# Patient Record
Sex: FEMALE | Race: WHITE | Employment: FULL TIME | URBAN - METROPOLITAN AREA
[De-identification: names, ages, dates, MRNs, and addresses within clinical notes are randomized per-mention and may not be internally consistent; named-entity substitution may affect disease eponyms.]

---

## 2019-11-25 ENCOUNTER — HOSPITAL ENCOUNTER (INPATIENT)
Age: 54
LOS: 1 days | Discharge: HOME | DRG: 247 | End: 2019-11-27
Attending: EMERGENCY MEDICINE | Admitting: INTERNAL MEDICINE
Payer: COMMERCIAL

## 2019-11-25 ENCOUNTER — APPOINTMENT (OUTPATIENT)
Dept: GENERAL RADIOLOGY | Age: 54
DRG: 247 | End: 2019-11-25
Payer: COMMERCIAL

## 2019-11-25 ENCOUNTER — APPOINTMENT (OUTPATIENT)
Dept: NUCLEAR MEDICINE | Age: 54
DRG: 247 | End: 2019-11-25
Payer: COMMERCIAL

## 2019-11-25 DIAGNOSIS — R07.9 ACUTE CHEST PAIN: Primary | ICD-10-CM

## 2019-11-25 DIAGNOSIS — J81.0 ACUTE PULMONARY EDEMA (HCC): ICD-10-CM

## 2019-11-25 DIAGNOSIS — R73.9 HYPERGLYCEMIA: ICD-10-CM

## 2019-11-25 LAB
ALBUMIN SERPL-MCNC: 4 GM/DL (ref 3.4–5)
ALP BLD-CCNC: 100 IU/L (ref 40–128)
ALT SERPL-CCNC: 23 U/L (ref 10–40)
ANION GAP SERPL CALCULATED.3IONS-SCNC: 13 MMOL/L (ref 4–16)
AST SERPL-CCNC: 19 IU/L (ref 15–37)
BASOPHILS ABSOLUTE: 0.1 K/CU MM
BASOPHILS RELATIVE PERCENT: 0.4 % (ref 0–1)
BILIRUB SERPL-MCNC: 0.2 MG/DL (ref 0–1)
BUN BLDV-MCNC: 14 MG/DL (ref 6–23)
CALCIUM SERPL-MCNC: 9.5 MG/DL (ref 8.3–10.6)
CHLORIDE BLD-SCNC: 90 MMOL/L (ref 99–110)
CHOLESTEROL: 304 MG/DL
CO2: 26 MMOL/L (ref 21–32)
CREAT SERPL-MCNC: 0.6 MG/DL (ref 0.6–1.1)
D DIMER: 202 NG/ML(DDU)
DIFFERENTIAL TYPE: ABNORMAL
EOSINOPHILS ABSOLUTE: 0.2 K/CU MM
EOSINOPHILS RELATIVE PERCENT: 2.1 % (ref 0–3)
ESTIMATED AVERAGE GLUCOSE: 335 MG/DL
GFR AFRICAN AMERICAN: >60 ML/MIN/1.73M2
GFR NON-AFRICAN AMERICAN: >60 ML/MIN/1.73M2
GLUCOSE BLD-MCNC: 319 MG/DL (ref 70–99)
GLUCOSE BLD-MCNC: 329 MG/DL (ref 70–99)
GLUCOSE BLD-MCNC: 343 MG/DL (ref 70–99)
GLUCOSE BLD-MCNC: 358 MG/DL (ref 70–99)
GLUCOSE BLD-MCNC: 509 MG/DL (ref 70–99)
HBA1C MFR BLD: 13.3 % (ref 4.2–6.3)
HCT VFR BLD CALC: 48.2 % (ref 37–47)
HDLC SERPL-MCNC: 30 MG/DL
HEMOGLOBIN: 15.7 GM/DL (ref 12.5–16)
IMMATURE NEUTROPHIL %: 0.4 % (ref 0–0.43)
LDL CHOLESTEROL DIRECT: 191 MG/DL
LV EF: 58 %
LV EF: 60 %
LVEF MODALITY: NORMAL
LVEF MODALITY: NORMAL
LYMPHOCYTES ABSOLUTE: 3.5 K/CU MM
LYMPHOCYTES RELATIVE PERCENT: 30.7 % (ref 24–44)
MCH RBC QN AUTO: 31.1 PG (ref 27–31)
MCHC RBC AUTO-ENTMCNC: 32.6 % (ref 32–36)
MCV RBC AUTO: 95.4 FL (ref 78–100)
MONOCYTES ABSOLUTE: 0.9 K/CU MM
MONOCYTES RELATIVE PERCENT: 7.7 % (ref 0–4)
NUCLEATED RBC %: 0 %
PDW BLD-RTO: 12 % (ref 11.7–14.9)
PLATELET # BLD: 197 K/CU MM (ref 140–440)
PMV BLD AUTO: 10.5 FL (ref 7.5–11.1)
POTASSIUM SERPL-SCNC: 4.3 MMOL/L (ref 3.5–5.1)
PRO-BNP: 52.8 PG/ML
PROCALCITONIN: 0.05
RBC # BLD: 5.05 M/CU MM (ref 4.2–5.4)
REASON FOR REJECTION: NORMAL
REASON FOR REJECTION: NORMAL
REJECTED TEST: NORMAL
SEGMENTED NEUTROPHILS ABSOLUTE COUNT: 6.7 K/CU MM
SEGMENTED NEUTROPHILS RELATIVE PERCENT: 58.7 % (ref 36–66)
SODIUM BLD-SCNC: 129 MMOL/L (ref 135–145)
TOTAL IMMATURE NEUTOROPHIL: 0.04 K/CU MM
TOTAL NUCLEATED RBC: 0 K/CU MM
TOTAL PROTEIN: 7.4 GM/DL (ref 6.4–8.2)
TRIGL SERPL-MCNC: 775 MG/DL
TROPONIN T: 0.03 NG/ML
TROPONIN T: 0.05 NG/ML
TROPONIN T: <0.01 NG/ML
WBC # BLD: 11.4 K/CU MM (ref 4–10.5)

## 2019-11-25 PROCEDURE — 99204 OFFICE O/P NEW MOD 45 MIN: CPT | Performed by: INTERNAL MEDICINE

## 2019-11-25 PROCEDURE — 3430000000 HC RX DIAGNOSTIC RADIOPHARMACEUTICAL: Performed by: INTERNAL MEDICINE

## 2019-11-25 PROCEDURE — 93306 TTE W/DOPPLER COMPLETE: CPT

## 2019-11-25 PROCEDURE — 6370000000 HC RX 637 (ALT 250 FOR IP): Performed by: INTERNAL MEDICINE

## 2019-11-25 PROCEDURE — 96372 THER/PROPH/DIAG INJ SC/IM: CPT

## 2019-11-25 PROCEDURE — 84145 PROCALCITONIN (PCT): CPT

## 2019-11-25 PROCEDURE — 80053 COMPREHEN METABOLIC PANEL: CPT

## 2019-11-25 PROCEDURE — 93010 ELECTROCARDIOGRAM REPORT: CPT | Performed by: INTERNAL MEDICINE

## 2019-11-25 PROCEDURE — 83036 HEMOGLOBIN GLYCOSYLATED A1C: CPT

## 2019-11-25 PROCEDURE — 6360000002 HC RX W HCPCS: Performed by: INTERNAL MEDICINE

## 2019-11-25 PROCEDURE — 78452 HT MUSCLE IMAGE SPECT MULT: CPT

## 2019-11-25 PROCEDURE — 99285 EMERGENCY DEPT VISIT HI MDM: CPT

## 2019-11-25 PROCEDURE — 71045 X-RAY EXAM CHEST 1 VIEW: CPT

## 2019-11-25 PROCEDURE — 85379 FIBRIN DEGRADATION QUANT: CPT

## 2019-11-25 PROCEDURE — 82962 GLUCOSE BLOOD TEST: CPT

## 2019-11-25 PROCEDURE — 83721 ASSAY OF BLOOD LIPOPROTEIN: CPT

## 2019-11-25 PROCEDURE — 84484 ASSAY OF TROPONIN QUANT: CPT

## 2019-11-25 PROCEDURE — 83880 ASSAY OF NATRIURETIC PEPTIDE: CPT

## 2019-11-25 PROCEDURE — G0378 HOSPITAL OBSERVATION PER HR: HCPCS

## 2019-11-25 PROCEDURE — 93017 CV STRESS TEST TRACING ONLY: CPT

## 2019-11-25 PROCEDURE — 94761 N-INVAS EAR/PLS OXIMETRY MLT: CPT

## 2019-11-25 PROCEDURE — 93005 ELECTROCARDIOGRAM TRACING: CPT | Performed by: EMERGENCY MEDICINE

## 2019-11-25 PROCEDURE — 80061 LIPID PANEL: CPT

## 2019-11-25 PROCEDURE — 85025 COMPLETE CBC W/AUTO DIFF WBC: CPT

## 2019-11-25 PROCEDURE — A9500 TC99M SESTAMIBI: HCPCS | Performed by: INTERNAL MEDICINE

## 2019-11-25 PROCEDURE — 36415 COLL VENOUS BLD VENIPUNCTURE: CPT

## 2019-11-25 RX ORDER — NICOTINE POLACRILEX 4 MG
15 LOZENGE BUCCAL PRN
Status: DISCONTINUED | OUTPATIENT
Start: 2019-11-25 | End: 2019-11-27 | Stop reason: HOSPADM

## 2019-11-25 RX ORDER — SODIUM CHLORIDE 0.9 % (FLUSH) 0.9 %
10 SYRINGE (ML) INJECTION EVERY 12 HOURS SCHEDULED
Status: CANCELLED | OUTPATIENT
Start: 2019-11-25

## 2019-11-25 RX ORDER — SODIUM CHLORIDE 9 MG/ML
INJECTION, SOLUTION INTRAVENOUS CONTINUOUS
Status: CANCELLED | OUTPATIENT
Start: 2019-11-25

## 2019-11-25 RX ORDER — DEXTROSE MONOHYDRATE 25 G/50ML
12.5 INJECTION, SOLUTION INTRAVENOUS PRN
Status: DISCONTINUED | OUTPATIENT
Start: 2019-11-25 | End: 2019-11-27 | Stop reason: HOSPADM

## 2019-11-25 RX ORDER — LISINOPRIL 20 MG/1
20 TABLET ORAL DAILY
Status: DISCONTINUED | OUTPATIENT
Start: 2019-11-25 | End: 2019-11-27 | Stop reason: HOSPADM

## 2019-11-25 RX ORDER — INSULIN GLARGINE 100 [IU]/ML
30 INJECTION, SOLUTION SUBCUTANEOUS NIGHTLY
Status: DISCONTINUED | OUTPATIENT
Start: 2019-11-25 | End: 2019-11-25

## 2019-11-25 RX ORDER — SODIUM CHLORIDE 0.9 % (FLUSH) 0.9 %
10 SYRINGE (ML) INJECTION PRN
Status: CANCELLED | OUTPATIENT
Start: 2019-11-25

## 2019-11-25 RX ORDER — DEXTROSE MONOHYDRATE 50 MG/ML
100 INJECTION, SOLUTION INTRAVENOUS PRN
Status: DISCONTINUED | OUTPATIENT
Start: 2019-11-25 | End: 2019-11-27 | Stop reason: HOSPADM

## 2019-11-25 RX ORDER — INSULIN GLARGINE 100 [IU]/ML
40 INJECTION, SOLUTION SUBCUTANEOUS NIGHTLY
Status: DISCONTINUED | OUTPATIENT
Start: 2019-11-25 | End: 2019-11-27

## 2019-11-25 RX ADMIN — INSULIN HUMAN 10 UNITS: 100 INJECTION, SOLUTION PARENTERAL at 03:25

## 2019-11-25 RX ADMIN — Medication 30 MILLICURIE: at 12:50

## 2019-11-25 RX ADMIN — Medication 10 MILLICURIE: at 09:55

## 2019-11-25 RX ADMIN — REGADENOSON 0.4 MG: 0.08 INJECTION, SOLUTION INTRAVENOUS at 12:48

## 2019-11-25 RX ADMIN — ENOXAPARIN SODIUM 40 MG: 40 INJECTION SUBCUTANEOUS at 15:59

## 2019-11-25 RX ADMIN — LISINOPRIL 20 MG: 20 TABLET ORAL at 15:59

## 2019-11-25 RX ADMIN — INSULIN GLARGINE 40 UNITS: 100 INJECTION, SOLUTION SUBCUTANEOUS at 20:32

## 2019-11-25 RX ADMIN — INSULIN LISPRO 4 UNITS: 100 INJECTION, SOLUTION INTRAVENOUS; SUBCUTANEOUS at 08:56

## 2019-11-25 SDOH — HEALTH STABILITY: MENTAL HEALTH: HOW OFTEN DO YOU HAVE A DRINK CONTAINING ALCOHOL?: NEVER

## 2019-11-25 ASSESSMENT — PAIN DESCRIPTION - ORIENTATION: ORIENTATION: MID

## 2019-11-25 ASSESSMENT — PAIN DESCRIPTION - PAIN TYPE: TYPE: ACUTE PAIN

## 2019-11-25 ASSESSMENT — PAIN SCALES - GENERAL
PAINLEVEL_OUTOF10: 7
PAINLEVEL_OUTOF10: 0

## 2019-11-25 ASSESSMENT — PAIN - FUNCTIONAL ASSESSMENT: PAIN_FUNCTIONAL_ASSESSMENT: 0-10

## 2019-11-25 ASSESSMENT — PAIN DESCRIPTION - LOCATION: LOCATION: CHEST

## 2019-11-25 ASSESSMENT — PAIN DESCRIPTION - ONSET: ONSET: ON-GOING

## 2019-11-25 ASSESSMENT — PAIN DESCRIPTION - FREQUENCY: FREQUENCY: CONTINUOUS

## 2019-11-26 ENCOUNTER — APPOINTMENT (OUTPATIENT)
Dept: GENERAL RADIOLOGY | Age: 54
DRG: 247 | End: 2019-11-26
Payer: COMMERCIAL

## 2019-11-26 PROBLEM — Z79.4 TYPE 2 DIABETES MELLITUS WITH CIRCULATORY DISORDER, WITH LONG-TERM CURRENT USE OF INSULIN (HCC): Status: ACTIVE | Noted: 2019-11-26

## 2019-11-26 PROBLEM — E66.01 MORBID OBESITY (HCC): Status: ACTIVE | Noted: 2019-11-26

## 2019-11-26 PROBLEM — I10 ESSENTIAL HYPERTENSION: Status: ACTIVE | Noted: 2019-11-26

## 2019-11-26 PROBLEM — E11.59 TYPE 2 DIABETES MELLITUS WITH CIRCULATORY DISORDER, WITH LONG-TERM CURRENT USE OF INSULIN (HCC): Status: ACTIVE | Noted: 2019-11-26

## 2019-11-26 PROBLEM — E78.5 DYSLIPIDEMIA: Status: ACTIVE | Noted: 2019-11-26

## 2019-11-26 PROBLEM — I20.0 UNSTABLE ANGINA (HCC): Status: ACTIVE | Noted: 2019-11-26

## 2019-11-26 LAB
ACTIVATED CLOTTING TIME, LOW RANGE: 159 SEC
ACTIVATED CLOTTING TIME, LOW RANGE: 198 SEC
ACTIVATED CLOTTING TIME, LOW RANGE: 229 SEC
GLUCOSE BLD-MCNC: 177 MG/DL (ref 70–99)
GLUCOSE BLD-MCNC: 218 MG/DL (ref 70–99)
GLUCOSE BLD-MCNC: 222 MG/DL (ref 70–99)
GLUCOSE BLD-MCNC: 279 MG/DL (ref 70–99)
LV EF: 65 %
LVEF MODALITY: NORMAL
TSH HIGH SENSITIVITY: 3.29 UIU/ML (ref 0.27–4.2)

## 2019-11-26 PROCEDURE — 1200000000 HC SEMI PRIVATE

## 2019-11-26 PROCEDURE — 92929 PR PRQ TRLUML CORONARY STENT W/ANGIO ADDL ART/BRNCH: CPT | Performed by: INTERNAL MEDICINE

## 2019-11-26 PROCEDURE — 6370000000 HC RX 637 (ALT 250 FOR IP): Performed by: INTERNAL MEDICINE

## 2019-11-26 PROCEDURE — 2580000003 HC RX 258: Performed by: INTERNAL MEDICINE

## 2019-11-26 PROCEDURE — G0378 HOSPITAL OBSERVATION PER HR: HCPCS

## 2019-11-26 PROCEDURE — C1760 CLOSURE DEV, VASC: HCPCS

## 2019-11-26 PROCEDURE — C1725 CATH, TRANSLUMIN NON-LASER: HCPCS

## 2019-11-26 PROCEDURE — 027034Z DILATION OF CORONARY ARTERY, ONE ARTERY WITH DRUG-ELUTING INTRALUMINAL DEVICE, PERCUTANEOUS APPROACH: ICD-10-PCS | Performed by: INTERNAL MEDICINE

## 2019-11-26 PROCEDURE — 6370000000 HC RX 637 (ALT 250 FOR IP)

## 2019-11-26 PROCEDURE — 92928 PRQ TCAT PLMT NTRAC ST 1 LES: CPT | Performed by: INTERNAL MEDICINE

## 2019-11-26 PROCEDURE — 6360000002 HC RX W HCPCS: Performed by: INTERNAL MEDICINE

## 2019-11-26 PROCEDURE — 93458 L HRT ARTERY/VENTRICLE ANGIO: CPT | Performed by: INTERNAL MEDICINE

## 2019-11-26 PROCEDURE — C1874 STENT, COATED/COV W/DEL SYS: HCPCS

## 2019-11-26 PROCEDURE — 94761 N-INVAS EAR/PLS OXIMETRY MLT: CPT

## 2019-11-26 PROCEDURE — 6360000002 HC RX W HCPCS

## 2019-11-26 PROCEDURE — 82962 GLUCOSE BLOOD TEST: CPT

## 2019-11-26 PROCEDURE — B2151ZZ FLUOROSCOPY OF LEFT HEART USING LOW OSMOLAR CONTRAST: ICD-10-PCS | Performed by: INTERNAL MEDICINE

## 2019-11-26 PROCEDURE — 84443 ASSAY THYROID STIM HORMONE: CPT

## 2019-11-26 PROCEDURE — 90686 IIV4 VACC NO PRSV 0.5 ML IM: CPT | Performed by: INTERNAL MEDICINE

## 2019-11-26 PROCEDURE — 2709999900 HC NON-CHARGEABLE SUPPLY

## 2019-11-26 PROCEDURE — B2111ZZ FLUOROSCOPY OF MULTIPLE CORONARY ARTERIES USING LOW OSMOLAR CONTRAST: ICD-10-PCS | Performed by: INTERNAL MEDICINE

## 2019-11-26 PROCEDURE — 2500000003 HC RX 250 WO HCPCS

## 2019-11-26 PROCEDURE — C1894 INTRO/SHEATH, NON-LASER: HCPCS

## 2019-11-26 PROCEDURE — C1769 GUIDE WIRE: HCPCS

## 2019-11-26 PROCEDURE — 92928 PRQ TCAT PLMT NTRAC ST 1 LES: CPT

## 2019-11-26 PROCEDURE — 2580000003 HC RX 258

## 2019-11-26 PROCEDURE — 4A023N7 MEASUREMENT OF CARDIAC SAMPLING AND PRESSURE, LEFT HEART, PERCUTANEOUS APPROACH: ICD-10-PCS | Performed by: INTERNAL MEDICINE

## 2019-11-26 PROCEDURE — 6360000004 HC RX CONTRAST MEDICATION

## 2019-11-26 PROCEDURE — G0008 ADMIN INFLUENZA VIRUS VAC: HCPCS | Performed by: INTERNAL MEDICINE

## 2019-11-26 PROCEDURE — C1887 CATHETER, GUIDING: HCPCS

## 2019-11-26 PROCEDURE — 92929 HC PRQ CARD STENT W/ANGIO ADDL: CPT

## 2019-11-26 PROCEDURE — 93458 L HRT ARTERY/VENTRICLE ANGIO: CPT

## 2019-11-26 PROCEDURE — 85347 COAGULATION TIME ACTIVATED: CPT

## 2019-11-26 PROCEDURE — 71046 X-RAY EXAM CHEST 2 VIEWS: CPT

## 2019-11-26 RX ORDER — SODIUM CHLORIDE 9 MG/ML
1000 INJECTION, SOLUTION INTRAVENOUS CONTINUOUS
Status: DISCONTINUED | OUTPATIENT
Start: 2019-11-26 | End: 2019-11-27 | Stop reason: HOSPADM

## 2019-11-26 RX ORDER — ASPIRIN 81 MG/1
81 TABLET ORAL DAILY
Status: DISCONTINUED | OUTPATIENT
Start: 2019-11-27 | End: 2019-11-27 | Stop reason: HOSPADM

## 2019-11-26 RX ORDER — SODIUM CHLORIDE 0.9 % (FLUSH) 0.9 %
10 SYRINGE (ML) INJECTION PRN
Status: DISCONTINUED | OUTPATIENT
Start: 2019-11-26 | End: 2019-11-27 | Stop reason: HOSPADM

## 2019-11-26 RX ORDER — CARVEDILOL 3.12 MG/1
3.12 TABLET ORAL 2 TIMES DAILY WITH MEALS
Status: DISCONTINUED | OUTPATIENT
Start: 2019-11-26 | End: 2019-11-27 | Stop reason: HOSPADM

## 2019-11-26 RX ORDER — ACETAMINOPHEN 325 MG/1
650 TABLET ORAL EVERY 4 HOURS PRN
Status: DISCONTINUED | OUTPATIENT
Start: 2019-11-26 | End: 2019-11-27 | Stop reason: HOSPADM

## 2019-11-26 RX ORDER — ATORVASTATIN CALCIUM 40 MG/1
40 TABLET, FILM COATED ORAL NIGHTLY
Status: DISCONTINUED | OUTPATIENT
Start: 2019-11-26 | End: 2019-11-27 | Stop reason: HOSPADM

## 2019-11-26 RX ORDER — FAMOTIDINE 20 MG/1
20 TABLET, FILM COATED ORAL 2 TIMES DAILY
Status: DISCONTINUED | OUTPATIENT
Start: 2019-11-26 | End: 2019-11-27 | Stop reason: HOSPADM

## 2019-11-26 RX ORDER — PRASUGREL 10 MG/1
10 TABLET, FILM COATED ORAL DAILY
Status: DISCONTINUED | OUTPATIENT
Start: 2019-11-27 | End: 2019-11-27 | Stop reason: HOSPADM

## 2019-11-26 RX ORDER — SODIUM CHLORIDE 0.9 % (FLUSH) 0.9 %
10 SYRINGE (ML) INJECTION EVERY 12 HOURS SCHEDULED
Status: DISCONTINUED | OUTPATIENT
Start: 2019-11-26 | End: 2019-11-27 | Stop reason: HOSPADM

## 2019-11-26 RX ADMIN — CARVEDILOL 3.12 MG: 3.12 TABLET, FILM COATED ORAL at 17:45

## 2019-11-26 RX ADMIN — FAMOTIDINE 20 MG: 20 TABLET ORAL at 23:31

## 2019-11-26 RX ADMIN — INFLUENZA A VIRUS A/MICHIGAN/45/2015 X-275 (H1N1) ANTIGEN (FORMALDEHYDE INACTIVATED), INFLUENZA A VIRUS A/SINGAPORE/INFIMH-16-0019/2016 IVR-186 (H3N2) ANTIGEN (FORMALDEHYDE INACTIVATED), INFLUENZA B VIRUS B/PHUKET/3073/2013 ANTIGEN (FORMALDEHYDE INACTIVATED), AND INFLUENZA B VIRUS B/MARYLAND/15/2016 BX-69A ANTIGEN (FORMALDEHYDE INACTIVATED) 0.5 ML: 15; 15; 15; 15 INJECTION, SUSPENSION INTRAMUSCULAR at 12:22

## 2019-11-26 RX ADMIN — SODIUM CHLORIDE 1000 ML: 9 INJECTION, SOLUTION INTRAVENOUS at 20:01

## 2019-11-26 RX ADMIN — ATORVASTATIN CALCIUM 40 MG: 40 TABLET, FILM COATED ORAL at 23:31

## 2019-11-26 RX ADMIN — LISINOPRIL 20 MG: 20 TABLET ORAL at 12:20

## 2019-11-26 RX ADMIN — LINAGLIPTIN 5 MG: 5 TABLET, FILM COATED ORAL at 12:20

## 2019-11-26 RX ADMIN — INSULIN GLARGINE 40 UNITS: 100 INJECTION, SOLUTION SUBCUTANEOUS at 23:27

## 2019-11-26 ASSESSMENT — PAIN SCALES - GENERAL: PAINLEVEL_OUTOF10: 0

## 2019-11-27 VITALS
WEIGHT: 272.2 LBS | OXYGEN SATURATION: 98 % | RESPIRATION RATE: 20 BRPM | SYSTOLIC BLOOD PRESSURE: 131 MMHG | DIASTOLIC BLOOD PRESSURE: 80 MMHG | BODY MASS INDEX: 42.72 KG/M2 | TEMPERATURE: 98.3 F | HEIGHT: 67 IN | HEART RATE: 76 BPM

## 2019-11-27 LAB
ANION GAP SERPL CALCULATED.3IONS-SCNC: 8 MMOL/L (ref 4–16)
BUN BLDV-MCNC: 11 MG/DL (ref 6–23)
CALCIUM SERPL-MCNC: 9.1 MG/DL (ref 8.3–10.6)
CHLORIDE BLD-SCNC: 100 MMOL/L (ref 99–110)
CO2: 30 MMOL/L (ref 21–32)
CREAT SERPL-MCNC: 0.6 MG/DL (ref 0.6–1.1)
GFR AFRICAN AMERICAN: >60 ML/MIN/1.73M2
GFR NON-AFRICAN AMERICAN: >60 ML/MIN/1.73M2
GLUCOSE BLD-MCNC: 150 MG/DL (ref 70–99)
GLUCOSE BLD-MCNC: 174 MG/DL (ref 70–99)
GLUCOSE BLD-MCNC: 226 MG/DL (ref 70–99)
GLUCOSE BLD-MCNC: 246 MG/DL (ref 70–99)
HCT VFR BLD CALC: 45.8 % (ref 37–47)
HEMOGLOBIN: 14.2 GM/DL (ref 12.5–16)
MCH RBC QN AUTO: 30.5 PG (ref 27–31)
MCHC RBC AUTO-ENTMCNC: 31 % (ref 32–36)
MCV RBC AUTO: 98.3 FL (ref 78–100)
PDW BLD-RTO: 12.4 % (ref 11.7–14.9)
PLATELET # BLD: 168 K/CU MM (ref 140–440)
PMV BLD AUTO: 10 FL (ref 7.5–11.1)
POTASSIUM SERPL-SCNC: 4.8 MMOL/L (ref 3.5–5.1)
RBC # BLD: 4.66 M/CU MM (ref 4.2–5.4)
SODIUM BLD-SCNC: 138 MMOL/L (ref 135–145)
WBC # BLD: 10.5 K/CU MM (ref 4–10.5)

## 2019-11-27 PROCEDURE — 6370000000 HC RX 637 (ALT 250 FOR IP): Performed by: INTERNAL MEDICINE

## 2019-11-27 PROCEDURE — 80048 BASIC METABOLIC PNL TOTAL CA: CPT

## 2019-11-27 PROCEDURE — 94761 N-INVAS EAR/PLS OXIMETRY MLT: CPT

## 2019-11-27 PROCEDURE — 36415 COLL VENOUS BLD VENIPUNCTURE: CPT

## 2019-11-27 PROCEDURE — 85027 COMPLETE CBC AUTOMATED: CPT

## 2019-11-27 PROCEDURE — 99232 SBSQ HOSP IP/OBS MODERATE 35: CPT | Performed by: INTERNAL MEDICINE

## 2019-11-27 PROCEDURE — 82962 GLUCOSE BLOOD TEST: CPT

## 2019-11-27 RX ORDER — LANCETS 30 GAUGE
1 EACH MISCELLANEOUS 4 TIMES DAILY
Qty: 200 EACH | Refills: 2 | Status: SHIPPED | OUTPATIENT
Start: 2019-11-27

## 2019-11-27 RX ORDER — FAMOTIDINE 20 MG/1
20 TABLET, FILM COATED ORAL 2 TIMES DAILY
Qty: 60 TABLET | Refills: 1 | Status: SHIPPED | OUTPATIENT
Start: 2019-11-27 | End: 2020-03-11 | Stop reason: SDUPTHER

## 2019-11-27 RX ORDER — GLUCOSAMINE HCL/CHONDROITIN SU 500-400 MG
CAPSULE ORAL
Qty: 200 STRIP | Refills: 2 | Status: SHIPPED | OUTPATIENT
Start: 2019-11-27

## 2019-11-27 RX ORDER — LISINOPRIL 20 MG/1
20 TABLET ORAL DAILY
Qty: 30 TABLET | Refills: 3 | Status: SHIPPED | OUTPATIENT
Start: 2019-11-28 | End: 2020-03-11 | Stop reason: SDUPTHER

## 2019-11-27 RX ORDER — ASPIRIN 81 MG/1
81 TABLET ORAL DAILY
Qty: 30 TABLET | Refills: 3 | Status: SHIPPED | OUTPATIENT
Start: 2019-11-28 | End: 2020-03-11 | Stop reason: SDUPTHER

## 2019-11-27 RX ORDER — ATORVASTATIN CALCIUM 40 MG/1
40 TABLET, FILM COATED ORAL NIGHTLY
Qty: 30 TABLET | Refills: 3 | Status: SHIPPED | OUTPATIENT
Start: 2019-11-27 | End: 2020-03-11 | Stop reason: SDUPTHER

## 2019-11-27 RX ORDER — PRASUGREL 10 MG/1
10 TABLET, FILM COATED ORAL DAILY
Qty: 30 TABLET | Refills: 3 | Status: SHIPPED | OUTPATIENT
Start: 2019-11-28 | End: 2020-03-11 | Stop reason: SDUPTHER

## 2019-11-27 RX ORDER — CARVEDILOL 3.12 MG/1
3.12 TABLET ORAL 2 TIMES DAILY WITH MEALS
Qty: 60 TABLET | Refills: 3 | Status: SHIPPED | OUTPATIENT
Start: 2019-11-27 | End: 2020-03-11 | Stop reason: SDUPTHER

## 2019-11-27 RX ORDER — PEN NEEDLE, DIABETIC 30 GX3/16"
NEEDLE, DISPOSABLE MISCELLANEOUS
Qty: 150 EACH | Refills: 2 | Status: SHIPPED | OUTPATIENT
Start: 2019-11-27

## 2019-11-27 RX ORDER — INSULIN GLARGINE 100 [IU]/ML
40 INJECTION, SOLUTION SUBCUTANEOUS NIGHTLY
Status: DISCONTINUED | OUTPATIENT
Start: 2019-11-27 | End: 2019-11-27 | Stop reason: HOSPADM

## 2019-11-27 RX ADMIN — PRASUGREL 10 MG: 10 TABLET, FILM COATED ORAL at 08:15

## 2019-11-27 RX ADMIN — FAMOTIDINE 20 MG: 20 TABLET ORAL at 08:17

## 2019-11-27 RX ADMIN — LINAGLIPTIN 5 MG: 5 TABLET, FILM COATED ORAL at 08:15

## 2019-11-27 RX ADMIN — LISINOPRIL 20 MG: 20 TABLET ORAL at 08:17

## 2019-11-27 RX ADMIN — CARVEDILOL 3.12 MG: 3.12 TABLET, FILM COATED ORAL at 08:17

## 2019-11-27 RX ADMIN — ASPIRIN 81 MG: 81 TABLET, COATED ORAL at 08:17

## 2019-11-27 ASSESSMENT — PAIN SCALES - GENERAL
PAINLEVEL_OUTOF10: 0
PAINLEVEL_OUTOF10: 0

## 2019-12-04 LAB
EKG ATRIAL RATE: 128 BPM
EKG DIAGNOSIS: NORMAL
EKG P AXIS: 11 DEGREES
EKG P-R INTERVAL: 170 MS
EKG Q-T INTERVAL: 278 MS
EKG QRS DURATION: 72 MS
EKG QTC CALCULATION (BAZETT): 405 MS
EKG R AXIS: 4 DEGREES
EKG T AXIS: 19 DEGREES
EKG VENTRICULAR RATE: 128 BPM

## 2019-12-10 ENCOUNTER — TELEPHONE (OUTPATIENT)
Dept: CARDIOLOGY CLINIC | Age: 54
End: 2019-12-10

## 2019-12-10 ENCOUNTER — OFFICE VISIT (OUTPATIENT)
Dept: CARDIOLOGY CLINIC | Age: 54
End: 2019-12-10
Payer: COMMERCIAL

## 2019-12-10 VITALS
BODY MASS INDEX: 43.81 KG/M2 | WEIGHT: 272.6 LBS | HEIGHT: 66 IN | SYSTOLIC BLOOD PRESSURE: 134 MMHG | DIASTOLIC BLOOD PRESSURE: 82 MMHG | HEART RATE: 98 BPM

## 2019-12-10 DIAGNOSIS — Z98.61 S/P PTCA (PERCUTANEOUS TRANSLUMINAL CORONARY ANGIOPLASTY): ICD-10-CM

## 2019-12-10 DIAGNOSIS — R00.2 PALPITATIONS: Primary | ICD-10-CM

## 2019-12-10 PROCEDURE — G8482 FLU IMMUNIZE ORDER/ADMIN: HCPCS | Performed by: INTERNAL MEDICINE

## 2019-12-10 PROCEDURE — 3017F COLORECTAL CA SCREEN DOC REV: CPT | Performed by: INTERNAL MEDICINE

## 2019-12-10 PROCEDURE — 99214 OFFICE O/P EST MOD 30 MIN: CPT | Performed by: INTERNAL MEDICINE

## 2019-12-10 PROCEDURE — 1111F DSCHRG MED/CURRENT MED MERGE: CPT | Performed by: INTERNAL MEDICINE

## 2019-12-10 PROCEDURE — G8417 CALC BMI ABV UP PARAM F/U: HCPCS | Performed by: INTERNAL MEDICINE

## 2019-12-10 PROCEDURE — G8427 DOCREV CUR MEDS BY ELIG CLIN: HCPCS | Performed by: INTERNAL MEDICINE

## 2019-12-10 PROCEDURE — G8598 ASA/ANTIPLAT THER USED: HCPCS | Performed by: INTERNAL MEDICINE

## 2019-12-10 PROCEDURE — 93000 ELECTROCARDIOGRAM COMPLETE: CPT | Performed by: INTERNAL MEDICINE

## 2019-12-10 PROCEDURE — 4004F PT TOBACCO SCREEN RCVD TLK: CPT | Performed by: INTERNAL MEDICINE

## 2019-12-11 ENCOUNTER — TELEPHONE (OUTPATIENT)
Dept: CARDIOLOGY CLINIC | Age: 54
End: 2019-12-11

## 2019-12-13 ENCOUNTER — TELEPHONE (OUTPATIENT)
Dept: CARDIOLOGY CLINIC | Age: 54
End: 2019-12-13

## 2019-12-16 ENCOUNTER — TELEPHONE (OUTPATIENT)
Dept: CARDIOLOGY CLINIC | Age: 54
End: 2019-12-16

## 2019-12-18 ENCOUNTER — PROCEDURE VISIT (OUTPATIENT)
Dept: CARDIOLOGY CLINIC | Age: 54
End: 2019-12-18
Payer: COMMERCIAL

## 2019-12-18 VITALS
HEIGHT: 66 IN | HEART RATE: 97 BPM | DIASTOLIC BLOOD PRESSURE: 72 MMHG | BODY MASS INDEX: 43.71 KG/M2 | WEIGHT: 272 LBS | SYSTOLIC BLOOD PRESSURE: 118 MMHG

## 2019-12-18 DIAGNOSIS — R06.02 SOB (SHORTNESS OF BREATH): ICD-10-CM

## 2019-12-18 DIAGNOSIS — Z98.61 S/P PTCA (PERCUTANEOUS TRANSLUMINAL CORONARY ANGIOPLASTY): Primary | ICD-10-CM

## 2019-12-18 DIAGNOSIS — R00.2 PALPITATIONS: ICD-10-CM

## 2019-12-18 PROCEDURE — 93015 CV STRESS TEST SUPVJ I&R: CPT | Performed by: INTERNAL MEDICINE

## 2019-12-23 DIAGNOSIS — R00.2 PALPITATIONS: ICD-10-CM

## 2019-12-23 DIAGNOSIS — Z98.61 S/P PTCA (PERCUTANEOUS TRANSLUMINAL CORONARY ANGIOPLASTY): Primary | ICD-10-CM

## 2020-03-11 ENCOUNTER — OFFICE VISIT (OUTPATIENT)
Dept: CARDIOLOGY CLINIC | Age: 55
End: 2020-03-11
Payer: COMMERCIAL

## 2020-03-11 VITALS
SYSTOLIC BLOOD PRESSURE: 120 MMHG | HEIGHT: 66 IN | BODY MASS INDEX: 44.16 KG/M2 | WEIGHT: 274.8 LBS | HEART RATE: 84 BPM | DIASTOLIC BLOOD PRESSURE: 80 MMHG

## 2020-03-11 PROCEDURE — G8417 CALC BMI ABV UP PARAM F/U: HCPCS | Performed by: INTERNAL MEDICINE

## 2020-03-11 PROCEDURE — G8482 FLU IMMUNIZE ORDER/ADMIN: HCPCS | Performed by: INTERNAL MEDICINE

## 2020-03-11 PROCEDURE — 99214 OFFICE O/P EST MOD 30 MIN: CPT | Performed by: INTERNAL MEDICINE

## 2020-03-11 PROCEDURE — G8427 DOCREV CUR MEDS BY ELIG CLIN: HCPCS | Performed by: INTERNAL MEDICINE

## 2020-03-11 PROCEDURE — 3017F COLORECTAL CA SCREEN DOC REV: CPT | Performed by: INTERNAL MEDICINE

## 2020-03-11 PROCEDURE — 4004F PT TOBACCO SCREEN RCVD TLK: CPT | Performed by: INTERNAL MEDICINE

## 2020-03-11 PROCEDURE — 93000 ELECTROCARDIOGRAM COMPLETE: CPT | Performed by: INTERNAL MEDICINE

## 2020-03-11 RX ORDER — PRASUGREL 10 MG/1
10 TABLET, FILM COATED ORAL DAILY
Qty: 30 TABLET | Refills: 3 | Status: SHIPPED | OUTPATIENT
Start: 2020-03-11 | End: 2020-06-26

## 2020-03-11 RX ORDER — FAMOTIDINE 20 MG/1
20 TABLET, FILM COATED ORAL 2 TIMES DAILY
Qty: 60 TABLET | Refills: 1 | Status: SHIPPED | OUTPATIENT
Start: 2020-03-11 | End: 2021-02-10

## 2020-03-11 RX ORDER — CARVEDILOL 3.12 MG/1
3.12 TABLET ORAL 2 TIMES DAILY WITH MEALS
Qty: 60 TABLET | Refills: 3 | Status: SHIPPED | OUTPATIENT
Start: 2020-03-11 | End: 2020-06-26

## 2020-03-11 RX ORDER — ATORVASTATIN CALCIUM 40 MG/1
40 TABLET, FILM COATED ORAL NIGHTLY
Qty: 30 TABLET | Refills: 3 | Status: SHIPPED | OUTPATIENT
Start: 2020-03-11 | End: 2020-06-26

## 2020-03-11 RX ORDER — ASPIRIN 81 MG/1
81 TABLET ORAL DAILY
Qty: 30 TABLET | Refills: 3 | Status: SHIPPED | OUTPATIENT
Start: 2020-03-11 | End: 2020-06-26

## 2020-03-11 RX ORDER — LISINOPRIL 20 MG/1
20 TABLET ORAL DAILY
Qty: 30 TABLET | Refills: 3 | Status: SHIPPED | OUTPATIENT
Start: 2020-03-11 | End: 2020-06-26

## 2020-03-11 NOTE — PROGRESS NOTES
11/25/2019     Lab Results   Component Value Date    WBC 10.5 11/27/2019    HCT 45.8 11/27/2019    MCV 98.3 11/27/2019     11/27/2019     Lab Results   Component Value Date    CHOL 304 (H) 11/25/2019    TRIG 775 (H) 11/25/2019    HDL 30 (L) 11/25/2019    LDLDIRECT 191 (H) 11/25/2019     Lab Results   Component Value Date    ALT 23 11/25/2019    AST 19 11/25/2019     BMP:    Lab Results   Component Value Date     11/27/2019    K 4.8 11/27/2019     11/27/2019    CO2 30 11/27/2019    BUN 11 11/27/2019    CREATININE 0.6 11/27/2019     CMP:   Lab Results   Component Value Date     11/27/2019    K 4.8 11/27/2019     11/27/2019    CO2 30 11/27/2019    BUN 11 11/27/2019    PROT 7.4 11/25/2019     TSH:    Lab Results   Component Value Date    TSHHS 3.290 11/26/2019       QUALITY MEASURES REVIEWED:  Impression:    1. Palpitations       Patient Active Problem List   Diagnosis Code    Acute chest pain R07.9    Unstable angina (HCC) I20.0    Morbid obesity (HCC) E66.01    Type 2 diabetes mellitus with circulatory disorder, with long-term current use of insulin (HCC) E11.59, Z79.4    Essential hypertension I10    Dyslipidemia E78.5    Post PTCA Z98.61    SOB (shortness of breath) R06.02    CAD (coronary artery disease) I25.10    Palpitations R00.2       Assessment & Plan:               -     CORONARY ARTERY DISEASE:  asymptomatic     All available  tests in chart reviewed. Management discussed . Testing ordered  no                                 -  Hypertension: Patients blood pressure is normal. Patient is advised about low sodium diet. Present medical regimen will not be changed. Mortality from the morbid obesity is very high:                   -  LIPID MANAGEMENT:  Available lipid  lab data reviewed  and patient was given dietary advice. NCEP- ATP III guidelines reviewed with patient. -   Changes  in medicines made:  Isidra Cunningham MA  [facility] [place]

## 2020-06-25 ENCOUNTER — TELEPHONE (OUTPATIENT)
Dept: CARDIOLOGY CLINIC | Age: 55
End: 2020-06-25

## 2020-06-26 NOTE — TELEPHONE ENCOUNTER
I talked to the patient and she thank you for letting her know and she will figure something out about work and this letter.

## 2020-06-29 RX ORDER — CARVEDILOL 3.12 MG/1
3.12 TABLET ORAL 2 TIMES DAILY WITH MEALS
Qty: 60 TABLET | Refills: 3 | Status: SHIPPED | OUTPATIENT
Start: 2020-06-29 | End: 2020-10-23

## 2020-06-29 RX ORDER — ATORVASTATIN CALCIUM 40 MG/1
40 TABLET, FILM COATED ORAL DAILY
Qty: 30 TABLET | Refills: 3 | Status: SHIPPED | OUTPATIENT
Start: 2020-06-29 | End: 2020-10-23 | Stop reason: SDUPTHER

## 2020-06-29 RX ORDER — LISINOPRIL 20 MG/1
20 TABLET ORAL DAILY
Qty: 30 TABLET | Refills: 3 | Status: SHIPPED | OUTPATIENT
Start: 2020-06-29 | End: 2020-10-23 | Stop reason: SDUPTHER

## 2020-06-29 RX ORDER — PRASUGREL 10 MG/1
10 TABLET, FILM COATED ORAL ONCE
Qty: 30 TABLET | Refills: 3 | Status: SHIPPED | OUTPATIENT
Start: 2020-06-29 | End: 2020-10-23 | Stop reason: SDUPTHER

## 2020-06-29 RX ORDER — ASPIRIN 81 MG/1
81 TABLET ORAL DAILY
Qty: 30 TABLET | Refills: 3 | Status: SHIPPED | OUTPATIENT
Start: 2020-06-29 | End: 2020-10-23 | Stop reason: SDUPTHER

## 2020-10-23 ENCOUNTER — OFFICE VISIT (OUTPATIENT)
Dept: CARDIOLOGY CLINIC | Age: 55
End: 2020-10-23
Payer: COMMERCIAL

## 2020-10-23 VITALS
BODY MASS INDEX: 45.3 KG/M2 | SYSTOLIC BLOOD PRESSURE: 170 MMHG | HEART RATE: 78 BPM | HEIGHT: 67 IN | WEIGHT: 288.6 LBS | RESPIRATION RATE: 16 BRPM | DIASTOLIC BLOOD PRESSURE: 102 MMHG

## 2020-10-23 PROCEDURE — 3017F COLORECTAL CA SCREEN DOC REV: CPT | Performed by: NURSE PRACTITIONER

## 2020-10-23 PROCEDURE — G8484 FLU IMMUNIZE NO ADMIN: HCPCS | Performed by: NURSE PRACTITIONER

## 2020-10-23 PROCEDURE — 99214 OFFICE O/P EST MOD 30 MIN: CPT | Performed by: NURSE PRACTITIONER

## 2020-10-23 PROCEDURE — 4004F PT TOBACCO SCREEN RCVD TLK: CPT | Performed by: NURSE PRACTITIONER

## 2020-10-23 PROCEDURE — G8427 DOCREV CUR MEDS BY ELIG CLIN: HCPCS | Performed by: NURSE PRACTITIONER

## 2020-10-23 PROCEDURE — G8417 CALC BMI ABV UP PARAM F/U: HCPCS | Performed by: NURSE PRACTITIONER

## 2020-10-23 RX ORDER — PRASUGREL 10 MG/1
10 TABLET, FILM COATED ORAL ONCE
Qty: 30 TABLET | Refills: 5 | Status: SHIPPED | OUTPATIENT
Start: 2020-10-23 | End: 2020-11-19

## 2020-10-23 RX ORDER — ASPIRIN 81 MG/1
81 TABLET ORAL DAILY
Qty: 30 TABLET | Refills: 5 | Status: SHIPPED | OUTPATIENT
Start: 2020-10-23 | End: 2021-03-29

## 2020-10-23 RX ORDER — LISINOPRIL 20 MG/1
20 TABLET ORAL DAILY
Qty: 30 TABLET | Refills: 5 | Status: SHIPPED | OUTPATIENT
Start: 2020-10-23 | End: 2021-03-30 | Stop reason: SDUPTHER

## 2020-10-23 RX ORDER — CARVEDILOL 3.12 MG/1
3.12 TABLET ORAL 2 TIMES DAILY WITH MEALS
Qty: 60 TABLET | Refills: 3 | Status: CANCELLED | OUTPATIENT
Start: 2020-10-23

## 2020-10-23 RX ORDER — CARVEDILOL 6.25 MG/1
6.25 TABLET ORAL 2 TIMES DAILY WITH MEALS
Qty: 60 TABLET | Refills: 3 | Status: ON HOLD
Start: 2020-10-23 | End: 2021-02-15 | Stop reason: HOSPADM

## 2020-10-23 RX ORDER — ATORVASTATIN CALCIUM 40 MG/1
40 TABLET, FILM COATED ORAL DAILY
Qty: 30 TABLET | Refills: 5 | Status: SHIPPED | OUTPATIENT
Start: 2020-10-23 | End: 2021-03-30 | Stop reason: SDUPTHER

## 2020-10-23 ASSESSMENT — ENCOUNTER SYMPTOMS
HOARSE VOICE: 0
EYE PAIN: 0
ABDOMINAL PAIN: 0
COLOR CHANGE: 0
POOR WOUND HEALING: 0
SHORTNESS OF BREATH: 0

## 2020-10-23 NOTE — PROGRESS NOTES
[place] ([facility]  [address]  Phone: [phone]    Fax [phone]                  Gabriela Vickers MD, Enrrique Canas MD, [address] [facility], MD, MD Brooks Connolly MD Malinda Corral, MD Johnye , APRN               Iesha Haq, APRN    Yessy Mccray, APRN              Francisca Delgado, APRN            10/23/2020    RE: Antonio Poole  (1965)                               TO:  Dr. Mai Pimentel primary care provider on file. The primary cardiologist is Dr. Slim Mederos    CC:   1. Coronary artery disease involving native coronary artery of native heart without angina pectoris    2. Essential hypertension    3. Dyslipidemia    4. Morbid obesity ([address]         HPI:    Thank you for involving me in taking care of your patient Antonio Poole. Antonio Poole is a 50y.o. year old female with a history as listed above and is being seen in the office today. Antonio Poole reports that she is feeling well. There is no chest pain. She denies any SOB. There are no reported palpitations. She denies dizziness. She notes her diabetes has been under good control.   She is following up with Dr. Jeyson Perry      Current Outpatient Medications   Medication Sig Dispense Refill    atorvastatin (LIPITOR) 40 MG tablet Take 1 tablet by mouth daily 30 tablet 3    lisinopril (PRINIVIL;ZESTRIL) 20 MG tablet Take 1 tablet by mouth daily 30 tablet 3    aspirin (ASPIRIN LOW DOSE) 81 MG EC tablet Take 1 tablet by mouth daily 30 tablet 3    carvedilol (COREG) 3.125 MG tablet Take 1 tablet by mouth 2 times daily (with meals) 60 tablet 3    Canagliflozin (INVOKANA PO) Take 200 mg by mouth      famotidine (PEPCID) 20 MG tablet Take 1 tablet by mouth 2 times daily 60 tablet 1    linagliptin (TRADJENTA) 5 MG tablet Take 1 tablet by mouth daily 30 tablet 2    insulin glargine (LANTUS SOLOSTAR) 100 UNIT/ML injection pen Inject 40 Units into the skin daily 5 pen 3    insulin aspart (NOVOLOG FLEXPEN) 100 UNIT/ML injection pen Take 20 units with each meal in addition to sliding scale as below   *Medium Dose Correction Algorithm**Insulin: 3 TIMES DAILY WITH MEALSGlucose: Dose: No [id number] 2 [id number] 4 [id number] 6 [id number] 8 [id number] 10 Fejwa077 and above 12 Units 5 pen 3    blood glucose monitor kit and supplies Test 4 times a day before meals and bedtime& as needed for symptoms of irregular blood glucose. 1 kit 0    Insulin Pen Needle (PEN NEEDLES) 31G X 5 MM MISC lantus QHS and Novolog QAC and  each 2    Lancets MISC 1 each by Does not apply route 4 times daily 200 each 2    blood glucose monitor strips Test 4 times a day & as needed for symptoms of irregular blood glucose. 200 strip 2    prasugrel (EFFIENT) 10 MG TABS Take 1 tablet by mouth once for 1 dose 30 tablet 3     No current facility-administered medications for this visit. Allergies: Ampicillin  Past Medical History:   Diagnosis Date    CAD (coronary artery disease)     Current every day smoker     Diabetes mellitus ([address]     History of exercise stress test 12/18/2019    Treadmill, Normal exercise performance without angina and ischemic EKG changes.  Hyperlipidemia     Hypertension     Morbid obesity ([address]     Palpitations     Post PTCA 11/26/2019    Kissing stent to LAD & to Ostium of Diag.  SOB (shortness of breath)      Past Surgical History:   Procedure Laterality Date    HERNIA REPAIR      PTCA  11/26/2019    Kissing stents to LAD & Ostium of Diag. No family history on file. Social History     Tobacco Use    Smoking status: Current Some Day Smoker     Packs/day: 1.00    Smokeless tobacco: Never Used    Tobacco comment: 1 pack every four days    Substance Use Topics    Alcohol use: Never     Frequency: Never        Review of Systems   Constitution: Positive for weight gain. Negative for diaphoresis and malaise/fatigue.    HENT: Negative for congestion and hoarse voice. Eyes: Negative for pain and visual disturbance. Cardiovascular: Negative for chest pain, dyspnea on exertion, irregular heartbeat, near-syncope, palpitations and paroxysmal nocturnal dyspnea. Respiratory: Negative for shortness of breath. Endocrine: Negative for cold intolerance and heat intolerance. Hematologic/Lymphatic: Negative for adenopathy and bleeding problem. Skin: Negative for color change and poor wound healing. Musculoskeletal: Positive for arthritis. Negative for muscle weakness. Gastrointestinal: Negative for abdominal pain and melena. Genitourinary: Negative for flank pain and hematuria. Neurological: Negative for dizziness and light-headedness. Psychiatric/Behavioral: Negative for depression. The patient is not nervous/anxious. Objective:      Physical Exam:  BP (!) 170/102 (Site: Left Upper Arm, Position: Sitting, Cuff Size: Large Adult)   Pulse 78   Resp 16   Ht 5' 7\" (1.702 m)   Wt 288 lb 9.6 oz (130.9 kg)   BMI 45.20 kg/m²   Wt Readings from Last 3 Encounters:   10/23/20 288 lb 9.6 oz (130.9 kg)   03/11/20 274 lb 12.8 oz (124.6 kg)   12/18/19 272 lb (123.4 kg)     Body mass index is 45.2 kg/m². Physical Exam  Constitutional:       Appearance: Normal appearance. She is obese. HENT:      Head: Normocephalic and atraumatic. Right Ear: External ear normal.      Left Ear: External ear normal.      Nose: Nose normal.      Mouth/Throat:      Mouth: Mucous membranes are moist.   Eyes:      Conjunctiva/sclera: Conjunctivae normal.      Pupils: Pupils are equal, round, and reactive to light. Neck:      Musculoskeletal: Normal range of motion and neck supple. Cardiovascular:      Rate and Rhythm: Normal rate and regular rhythm. Pulses: Normal pulses. Heart sounds: Normal heart sounds. Pulmonary:      Effort: Pulmonary effort is normal.      Breath sounds: Normal breath sounds. No rales.    Chest: Chest wall: No tenderness. Abdominal:      General: There is no distension. Palpations: Abdomen is soft. Tenderness: There is no abdominal tenderness. Musculoskeletal:         General: No tenderness. Right lower leg: No edema. Left lower leg: No edema. Skin:     General: Skin is warm and dry. Capillary Refill: Capillary refill takes less than 2 seconds. Neurological:      General: No focal deficit present. Mental Status: She is alert and oriented to person, place, and time. Psychiatric:         Mood and Affect: Mood normal.         Behavior: Behavior normal.             DATA  BNP:   Lab Results   Component Value Date    PROBNP 52.80 11/25/2019     CBC:   Lab Results   Component Value Date    WBC 10.5 11/27/2019    RBC 4.66 11/27/2019    HGB 14.2 11/27/2019    HCT 45.8 11/27/2019     11/27/2019     CMP:    Lab Results   Component Value Date     11/27/2019    K 4.8 11/27/2019     11/27/2019    CO2 30 11/27/2019    BUN 11 11/27/2019    CREATININE 0.6 11/27/2019    GFRAA >60 11/27/2019    LABGLOM >60 11/27/2019    GLUCOSE 246 11/27/2019    CALCIUM 9.1 11/27/2019     Hepatic Function Panel:    Lab Results   Component Value Date    ALKPHOS 100 11/25/2019    ALT 23 11/25/2019    AST 19 11/25/2019    PROT 7.4 11/25/2019    BILITOT 0.2 11/25/2019    LABALBU 4.0 11/25/2019     Magnesium:  No results found for: MG  PT/INR:  No results found for: PROTIME, INR  Lipids:    Lab Results   Component Value Date    TRIG 775 11/25/2019    HDL 30 11/25/2019    LDLDIRECT 191 11/25/2019     Lab Results   Component Value Date    LABA1C 13.3 (H) 11/25/2019     TSH:  No results found for: TSH      Assessment/ Plan:    Patient seen, interviewed and examined. Testing was reviewed.     1. Coronary artery disease   Three-vessel mild to moderate CAD but ostial diagnosis disease was critical for heart cath November 2019 she underwent ROBIN kissing stent mid LAD bifurcation and ostium of the diagonal  Recommend to continue DAPT please 1 year  Aggressive risk factor modification including blood pressure management, lipid management, diabetes management and weight management. Have encouraged patient to engage in organized exercise, weight loss. Continue with beta-blocker, aspirin, statin, ACE      2. Essential hypertension  Pressure is uncontrolled we will increase carvedilol to 6.25 twice daily  Patient is able to monitor blood pressure at home I have asked patient to track blood pressure and to call in with results in 2 weeks. For further titration of medication if warranted    3. Dyslipidemia  Lipids were not at goal.  Last lipids noted from November 2019  I did give patient a new lab slip to have lipids rechecked she is started on a statin since last lab draw  Goal for HDL 40 or better LDL 70 or less       4. Morbid obesity ([address]  BMI 45.20  Encouraged patient for diet exercise including weight loss      Tobacco abuse patient is still smoking unfortunately. She is requesting Chantix-we will give Chantix once blood pressure is under control - currently we are adjusting medications for blood pressure and lipids             Lifestyle and risk factor modificatons discussed. Various goals are discussed and questions answered. Continue current medications. Appropriate prescriptions are addressed. Questions answered and patient verbalizes understanding.    Office visit in 3  Months

## 2020-10-29 ENCOUNTER — TELEPHONE (OUTPATIENT)
Dept: CARDIOLOGY CLINIC | Age: 55
End: 2020-10-29

## 2020-10-29 RX ORDER — CLOPIDOGREL BISULFATE 75 MG/1
75 TABLET ORAL DAILY
Qty: 30 TABLET | Refills: 5 | Status: ON HOLD
Start: 2020-10-29 | End: 2021-02-15 | Stop reason: HOSPADM

## 2020-10-29 NOTE — TELEPHONE ENCOUNTER
Per Donaldo Kenney, Dr Rafa Pierce agreed to stop Effient at end of November and change to plavix daily.  Patient informed

## 2020-11-02 RX ORDER — PRASUGREL 10 MG/1
TABLET, FILM COATED ORAL
Qty: 30 TABLET | Refills: 2 | OUTPATIENT
Start: 2020-11-02

## 2020-11-02 RX ORDER — CARVEDILOL 3.12 MG/1
TABLET ORAL
Qty: 60 TABLET | Refills: 2 | OUTPATIENT
Start: 2020-11-02

## 2020-11-03 PROBLEM — E78.5 HYPERLIPIDEMIA: Status: RESOLVED | Noted: 2020-11-03 | Resolved: 2020-11-03

## 2020-11-04 RX ORDER — PRASUGREL 10 MG/1
TABLET, FILM COATED ORAL
Qty: 30 TABLET | Refills: 2 | OUTPATIENT
Start: 2020-11-04

## 2020-11-13 RX ORDER — CARVEDILOL 3.12 MG/1
3.12 TABLET ORAL 2 TIMES DAILY
Qty: 60 TABLET | Refills: 2 | OUTPATIENT
Start: 2020-11-13

## 2020-11-13 RX ORDER — PRASUGREL 10 MG/1
TABLET, FILM COATED ORAL
Qty: 30 TABLET | Refills: 2 | OUTPATIENT
Start: 2020-11-13

## 2020-11-19 RX ORDER — PRASUGREL 10 MG/1
10 TABLET, FILM COATED ORAL DAILY
Qty: 30 TABLET | Refills: 2 | Status: ON HOLD | OUTPATIENT
Start: 2020-11-19 | End: 2021-02-14

## 2020-11-19 RX ORDER — CARVEDILOL 3.12 MG/1
3.12 TABLET ORAL 2 TIMES DAILY WITH MEALS
Qty: 60 TABLET | Refills: 2 | Status: SHIPPED | OUTPATIENT
Start: 2020-11-19 | End: 2021-03-30 | Stop reason: ALTCHOICE

## 2021-02-05 RX ORDER — PRASUGREL 10 MG/1
10 TABLET, FILM COATED ORAL DAILY
Qty: 30 TABLET | Refills: 5 | OUTPATIENT
Start: 2021-02-05

## 2021-02-10 ENCOUNTER — HOSPITAL ENCOUNTER (INPATIENT)
Age: 56
LOS: 5 days | Discharge: HOME | DRG: 871 | End: 2021-02-15
Attending: EMERGENCY MEDICINE | Admitting: STUDENT IN AN ORGANIZED HEALTH CARE EDUCATION/TRAINING PROGRAM
Payer: COMMERCIAL

## 2021-02-10 ENCOUNTER — APPOINTMENT (OUTPATIENT)
Dept: GENERAL RADIOLOGY | Age: 56
DRG: 871 | End: 2021-02-10
Payer: COMMERCIAL

## 2021-02-10 ENCOUNTER — APPOINTMENT (OUTPATIENT)
Dept: CT IMAGING | Age: 56
DRG: 871 | End: 2021-02-10
Payer: COMMERCIAL

## 2021-02-10 DIAGNOSIS — R19.7 DIARRHEA, UNSPECIFIED TYPE: Primary | ICD-10-CM

## 2021-02-10 DIAGNOSIS — E87.1 HYPONATREMIA: ICD-10-CM

## 2021-02-10 DIAGNOSIS — E87.29 HIGH ANION GAP METABOLIC ACIDOSIS: ICD-10-CM

## 2021-02-10 LAB
ADENOVIRUS DETECTION BY PCR: NOT DETECTED
ALBUMIN SERPL-MCNC: 3.5 GM/DL (ref 3.4–5)
ALP BLD-CCNC: 111 IU/L (ref 40–128)
ALT SERPL-CCNC: 30 U/L (ref 10–40)
AMMONIA: 29 UMOL/L (ref 11–51)
ANION GAP SERPL CALCULATED.3IONS-SCNC: 18 MMOL/L (ref 4–16)
ANION GAP SERPL CALCULATED.3IONS-SCNC: 18 MMOL/L (ref 4–16)
ANION GAP SERPL CALCULATED.3IONS-SCNC: 22 MMOL/L (ref 4–16)
AST SERPL-CCNC: 42 IU/L (ref 15–37)
BACTERIA: NEGATIVE /HPF
BASOPHILS ABSOLUTE: 0.1 K/CU MM
BASOPHILS RELATIVE PERCENT: 0.4 % (ref 0–1)
BILIRUB SERPL-MCNC: 0.5 MG/DL (ref 0–1)
BILIRUBIN URINE: NEGATIVE MG/DL
BLOOD, URINE: ABNORMAL
BORDETELLA PARAPERTUSSIS BY PCR: NOT DETECTED
BORDETELLA PERTUSSIS PCR: NOT DETECTED
BUN BLDV-MCNC: 18 MG/DL (ref 6–23)
BUN BLDV-MCNC: 23 MG/DL (ref 6–23)
BUN BLDV-MCNC: 26 MG/DL (ref 6–23)
CALCIUM SERPL-MCNC: 7.5 MG/DL (ref 8.3–10.6)
CALCIUM SERPL-MCNC: 7.6 MG/DL (ref 8.3–10.6)
CALCIUM SERPL-MCNC: 8 MG/DL (ref 8.3–10.6)
CHLAMYDOPHILA PNEUMONIA PCR: NOT DETECTED
CHLORIDE BLD-SCNC: 88 MMOL/L (ref 99–110)
CHLORIDE BLD-SCNC: 93 MMOL/L (ref 99–110)
CHLORIDE BLD-SCNC: 93 MMOL/L (ref 99–110)
CLARITY: ABNORMAL
CO2: 12 MMOL/L (ref 21–32)
CO2: 13 MMOL/L (ref 21–32)
CO2: 15 MMOL/L (ref 21–32)
COLOR: YELLOW
CORONAVIRUS 229E PCR: NOT DETECTED
CORONAVIRUS HKU1 PCR: NOT DETECTED
CORONAVIRUS NL63 PCR: NOT DETECTED
CORONAVIRUS OC43 PCR: NOT DETECTED
CREAT SERPL-MCNC: 0.7 MG/DL (ref 0.6–1.1)
CREAT SERPL-MCNC: 0.9 MG/DL (ref 0.6–1.1)
CREAT SERPL-MCNC: 1 MG/DL (ref 0.6–1.1)
DIFFERENTIAL TYPE: ABNORMAL
EOSINOPHILS ABSOLUTE: 0 K/CU MM
EOSINOPHILS RELATIVE PERCENT: 0 % (ref 0–3)
FOLATE: 14.2 NG/ML (ref 3.1–17.5)
GFR AFRICAN AMERICAN: >60 ML/MIN/1.73M2
GFR NON-AFRICAN AMERICAN: 58 ML/MIN/1.73M2
GFR NON-AFRICAN AMERICAN: >60 ML/MIN/1.73M2
GFR NON-AFRICAN AMERICAN: >60 ML/MIN/1.73M2
GLUCOSE BLD-MCNC: 126 MG/DL (ref 70–99)
GLUCOSE BLD-MCNC: 130 MG/DL (ref 70–99)
GLUCOSE BLD-MCNC: 149 MG/DL (ref 70–99)
GLUCOSE BLD-MCNC: 163 MG/DL (ref 70–99)
GLUCOSE BLD-MCNC: 204 MG/DL (ref 70–99)
GLUCOSE BLD-MCNC: 204 MG/DL (ref 70–99)
GLUCOSE BLD-MCNC: 217 MG/DL (ref 70–99)
GLUCOSE BLD-MCNC: 225 MG/DL (ref 70–99)
GLUCOSE, URINE: >500 MG/DL
HCT VFR BLD CALC: 40.5 % (ref 37–47)
HCT VFR BLD CALC: 43.8 % (ref 37–47)
HEMOGLOBIN: 13 GM/DL (ref 12.5–16)
HEMOGLOBIN: 14.1 GM/DL (ref 12.5–16)
HUMAN METAPNEUMOVIRUS PCR: NOT DETECTED
IMMATURE NEUTROPHIL %: 1.2 % (ref 0–0.43)
INFLUENZA A BY PCR: NOT DETECTED
INFLUENZA A H1 (2009) PCR: NOT DETECTED
INFLUENZA A H1 PANDEMIC PCR: NOT DETECTED
INFLUENZA A H3 PCR: NOT DETECTED
INFLUENZA B BY PCR: NOT DETECTED
KETONES, URINE: ABNORMAL MG/DL
LACTATE: 1.1 MMOL/L (ref 0.4–2)
LACTIC ACID, SEPSIS: 1 MMOL/L (ref 0.5–1.9)
LEUKOCYTE ESTERASE, URINE: ABNORMAL
LYMPHOCYTES ABSOLUTE: 2.5 K/CU MM
LYMPHOCYTES RELATIVE PERCENT: 13 % (ref 24–44)
MCH RBC QN AUTO: 31 PG (ref 27–31)
MCH RBC QN AUTO: 31.6 PG (ref 27–31)
MCHC RBC AUTO-ENTMCNC: 32.1 % (ref 32–36)
MCHC RBC AUTO-ENTMCNC: 32.2 % (ref 32–36)
MCV RBC AUTO: 96.4 FL (ref 78–100)
MCV RBC AUTO: 98.2 FL (ref 78–100)
MONOCYTES ABSOLUTE: 2 K/CU MM
MONOCYTES RELATIVE PERCENT: 10 % (ref 0–4)
MYCOPLASMA PNEUMONIAE PCR: NOT DETECTED
NITRITE URINE, QUANTITATIVE: NEGATIVE
NUCLEATED RBC %: 0 %
PARAINFLUENZA 1 PCR: NOT DETECTED
PARAINFLUENZA 2 PCR: NOT DETECTED
PARAINFLUENZA 3 PCR: NOT DETECTED
PARAINFLUENZA 4 PCR: NOT DETECTED
PDW BLD-RTO: 13.6 % (ref 11.7–14.9)
PDW BLD-RTO: 13.6 % (ref 11.7–14.9)
PH, URINE: 5 (ref 5–8)
PLATELET # BLD: 162 K/CU MM (ref 140–440)
PLATELET # BLD: 165 K/CU MM (ref 140–440)
PMV BLD AUTO: 10.3 FL (ref 7.5–11.1)
PMV BLD AUTO: 10.4 FL (ref 7.5–11.1)
POTASSIUM SERPL-SCNC: 3.8 MMOL/L (ref 3.5–5.1)
POTASSIUM SERPL-SCNC: 4 MMOL/L (ref 3.5–5.1)
POTASSIUM SERPL-SCNC: 4.5 MMOL/L (ref 3.5–5.1)
PROCALCITONIN: 1.51
PROTEIN UA: 30 MG/DL
RAPID INFLUENZA  B AGN: NEGATIVE
RAPID INFLUENZA A AGN: NEGATIVE
RBC # BLD: 4.2 M/CU MM (ref 4.2–5.4)
RBC # BLD: 4.46 M/CU MM (ref 4.2–5.4)
RBC URINE: 13 /HPF (ref 0–6)
RHINOVIRUS ENTEROVIRUS PCR: NOT DETECTED
RSV PCR: NOT DETECTED
SARS-COV-2, NAAT: NOT DETECTED
SARS-COV-2: NORMAL
SARS-COV-2: NOT DETECTED
SEGMENTED NEUTROPHILS ABSOLUTE COUNT: 14.7 K/CU MM
SEGMENTED NEUTROPHILS RELATIVE PERCENT: 75.4 % (ref 36–66)
SODIUM BLD-SCNC: 122 MMOL/L (ref 135–145)
SODIUM BLD-SCNC: 124 MMOL/L (ref 135–145)
SODIUM BLD-SCNC: 126 MMOL/L (ref 135–145)
SOURCE: NORMAL
SPECIFIC GRAVITY UA: 1.02 (ref 1–1.03)
SQUAMOUS EPITHELIAL: 3 /HPF
TOTAL IMMATURE NEUTOROPHIL: 0.23 K/CU MM
TOTAL NUCLEATED RBC: 0 K/CU MM
TOTAL PROTEIN: 7.4 GM/DL (ref 6.4–8.2)
TRICHOMONAS: ABNORMAL /HPF
TSH HIGH SENSITIVITY: 1.28 UIU/ML (ref 0.27–4.2)
UNCLASSIFIED CAST: 1 /LPF
UROBILINOGEN, URINE: NEGATIVE MG/DL (ref 0.2–1)
VITAMIN B-12: 1582 PG/ML (ref 211–911)
WBC # BLD: 17.8 K/CU MM (ref 4–10.5)
WBC # BLD: 19.5 K/CU MM (ref 4–10.5)
WBC UA: 11 /HPF (ref 0–5)
YEAST: ABNORMAL /HPF

## 2021-02-10 PROCEDURE — 2140000000 HC CCU INTERMEDIATE R&B

## 2021-02-10 PROCEDURE — 82607 VITAMIN B-12: CPT

## 2021-02-10 PROCEDURE — 82140 ASSAY OF AMMONIA: CPT

## 2021-02-10 PROCEDURE — 0202U NFCT DS 22 TRGT SARS-COV-2: CPT

## 2021-02-10 PROCEDURE — U0002 COVID-19 LAB TEST NON-CDC: HCPCS

## 2021-02-10 PROCEDURE — 6370000000 HC RX 637 (ALT 250 FOR IP): Performed by: INTERNAL MEDICINE

## 2021-02-10 PROCEDURE — 82962 GLUCOSE BLOOD TEST: CPT

## 2021-02-10 PROCEDURE — 85027 COMPLETE CBC AUTOMATED: CPT

## 2021-02-10 PROCEDURE — 80053 COMPREHEN METABOLIC PANEL: CPT

## 2021-02-10 PROCEDURE — 2500000003 HC RX 250 WO HCPCS: Performed by: INTERNAL MEDICINE

## 2021-02-10 PROCEDURE — 99221 1ST HOSP IP/OBS SF/LOW 40: CPT | Performed by: INTERNAL MEDICINE

## 2021-02-10 PROCEDURE — 84145 PROCALCITONIN (PCT): CPT

## 2021-02-10 PROCEDURE — 87040 BLOOD CULTURE FOR BACTERIA: CPT

## 2021-02-10 PROCEDURE — 96374 THER/PROPH/DIAG INJ IV PUSH: CPT

## 2021-02-10 PROCEDURE — 6360000002 HC RX W HCPCS: Performed by: STUDENT IN AN ORGANIZED HEALTH CARE EDUCATION/TRAINING PROGRAM

## 2021-02-10 PROCEDURE — 85025 COMPLETE CBC W/AUTO DIFF WBC: CPT

## 2021-02-10 PROCEDURE — 2580000003 HC RX 258: Performed by: PHYSICIAN ASSISTANT

## 2021-02-10 PROCEDURE — 87804 INFLUENZA ASSAY W/OPTIC: CPT

## 2021-02-10 PROCEDURE — 70450 CT HEAD/BRAIN W/O DYE: CPT

## 2021-02-10 PROCEDURE — 2580000003 HC RX 258: Performed by: INTERNAL MEDICINE

## 2021-02-10 PROCEDURE — 2580000003 HC RX 258: Performed by: EMERGENCY MEDICINE

## 2021-02-10 PROCEDURE — 84443 ASSAY THYROID STIM HORMONE: CPT

## 2021-02-10 PROCEDURE — 99211 OFF/OP EST MAY X REQ PHY/QHP: CPT

## 2021-02-10 PROCEDURE — 81001 URINALYSIS AUTO W/SCOPE: CPT

## 2021-02-10 PROCEDURE — 6360000002 HC RX W HCPCS: Performed by: EMERGENCY MEDICINE

## 2021-02-10 PROCEDURE — 74176 CT ABD & PELVIS W/O CONTRAST: CPT

## 2021-02-10 PROCEDURE — 2580000003 HC RX 258: Performed by: STUDENT IN AN ORGANIZED HEALTH CARE EDUCATION/TRAINING PROGRAM

## 2021-02-10 PROCEDURE — 71045 X-RAY EXAM CHEST 1 VIEW: CPT

## 2021-02-10 PROCEDURE — 6370000000 HC RX 637 (ALT 250 FOR IP): Performed by: STUDENT IN AN ORGANIZED HEALTH CARE EDUCATION/TRAINING PROGRAM

## 2021-02-10 PROCEDURE — 2500000003 HC RX 250 WO HCPCS: Performed by: STUDENT IN AN ORGANIZED HEALTH CARE EDUCATION/TRAINING PROGRAM

## 2021-02-10 PROCEDURE — 96375 TX/PRO/DX INJ NEW DRUG ADDON: CPT

## 2021-02-10 PROCEDURE — 99284 EMERGENCY DEPT VISIT MOD MDM: CPT

## 2021-02-10 PROCEDURE — 83605 ASSAY OF LACTIC ACID: CPT

## 2021-02-10 PROCEDURE — 6360000002 HC RX W HCPCS: Performed by: INTERNAL MEDICINE

## 2021-02-10 PROCEDURE — 82746 ASSAY OF FOLIC ACID SERUM: CPT

## 2021-02-10 PROCEDURE — 51702 INSERT TEMP BLADDER CATH: CPT

## 2021-02-10 PROCEDURE — 80048 BASIC METABOLIC PNL TOTAL CA: CPT

## 2021-02-10 PROCEDURE — 36415 COLL VENOUS BLD VENIPUNCTURE: CPT

## 2021-02-10 PROCEDURE — 6370000000 HC RX 637 (ALT 250 FOR IP): Performed by: EMERGENCY MEDICINE

## 2021-02-10 RX ORDER — SODIUM CHLORIDE 0.9 % (FLUSH) 0.9 %
10 SYRINGE (ML) INJECTION PRN
Status: DISCONTINUED | OUTPATIENT
Start: 2021-02-10 | End: 2021-02-10 | Stop reason: SDUPTHER

## 2021-02-10 RX ORDER — ACETAMINOPHEN 325 MG/1
650 TABLET ORAL ONCE
Status: COMPLETED | OUTPATIENT
Start: 2021-02-10 | End: 2021-02-10

## 2021-02-10 RX ORDER — ASPIRIN 81 MG/1
81 TABLET ORAL DAILY
Status: DISCONTINUED | OUTPATIENT
Start: 2021-02-10 | End: 2021-02-11

## 2021-02-10 RX ORDER — KETOROLAC TROMETHAMINE 30 MG/ML
30 INJECTION, SOLUTION INTRAMUSCULAR; INTRAVENOUS ONCE
Status: COMPLETED | OUTPATIENT
Start: 2021-02-10 | End: 2021-02-10

## 2021-02-10 RX ORDER — ATORVASTATIN CALCIUM 40 MG/1
40 TABLET, FILM COATED ORAL DAILY
Status: DISCONTINUED | OUTPATIENT
Start: 2021-02-10 | End: 2021-02-15 | Stop reason: HOSPADM

## 2021-02-10 RX ORDER — NICOTINE POLACRILEX 4 MG
15 LOZENGE BUCCAL PRN
Status: DISCONTINUED | OUTPATIENT
Start: 2021-02-10 | End: 2021-02-15 | Stop reason: HOSPADM

## 2021-02-10 RX ORDER — SODIUM CHLORIDE 0.9 % (FLUSH) 0.9 %
10 SYRINGE (ML) INJECTION EVERY 12 HOURS SCHEDULED
Status: DISCONTINUED | OUTPATIENT
Start: 2021-02-10 | End: 2021-02-15 | Stop reason: HOSPADM

## 2021-02-10 RX ORDER — 0.9 % SODIUM CHLORIDE 0.9 %
1000 INTRAVENOUS SOLUTION INTRAVENOUS ONCE
Status: COMPLETED | OUTPATIENT
Start: 2021-02-10 | End: 2021-02-10

## 2021-02-10 RX ORDER — ACETAMINOPHEN 325 MG/1
650 TABLET ORAL EVERY 6 HOURS PRN
Status: DISCONTINUED | OUTPATIENT
Start: 2021-02-10 | End: 2021-02-15 | Stop reason: HOSPADM

## 2021-02-10 RX ORDER — GABAPENTIN 300 MG/1
300 CAPSULE ORAL ONCE
Status: COMPLETED | OUTPATIENT
Start: 2021-02-10 | End: 2021-02-10

## 2021-02-10 RX ORDER — CARVEDILOL 3.12 MG/1
3.12 TABLET ORAL 2 TIMES DAILY WITH MEALS
Status: DISCONTINUED | OUTPATIENT
Start: 2021-02-10 | End: 2021-02-15 | Stop reason: HOSPADM

## 2021-02-10 RX ORDER — ACETAMINOPHEN 650 MG/1
650 SUPPOSITORY RECTAL EVERY 6 HOURS PRN
Status: DISCONTINUED | OUTPATIENT
Start: 2021-02-10 | End: 2021-02-15 | Stop reason: HOSPADM

## 2021-02-10 RX ORDER — CLOPIDOGREL BISULFATE 75 MG/1
75 TABLET ORAL DAILY
Status: DISCONTINUED | OUTPATIENT
Start: 2021-02-10 | End: 2021-02-11

## 2021-02-10 RX ORDER — DEXTROSE MONOHYDRATE 25 G/50ML
12.5 INJECTION, SOLUTION INTRAVENOUS PRN
Status: DISCONTINUED | OUTPATIENT
Start: 2021-02-10 | End: 2021-02-15 | Stop reason: HOSPADM

## 2021-02-10 RX ORDER — DEXTROSE MONOHYDRATE 50 MG/ML
100 INJECTION, SOLUTION INTRAVENOUS PRN
Status: DISCONTINUED | OUTPATIENT
Start: 2021-02-10 | End: 2021-02-15 | Stop reason: HOSPADM

## 2021-02-10 RX ORDER — ONDANSETRON 2 MG/ML
8 INJECTION INTRAMUSCULAR; INTRAVENOUS ONCE
Status: COMPLETED | OUTPATIENT
Start: 2021-02-10 | End: 2021-02-10

## 2021-02-10 RX ORDER — POLYETHYLENE GLYCOL 3350 17 G/17G
17 POWDER, FOR SOLUTION ORAL DAILY PRN
Status: DISCONTINUED | OUTPATIENT
Start: 2021-02-10 | End: 2021-02-15 | Stop reason: HOSPADM

## 2021-02-10 RX ORDER — INSULIN GLARGINE 100 [IU]/ML
30 INJECTION, SOLUTION SUBCUTANEOUS DAILY
Status: DISCONTINUED | OUTPATIENT
Start: 2021-02-11 | End: 2021-02-15 | Stop reason: HOSPADM

## 2021-02-10 RX ORDER — GABAPENTIN 300 MG/1
300 CAPSULE ORAL NIGHTLY PRN
Status: DISCONTINUED | OUTPATIENT
Start: 2021-02-10 | End: 2021-02-12

## 2021-02-10 RX ORDER — CANAGLIFLOZIN 300 MG/1
TABLET, FILM COATED ORAL
COMMUNITY
Start: 2020-11-03 | End: 2021-03-23 | Stop reason: SDUPTHER

## 2021-02-10 RX ORDER — ONDANSETRON 2 MG/ML
4 INJECTION INTRAMUSCULAR; INTRAVENOUS EVERY 6 HOURS PRN
Status: DISCONTINUED | OUTPATIENT
Start: 2021-02-10 | End: 2021-02-15 | Stop reason: HOSPADM

## 2021-02-10 RX ORDER — SODIUM CHLORIDE 0.9 % (FLUSH) 0.9 %
10 SYRINGE (ML) INJECTION PRN
Status: DISCONTINUED | OUTPATIENT
Start: 2021-02-10 | End: 2021-02-15 | Stop reason: HOSPADM

## 2021-02-10 RX ORDER — INSULIN GLARGINE 100 [IU]/ML
40 INJECTION, SOLUTION SUBCUTANEOUS DAILY
Status: DISCONTINUED | OUTPATIENT
Start: 2021-02-10 | End: 2021-02-10

## 2021-02-10 RX ORDER — SODIUM CHLORIDE, SODIUM LACTATE, POTASSIUM CHLORIDE, AND CALCIUM CHLORIDE .6; .31; .03; .02 G/100ML; G/100ML; G/100ML; G/100ML
1000 INJECTION, SOLUTION INTRAVENOUS ONCE
Status: COMPLETED | OUTPATIENT
Start: 2021-02-10 | End: 2021-02-10

## 2021-02-10 RX ORDER — LISINOPRIL 20 MG/1
20 TABLET ORAL DAILY
Status: DISCONTINUED | OUTPATIENT
Start: 2021-02-10 | End: 2021-02-10

## 2021-02-10 RX ORDER — SODIUM CHLORIDE 0.9 % (FLUSH) 0.9 %
10 SYRINGE (ML) INJECTION EVERY 12 HOURS SCHEDULED
Status: DISCONTINUED | OUTPATIENT
Start: 2021-02-10 | End: 2021-02-10 | Stop reason: SDUPTHER

## 2021-02-10 RX ORDER — PRASUGREL 10 MG/1
10 TABLET, FILM COATED ORAL DAILY
Status: DISCONTINUED | OUTPATIENT
Start: 2021-02-10 | End: 2021-02-11

## 2021-02-10 RX ORDER — PROMETHAZINE HYDROCHLORIDE 25 MG/1
12.5 TABLET ORAL EVERY 6 HOURS PRN
Status: DISCONTINUED | OUTPATIENT
Start: 2021-02-10 | End: 2021-02-10

## 2021-02-10 RX ADMIN — CARVEDILOL 3.12 MG: 3.12 TABLET, FILM COATED ORAL at 18:14

## 2021-02-10 RX ADMIN — KETOROLAC TROMETHAMINE 30 MG: 30 INJECTION, SOLUTION INTRAMUSCULAR; INTRAVENOUS at 01:24

## 2021-02-10 RX ADMIN — GABAPENTIN 300 MG: 300 CAPSULE ORAL at 20:58

## 2021-02-10 RX ADMIN — LISINOPRIL 20 MG: 20 TABLET ORAL at 08:19

## 2021-02-10 RX ADMIN — FLUCONAZOLE 150 MG: 200 INJECTION, SOLUTION INTRAVENOUS at 20:59

## 2021-02-10 RX ADMIN — SODIUM CHLORIDE, PRESERVATIVE FREE 10 ML: 5 INJECTION INTRAVENOUS at 21:17

## 2021-02-10 RX ADMIN — ACETAMINOPHEN 650 MG: 325 TABLET ORAL at 20:58

## 2021-02-10 RX ADMIN — ONDANSETRON 8 MG: 2 INJECTION INTRAMUSCULAR; INTRAVENOUS at 01:24

## 2021-02-10 RX ADMIN — ASPIRIN 81 MG: 81 TABLET, COATED ORAL at 08:19

## 2021-02-10 RX ADMIN — VANCOMYCIN HYDROCHLORIDE 1750 MG: 5 INJECTION, POWDER, LYOPHILIZED, FOR SOLUTION INTRAVENOUS at 20:58

## 2021-02-10 RX ADMIN — ATORVASTATIN CALCIUM 40 MG: 40 TABLET, FILM COATED ORAL at 08:19

## 2021-02-10 RX ADMIN — SODIUM BICARBONATE: 84 INJECTION, SOLUTION INTRAVENOUS at 08:18

## 2021-02-10 RX ADMIN — PRASUGREL 10 MG: 10 TABLET, FILM COATED ORAL at 08:19

## 2021-02-10 RX ADMIN — MICONAZOLE NITRATE: 2 POWDER TOPICAL at 21:01

## 2021-02-10 RX ADMIN — SODIUM CHLORIDE, POTASSIUM CHLORIDE, SODIUM LACTATE AND CALCIUM CHLORIDE 1000 ML: 600; 310; 30; 20 INJECTION, SOLUTION INTRAVENOUS at 05:03

## 2021-02-10 RX ADMIN — INSULIN LISPRO 20 UNITS: 100 INJECTION, SOLUTION INTRAVENOUS; SUBCUTANEOUS at 12:49

## 2021-02-10 RX ADMIN — CARVEDILOL 3.12 MG: 3.12 TABLET, FILM COATED ORAL at 08:19

## 2021-02-10 RX ADMIN — SODIUM BICARBONATE: 84 INJECTION, SOLUTION INTRAVENOUS at 21:18

## 2021-02-10 RX ADMIN — CEFEPIME 2000 MG: 2 INJECTION, POWDER, FOR SOLUTION INTRAVENOUS at 20:58

## 2021-02-10 RX ADMIN — MICONAZOLE NITRATE: 2 POWDER TOPICAL at 12:49

## 2021-02-10 RX ADMIN — SODIUM CHLORIDE, PRESERVATIVE FREE 10 ML: 5 INJECTION INTRAVENOUS at 08:19

## 2021-02-10 RX ADMIN — SODIUM CHLORIDE 1000 ML: 9 INJECTION, SOLUTION INTRAVENOUS at 01:23

## 2021-02-10 RX ADMIN — ACETAMINOPHEN 650 MG: 325 TABLET ORAL at 01:24

## 2021-02-10 RX ADMIN — SODIUM CHLORIDE 1000 ML: 9 INJECTION, SOLUTION INTRAVENOUS at 02:55

## 2021-02-10 RX ADMIN — INSULIN GLARGINE 40 UNITS: 100 INJECTION, SOLUTION SUBCUTANEOUS at 08:20

## 2021-02-10 RX ADMIN — CLOPIDOGREL BISULFATE 75 MG: 75 TABLET ORAL at 08:19

## 2021-02-10 RX ADMIN — ENOXAPARIN SODIUM 40 MG: 40 INJECTION SUBCUTANEOUS at 08:20

## 2021-02-10 RX ADMIN — ACETAMINOPHEN 650 MG: 325 TABLET ORAL at 08:19

## 2021-02-10 ASSESSMENT — PAIN DESCRIPTION - PAIN TYPE: TYPE: ACUTE PAIN

## 2021-02-10 ASSESSMENT — PAIN SCALES - GENERAL
PAINLEVEL_OUTOF10: 0
PAINLEVEL_OUTOF10: 6
PAINLEVEL_OUTOF10: 0

## 2021-02-10 ASSESSMENT — PAIN - FUNCTIONAL ASSESSMENT: PAIN_FUNCTIONAL_ASSESSMENT: PREVENTS OR INTERFERES SOME ACTIVE ACTIVITIES AND ADLS

## 2021-02-10 ASSESSMENT — PAIN DESCRIPTION - LOCATION: LOCATION: KNEE;LEG;ELBOW

## 2021-02-10 ASSESSMENT — PAIN DESCRIPTION - DESCRIPTORS: DESCRIPTORS: ACHING

## 2021-02-10 NOTE — PLAN OF CARE
Problem: Pain:  Goal: Pain level will decrease  Description: Pain level will decrease  Outcome: Ongoing  Goal: Control of acute pain  Description: Control of acute pain  Outcome: Ongoing  Goal: Control of chronic pain  Description: Control of chronic pain  Outcome: Ongoing     Problem: Fluid Volume:  Goal: Ability to achieve a balanced intake and output will improve  Description: Ability to achieve a balanced intake and output will improve  Outcome: Ongoing     Problem: Physical Regulation:  Goal: Ability to maintain clinical measurements within normal limits will improve  Description: Ability to maintain clinical measurements within normal limits will improve  Outcome: Ongoing  Goal: Will show no signs and symptoms of electrolyte imbalance  Description: Will show no signs and symptoms of electrolyte imbalance  Outcome: Ongoing     Problem: Fluid Volume - Imbalance:  Goal: Absence of imbalanced fluid volume signs and symptoms  Description: Absence of imbalanced fluid volume signs and symptoms  Outcome: Ongoing

## 2021-02-10 NOTE — ED TRIAGE NOTES
Pt. Presents to the ER via EMS from home. Pt. States that she \"feels horrible. \" Pt. States she is having severe body aches and exhaustion. Pt. States she is unable to walk. Pt. States she has no appetite.

## 2021-02-10 NOTE — PROGRESS NOTES
Home med list inaccurate. Asked RN to update        Dr. Joetta Severance.  Cornell Dears, [place]  Internal Medicine Hospitalist  [facility]

## 2021-02-10 NOTE — PROGRESS NOTES
Spoke with Dr. Tamica Hauser in regard to concern for diabetes insipidus; Dr. Chris Plummer consulted.

## 2021-02-10 NOTE — PROGRESS NOTES
Hospitalist Progress Note      Name:  Niocle May /Age/Sex: 1965  (50 y.o. female)   MRN & CSN:  [id number] & [id number] Admission Date/Time: 2/10/2021 12:50 AM   Location:  [facility], [id number] PCP: No primary care provider on file. Hospital Day: 1      Assessment and Plan:     Sepsis  -Suspect source is skin/soft tissue infection left groin    Left groin/osiel area anterior intertrigo with suspected superimposed bacterial infection  -IV Diflucan  -IV cefepime/vancomycin for suspected bacterial SSTI  -Infectious disease consult  -De-escalate antibiotics as appropriate  -Wound care notified me of suspected hidradenitis to the perineal/posterior thighs-this will need follow-up  -Nursing staff indicated copious drainage from the left groin/osiel area, and having to change her dressings multiple times today, and also reported that the patient was at times screaming in pain due to discomfort in that area during care    Diarrhea  -Check C. Difficile    Encephalopathy-suspect related to sepsis  -CT head negative  -Still confused as of 2/10    Hyponatremia  -Consult nephrology    Metabolic acidosis with elevated anion gap  -Nephrology consult    Diabetes mellitus type 2  -Last available A1c was 13.3 in 2019        Other problems    Tobacco use  -Encourage cessation    Coronary disease status post stents-continue DAPT    Body mass index is 40.22 kg/m². Consistent with morbid obesity    Dyslipidemia        Subjective:     2-9    Chief Complaint   Patient presents with    Fatigue    Fever     She is confused today. She is not very articulate when letting me know why she is here    She did state after questioning about her  called the squad at her request.  She could not tell me why other than she could not walk. Objective:        Intake/Output Summary (Last 24 hours) at 2/10/2021 1711  Last data filed at 2/10/2021 0814  Gross per 24 hour   Intake 937.04 ml   Output --   Net 937.04 ml      Vitals: Vitals:    02/10/21 1248   BP: 114/60   Pulse: 106   Resp: 22   Temp: 98.9 °F (37.2 °C)   SpO2: 97%     Physical Exam:       Abdomen-extensive redness in the left groin and osiel area noted.   Exam was done in the presence of a nursing technician    Lungs/respiratory effort nonlabored at rest    Neurologic-she is confused-cranial nerves II through XII are intact  Medications:   Medications:    aspirin  81 mg Oral Daily    atorvastatin  40 mg Oral Daily    carvedilol  3.125 mg Oral BID WC    clopidogrel  75 mg Oral Daily    insulin glargine  40 Units Subcutaneous Daily    insulin lispro  20 Units Subcutaneous TID WC    lisinopril  20 mg Oral Daily    prasugrel  10 mg Oral Daily    sodium chloride flush  10 mL Intravenous 2 times per day    enoxaparin  40 mg Subcutaneous Daily    miconazole   Topical BID      Infusions:    dextrose       PRN Meds:     sodium chloride flush, 10 mL, PRN      promethazine, 12.5 mg, Q6H PRN    Or      ondansetron, 4 mg, Q6H PRN      polyethylene glycol, 17 g, Daily PRN      acetaminophen, 650 mg, Q6H PRN    Or      acetaminophen, 650 mg, Q6H PRN      glucose, 15 g, PRN      dextrose, 12.5 g, PRN      glucagon (rDNA), 1 mg, PRN      dextrose, 100 mL/hr, PRN          Electronically signed by Myron Campos MD on 2/10/2021 at 5:11 PM

## 2021-02-10 NOTE — ED NOTES
Spoke with Kandice BAJWA on the phone. Will begin LR bolus. Kandice Almazan notified she will change bed to step down bed.      Tonya Hancock  02/10/21 0500

## 2021-02-10 NOTE — H&P
History and Physical      Name:  Bindu Gomez /Age/Sex: 1965  (50 y.o. female)   MRN & CSN:  [id number] & [id number] Admission Date/Time: 2/10/2021 12:50 AM   Location:  ED[facility] PCP: No primary care provider on file. Hospital Day: 1    Assessment and Plan:   Bindu Gomez is a 50 y.o.  female  who presents with:     Intractable diarrhea, concern for c diff   Dehydration and metabolic acidosis secondary to diarrhea   Hyponatremia, consistent with dehydration   Acute metabolic acidosis with gap of 22    IVF, bicarb gtt overnight  Pending cultures  Wait on antibiotics as this does not seem infectious at this time    Diet No diet orders on file   DVT Prophylaxis [] Lovenox, []  Heparin, [] SCDs, [] No VTE prophylaxis, patient ambulating   GI Prophylaxis [] PPI, [] H2 Blocker, [] No GI prophylaxis, patient is receiving diet/Tube Feeds   Code Status Prior   Disposition Patient requires continued admission due to ivf and labs in AM    Dc plan per CM   MDM [] Low, [] Moderate,[x]  High  Patient's risk as above due to     [] One or more chronic illnesses with severe exacerbation or progression    [] Acute or chronic illnesses or injuries that pose a threat to life or bodily function    [x] An abrupt change in neurological status    [] Decision not to resuscitate     [] Drug therapy requiring intensive monitoring for toxicity      History of Present Illness:     Chief Complaint:   Bindu Gomez is a 50 y.o.  female  who presents with generalized weakness and diarrhea. A/w poor po intake and nausea. Patient reports having onset of symptoms for the last week or so but it was significantly worse today. She presented due to the weakness via EMS. No radiation of the symptoms. Denies having experienced this before. Recently tested for covid due to her fatigue but was found to be negative. Ed found her to be severely dehydrated and acidotic.  She was stabilized and admitted to the medicine team. 10-14 point ROS reviewed negative, unless as noted above    Objective:   No intake or output data in the 24 hours ending 02/10/21 0241   Vitals:   Vitals:    02/10/21 0101   BP: 109/63   Pulse:    Resp:    Temp:    SpO2:      Physical Exam:    GEN Awake female, laying in bed in no apparent distress. Appears given age. EYES Pupils are equally round. No scleral discharge  HENT Atraumatic and symmetric head  NECK No apparent thyromegaly  RESP Symmetric chest movement while on room air. CARDIO/VASC Peripheral pulses equal bilaterally and palpable. No peripheral edema. GI Abdomen is not distended. Rectal exam deferred. Central obesity    Azul catheter is not present. HEME/LYMPH No petechiae or ecchymoses. MSK Spontaneous movement of BL upper extremities  SKIN Normal coloration, warm, dry. NEURO Cranial nerves appear grossly intact  PSYCH Awake, alert. Oriented. Past Medical History: PMHx:   Past Medical History:   Diagnosis Date    CAD (coronary artery disease)     Current every day smoker     Diabetes mellitus ([address]     History of exercise stress test 12/18/2019    Treadmill, Normal exercise performance without angina and ischemic EKG changes.  Hyperlipidemia     Hypertension     Morbid obesity ([address]     Palpitations     Post PTCA 11/26/2019    Kissing stent to LAD & to Ostium of Diag.  SOB (shortness of breath)      PSHx:  has a past surgical history that includes hernia repair and Percutaneous Transluminal Coronary Angio (11/26/2019). Allergies: Allergies   Allergen Reactions    Ampicillin Rash     Home Medications:   Prior to Admission medications    Medication Sig Start Date End Date Taking?  Authorizing Provider   prasugrel (EFFIENT) 10 MG TABS Take 1 tablet by mouth daily 11/19/20   Patricia Capone MD   carvedilol (COREG) 3.125 MG tablet Take 1 tablet by mouth 2 times daily (with meals) 11/19/20   Patricia Capone MD   clopidogrel (PLAVIX) 75 MG tablet Take 1 tablet by mouth daily 10/29/20   Kofi Les, APRN - CNP   atorvastatin (LIPITOR) 40 MG tablet Take 1 tablet by mouth daily 10/23/20   Kofi Les, APRN - CNP   aspirin (ASPIRIN LOW DOSE) 81 MG EC tablet Take 1 tablet by mouth daily 10/23/20   Kofi Les, APRN - CNP   lisinopril (PRINIVIL;ZESTRIL) 20 MG tablet Take 1 tablet by mouth daily 10/23/20   Kofi Les, APRN - CNP   carvedilol (COREG) 6.25 MG tablet Take 1 tablet by mouth 2 times daily (with meals) 10/23/20   Kofi Les, APRN - CNP   Canagliflozin (INVOKANA PO) Take 200 mg by mouth    Historical Provider, MD   linagliptin (TRADJENTA) 5 MG tablet Take 1 tablet by mouth daily 11/28/19   Cora Whyte MD   insulin glargine (LANTUS SOLOSTAR) 100 UNIT/ML injection pen Inject 40 Units into the skin daily 11/27/19   Cora Whyte MD   insulin aspart (NOVOLOG FLEXPEN) 100 UNIT/ML injection pen Take 20 units with each meal in addition to sliding scale as below   *Medium Dose Correction Algorithm**Insulin: 3 TIMES DAILY WITH MEALSGlucose: Dose: No [id number] 2 [id number] 4 [id number] 6 Ynaek863-043 8 [id number] 10 Gieeq611 and above 12 Units 11/27/19   Cora Whyte MD   blood glucose monitor kit and supplies Test 4 times a day before meals and bedtime& as needed for symptoms of irregular blood glucose. 11/27/19   Nigel Bobby MD   Insulin Pen Needle (PEN NEEDLES) 31G X 5 MM MISC lantus QHS and Novolog QAC and HS 11/27/19   Cora Whyte MD   Lancets MISC 1 each by Does not apply route 4 times daily 11/27/19   Cora Whyte MD   blood glucose monitor strips Test 4 times a day & as needed for symptoms of irregular blood glucose.  11/27/19   Cora Whyte MD     FHx: reviewed and is noncontributory to hospitalization   SHx:   Social History     Socioeconomic History    Marital status:      Spouse name: None    Number of children: None    Years of education: None    Highest education level: None   Occupational History    None   Social Needs  Financial resource strain: None    Food insecurity     Worry: None     Inability: None    Transportation needs     Medical: None     Non-medical: None   Tobacco Use    Smoking status: Current Some Day Smoker     Packs/day: 1.00    Smokeless tobacco: Never Used    Tobacco comment: 1 pack every four days    Substance and Sexual Activity    Alcohol use: Never     Frequency: Never    Drug use: Never    Sexual activity: Yes     Partners: Male, Female   Lifestyle    Physical activity     Days per week: None     Minutes per session: None    Stress: None   Relationships    Social connections     Talks on phone: None     Gets together: None     Attends Rastafari service: None     Active member of club or organization: None     Attends meetings of clubs or organizations: None     Relationship status: None    Intimate partner violence     Fear of current or ex partner: None     Emotionally abused: None     Physically abused: None     Forced sexual activity: None   Other Topics Concern    None   Social History Narrative    None       Medications:   Medications:    Infusions:   PRN Meds:       Electronically signed by Kristofer Ayon DO on 2/10/2021 at 2:41 AM

## 2021-02-10 NOTE — ED PROVIDER NOTES
use: Never    Sexual activity: Yes     Partners: Male, Female   Lifestyle    Physical activity     Days per week: Not on file     Minutes per session: Not on file    Stress: Not on file   Relationships    Social connections     Talks on phone: Not on file     Gets together: Not on file     Attends [employer] service: Not on file     Active member of club or organization: Not on file     Attends meetings of clubs or organizations: Not on file     Relationship status: Not on file    Intimate partner violence     Fear of current or ex partner: Not on file     Emotionally abused: Not on file     Physically abused: Not on file     Forced sexual activity: Not on file   Other Topics Concern    Not on file   Social History Narrative    Not on file     No current facility-administered medications for this encounter.       Current Outpatient Medications   Medication Sig Dispense Refill    prasugrel (EFFIENT) 10 MG TABS Take 1 tablet by mouth daily 30 tablet 2    carvedilol (COREG) 3.125 MG tablet Take 1 tablet by mouth 2 times daily (with meals) 60 tablet 2    clopidogrel (PLAVIX) 75 MG tablet Take 1 tablet by mouth daily 30 tablet 5    atorvastatin (LIPITOR) 40 MG tablet Take 1 tablet by mouth daily 30 tablet 5    aspirin (ASPIRIN LOW DOSE) 81 MG EC tablet Take 1 tablet by mouth daily 30 tablet 5    lisinopril (PRINIVIL;ZESTRIL) 20 MG tablet Take 1 tablet by mouth daily 30 tablet 5    carvedilol (COREG) 6.25 MG tablet Take 1 tablet by mouth 2 times daily (with meals) 60 tablet 3    Canagliflozin (INVOKANA PO) Take 200 mg by mouth      linagliptin (TRADJENTA) 5 MG tablet Take 1 tablet by mouth daily 30 tablet 2    insulin glargine (LANTUS SOLOSTAR) 100 UNIT/ML injection pen Inject 40 Units into the skin daily 5 pen 3    insulin aspart (NOVOLOG FLEXPEN) 100 UNIT/ML injection pen Take 20 units with each meal in addition to sliding scale as below   *Medium Dose Correction Algorithm**Insulin: 3 TIMES DAILY WITH MEALSGlucose: Dose: No [id number] 2 [id number] 4 [id number] 6 Legcd523-117 8 Okdah177-969 10 Izply660 and above 12 Units 5 pen 3    blood glucose monitor kit and supplies Test 4 times a day before meals and bedtime& as needed for symptoms of irregular blood glucose. 1 kit 0    Insulin Pen Needle (PEN NEEDLES) 31G X 5 MM MISC lantus QHS and Novolog QAC and  each 2    Lancets MISC 1 each by Does not apply route 4 times daily 200 each 2    blood glucose monitor strips Test 4 times a day & as needed for symptoms of irregular blood glucose. 200 strip 2     Allergies   Allergen Reactions    Ampicillin Rash       Nursing Notes Reviewed    Physical Exam:  ED Triage Vitals   Enc Vitals Group      BP 02/10/21 0101 109/63      Pulse 02/10/21 0056 100      Resp 02/10/21 0056 22      Temp 02/10/21 0059 98.5 °F (36.9 °C)      Temp Source 02/10/21 0059 Oral      SpO2 02/10/21 0056 99 %      Weight 02/10/21 0056 240 lb (108.9 kg)      Height 02/10/21 0056 5' 6\" (1.676 m)      Head Circumference --       Peak Flow --       Pain Score --       Pain Loc --       Pain Edu? --       Excl. in [address]? --      GENERAL APPEARANCE: Awake and alert. Cooperative. No acute distress. Appears fatigued. HEAD: Normocephalic. Atraumatic. EYES: EOM's grossly intact. Sclera anicteric. ENT: Mucous membranes are moist. Tolerates saliva. No trismus. NECK: No meningismus. HEART:  Extremities pink  LUNGS: Respirations unlabored. Even chest rise bilaterally  ABDOMEN: Non distended. EXTREMITIES: No acute deformities. SKIN: Dry. NEUROLOGICAL: No gross facial drooping. Moves all 4 extremities spontaneously. PSYCHIATRIC: Normal mood.     I have reviewed and interpreted all of the currently available lab results from this visit (if applicable):  Results for orders placed or performed during the hospital encounter of 02/10/21   Rapid Flu Swab    Specimen: Nasopharyngeal   Result Value Ref Range    Rapid Influenza A Ag NEGATIVE NEGATIVE    Rapid Influenza B Ag NEGATIVE NEGATIVE   CBC Auto Differential   Result Value Ref Range    WBC 19.5 (H) 4.0 - 10.5 K/CU MM    RBC 4.46 4.2 - 5.4 M/CU MM    Hemoglobin 14.1 12.5 - 16.0 GM/DL    Hematocrit 43.8 37 - 47 %    MCV 98.2 78 - 100 FL    MCH 31.6 (H) 27 - 31 PG    MCHC 32.2 32.0 - 36.0 %    RDW 13.6 11.7 - 14.9 %    Platelets 375 638 - 856 K/CU MM    MPV 10.4 7.5 - 11.1 FL    Differential Type AUTOMATED DIFFERENTIAL     Segs Relative 75.4 (H) 36 - 66 %    Lymphocytes % 13.0 (L) 24 - 44 %    Monocytes % 10.0 (H) 0 - 4 %    Eosinophils % 0.0 0 - 3 %    Basophils % 0.4 0 - 1 %    Segs Absolute 14.7 K/CU MM    Lymphocytes Absolute 2.5 K/CU MM    Monocytes Absolute 2.0 K/CU MM    Eosinophils Absolute 0.0 K/CU MM    Basophils Absolute 0.1 K/CU MM    Nucleated RBC % 0.0 %    Total Nucleated RBC 0.0 K/CU MM    Total Immature Neutrophil 0.23 K/CU MM    Immature Neutrophil % 1.2 (H) 0 - 0.43 %   Comprehensive Metabolic Panel w/ Reflex to MG   Result Value Ref Range    Sodium 122 (L) 135 - 145 MMOL/L    Potassium 4.5 3.5 - 5.1 MMOL/L    Chloride 88 (L) 99 - 110 mMol/L    CO2 12 (L) 21 - 32 MMOL/L    BUN 23 6 - 23 MG/DL    CREATININE 1.0 0.6 - 1.1 MG/DL    Glucose 204 (H) 70 - 99 MG/DL    Calcium 8.0 (L) 8.3 - 10.6 MG/DL    Albumin 3.5 3.4 - 5.0 GM/DL    Total Protein 7.4 6.4 - 8.2 GM/DL    Total Bilirubin 0.5 0.0 - 1.0 MG/DL    ALT 30 10 - 40 U/L    AST 42 (H) 15 - 37 IU/L    Alkaline Phosphatase 111 40 - 128 IU/L    GFR Non- 58 (L) >60 mL/min/1.73m2    GFR African American >60 >60 mL/min/1.73m2    Anion Gap 22 (H) 4 - 16      Radiographs (if obtained):  [] The following radiograph was interpreted by myself in the absence of a radiologist:  [] Radiologist's Report Reviewed:    EKG (if obtained): (All EKG's are interpreted by myself in the absence of a cardiologist)    MDM:  Plan of care is discussed thoroughly with the patient and family if present.   If performed, all imaging and lab work also discussed with patient. All relevant prior results and chart reviewed if available. Patient presents as above. She is in no acute distress but appears fatigued. Vital signs are normal.  She denies any cardiopulmonary symptoms. Overall presentation most consistent with likely viral syndrome. Patient given Tylenol, Zofran, Toradol and IV fluids here for symptom control. Plan to evaluate for any electrolyte abnormalities. Rapid flu swab is sent. Patient recently tested negative for Covid however could be false negative. Patient states that she feels somewhat better on reevaluation. Metabolic work-up significant for anion gap acidosis and hyponatremia which I feel is likely secondary to sodium/bicarb losses from diarrhea. Blood cultures, lactate, chest x-ray, urinalysis also ordered to complete work-up. Patient did become hypotensive here responded to further IV fluid resuscitation. She is admitted to the hospitalist for further management. Clinical Impression:  1. Diarrhea, unspecified type    2. Hyponatremia    3.  High anion gap metabolic acidosis      (Please note that portions of this note may have been completed with a voice recognition program. Efforts were made to edit the dictations but occasionally words are mis-transcribed.)    MD Kelsy Gurrola MD  02/10/21 [address], MD  02/10/21 8350

## 2021-02-10 NOTE — PROGRESS NOTES
Skin assessment completed by this nurse and [facility] [place], LPN. Patient has an abrasion on L knee, redness under breasts and scattered bruising.

## 2021-02-10 NOTE — CONSULTS
[address] [address] Continence Nurse  Consult Note       Kin King  AGE: 50 y.o. GENDER: female  : 1965  TODAY'S DATE:  2/10/2021    Subjective:     Reason for  Evaluation and Assessment: wound assessment      Kin King is a 50 y.o. female referred by:   [x] Physician  [] Nursing  [] Other:     Wound Identification:  Wound Type: undetermined and MASD-intertriginous dermatitis and possible candidiasis  Contributing Factors: diabetes, chronic pressure, decreased mobility and smoking        PAST MEDICAL HISTORY        Diagnosis Date    CAD (coronary artery disease)     Current every day smoker     Diabetes mellitus ([address]     History of exercise stress test 2019    Treadmill, Normal exercise performance without angina and ischemic EKG changes.  Hyperlipidemia     Hypertension     Morbid obesity ([address]     Palpitations     Post PTCA 2019    Kissing stent to LAD & to Ostium of Diag.  SOB (shortness of breath)        PAST SURGICAL HISTORY    Past Surgical History:   Procedure Laterality Date    HERNIA REPAIR      PTCA  2019    Kissing stents to LAD & Ostium of Diag. FAMILY HISTORY    History reviewed. No pertinent family history. SOCIAL HISTORY    Social History     Tobacco Use    Smoking status: Current Some Day Smoker     Packs/day: 1.00    Smokeless tobacco: Never Used    Tobacco comment: 1 pack every four days    Substance Use Topics    Alcohol use: Never     Frequency: Never    Drug use: Never       ALLERGIES    Allergies   Allergen Reactions    Ampicillin Rash       MEDICATIONS    No current facility-administered medications on file prior to encounter.       Current Outpatient Medications on File Prior to Encounter   Medication Sig Dispense Refill    prasugrel (EFFIENT) 10 MG TABS Take 1 tablet by mouth daily 30 tablet 2    carvedilol (COREG) 3.125 MG tablet Take 1 tablet by mouth 2 times daily (with meals) 60 tablet 2    clopidogrel (PLAVIX) 75 MG tablet Take 1 tablet by mouth daily 30 tablet 5    atorvastatin (LIPITOR) 40 MG tablet Take 1 tablet by mouth daily 30 tablet 5    aspirin (ASPIRIN LOW DOSE) 81 MG EC tablet Take 1 tablet by mouth daily 30 tablet 5    lisinopril (PRINIVIL;ZESTRIL) 20 MG tablet Take 1 tablet by mouth daily 30 tablet 5    carvedilol (COREG) 6.25 MG tablet Take 1 tablet by mouth 2 times daily (with meals) 60 tablet 3    Canagliflozin (INVOKANA PO) Take 200 mg by mouth      linagliptin (TRADJENTA) 5 MG tablet Take 1 tablet by mouth daily 30 tablet 2    insulin glargine (LANTUS SOLOSTAR) 100 UNIT/ML injection pen Inject 40 Units into the skin daily 5 pen 3    insulin aspart (NOVOLOG FLEXPEN) 100 UNIT/ML injection pen Take 20 units with each meal in addition to sliding scale as below   *Medium Dose Correction Algorithm**Insulin: 3 TIMES DAILY WITH MEALSGlucose: Dose: No [id number] 2 Faeqb140-576 4 Gbtmm006-204 6 Pyrec967-887 8 Rdxoq015-508 10 Jakfb196 and above 12 Units 5 pen 3    blood glucose monitor kit and supplies Test 4 times a day before meals and bedtime& as needed for symptoms of irregular blood glucose. 1 kit 0    Insulin Pen Needle (PEN NEEDLES) 31G X 5 MM MISC lantus QHS and Novolog QAC and  each 2    Lancets MISC 1 each by Does not apply route 4 times daily 200 each 2    blood glucose monitor strips Test 4 times a day & as needed for symptoms of irregular blood glucose.  200 strip 2         Objective:      BP (!) 123/100   Pulse 130   Temp 99.1 °F (37.3 °C) (Oral)   Resp 24   Ht 5' 6\" (1.676 m)   Wt 240 lb (108.9 kg)   SpO2 98%   BMI 38.74 kg/m²   Veto Risk Score: Veto Scale Score: 20    LABS    CBC:   Lab Results   Component Value Date    WBC 19.5 02/10/2021    RBC 4.46 02/10/2021    HGB 14.1 02/10/2021    HCT 43.8 02/10/2021    MCV 98.2 02/10/2021    MCH 31.6 02/10/2021    MCHC 32.2 02/10/2021    RDW 13.6 02/10/2021     02/10/2021    MPV 10.4 02/10/2021     CMP:    Lab Results Component Value Date     02/10/2021    K 4.5 02/10/2021    CL 88 02/10/2021    CO2 12 02/10/2021    BUN 23 02/10/2021    CREATININE 1.0 02/10/2021    GFRAA >60 02/10/2021    LABGLOM 58 02/10/2021    GLUCOSE 204 02/10/2021    PROT 7.4 02/10/2021    LABALBU 3.5 02/10/2021    CALCIUM 8.0 02/10/2021    BILITOT 0.5 02/10/2021    ALKPHOS 111 02/10/2021    AST 42 02/10/2021    ALT 30 02/10/2021     Albumin:    Lab Results   Component Value Date    LABALBU 3.5 02/10/2021     PT/INR:  No results found for: PROTIME, INR  HgBA1c:    Lab Results   Component Value Date    LABA1C 13.3 11/25/2019         Assessment:     Patient Active Problem List   Diagnosis    Acute chest pain    Unstable angina (HCC)    Morbid obesity (HCC)    Type 2 diabetes mellitus with circulatory disorder, with long-term current use of insulin (HCC)    Essential hypertension    Dyslipidemia    Post PTCA    SOB (shortness of breath)    CAD (coronary artery disease)    Palpitations    Hyponatremia       Measurements:  Wound 02/10/21 Perineum cluster osiel rectal (Active)   Wound Etiology Other 02/10/21 1030   Dressing Status Other (Comment) 02/10/21 1030   Wound Cleansed Cleansed with saline 02/10/21 1030   Wound Length (cm) 3 cm 02/10/21 1030   Wound Width (cm) 4 cm 02/10/21 1030   Wound Depth (cm) 0.1 cm 02/10/21 1030   Wound Surface Area (cm^2) 12 cm^2 02/10/21 1030   Wound Volume (cm^3) 1.2 cm^3 02/10/21 1030   Distance Tunneling (cm) 0 cm 02/10/21 1030   Tunneling Position ___ O'Clock 0 02/10/21 1030   Undermining Starts ___ O'Clock 0 02/10/21 1030   Undermining Ends___ O'Clock 0 02/10/21 1030   Undermining Maxium Distance (cm) 0 02/10/21 1030   Wound Assessment Pink/red;Slough 02/10/21 1030   Drainage Amount Small 02/10/21 1030   Drainage Description Yellow 02/10/21 1030   Odor Mild 02/10/21 1030   Osiel-wound Assessment Blanchable erythema; Maceration 02/10/21 1030   Margins Defined edges 02/10/21 1030   Wound Thickness Description not for Pressure Injury Full thickness 02/10/21 1030   Number of days: 0       Wound 02/10/21 Groin Anterior; Left (Active)   Wound Etiology Other 02/10/21 1030   Dressing Status New dressing applied 02/10/21 1030   Wound Cleansed Cleansed with saline 02/10/21 1030   Dressing/Treatment Interdry Ag/wicking fabric with Ag 02/10/21 1030   Wound Length (cm) 0.5 cm 02/10/21 1030   Wound Width (cm) 0.8 cm 02/10/21 1030   Wound Depth (cm) 0.1 cm 02/10/21 1030   Wound Surface Area (cm^2) 0.4 cm^2 02/10/21 1030   Wound Volume (cm^3) 0.04 cm^3 02/10/21 1030   Distance Tunneling (cm) 0 cm 02/10/21 1030   Tunneling Position ___ O'Clock 0 02/10/21 1030   Undermining Starts ___ O'Clock 0 02/10/21 1030   Undermining Ends___ O'Clock 0 02/10/21 1030   Undermining Maxium Distance (cm) 0 02/10/21 1030   Wound Assessment Pink/red;Slough 02/10/21 1030   Drainage Amount Small 02/10/21 1030   Drainage Description Yellow 02/10/21 1030   Odor Mild 02/10/21 1030   Osiel-wound Assessment Maceration;Blanchable erythema 02/10/21 1030   Margins Defined edges 02/10/21 1030   Wound Thickness Description not for Pressure Injury Partial thickness 02/10/21 1030   Number of days: 0       Response to treatment:  Well tolerated by patient. Pain Assessment:  Severity:  none  Quality of pain: na  Wound Pain Timing/Severity: na  Premedicated: no    Plan:     Plan of Care: Wound 02/10/21 Perineum cluster osiel rectal-Dressing/Treatment: (calazime cream)  Wound 02/10/21 Groin Anterior; Left-Dressing/Treatment: Interdry Ag/wicking fabric with Ag     Pt in bed. Agreeable to wound assessment. Turns independently in bed. Heels intact. Pt stated has psoriasis. Some psoriasis lesions noted to lower legs and elbows. Scattered bruising and eschar noted. Osiel rectal cluster of wounds noted. Moist. Surrounding erythema noted. Surrounding skin involving posterior thighs with dark areas in pores possibly being hidradenitis.  Pt stated has not been to dermatologist or had MD look at. Left abdominal fold and groins with MASD-intertriginous dermatitis with possible candidiasis. Central erythema with satellite macular papular lesions. Recommend Micotin powder. Pictures and measurements taken. Calazime cream applied to osiel rectal area. Interdry applied to abdominal fold. Spoke with Dr. Patrizia Goldstein regarding assessment and recommendations. Agreeable. Recommend follow up with dermatology as outpatient to patient. Pt is generally not at risk for skin breakdown AEB Veto. Follow Veto orders. Specialty Bed Required : no  [] Low Air Loss   [] Pressure Redistribution  [] Fluid Immersion  [] Bariatric  [] Total Pressure Relief  [] Other:     Discharge Plan:  Placement for patient upon discharge: tbd  Hospice Care: no  Patient appropriate for Outpatient [address]: recommend follow up with dermatology as outpatient    Patient/Caregiver Teaching:  Level of patient/caregiver understanding able to:   Voiced understanding.         Electronically signed by Meggan Anton RN,  on 2/10/2021 at 11:28 AM

## 2021-02-10 NOTE — ED NOTES
Pt is diaphoretic. SBP low. Second liter of normal saline infusing via rapid infuser per . BG taken. 217 at this time.       Rogelio Johnston RN  02/10/21 0890

## 2021-02-10 NOTE — ED NOTES
Bed: ED-29  Expected date:   Expected time:   Means of arrival:   Comments:  ems     Zeynep Cordova  02/10/21 0051

## 2021-02-10 NOTE — ED NOTES
Report called to [address] [facility] for room 3123     [facility] Sylvie  02/10/21 0551

## 2021-02-11 LAB
ADENOVIRUS F 40 41 PCR: NOT DETECTED
ANION GAP SERPL CALCULATED.3IONS-SCNC: 14 MMOL/L (ref 4–16)
ASTROVIRUS PCR: NOT DETECTED
BUN BLDV-MCNC: 21 MG/DL (ref 6–23)
CALCIUM SERPL-MCNC: 7.5 MG/DL (ref 8.3–10.6)
CAMPYLOBACTER PCR: NOT DETECTED
CHLORIDE BLD-SCNC: 98 MMOL/L (ref 99–110)
CLOSTRIDIUM DIFFICILE, PCR: NORMAL
CO2: 17 MMOL/L (ref 21–32)
CREAT SERPL-MCNC: 0.6 MG/DL (ref 0.6–1.1)
CRYPTOSPORIDIUM PCR: NOT DETECTED
CYCLOSPORA CAYETANENSIS PCR: NOT DETECTED
E COLI 0157 PCR: NOT DETECTED
E COLI ENTEROAGGREGATIVE PCR: NOT DETECTED
E COLI ENTEROPATHOGENIC PCR: NOT DETECTED
E COLI ENTEROTOXIGENIC PCR: NOT DETECTED
E COLI SHIGA LIKE TOXIN PCR: NOT DETECTED
E COLI SHIGELLA/ENTEROINVASIVE PCR: NOT DETECTED
EKG ATRIAL RATE: 102 BPM
EKG DIAGNOSIS: NORMAL
EKG P AXIS: 67 DEGREES
EKG P-R INTERVAL: 176 MS
EKG Q-T INTERVAL: 320 MS
EKG QRS DURATION: 78 MS
EKG QTC CALCULATION (BAZETT): 417 MS
EKG R AXIS: 72 DEGREES
EKG T AXIS: 70 DEGREES
EKG VENTRICULAR RATE: 102 BPM
ENTAMOEBA HISTOLYTICA PCR: NOT DETECTED
ERYTHROCYTE SEDIMENTATION RATE: 40 MM/HR (ref 0–30)
ESTIMATED AVERAGE GLUCOSE: 286 MG/DL
GFR AFRICAN AMERICAN: >60 ML/MIN/1.73M2
GFR NON-AFRICAN AMERICAN: >60 ML/MIN/1.73M2
GIARDIA LAMBLIA PCR: NOT DETECTED
GLUCOSE BLD-MCNC: 154 MG/DL (ref 70–99)
GLUCOSE BLD-MCNC: 165 MG/DL (ref 70–99)
GLUCOSE BLD-MCNC: 168 MG/DL (ref 70–99)
GLUCOSE BLD-MCNC: 172 MG/DL (ref 70–99)
GLUCOSE BLD-MCNC: 187 MG/DL (ref 70–99)
GLUCOSE BLD-MCNC: 209 MG/DL (ref 70–99)
HBA1C MFR BLD: 11.6 % (ref 4.2–6.3)
HIGH SENSITIVE C-REACTIVE PROTEIN: 444.5 MG/L
LACTIC ACID, SEPSIS: 1 MMOL/L (ref 0.5–1.9)
NOROVIRUS GI GII PCR: NOT DETECTED
PLESIOMONAS SHIGELLOIDES PCR: NOT DETECTED
POTASSIUM SERPL-SCNC: 4 MMOL/L (ref 3.5–5.1)
ROTAVIRUS A PCR: NOT DETECTED
SALMONELLA PCR: NOT DETECTED
SAPOVIRUS PCR: NOT DETECTED
SODIUM BLD-SCNC: 129 MMOL/L (ref 135–145)
VIBRIO CHOLERAE PCR: NOT DETECTED
VIBRIO PCR: NOT DETECTED
YERSINIA ENTEROCOLITICA PCR: NOT DETECTED

## 2021-02-11 PROCEDURE — 6360000002 HC RX W HCPCS: Performed by: INTERNAL MEDICINE

## 2021-02-11 PROCEDURE — 82962 GLUCOSE BLOOD TEST: CPT

## 2021-02-11 PROCEDURE — 83036 HEMOGLOBIN GLYCOSYLATED A1C: CPT

## 2021-02-11 PROCEDURE — 93010 ELECTROCARDIOGRAM REPORT: CPT | Performed by: INTERNAL MEDICINE

## 2021-02-11 PROCEDURE — 87077 CULTURE AEROBIC IDENTIFY: CPT

## 2021-02-11 PROCEDURE — 2500000003 HC RX 250 WO HCPCS: Performed by: INTERNAL MEDICINE

## 2021-02-11 PROCEDURE — 2580000003 HC RX 258: Performed by: INTERNAL MEDICINE

## 2021-02-11 PROCEDURE — 36415 COLL VENOUS BLD VENIPUNCTURE: CPT

## 2021-02-11 PROCEDURE — 84295 ASSAY OF SERUM SODIUM: CPT

## 2021-02-11 PROCEDURE — 87324 CLOSTRIDIUM AG IA: CPT

## 2021-02-11 PROCEDURE — 80048 BASIC METABOLIC PNL TOTAL CA: CPT

## 2021-02-11 PROCEDURE — 2580000003 HC RX 258: Performed by: STUDENT IN AN ORGANIZED HEALTH CARE EDUCATION/TRAINING PROGRAM

## 2021-02-11 PROCEDURE — G0328 FECAL BLOOD SCRN IMMUNOASSAY: HCPCS

## 2021-02-11 PROCEDURE — 6370000000 HC RX 637 (ALT 250 FOR IP): Performed by: INTERNAL MEDICINE

## 2021-02-11 PROCEDURE — 99221 1ST HOSP IP/OBS SF/LOW 40: CPT | Performed by: SURGERY

## 2021-02-11 PROCEDURE — 6370000000 HC RX 637 (ALT 250 FOR IP): Performed by: STUDENT IN AN ORGANIZED HEALTH CARE EDUCATION/TRAINING PROGRAM

## 2021-02-11 PROCEDURE — 87081 CULTURE SCREEN ONLY: CPT

## 2021-02-11 PROCEDURE — 85652 RBC SED RATE AUTOMATED: CPT

## 2021-02-11 PROCEDURE — 99222 1ST HOSP IP/OBS MODERATE 55: CPT | Performed by: INTERNAL MEDICINE

## 2021-02-11 PROCEDURE — 6360000002 HC RX W HCPCS: Performed by: STUDENT IN AN ORGANIZED HEALTH CARE EDUCATION/TRAINING PROGRAM

## 2021-02-11 PROCEDURE — 86141 C-REACTIVE PROTEIN HS: CPT

## 2021-02-11 PROCEDURE — 87086 URINE CULTURE/COLONY COUNT: CPT

## 2021-02-11 PROCEDURE — 2140000000 HC CCU INTERMEDIATE R&B

## 2021-02-11 PROCEDURE — 93005 ELECTROCARDIOGRAM TRACING: CPT | Performed by: INTERNAL MEDICINE

## 2021-02-11 PROCEDURE — 87507 IADNA-DNA/RNA PROBE TQ 12-25: CPT

## 2021-02-11 PROCEDURE — 94761 N-INVAS EAR/PLS OXIMETRY MLT: CPT

## 2021-02-11 PROCEDURE — 83605 ASSAY OF LACTIC ACID: CPT

## 2021-02-11 PROCEDURE — APPSS60 APP SPLIT SHARED TIME 46-60 MINUTES: Performed by: NURSE PRACTITIONER

## 2021-02-11 RX ORDER — LACTOBACILLUS RHAMNOSUS GG 10B CELL
1 CAPSULE ORAL
Status: DISCONTINUED | OUTPATIENT
Start: 2021-02-12 | End: 2021-02-15 | Stop reason: HOSPADM

## 2021-02-11 RX ORDER — LOPERAMIDE HYDROCHLORIDE 2 MG/1
2 CAPSULE ORAL ONCE
Status: COMPLETED | OUTPATIENT
Start: 2021-02-11 | End: 2021-02-11

## 2021-02-11 RX ORDER — PANTOPRAZOLE SODIUM 40 MG/10ML
40 INJECTION, POWDER, LYOPHILIZED, FOR SOLUTION INTRAVENOUS 2 TIMES DAILY
Status: DISCONTINUED | OUTPATIENT
Start: 2021-02-11 | End: 2021-02-12

## 2021-02-11 RX ORDER — NYSTATIN 100000 U/G
CREAM TOPICAL 2 TIMES DAILY
Status: DISCONTINUED | OUTPATIENT
Start: 2021-02-11 | End: 2021-02-15 | Stop reason: HOSPADM

## 2021-02-11 RX ADMIN — CEFEPIME 2000 MG: 2 INJECTION, POWDER, FOR SOLUTION INTRAVENOUS at 03:48

## 2021-02-11 RX ADMIN — CARVEDILOL 3.12 MG: 3.12 TABLET, FILM COATED ORAL at 10:26

## 2021-02-11 RX ADMIN — SODIUM BICARBONATE: 84 INJECTION, SOLUTION INTRAVENOUS at 14:42

## 2021-02-11 RX ADMIN — ENOXAPARIN SODIUM 40 MG: 40 INJECTION SUBCUTANEOUS at 10:27

## 2021-02-11 RX ADMIN — INSULIN LISPRO 2 UNITS: 100 INJECTION, SOLUTION INTRAVENOUS; SUBCUTANEOUS at 13:03

## 2021-02-11 RX ADMIN — ASPIRIN 81 MG: 81 TABLET, COATED ORAL at 10:26

## 2021-02-11 RX ADMIN — MICONAZOLE NITRATE: 2 POWDER TOPICAL at 09:30

## 2021-02-11 RX ADMIN — INSULIN GLARGINE 30 UNITS: 100 INJECTION, SOLUTION SUBCUTANEOUS at 10:27

## 2021-02-11 RX ADMIN — PRASUGREL 10 MG: 10 TABLET, FILM COATED ORAL at 10:25

## 2021-02-11 RX ADMIN — SODIUM CHLORIDE, PRESERVATIVE FREE 10 ML: 5 INJECTION INTRAVENOUS at 11:17

## 2021-02-11 RX ADMIN — LOPERAMIDE HYDROCHLORIDE 2 MG: 2 CAPSULE ORAL at 21:46

## 2021-02-11 RX ADMIN — CLOPIDOGREL BISULFATE 75 MG: 75 TABLET ORAL at 10:25

## 2021-02-11 RX ADMIN — CEFEPIME 2000 MG: 2 INJECTION, POWDER, FOR SOLUTION INTRAVENOUS at 18:50

## 2021-02-11 RX ADMIN — ATORVASTATIN CALCIUM 40 MG: 40 TABLET, FILM COATED ORAL at 10:26

## 2021-02-11 RX ADMIN — CEFEPIME 2000 MG: 2 INJECTION, POWDER, FOR SOLUTION INTRAVENOUS at 12:52

## 2021-02-11 RX ADMIN — VANCOMYCIN HYDROCHLORIDE 1250 MG: 5 INJECTION, POWDER, LYOPHILIZED, FOR SOLUTION INTRAVENOUS at 10:33

## 2021-02-11 RX ADMIN — SODIUM CHLORIDE, PRESERVATIVE FREE 10 ML: 5 INJECTION INTRAVENOUS at 21:45

## 2021-02-11 RX ADMIN — INSULIN LISPRO 2 UNITS: 100 INJECTION, SOLUTION INTRAVENOUS; SUBCUTANEOUS at 18:39

## 2021-02-11 RX ADMIN — CARVEDILOL 3.12 MG: 3.12 TABLET, FILM COATED ORAL at 18:39

## 2021-02-11 RX ADMIN — INSULIN LISPRO 2 UNITS: 100 INJECTION, SOLUTION INTRAVENOUS; SUBCUTANEOUS at 10:27

## 2021-02-11 RX ADMIN — MICONAZOLE NITRATE: 2 POWDER TOPICAL at 21:46

## 2021-02-11 ASSESSMENT — ENCOUNTER SYMPTOMS
COLOR CHANGE: 0
NAUSEA: 0
EYE DISCHARGE: 0
EYE REDNESS: 0
CHEST TIGHTNESS: 0
ABDOMINAL DISTENTION: 0
SHORTNESS OF BREATH: 0
ABDOMINAL PAIN: 1
CONSTIPATION: 0
VOMITING: 0
DIARRHEA: 1
SORE THROAT: 0

## 2021-02-11 NOTE — CONSULTS
Nephrology Service Consultation      [address], [address] [address]  Phone: [phone]  Office Hours: 8:30AM - 4:30PM  Monday - Friday            Patient:  Devota Hashimoto  MRN: [id number]  Consulting physician:  Rubi Pringle DO  Reason for Consult: low sodium      PCP: No primary care provider on file. HISTORY OF PRESENT ILLNESS:   The patient is a 50 y.o. female with DM2, CAD s/p stents presented due to feeling unwell, she reports feeling as if she had covid 19 due to her generalized unwellness. She had 2 loose stools on Sunday and no diarrhea. She denies vomiting but reports poor intake of foods. She believes that she was drinking lots of fluid. Renal consult for sodium 124, she has been started on hypotonic ivf overnight  Rapid covid test was negative  BP was low on admission    REVIEW OF SYSTEMS:  14 point ROS is Negative. See positive ROS per HPI    Past Medical History:        Diagnosis Date    CAD (coronary artery disease)     Current every day smoker     Diabetes mellitus ([address]     History of exercise stress test 12/18/2019    Treadmill, Normal exercise performance without angina and ischemic EKG changes.  Hyperlipidemia     Hypertension     Morbid obesity ([address]     Palpitations     Post PTCA 11/26/2019    Kissing stent to LAD & to Ostium of Diag.  SOB (shortness of breath)        Past Surgical History:        Procedure Laterality Date    HERNIA REPAIR      PTCA  11/26/2019    Kissing stents to LAD & Ostium of Diag. Medications:   Prior to Admission medications    Medication Sig Start Date End Date Taking?  Authorizing Provider   INVOKANA 300 MG TABS tablet  11/3/20   Historical Provider, MD   prasugrel (EFFIENT) 10 MG TABS Take 1 tablet by mouth daily 11/19/20   Dasha Sanchez MD   carvedilol (COREG) 3.125 MG tablet Take 1 tablet by mouth 2 times daily (with meals) 11/19/20   Dasha Sanchez MD   clopidogrel (PLAVIX) 75 MG tablet Take 1 tablet by mouth daily 10/29/20   Arleene Copping, APRN - CNP   atorvastatin (LIPITOR) 40 MG tablet Take 1 tablet by mouth daily 10/23/20   Arleene Copping, APRN - CNP   aspirin (ASPIRIN LOW DOSE) 81 MG EC tablet Take 1 tablet by mouth daily 10/23/20   Arleene Copping, APRN - CNP   lisinopril (PRINIVIL;ZESTRIL) 20 MG tablet Take 1 tablet by mouth daily 10/23/20   Arleene Copping, APRN - CNP   carvedilol (COREG) 6.25 MG tablet Take 1 tablet by mouth 2 times daily (with meals) 10/23/20   Arleene Copping, APRN - CNP   Canagliflozin (INVOKANA PO) Take 200 mg by mouth    Historical Provider, MD   linagliptin (TRADJENTA) 5 MG tablet Take 1 tablet by mouth daily 11/28/19   Sirisha Stahl MD   insulin glargine (LANTUS SOLOSTAR) 100 UNIT/ML injection pen Inject 40 Units into the skin daily 11/27/19   Sirisha Stahl MD   insulin aspart (NOVOLOG FLEXPEN) 100 UNIT/ML injection pen Take 20 units with each meal in addition to sliding scale as below   *Medium Dose Correction Algorithm**Insulin: 3 TIMES DAILY WITH MEALSGlucose: Dose: No Keezrbh781-119 2 Vuavu540-052 4 Osfwr190-142 6 Sgjki573-550 8 Rwotw132-294 10 Kmhgm740 and above 12 Units 11/27/19   Sirisha Stahl MD   blood glucose monitor kit and supplies Test 4 times a day before meals and bedtime& as needed for symptoms of irregular blood glucose. 11/27/19   Nigel Bobby MD   Insulin Pen Needle (PEN NEEDLES) 31G X 5 MM MISC lantus QHS and Novolog QAC and HS 11/27/19   Sirisha Stahl MD   Lancets MISC 1 each by Does not apply route 4 times daily 11/27/19   Sirisha Stahl MD   blood glucose monitor strips Test 4 times a day & as needed for symptoms of irregular blood glucose. 11/27/19   Sirisha Stahl MD        Allergies:  Ampicillin    Social History:   TOBACCO:   reports that she has been smoking. She has been smoking about 1.00 pack per day. She has never used smokeless tobacco.  ETOH:   reports no history of alcohol use. OCCUPATION:      Family History:   History reviewed.  No pertinent family history.         Physical Exam:    Vitals: /63   Pulse 108   Temp 99.8 °F (37.7 °C) (Oral)   Resp 27   Ht 5' 6\" (1.676 m)   Wt 271 lb 1.6 oz (123 kg)   SpO2 92%   BMI 43.76 kg/m²   General appearance: in no acute distress, appears stated age  [de-identified]: normocephalic, atraumatic  Neck: supple, trachea midline  Lungs: clear to auscultation bilaterally, breathing comfortably  Heart[de-identified] regular rate and rhythm, S1, S2 normal,  Abdomen: soft, non-tender; bowel sounds normal; no masses,   Extremities: extremities normal, atraumatic, no cyanosis or edema  Neurologic: Mental status: alert, oriented, interactive, following commands  Psychiatric: mood and affect appropriate    CBC:   Recent Labs     02/10/21  0130 02/10/21  1048   WBC 19.5* 17.8*   HGB 14.1 13.0    162     BMP:    Recent Labs     02/10/21  0130 02/10/21  1048 02/10/21  2039   * 124* 126*   K 4.5 4.0 3.8   CL 88* 93* 93*   CO2 12* 13* 15*   BUN 23 26* 18   CREATININE 1.0 0.9 0.7   GLUCOSE 204* 204* 126*     Hepatic:   Recent Labs     02/10/21  0130   AST 42*   ALT 30   BILITOT 0.5   ALKPHOS 111        -----------------------------------------------------------------      Assessment and Recommendations     Patient Active Problem List   Diagnosis    Acute chest pain    Unstable angina (HCC)    Morbid obesity (HCC)    Type 2 diabetes mellitus with circulatory disorder, with long-term current use of insulin (HCC)    Essential hypertension    Dyslipidemia    Post PTCA    SOB (shortness of breath)    CAD (coronary artery disease)    Palpitations    Hyponatremia   Hyponatremia: 122 on admission//ddx: volume depletion vs hypervolemia vs siadh  Anion gap metabolic acidosis    Plan:  -Stop the hypotonic IVF please  -Repeat a sodium level this morning to decide on further interventions  -Do not overdrink fluids-    Thank you      Electronically signed by Anusha Courtney DO on 2/11/2021 at 7:25 AM    [facility]  Franc[place]a [place], MD Gorden Dancer, DO [address]  [place], [place] [address]  PHONE: [phone]  FAX: [phone]

## 2021-02-11 NOTE — CONSULTS
Infectious Disease Consult Note  2021   Patient Name: Bindu Gomez : 1965   Impression   Sepsis Secondary to Bilateral Groin Cellulitis Without Abscess:   · Fever max 103.2, trended down to low grade temps  · Leukocytosis max 19.5 on 21  · Blood cultures 2/10-pending  · 2/10-RDP and rapid Flu Negative  · -GI Disease panel and CDif pending  · Pct 1.51, .5  · 2/10-UA WBC 11, RBC 13, Urine culture pending  · 2/10-CT A&P WO Contrast: No acute findings, diffuse hepatic steatosis  · Dr. Ana Burnett, General Surgery, onboard, impression of bilateral groins and intertriginous folds cellulitis without apparent abscess, Hidradenitis is a consideration but less likely due to no previous infection, rec empiric ABX and local wound care, no surgical intervention at this time.  Anion Gap Metabolic Acidosis/Hyponatemia:  · Dr. Payal Rice onboard  · Imp of volume depletion vs hypervolemia vs SIADH    · Morbid Obesity: BMI 43.71    · DMII:  · Dr. Willian Herrera onboard  · HbAIC -11.6, uncontrolled    · Tobacco Abuse    · HTN    · CAD     Multi-morbidity: per PMHx:  CAD, Tobacco Abuse, DMII, HLD, HTN, Morbid Obesity, CAD s/p PCI, hernia repair, Allergy to Ampicillin, but not PCN (rash)  Plan:   Continue Cefepime IV 2 gm q8h   DC fluconazole, and vancomycin  · Start topical Nystatin to groin bid  · Check MRSA screen   Trend CRP and Pct  · Check ESR  · Appreciate Dr. Di Armstrong and Dr. Binta Bray  recommendations    Thank you for allowing me to consult in the care of this patient.  ------------------------  REASON FOR CONSULT: Infective syndrome     Requested by: Dr. Maia Beckman is a 50 y.o.  female who was admitted 2/10/2021 for further evaluation and management of generalized weakness and diarrhea, poor oral intake and nausea. She presented to the ED per EMS from home. Reports no fever, chills, shortness of breath, chest pain or cough.   Reports she recently tested negative for COVID-19. States she has been taking DayQuil and NyQuil no no improvement of symptoms. States has not bathed/showered in over a week. Has a bilateral groin erythema and edematous area with a foul odor, states this has been present for about a week. States this occurs whenever she goes for long periods between bathing but \"clears up\" when she showers. ?  Infectious diseases service was consulted to evaluate the pt, and recommend further investigative and therapeutic measures. ROS: Other systems reviewed Including eyes, ENT, respiratory, cardiovascular, GI, , dermatologic, neurologic, psych, hem/lymphatic, musculoskeletal and endocrine were negative other than what is mentioned above. Patient Active Problem List    Diagnosis Date Noted    High anion gap metabolic acidosis     Hyponatremia 02/10/2021    SOB (shortness of breath)     CAD (coronary artery disease)     Palpitations     Unstable angina ([address] 11/26/2019    Morbid obesity ([address] 11/26/2019    Type 2 diabetes mellitus with circulatory disorder, with long-term current use of insulin ([address] 11/26/2019    Essential hypertension 11/26/2019    Dyslipidemia 11/26/2019    Post PTCA 11/26/2019    Acute chest pain 11/25/2019     Past Medical History:   Diagnosis Date    CAD (coronary artery disease)     Current every day smoker     Diabetes mellitus ([address]     History of exercise stress test 12/18/2019    Treadmill, Normal exercise performance without angina and ischemic EKG changes.  Hyperlipidemia     Hypertension     Morbid obesity ([address]     Palpitations     Post PTCA 11/26/2019    Kissing stent to LAD & to Ostium of Diag.  SOB (shortness of breath)       Past Surgical History:   Procedure Laterality Date    HERNIA REPAIR      PTCA  11/26/2019    Kissing stents to LAD & Ostium of Diag. History reviewed. No pertinent family history. Infectious disease related family history - not contibutory.    SOCIAL HISTORY  Social History     Tobacco Use    Smoking status: Current Some Day Smoker     Packs/day: 1.00    Smokeless tobacco: Never Used    Tobacco comment: 1 pack every four days    Substance Use Topics    Alcohol use: Never     Frequency: Never       Born:   Lives: [place], [place] with  and son  Pal Mohr No recent travel of significance.  No recent unusual exposures.  Pets: one dog, one cat  ? ALLERGIES  Allergies   Allergen Reactions    Ampicillin Rash      MEDICATIONS  Reviewed and are per the chart/EMR. ? Antibiotics:   Present:  Cefepime 2/10-  Nystatin topical 2/11-    Past:  Vancomycin 2/10-11  Diflucan 2/10-11?  -------------------------------------------------------------------------------------------------------------------    Vital Signs:  Vitals:    02/11/21 0400   BP: 122/63   Pulse: 108   Resp: 27   Temp: 99.8 °F (37.7 °C)   SpO2: 92%         Exam:    VS: noted; wt 271 lb (123 kg) 5'6\" Height  Gen: alert and oriented X3, no distress  Skin: no stigmata of endocarditis  Wounds: bilateral groin areas with erythema, edema, foul odor  HEMT: AT/NC Oropharynx pink, moist, and without lesions or exudates; dentition in poor state of repair  Eyes: PERRLA, EOMI, conjunctiva pink, sclera anicteric. Neck: Supple. Trachea midline. No LAD. Chest: no distress and CTA. Good air movement. Room air. Heart: RRR to ST and no MRG. Abd: large, pendulous, soft, non-distended, no tenderness, no hepatomegaly. Normoactive bowel sounds. Ext: no clubbing, cyanosis, or edema  Catheter Site: without erythema or tenderness draining clear yellow urine. Neuro: Mental status intact. CN 2-12 intact and no focal sensory or motor deficits    ? Diagnostic Studies: reviewed  2/10/21 XR Chest Portable:  Impression   No acute process. 2/10/21 CT Head WO Contrast:  Impression   No acute brain parenchymal abnormality.      2/10/21 CT Abdomen Pelvis WO Contrast:  Impression   No acute findings.     Diffuse hepatic steatosis. ??  I have examined this patient and available medical records on this date and have made the above observations, conclusions and recommendations. Electronically signed by: Electronically signed by Ravinder Bolivar.  VIVIAN Handley CNP on 2/11/2021 at 9:14 AM

## 2021-02-11 NOTE — PROGRESS NOTES
Physician Progress Note      PATIENT:               Priyanka Gonzalez  CSN #:                  [id number]  :                       1965  ADMIT DATE:       2/10/2021 12:50 AM  DISCH DATE:  RESPONDING  PROVIDER #:        Hanna Giles MD          QUERY TEXT:    Dear Hospitalist,    Pt admitted with intractable diarrhea and sepsis and has encephalopathy   documented. If possible, please document in progress notes and discharge   summary further specificity regarding the type of encephalopathy:    The medical record reflects the following:  Risk Factors: dehydration, sepsis  Clinical Indicators: Anion gap 22 -  18,  - 126, Temp 98.1 - 103.2, WBC   19.4 - 17.8, Dr Marguerite Kinsgley documented in 2/10 progress note   \"Encephalopathy-suspect related to sepsis, -CT head negative -Still confused   as of 2/10\"  Treatment: 2 L 0.9% NS bolus, IV cefepime and vanc, sodium bicarb gtt 50   ml/hr, tele, head CT    Thank you,  ДМИТРИЙ JewellN, RN, CDS  [phone]  Options provided:  -- Metabolic encephalopathy  -- Septic encephalopathy  -- Other - I will add my own diagnosis  -- Disagree - Not applicable / Not valid  -- Disagree - Clinically unable to determine / Unknown  -- Refer to Clinical Documentation Reviewer    PROVIDER RESPONSE TEXT:    This patient has a metabolic encephalopathy likely due to hyponatremia. Sepsis may be contributing to the encephalopathy as well.      Query created by: Dilcia Hollingsworth on 2021 10:09 AM      Electronically signed by:  Hanna Giles MD 2021 10:22 AM

## 2021-02-11 NOTE — PROGRESS NOTES
Physician Progress Note      PATIENT:               Jameel Marley  CSN #:                  [id number]  :                       1965  ADMIT DATE:       2/10/2021 12:50 AM  DISCH DATE:  RESPONDING  PROVIDER #:        Gregg Garza MD          QUERY TEXT:    Dear Hospitalist,    Pt admitted with intractable diarrhea. Pt noted to have sepsis with   hypotension. If possible, please document in the progress notes and discharge   summary if you are evaluating and/or treating any of the following: The medical record reflects the following:  Risk Factors: intractable diarrhea, sepsis  Clinical Indicators: SBP 75 - 150,  P 130 -89, R 31 - 20, cool skin in nursing   assessment on 2/10 at 0432, encephalopathy noted in Dr. Eron Eil  progress   notes on 2/10, Dr. Anisha Askew noted severe sepsis in  progress note. Dr. Jessica Zuluaga documented the following in ED provider note on 2/10  \". Patient did   become hypotensive here responded to further IV fluid resuscitation. \" anion   gap 22 -  18. Treatment: 2 L 0.9% NS bolus (one L infused via rapid infuser per ED nursing   note on 2/10), IV cefepime and vanc, sodium bicarb gtt 50 ml/hr, tele,    Thank you,  ДМИТРИЙ ClearyN, RN, CDS  [phone]  Options provided:  -- Septic Shock  -- Hypovolemic Shock  -- Hypovolemia without Shock  -- Hypotension without Shock  -- Other - I will add my own diagnosis  -- Disagree - Not applicable / Not valid  -- Disagree - Clinically unable to determine / Unknown  -- Refer to Clinical Documentation Reviewer    PROVIDER RESPONSE TEXT:    Patient had hypotension related to sepsis BP was down to 71/44 at one point,   and she had confusion, weakness, and was unable to ambulate. She may have had   early septic shock.     Query created by: Sobeida Holt on 2021 10:09 AM      Electronically signed by:  Gregg Garza MD 2021 1:00 PM

## 2021-02-11 NOTE — PLAN OF CARE
Problem: Pain:  Goal: Pain level will decrease  Description: Pain level will decrease  2/11/2021 0027 by Umm Donovan RN  Outcome: Ongoing  2/10/2021 1432 by Dagmar Black RN  Outcome: Ongoing  Goal: Control of acute pain  Description: Control of acute pain  2/11/2021 0027 by Umm Donovan RN  Outcome: Ongoing  2/10/2021 1432 by Dagmar Black RN  Outcome: Ongoing  Goal: Control of chronic pain  Description: Control of chronic pain  2/11/2021 0027 by Umm Donovan RN  Outcome: Ongoing  2/10/2021 1432 by Dagmar Black RN  Outcome: Ongoing     Problem: Fluid Volume:  Goal: Ability to achieve a balanced intake and output will improve  Description: Ability to achieve a balanced intake and output will improve  2/11/2021 0027 by Umm Donovan RN  Outcome: Ongoing  2/10/2021 1432 by Dagmar Black RN  Outcome: Ongoing     Problem: Physical Regulation:  Goal: Ability to maintain clinical measurements within normal limits will improve  Description: Ability to maintain clinical measurements within normal limits will improve  2/11/2021 0027 by Umm Donovan RN  Outcome: Ongoing  2/10/2021 1432 by Dagmar Black RN  Outcome: Ongoing  Goal: Will show no signs and symptoms of electrolyte imbalance  Description: Will show no signs and symptoms of electrolyte imbalance  2/11/2021 0027 by Umm Donovan RN  Outcome: Ongoing  2/10/2021 1432 by Dagmar Black RN  Outcome: Ongoing     Problem: Fluid Volume - Imbalance:  Goal: Absence of imbalanced fluid volume signs and symptoms  Description: Absence of imbalanced fluid volume signs and symptoms  2/11/2021 0027 by Umm Donovan RN  Outcome: Ongoing  2/10/2021 1432 by Dagmar Black RN  Outcome: Ongoing

## 2021-02-11 NOTE — PROGRESS NOTES
Hospitalist Progress Note      Name:  Toña Henning /Age/Sex: 1965  (50 y.o. female)   MRN & CSN:  [id number] & [id number] Admission Date/Time: 2/10/2021 12:50 AM   Location:  Replaced by [id number] PCP: No primary care provider on file. Hospital Day: 2      Assessment and Plan:     Sepsis  -He has bilateral groin cellulitis    Bilateral groin cellulitis  -IV Diflucan  -IV cefepime, appreciate infectious disease consult  -Topical miconazole for yeast infection of skin    Diarrhea  -C. difficile negative and GI disease panel is negative  - afternoon-copious amounts of black stool noted-1 dose Imodium ordered, will consult GI    Encephalopathy-suspect related to sepsis  --confusion improved    Hyponatremia  -Consult nephrology  -Hyponatremia much improved    Metabolic acidosis with elevated anion gap  -Nephrology consult appreciated    Diabetes mellitus type 2  -Last available A1c was 13.3 in 2019  -Hemoglobin A1c this admission is 11.6%        Other problems    Tobacco use  -Encourage cessation    Coronary disease status post stents-continue DAPT    Body mass index is 43.76 kg/m². Consistent with morbid obesity    Dyslipidemia        Subjective:     -her mental status is much better today    2-10    Chief Complaint   Patient presents with    Fatigue    Fever     She is confused today. She is not very articulate when letting me know why she is here    She did state after questioning about her  called the squad at her request.  She could not tell me why other than she could not walk. Objective:        Intake/Output Summary (Last 24 hours) at 2021 1334  Last data filed at 2021 0426  Gross per 24 hour   Intake 1352.54 ml   Output 4150 ml   Net -2797.46 ml      Vitals:   Vitals:    21 1232   BP: 119/66   Pulse:    Resp:    Temp: 98.7 °F (37.1 °C)   SpO2: 95%     Physical Exam:       Lungs-respiratory effort nonlabored at rest    Neurologic-speech clear, mental status much clear today    Skin-no cyanosis    Medications:   Medications:    aspirin  81 mg Oral Daily    atorvastatin  40 mg Oral Daily    carvedilol  3.125 mg Oral BID WC    clopidogrel  75 mg Oral Daily    prasugrel  10 mg Oral Daily    sodium chloride flush  10 mL Intravenous 2 times per day    enoxaparin  40 mg Subcutaneous Daily    miconazole   Topical BID    fluconazole  150 mg Intravenous Q24H    cefepime  2,000 mg Intravenous Q8H    vancomycin  1,250 mg Intravenous Q12H    insulin lispro  15 Units Subcutaneous TID     insulin glargine  30 Units Subcutaneous Daily    insulin lispro  0-12 Units Subcutaneous TID     insulin lispro  0-6 Units Subcutaneous 2 times per day      Infusions:    sodium bicarbonate infusion      dextrose       PRN Meds:     sodium chloride flush, 10 mL, PRN      ondansetron, 4 mg, Q6H PRN      polyethylene glycol, 17 g, Daily PRN      acetaminophen, 650 mg, Q6H PRN    Or      acetaminophen, 650 mg, Q6H PRN      glucose, 15 g, PRN      dextrose, 12.5 g, PRN      glucagon (rDNA), 1 mg, PRN      dextrose, 100 mL/hr, PRN      gabapentin, 300 mg, Nightly PRN          Electronically signed by Ernesto Reed MD on 2/11/2021 at 1:34 PM

## 2021-02-11 NOTE — CONSULTS
[id number] [facility], [facility]    PATIENT: Rebeka Santos, 1965, 50 y.o., female  MRN: [id number]    Physician: Natali Sewell MD    Date: 2/11/21    Reason for Evaluation & Chief Complaint:  Bilateral groin drainage and infection, possible hidradenitis   Chief Complaint   Patient presents with    Fatigue    Fever     Requesting Provider: Dionna Kenney MD     History Obtained From:  patient, electronic medical record    HISTORY OF PRESENT ILLNESS:    Veronique Wu is a 50 y.o. female presenting with diarrhea and weakness, who was also noted to have erythema and drainage from her bilateral groins. She relates that she thinks the problem started because she hasn't been able to shower in more than a week. She denies any previous history of groin infections or hidradenitis. She had subjective fevers and chills on admission, but denies since. She has some pain in the groin areas with stinging and burning. She thinks the infection has been present for a few days. Past Medical History:    Past Medical History:   Diagnosis Date    CAD (coronary artery disease)     Current every day smoker     Diabetes mellitus ([address]     History of exercise stress test 12/18/2019    Treadmill, Normal exercise performance without angina and ischemic EKG changes.  Hyperlipidemia     Hypertension     Morbid obesity ([address]     Palpitations     Post PTCA 11/26/2019    Kissing stent to LAD & to Ostium of Diag.  SOB (shortness of breath)        Past Surgical History:    Past Surgical History:   Procedure Laterality Date    HERNIA REPAIR      PTCA  11/26/2019    Kissing stents to LAD & Ostium of Diag.        Current Medications:   Current Facility-Administered Medications   Medication Dose Route Frequency Provider Last Rate Last Admin    aspirin EC tablet 81 mg  81 mg Oral Daily Miriam Valentin DO   81 mg at 02/10/21 0819    atorvastatin (LIPITOR) tablet 40 mg  40 mg Oral Daily Emily Castro DO   40 mg at 02/10/21 9657    carvedilol (COREG) tablet 3.125 mg  3.125 mg Oral BID WC Miriam Valentin DO   3.125 mg at 02/10/21 1814    clopidogrel (PLAVIX) tablet 75 mg  75 mg Oral Daily Emily Castro DO   75 mg at 02/10/21 5831    prasugrel (EFFIENT) tablet 10 mg  10 mg Oral Daily Miriam Valentin DO   10 mg at 02/10/21 2525    sodium chloride flush 0.9 % injection 10 mL  10 mL Intravenous 2 times per day Emily Castro DO   10 mL at 02/10/21 2117    sodium chloride flush 0.9 % injection 10 mL  10 mL Intravenous PRN Emily Castro DO        enoxaparin (LOVENOX) injection 40 mg  40 mg Subcutaneous Daily Miriam Valentin DO   40 mg at 02/10/21 0820    ondansetron (ZOFRAN) injection 4 mg  4 mg Intravenous Q6H PRN Miriam Valentin DO        polyethylene glycol (GLYCOLAX) packet 17 g  17 g Oral Daily PRN Emily Castro DO        acetaminophen (TYLENOL) tablet 650 mg  650 mg Oral Q6H PRN Emily Castro DO   650 mg at 02/10/21 2058    Or    acetaminophen (TYLENOL) suppository 650 mg  650 mg Rectal Q6H PRN Miriam Valentin DO        miconazole (MICOTIN) 2 % powder   Topical BID Alfonso Mesa MD   Given at 02/10/21 2101    glucose (GLUTOSE) 40 % oral gel 15 g  15 g Oral PRN Alfonso Mesa MD        dextrose 50 % IV solution  12.5 g Intravenous PRN Alfonso Mesa MD        glucagon (rDNA) injection 1 mg  1 mg Intramuscular PRN Alfonso Mesa MD        dextrose 5 % solution  100 mL/hr Intravenous PRN Alfonso Mesa MD        fluconazole (DIFLUCAN) 150 mg IVPB  150 mg Intravenous Q24H Alfonso Mesa  mL/hr at 02/10/21 2059 150 mg at 02/10/21 2059    cefepime (MAXIPIME) 2000 mg IVPB minibag  2,000 mg Intravenous Q8H Alfonso Mesa MD   Stopped at 02/11/21 0418    gabapentin (NEURONTIN) capsule 300 mg  300 mg Oral Nightly PRN Alfonso Mesa MD        vancomycin (VANCOCIN) 1,250 mg in dextrose 5 % 250 mL IVPB  1,250 mg Intravenous Q12H Tc Sanchez MD        insulin lispro (HUMALOG) injection vial 15 Units  15 Units Subcutaneous TID  Melissa King MD        insulin glargine (LANTUS) injection vial 30 Units  30 Units Subcutaneous Daily  Leopold Signs, MD        insulin lispro (HUMALOG) injection vial 0-12 Units  0-12 Units Subcutaneous TID  Melissa King MD        insulin lispro (HUMALOG) injection vial 0-6 Units  0-6 Units Subcutaneous 2 times per day Melissa King MD   1 Units at 02/11/21 0201       Allergies:  Ampicillin    Social History:   Social History     Socioeconomic History    Marital status:      Spouse name: None    Number of children: None    Years of education: None    Highest education level: None   Occupational History    None   Social Needs    Financial resource strain: None    Food insecurity     Worry: None     Inability: None    Transportation needs     Medical: None     Non-medical: None   Tobacco Use    Smoking status: Current Some Day Smoker     Packs/day: 1.00    Smokeless tobacco: Never Used    Tobacco comment: 1 pack every four days    Substance and Sexual Activity    Alcohol use: Never     Frequency: Never    Drug use: Never    Sexual activity: Yes     Partners: Male, Female   Lifestyle    Physical activity     Days per week: None     Minutes per session: None    Stress: None   Relationships    Social connections     Talks on phone: None     Gets together: None     Attends [employer] service: None     Active member of club or organization: None     Attends meetings of clubs or organizations: None     Relationship status: None    Intimate partner violence     Fear of current or ex partner: None     Emotionally abused: None     Physically abused: None     Forced sexual activity: None   Other Topics Concern    None   Social History Narrative    None       Family History:   History reviewed. No pertinent family history.     REVIEW OF SYSTEMS:    Review of Systems   Constitutional: Positive for chills and fever. HENT: Negative for congestion and sore throat. Eyes: Negative for discharge and redness. Respiratory: Negative for chest tightness and shortness of breath. Cardiovascular: Negative for chest pain and palpitations. Gastrointestinal: Positive for abdominal pain (bilateral groins) and diarrhea. Negative for abdominal distention, constipation, nausea and vomiting. Genitourinary: Negative for dysuria and flank pain. Musculoskeletal: Negative for arthralgias and myalgias. Skin: Positive for rash. Negative for color change. Neurological: Negative for dizziness and numbness. Psychiatric/Behavioral: Negative for confusion. The patient is not nervous/anxious. I have reviewed the patient's information pertinent to this visit, including medical history, family history, social history and review of systems. PHYSICAL EXAM:    Vitals:    02/11/21 0100 02/11/21 0200 02/11/21 0300 02/11/21 0400   BP: (!) 96/58 129/76 (!) 101/58 122/63   Pulse: 105 99 100 108   Resp: 26 20 23 27   Temp:    99.8 °F (37.7 °C)   TempSrc:    Oral   SpO2: 100%   92%   Weight:    271 lb 1.6 oz (123 kg)   Height:         Physical Exam  Constitutional:       General: She is not in acute distress. Appearance: She is well-developed. She is obese. She is not diaphoretic. HENT:      Head: Normocephalic and atraumatic. Mouth/Throat:      Mouth: Mucous membranes are moist.   Eyes:      General:         Right eye: No discharge. Left eye: No discharge. Pupils: Pupils are equal, round, and reactive to light. Neck:      Musculoskeletal: Neck supple. Trachea: No tracheal deviation. Cardiovascular:      Rate and Rhythm: Regular rhythm. Tachycardia present. Pulmonary:      Effort: Pulmonary effort is normal. No respiratory distress. Breath sounds: No wheezing. Abdominal:      General: There is no distension. Palpations: Abdomen is soft. Tenderness: There is abdominal tenderness (bilateral groin folds). There is no guarding or rebound. Comments: Morbidly obese   Musculoskeletal:         General: No tenderness or deformity. Skin:     General: Skin is warm and dry. Findings: Rash (erythema and cellulitis of the bilateral groin folds and intertigrinous areas with weeping of seropurulent fluid. No apparent fluid collection or abscess) present. Comments: Scattered ecchymoses of the bilateral lower extremities. Neurological:      Mental Status: She is alert and oriented to person, place, and time. Psychiatric:         Behavior: Behavior normal.         DATA:    Lab Results   Component Value Date    WBC 17.8 (H) 02/10/2021    HGB 13.0 02/10/2021    HCT 40.5 02/10/2021     02/10/2021     (L) 02/10/2021    K 3.8 02/10/2021    CL 93 (L) 02/10/2021    CO2 15 (L) 02/10/2021    BUN 18 02/10/2021    CREATININE 0.7 02/10/2021    GLUCOSE 126 (H) 02/10/2021    CALCIUM 7.6 (L) 02/10/2021    PROT 7.4 02/10/2021    BILITOT 0.5 02/10/2021    AST 42 (H) 02/10/2021    ALT 30 02/10/2021    ALKPHOS 111 02/10/2021    LABA1C 13.3 (H) 11/25/2019       Imaging:   Ct Abdomen Pelvis Wo Contrast Additional Contrast? None    Result Date: 2/10/2021  EXAMINATION: CT OF THE ABDOMEN AND PELVIS WITHOUT CONTRAST 2/10/2021 8:21 pm TECHNIQUE: CT of the abdomen and pelvis was performed without the administration of intravenous contrast. Multiplanar reformatted images are provided for review. Dose modulation, iterative reconstruction, and/or weight based adjustment of the mA/kV was utilized to reduce the radiation dose to as low as reasonably achievable. COMPARISON: None.  HISTORY: ORDERING SYSTEM PROVIDED HISTORY: Fever, rule out abdominal source TECHNOLOGIST PROVIDED HISTORY: Reason for exam:->Fever, rule out abdominal source Additional Contrast?->None Is the patient pregnant?->No Reason for Exam: fever, rule out abdominal source Acuity: Unknown Type of Exam: Unknown FINDINGS: Lower Chest:  Visualized portion of the lower chest demonstrates no acute abnormality. Organs: Is diffuse hepatic steatosis. The visualized portions of the liver, spleen, pancreas and adrenal glands appear unremarkable within the constraints of a noncontrast exam.  No biliary ductal dilatation. The kidneys appear normal in size without evidence of a contour distorting mass. No renal stone or hydronephrosis. No perinephric stranding. GI/Bowel: No bowel dilatation to suggest obstruction. No evidence of acute appendicitis. Pelvis: The urinary bladder contains Azul catheter. The pelvic organs demonstrate no acute abnormality. Peritoneum/Retroperitoneum: The abdominal aorta is normal in caliber. No fluid collection, gross lymphadenopathy, or free air. Bones/Soft Tissues: No acute findings. No acute findings. Diffuse hepatic steatosis. Pertinent laboratory and imaging studies were personally reviewed if available. IMPRESSION:    Larry Padgett is a 50 y.o. female with sepsis, poorly controlled diabetes, diarrhea, and cellulitis of the bilateral groin folds  Patient Active Problem List    Diagnosis Date Noted    High anion gap metabolic acidosis     Hyponatremia 02/10/2021    SOB (shortness of breath)     CAD (coronary artery disease)     Palpitations     Unstable angina ([address] 11/26/2019    Morbid obesity ([address] 11/26/2019    Type 2 diabetes mellitus with circulatory disorder, with long-term current use of insulin ([address] 11/26/2019    Essential hypertension 11/26/2019    Dyslipidemia 11/26/2019    Post PTCA 11/26/2019    Acute chest pain 11/25/2019     Visit Diagnoses:  1. Diarrhea, unspecified type    2. Hyponatremia    3. High anion gap metabolic acidosis      PLAN:  · Discussed findings and options with Larry Padgett. Currently she appears to have cellulitis without apparent abscess of the bilateral groins and intertriginous folds.  Hidradenitis is a consideration, but she denies any history of previous infection or hidradenitis, so it appears less likely at this time. Agree with continued empiric abx and local wound care. · Currently she doesn't appear to require surgical intervention. Will continue to follow. · Morbid obesity likely contributing to moisture in the pannus area. Agree with diabetes control and weight loss measures.    · Encouraged careful hygiene, hadn't showered in more than a week before coming to the hospital   · Thank you for the consultation and the opportunity to care for Brandy Prescott    Electronically signed by Luisana Novoa MD, 2/11/2021, 7:40 AM

## 2021-02-11 NOTE — CONSULTS
Endocrinology   Consult Note      Dear Doctor Owen Mina    Thank You for the Consult     Pt. Was Admitted for : Lysed weakness nausea diarrhea possible sepsis    Reason for Consult: Better control of blood glucose and also patient is hyponatremic      History Obtained From:  Patient/ EMR       HISTORY OF PRESENT ILLNESS:                The patient is a 50 y.o. female with significant past medical history of CAD, post PTCA diabetes mellitus, hypertension, hyperlipidemia, was admitted to hospital for generalized weakness diarrhea body aches chills nausea. She has a very poor appetite. I was  consulted for better control of blood glucose and also review her sodium status. There was a question of she having diabetes insipidus as she has had urinary volume. .       ROS:   Pt's ROS done in detail. Abnormal ROS are noted in Medical and Surgical History Section below: Other Medical History:        Diagnosis Date    CAD (coronary artery disease)     Current every day smoker     Diabetes mellitus ([address]     History of exercise stress test 12/18/2019    Treadmill, Normal exercise performance without angina and ischemic EKG changes.  Hyperlipidemia     Hypertension     Morbid obesity ([address]     Palpitations     Post PTCA 11/26/2019    Kissing stent to LAD & to Ostium of Diag.  SOB (shortness of breath)      Surgical History:        Procedure Laterality Date    HERNIA REPAIR      PTCA  11/26/2019    Kissing stents to LAD & Ostium of Diag. Allergies:  Ampicillin    Family History:   History reviewed. No pertinent family history.   REVIEW OF SYSTEMS:  Review of System Done as noted above     PHYSICAL EXAM:      Vitals:    /64   Pulse 117   Temp 103.2 °F (39.6 °C) (Oral)   Resp 24   Ht 5' 6\" (1.676 m)   Wt 240 lb (108.9 kg)   SpO2 95%   BMI 38.74 kg/m²     CONSTITUTIONAL:  awake, alert, cooperative, appears stated age but appears very tired  EYES:  vision intact Fundoscopic Exam not performed ENT:Normal  NECK:  Supple, No JVD. Thyroid Exam:Normal   LUNGS:  Has Vesicular Breath Sounds,   CARDIOVASCULAR:  Normal apical impulse, regular rate and rhythm, normal S1 and S2, no S3 or S4, and has no  murmur   ABDOMEN:  No scars, normal bowel sounds, soft, non-distended, non-tender, no masses palpated, no hepatolienomegaly  Musculoskeletal: Normal  Extremities: Normal, peripheral pulses normal, , has no edema   NEUROLOGIC:  Awake, alert, oriented to name, place and time. Cranial nerves II-XII are grossly intact. Motor is  intact. Sensory is intact. ,  and gait is normal.    DATA:    CBC:   Recent Labs     02/10/21  0130 02/10/21  1048   WBC 19.5* 17.8*   HGB 14.1 13.0    162    CMP:  Recent Labs     02/10/21  0130 02/10/21  1048   * 124*   K 4.5 4.0   CL 88* 93*   CO2 12* 13*   BUN 23 26*   CREATININE 1.0 0.9   CALCIUM 8.0* 7.5*   PROT 7.4  --    LABALBU 3.5  --    BILITOT 0.5  --    ALKPHOS 111  --    AST 42*  --    ALT 30  --      Lipids:   Lab Results   Component Value Date    CHOL 304 11/25/2019    HDL 30 11/25/2019    TRIG 775 11/25/2019     Glucose:   Recent Labs     02/10/21  1221 02/10/21  1756 02/10/21  2041   POCGLU 225* 130* 149*     Hemoglobin A1C:   Lab Results   Component Value Date    LABA1C 13.3 11/25/2019     Free T4: No results found for: T4FREE  Free T3: No results found for: FT3  TSH High Sensitivity:   Lab Results   Component Value Date    TSHHS 3.290 11/26/2019       Ct Abdomen Pelvis Wo Contrast Additional Contrast? None    Result Date: 2/10/2021  EXAMINATION: CT OF THE ABDOMEN AND PELVIS WITHOUT CONTRAST 2/10/2021 8:21 pm       No acute findings. Diffuse hepatic steatosis. Ct Head Wo Contrast    Result Date: 2/10/2021  EXAMINATION: CT OF THE HEAD WITHOUT CONTRAST  2/10/2021 8:21 pm   amelia of the sinuses are clear. The mastoid air cells are clear. SOFT TISSUES/SKULL:  No acute abnormality of the visualized skull or soft tissues.      No acute brain parenchymal abnormality. Xr Chest Portable    Result Date: 2/10/2021  EXAMINATION: ONE XRAY VIEW OF THE CHEST 2/10/2021 3:05 am COMPARISON: 11/26/2019 HISTORY: ORDERING SYSTEM PROVIDED HISTORY: fever TECHNOLOGIST PROVIDED HISTORY: Reason for exam:->fever Reason for Exam: fever Acuity: Acute Type of Exam: Initial FINDINGS: The lungs are underinflated, resulting in vascular crowding and subsegmental atelectasis. No focal consolidation, pleural effusion or pneumothorax. The cardiac silhouette and mediastinal contours are stable. No acute bony abnormality. No acute process. Scheduled Medicines   Medications:    aspirin  81 mg Oral Daily    atorvastatin  40 mg Oral Daily    carvedilol  3.125 mg Oral BID WC    clopidogrel  75 mg Oral Daily    insulin glargine  40 Units Subcutaneous Daily    insulin lispro  20 Units Subcutaneous TID WC    prasugrel  10 mg Oral Daily    sodium chloride flush  10 mL Intravenous 2 times per day    enoxaparin  40 mg Subcutaneous Daily    miconazole   Topical BID    fluconazole  150 mg Intravenous Q24H    cefepime  2,000 mg Intravenous Q8H    vancomycin  15 mg/kg Intravenous Once    [START ON 2/11/2021] vancomycin  1,250 mg Intravenous Q12H      Infusions:    dextrose      sodium bicarbonate infusion 50 mL/hr at 02/10/21 2118         IMPRESSION    Patient Active Problem List   Diagnosis    Acute chest pain    Unstable angina (HCC)    Morbid obesity (HCC)    Type 2 diabetes mellitus with circulatory disorder, with long-term current use of insulin (HCC)    Essential hypertension    Dyslipidemia    Post PTCA    SOB (shortness of breath)    CAD (coronary artery disease)    Palpitations    Hyponatremia         RECOMMENDATIONS:      1. Reviewed POC blood glucose . Labs and X ray results   2. Reviewed Home and Current Medicines   3. Will Start On meal/ Correction bolus Humalog/ Lantus Insulin regime  4. Monitor Blood glucose frequently   5.  Will continue on normal saline infusion  6. Modify  the dose of Insulin she is being as needed   7. She is being managed as a case of severe sepsis at this time       Will follow with you  Again thank you for sharing pt's care with me.      Truly yours,       Lise Severs MD

## 2021-02-11 NOTE — PROGRESS NOTES
[address]  consulted by Dr. Roger Marshall for monitoring and adjustment. Indication for treatment: Sepsis secondary to skin or soft tissue infection  Goal trough: 15 mcg/mL  Other Antimicrobials:  Cefepime    Pertinent Laboratory Values:   Temp Readings from Last 3 Encounters:   02/11/21 98.7 °F (37.1 °C) (Oral)   11/27/19 98.3 °F (36.8 °C) (Oral)     Recent Labs     02/10/21  0130 02/10/21  0325 02/10/21  1048   WBC 19.5*  --  17.8*   LACTATE  --  1.1  --      Recent Labs     02/10/21  1048 02/10/21  2039 02/11/21  0904   BUN 26* 18 21   CREATININE 0.9 0.7 0.6     Estimated Creatinine Clearance: 145 mL/min (based on SCr of 0.6 mg/dL). Intake/Output Summary (Last 24 hours) at 2/11/2021 1315  Last data filed at 2/11/2021 0426  Gross per 24 hour   Intake 1352.54 ml   Output 4150 ml   Net -2797.46 ml       Pertinent Cultures:  Date    Source    Results  2/10   Blood     Pending              Vancomycin level:   TROUGH:  No results for input(s): VANCOTROUGH in the last 72 hours. RANDOM:  No results for input(s): VANCORANDOM in the last 72 hours. Assessment:  · WBC and temperature: WBC elevated, TMax 99.8 F  · SCr, BUN, and urine output: SCr wnl, good UOP  · Day(s) of therapy: # 2  · Vancomycin concentration: to be collected. Plan:  · Started on Vancomycin 1250mg IV every 12 hours  · Trough level ordered prior to fourth dose  · Pharmacy will continue to monitor patient and adjust therapy as indicated    [address] 3 2/12 @ 5980. Thank you for the consult.   Mary Grace Zamora [place]   2/11/2021 1:15 PM

## 2021-02-12 ENCOUNTER — ANESTHESIA EVENT (OUTPATIENT)
Dept: ENDOSCOPY | Age: 56
DRG: 871 | End: 2021-02-12
Payer: COMMERCIAL

## 2021-02-12 LAB
ALBUMIN SERPL-MCNC: 2.4 GM/DL (ref 3.4–5)
ALP BLD-CCNC: 91 IU/L (ref 40–129)
ALT SERPL-CCNC: 25 U/L (ref 10–40)
ANION GAP SERPL CALCULATED.3IONS-SCNC: 13 MMOL/L (ref 4–16)
AST SERPL-CCNC: 31 IU/L (ref 15–37)
BILIRUB SERPL-MCNC: 0.3 MG/DL (ref 0–1)
BUN BLDV-MCNC: 48 MG/DL (ref 6–23)
CALCIUM SERPL-MCNC: 7.1 MG/DL (ref 8.3–10.6)
CHLORIDE BLD-SCNC: 100 MMOL/L (ref 99–110)
CO2: 18 MMOL/L (ref 21–32)
CREAT SERPL-MCNC: 0.7 MG/DL (ref 0.6–1.1)
CULTURE: ABNORMAL
CULTURE: ABNORMAL
EKG ATRIAL RATE: 129 BPM
EKG DIAGNOSIS: NORMAL
EKG P AXIS: 70 DEGREES
EKG P-R INTERVAL: 166 MS
EKG Q-T INTERVAL: 282 MS
EKG QRS DURATION: 70 MS
EKG QTC CALCULATION (BAZETT): 413 MS
EKG R AXIS: 63 DEGREES
EKG T AXIS: 57 DEGREES
EKG VENTRICULAR RATE: 129 BPM
GFR AFRICAN AMERICAN: >60 ML/MIN/1.73M2
GFR NON-AFRICAN AMERICAN: >60 ML/MIN/1.73M2
GLUCOSE BLD-MCNC: 241 MG/DL (ref 70–99)
GLUCOSE BLD-MCNC: 252 MG/DL (ref 70–99)
GLUCOSE BLD-MCNC: 264 MG/DL (ref 70–99)
GLUCOSE BLD-MCNC: 287 MG/DL (ref 70–99)
GLUCOSE BLD-MCNC: 299 MG/DL (ref 70–99)
GLUCOSE BLD-MCNC: 305 MG/DL (ref 70–99)
GLUCOSE BLD-MCNC: 320 MG/DL (ref 70–99)
HCT VFR BLD CALC: 20.4 % (ref 37–47)
HCT VFR BLD CALC: 20.4 % (ref 37–47)
HCT VFR BLD CALC: 25.5 % (ref 37–47)
HCT VFR BLD CALC: 27 % (ref 37–47)
HCT VFR BLD CALC: 27.4 % (ref 37–47)
HCT VFR BLD CALC: 31.4 % (ref 37–47)
HEMOCCULT SP1 STL QL: POSITIVE
HEMOGLOBIN: 6.5 GM/DL (ref 12.5–16)
HEMOGLOBIN: 6.8 GM/DL (ref 12.5–16)
HEMOGLOBIN: 8.3 GM/DL (ref 12.5–16)
HEMOGLOBIN: 8.6 GM/DL (ref 12.5–16)
HEMOGLOBIN: 8.8 GM/DL (ref 12.5–16)
HEMOGLOBIN: 9 GM/DL (ref 12.5–16)
HIGH SENSITIVE C-REACTIVE PROTEIN: 165.4 MG/L
LACTATE: 2.4 MMOL/L (ref 0.4–2)
Lab: ABNORMAL
MCH RBC QN AUTO: 31.3 PG (ref 27–31)
MCHC RBC AUTO-ENTMCNC: 31.9 % (ref 32–36)
MCV RBC AUTO: 98.2 FL (ref 78–100)
OCCULT BLOOD 2: POSITIVE
PDW BLD-RTO: 13.7 % (ref 11.7–14.9)
PLATELET # BLD: 196 K/CU MM (ref 140–440)
PMV BLD AUTO: 10.7 FL (ref 7.5–11.1)
POTASSIUM SERPL-SCNC: 4.1 MMOL/L (ref 3.5–5.1)
PROCALCITONIN: 1.25
RBC # BLD: 2.75 M/CU MM (ref 4.2–5.4)
SODIUM BLD-SCNC: 131 MMOL/L (ref 135–145)
SPECIMEN: ABNORMAL
TOTAL PROTEIN: 4.7 GM/DL (ref 6.4–8.2)
WBC # BLD: 27.5 K/CU MM (ref 4–10.5)

## 2021-02-12 PROCEDURE — 36592 COLLECT BLOOD FROM PICC: CPT

## 2021-02-12 PROCEDURE — 80053 COMPREHEN METABOLIC PANEL: CPT

## 2021-02-12 PROCEDURE — C9113 INJ PANTOPRAZOLE SODIUM, VIA: HCPCS | Performed by: INTERNAL MEDICINE

## 2021-02-12 PROCEDURE — C9113 INJ PANTOPRAZOLE SODIUM, VIA: HCPCS | Performed by: NURSE PRACTITIONER

## 2021-02-12 PROCEDURE — 2580000003 HC RX 258: Performed by: INTERNAL MEDICINE

## 2021-02-12 PROCEDURE — 87186 SC STD MICRODIL/AGAR DIL: CPT

## 2021-02-12 PROCEDURE — 85018 HEMOGLOBIN: CPT

## 2021-02-12 PROCEDURE — 6370000000 HC RX 637 (ALT 250 FOR IP): Performed by: STUDENT IN AN ORGANIZED HEALTH CARE EDUCATION/TRAINING PROGRAM

## 2021-02-12 PROCEDURE — 2580000003 HC RX 258: Performed by: NURSE PRACTITIONER

## 2021-02-12 PROCEDURE — 6360000002 HC RX W HCPCS: Performed by: INTERNAL MEDICINE

## 2021-02-12 PROCEDURE — 86901 BLOOD TYPING SEROLOGIC RH(D): CPT

## 2021-02-12 PROCEDURE — 6370000000 HC RX 637 (ALT 250 FOR IP): Performed by: NURSE PRACTITIONER

## 2021-02-12 PROCEDURE — 2000000000 HC ICU R&B

## 2021-02-12 PROCEDURE — C1751 CATH, INF, PER/CENT/MIDLINE: HCPCS

## 2021-02-12 PROCEDURE — 80048 BASIC METABOLIC PNL TOTAL CA: CPT

## 2021-02-12 PROCEDURE — 2580000003 HC RX 258: Performed by: STUDENT IN AN ORGANIZED HEALTH CARE EDUCATION/TRAINING PROGRAM

## 2021-02-12 PROCEDURE — 36415 COLL VENOUS BLD VENIPUNCTURE: CPT

## 2021-02-12 PROCEDURE — 83605 ASSAY OF LACTIC ACID: CPT

## 2021-02-12 PROCEDURE — 86922 COMPATIBILITY TEST ANTIGLOB: CPT

## 2021-02-12 PROCEDURE — 99232 SBSQ HOSP IP/OBS MODERATE 35: CPT | Performed by: SURGERY

## 2021-02-12 PROCEDURE — 36430 TRANSFUSION BLD/BLD COMPNT: CPT

## 2021-02-12 PROCEDURE — 76937 US GUIDE VASCULAR ACCESS: CPT

## 2021-02-12 PROCEDURE — 82962 GLUCOSE BLOOD TEST: CPT

## 2021-02-12 PROCEDURE — 36410 VNPNXR 3YR/> PHY/QHP DX/THER: CPT

## 2021-02-12 PROCEDURE — 6360000002 HC RX W HCPCS: Performed by: NURSE PRACTITIONER

## 2021-02-12 PROCEDURE — 99233 SBSQ HOSP IP/OBS HIGH 50: CPT | Performed by: NURSE PRACTITIONER

## 2021-02-12 PROCEDURE — 87147 CULTURE TYPE IMMUNOLOGIC: CPT

## 2021-02-12 PROCEDURE — 85027 COMPLETE CBC AUTOMATED: CPT

## 2021-02-12 PROCEDURE — 93005 ELECTROCARDIOGRAM TRACING: CPT | Performed by: INTERNAL MEDICINE

## 2021-02-12 PROCEDURE — 6370000000 HC RX 637 (ALT 250 FOR IP): Performed by: INTERNAL MEDICINE

## 2021-02-12 PROCEDURE — 85014 HEMATOCRIT: CPT

## 2021-02-12 PROCEDURE — 87077 CULTURE AEROBIC IDENTIFY: CPT

## 2021-02-12 PROCEDURE — 93010 ELECTROCARDIOGRAM REPORT: CPT | Performed by: INTERNAL MEDICINE

## 2021-02-12 PROCEDURE — 87070 CULTURE OTHR SPECIMN AEROBIC: CPT

## 2021-02-12 PROCEDURE — 99232 SBSQ HOSP IP/OBS MODERATE 35: CPT | Performed by: INTERNAL MEDICINE

## 2021-02-12 PROCEDURE — 86141 C-REACTIVE PROTEIN HS: CPT

## 2021-02-12 PROCEDURE — P9016 RBC LEUKOCYTES REDUCED: HCPCS

## 2021-02-12 PROCEDURE — 87076 CULTURE ANAEROBE IDENT EACH: CPT

## 2021-02-12 PROCEDURE — 86850 RBC ANTIBODY SCREEN: CPT

## 2021-02-12 PROCEDURE — 94761 N-INVAS EAR/PLS OXIMETRY MLT: CPT

## 2021-02-12 PROCEDURE — 84145 PROCALCITONIN (PCT): CPT

## 2021-02-12 PROCEDURE — 87075 CULTR BACTERIA EXCEPT BLOOD: CPT

## 2021-02-12 PROCEDURE — 87185 SC STD ENZYME DETCJ PER NZM: CPT

## 2021-02-12 PROCEDURE — 86900 BLOOD TYPING SEROLOGIC ABO: CPT

## 2021-02-12 RX ORDER — 0.9 % SODIUM CHLORIDE 0.9 %
1000 INTRAVENOUS SOLUTION INTRAVENOUS ONCE
Status: COMPLETED | OUTPATIENT
Start: 2021-02-12 | End: 2021-02-12

## 2021-02-12 RX ORDER — GABAPENTIN 300 MG/1
300 CAPSULE ORAL NIGHTLY
Status: DISCONTINUED | OUTPATIENT
Start: 2021-02-12 | End: 2021-02-15 | Stop reason: HOSPADM

## 2021-02-12 RX ORDER — SODIUM CHLORIDE, SODIUM LACTATE, POTASSIUM CHLORIDE, CALCIUM CHLORIDE 600; 310; 30; 20 MG/100ML; MG/100ML; MG/100ML; MG/100ML
INJECTION, SOLUTION INTRAVENOUS ONCE
Status: COMPLETED | OUTPATIENT
Start: 2021-02-12 | End: 2021-02-12

## 2021-02-12 RX ORDER — LANOLIN ALCOHOL/MO/W.PET/CERES
3 CREAM (GRAM) TOPICAL NIGHTLY PRN
Status: DISCONTINUED | OUTPATIENT
Start: 2021-02-12 | End: 2021-02-15 | Stop reason: HOSPADM

## 2021-02-12 RX ORDER — LORAZEPAM 0.5 MG/1
0.5 TABLET ORAL ONCE
Status: COMPLETED | OUTPATIENT
Start: 2021-02-12 | End: 2021-02-12

## 2021-02-12 RX ORDER — SODIUM CHLORIDE 9 MG/ML
INJECTION, SOLUTION INTRAVENOUS PRN
Status: DISCONTINUED | OUTPATIENT
Start: 2021-02-12 | End: 2021-02-15 | Stop reason: HOSPADM

## 2021-02-12 RX ORDER — SODIUM CHLORIDE 9 MG/ML
INJECTION, SOLUTION INTRAVENOUS CONTINUOUS
Status: DISCONTINUED | OUTPATIENT
Start: 2021-02-12 | End: 2021-02-14

## 2021-02-12 RX ADMIN — ACETAMINOPHEN 650 MG: 325 TABLET ORAL at 18:23

## 2021-02-12 RX ADMIN — ACETAMINOPHEN 650 MG: 325 TABLET ORAL at 00:41

## 2021-02-12 RX ADMIN — GABAPENTIN 300 MG: 300 CAPSULE ORAL at 21:28

## 2021-02-12 RX ADMIN — MICONAZOLE NITRATE: 2 POWDER TOPICAL at 21:37

## 2021-02-12 RX ADMIN — SODIUM CHLORIDE 1000 ML: 9 INJECTION, SOLUTION INTRAVENOUS at 10:52

## 2021-02-12 RX ADMIN — SODIUM CHLORIDE, POTASSIUM CHLORIDE, SODIUM LACTATE AND CALCIUM CHLORIDE: 600; 310; 30; 20 INJECTION, SOLUTION INTRAVENOUS at 03:26

## 2021-02-12 RX ADMIN — PANTOPRAZOLE SODIUM 8 MG/HR: 40 INJECTION, POWDER, FOR SOLUTION INTRAVENOUS at 10:52

## 2021-02-12 RX ADMIN — LORAZEPAM 0.5 MG: 0.5 TABLET ORAL at 08:57

## 2021-02-12 RX ADMIN — INSULIN LISPRO 8 UNITS: 100 INJECTION, SOLUTION INTRAVENOUS; SUBCUTANEOUS at 11:44

## 2021-02-12 RX ADMIN — ATORVASTATIN CALCIUM 40 MG: 40 TABLET, FILM COATED ORAL at 21:34

## 2021-02-12 RX ADMIN — PANTOPRAZOLE SODIUM 8 MG/HR: 40 INJECTION, POWDER, FOR SOLUTION INTRAVENOUS at 19:24

## 2021-02-12 RX ADMIN — CEFEPIME 2000 MG: 2 INJECTION, POWDER, FOR SOLUTION INTRAVENOUS at 13:11

## 2021-02-12 RX ADMIN — MICONAZOLE NITRATE: 2 POWDER TOPICAL at 09:21

## 2021-02-12 RX ADMIN — INSULIN LISPRO 4 UNITS: 100 INJECTION, SOLUTION INTRAVENOUS; SUBCUTANEOUS at 18:27

## 2021-02-12 RX ADMIN — CARVEDILOL 3.12 MG: 3.12 TABLET, FILM COATED ORAL at 18:23

## 2021-02-12 RX ADMIN — SODIUM CHLORIDE: 9 INJECTION, SOLUTION INTRAVENOUS at 13:02

## 2021-02-12 RX ADMIN — Medication 3 MG: at 22:22

## 2021-02-12 RX ADMIN — CEFEPIME 2000 MG: 2 INJECTION, POWDER, FOR SOLUTION INTRAVENOUS at 19:24

## 2021-02-12 RX ADMIN — SODIUM CHLORIDE, PRESERVATIVE FREE 10 ML: 5 INJECTION INTRAVENOUS at 21:28

## 2021-02-12 RX ADMIN — PANTOPRAZOLE SODIUM 40 MG: 40 INJECTION, POWDER, LYOPHILIZED, FOR SOLUTION INTRAVENOUS at 00:41

## 2021-02-12 RX ADMIN — NYSTATIN: 100000 CREAM TOPICAL at 13:11

## 2021-02-12 RX ADMIN — VANCOMYCIN HYDROCHLORIDE 1250 MG: 5 INJECTION, POWDER, LYOPHILIZED, FOR SOLUTION INTRAVENOUS at 00:41

## 2021-02-12 RX ADMIN — CEFEPIME 2000 MG: 2 INJECTION, POWDER, FOR SOLUTION INTRAVENOUS at 04:36

## 2021-02-12 ASSESSMENT — LIFESTYLE VARIABLES: SMOKING_STATUS: 1

## 2021-02-12 ASSESSMENT — PAIN SCALES - GENERAL
PAINLEVEL_OUTOF10: 2
PAINLEVEL_OUTOF10: 0

## 2021-02-12 NOTE — ANESTHESIA PRE PROCEDURE
Department of Anesthesiology  Preprocedure Note       Name:  Papi Keita   Age:  50 y.o.  :  1965                                          MRN:  [id number]         Date:  2021      Surgeon: Sulema Cruz):  Zak Stone MD    Procedure: Procedure(s):  EGD ESOPHAGOGASTRODUODENOSCOPY    Medications prior to admission:   Prior to Admission medications    Medication Sig Start Date End Date Taking?  Authorizing Provider   INVOKANA 300 MG TABS tablet  11/3/20   Historical Provider, MD   prasugrel (EFFIENT) 10 MG TABS Take 1 tablet by mouth daily 20   Rosita Schlatter, MD   carvedilol (COREG) 3.125 MG tablet Take 1 tablet by mouth 2 times daily (with meals) 20   Rosita Schlatter, MD   clopidogrel (PLAVIX) 75 MG tablet Take 1 tablet by mouth daily 10/29/20   VIVIAN Dao CNP   atorvastatin (LIPITOR) 40 MG tablet Take 1 tablet by mouth daily 10/23/20   VIVIAN Dao CNP   aspirin (ASPIRIN LOW DOSE) 81 MG EC tablet Take 1 tablet by mouth daily 10/23/20   VIVIAN Dao CNP   lisinopril (PRINIVIL;ZESTRIL) 20 MG tablet Take 1 tablet by mouth daily 10/23/20   VIVIAN Dao CNP   carvedilol (COREG) 6.25 MG tablet Take 1 tablet by mouth 2 times daily (with meals) 10/23/20   VIVIAN Dao CNP   Canagliflozin (INVOKANA PO) Take 200 mg by mouth    Historical Provider, MD   linagliptin (TRADJENTA) 5 MG tablet Take 1 tablet by mouth daily 19   Lucie Sullivan MD   insulin glargine (LANTUS SOLOSTAR) 100 UNIT/ML injection pen Inject 40 Units into the skin daily 19   Lucie Sullivan MD   insulin aspart (NOVOLOG FLEXPEN) 100 UNIT/ML injection pen Take 20 units with each meal in addition to sliding scale as below   *Medium Dose Correction Algorithm**Insulin: 3 TIMES DAILY WITH MEALSGlucose: Dose: No [id number] 2 [id number] 4 [id number] 6 [id number] 8 [id number] 10 [id number] and above 12 Units 11/27/19   Lucie Sullivan MD blood glucose monitor kit and supplies Test 4 times a day before meals and bedtime& as needed for symptoms of irregular blood glucose. 11/27/19   Nigel Bobby MD   Insulin Pen Needle (PEN NEEDLES) 31G X 5 MM MISC lantus QHS and Novolog QA and HS 11/27/19   Jeffrey Dejesus MD   Lancets MISC 1 each by Does not apply route 4 times daily 11/27/19   Jeffrey Dejesus MD   blood glucose monitor strips Test 4 times a day & as needed for symptoms of irregular blood glucose. 11/27/19   Jeffrey Dejesus MD       Current medications:    Current Facility-Administered Medications   Medication Dose Route Frequency Provider Last Rate Last Admin    0.9 % sodium chloride infusion   Intravenous PRN VIVIAN Gleason CNP        insulin lispro (HUMALOG) injection vial 0-12 Units  0-12 Units Subcutaneous 2 times per day MARC Haile MD        gabapentin (NEURONTIN) capsule 300 mg  300 mg Oral Nightly Fely Fierro MD        pantoprazole (PROTONIX) 80 mg in sodium chloride 0.9 % 100 mL infusion  8 mg/hr Intravenous Continuous Fely Fierro MD 10 mL/hr at 02/12/21 1052 8 mg/hr at 02/12/21 1052    0.9 % sodium chloride infusion   Intravenous Continuous Fely Fierro  mL/hr at 02/12/21 1302 New Bag at 02/12/21 1302    nystatin (MYCOSTATIN) cream   Topical BID VIVIAN Soliman - CNP   Given at 02/12/21 1311    lactobacillus (CULTURELLE) capsule 1 capsule  1 capsule Oral Daily with breakfast Fely Fierro MD        atorvastatin (LIPITOR) tablet 40 mg  40 mg Oral Daily Miriam Valentin, DO   40 mg at 02/11/21 1026    carvedilol (COREG) tablet 3.125 mg  3.125 mg Oral BID  VIVIAN Gleason CNP   3.125 mg at 02/11/21 1839    sodium chloride flush 0.9 % injection 10 mL  10 mL Intravenous 2 times per day Cameron Fought, DO   10 mL at 02/11/21 2145    sodium chloride flush 0.9 % injection 10 mL  10 mL Intravenous PRN Cameron Fought, DO  ondansetron (ZOFRAN) injection 4 mg  4 mg Intravenous Q6H PRN Miriam Valentin, DO        polyethylene glycol (GLYCOLAX) packet 17 g  17 g Oral Daily PRN Rhodia Girt, DO        acetaminophen (TYLENOL) tablet 650 mg  650 mg Oral Q6H PRN Miriam Valentin, DO   650 mg at 02/12/21 0041    Or    acetaminophen (TYLENOL) suppository 650 mg  650 mg Rectal Q6H PRN Rhodia Girt, DO        miconazole (MICOTIN) 2 % powder   Topical BID Cora Liang MD   Given at 02/12/21 0921    glucose (GLUTOSE) 40 % oral gel 15 g  15 g Oral PRN Cora Liang MD        dextrose 50 % IV solution  12.5 g Intravenous PRN Cora Liang MD        glucagon (rDNA) injection 1 mg  1 mg Intramuscular PRN Cora Liang MD        dextrose 5 % solution  100 mL/hr Intravenous PRN Cora Liang MD        cefepime (MAXIPIME) 2000 mg IVPB minibag  2,000 mg Intravenous Q8H Cora Liang  mL/hr at 02/12/21 1311 2,000 mg at 02/12/21 1311    insulin lispro (HUMALOG) injection vial 15 Units  15 Units Subcutaneous TID  Yadiel Hurtado MD   15 Units at 02/11/21 1840    insulin glargine (LANTUS) injection vial 30 Units  30 Units Subcutaneous Daily Yadiel Hurtado MD   30 Units at 02/11/21 1027    insulin lispro (HUMALOG) injection vial 0-12 Units  0-12 Units Subcutaneous TID  Yadiel Hurtado MD   8 Units at 02/12/21 1144       Allergies:     Allergies   Allergen Reactions    Ampicillin Rash       Problem List:    Patient Active Problem List   Diagnosis Code    Acute chest pain R07.9    Unstable angina (HCC) I20.0    Morbid obesity (HCC) E66.01    Type 2 diabetes mellitus with circulatory disorder, with long-term current use of insulin (HCC) E11.59, Z79.4    Essential hypertension I10    Dyslipidemia E78.5    Post PTCA Z98.61    SOB (shortness of breath) R06.02    CAD (coronary artery disease) I25.10    Palpitations R00.2    Hyponatremia E87.1 Component Value Date     02/12/2021    K 4.1 02/12/2021     02/12/2021    CO2 18 02/12/2021    BUN 48 02/12/2021    CREATININE 0.7 02/12/2021    GFRAA >60 02/12/2021    LABGLOM >60 02/12/2021    GLUCOSE 264 02/12/2021    PROT 4.7 02/12/2021    CALCIUM 7.1 02/12/2021    BILITOT 0.3 02/12/2021    ALKPHOS 91 02/12/2021    AST 31 02/12/2021    ALT 25 02/12/2021       POC Tests:   Recent Labs     02/12/21  1137   POCGLU 320*       Coags: No results found for: PROTIME, INR, APTT    HCG (If Applicable): No results found for: PREGTESTUR, PREGSERUM, HCG, HCGQUANT     ABGs: No results found for: PHART, PO2ART, TEU8HBR, PGL6NWH, BEART, R4HLHZCT     Type & Screen (If Applicable):  No results found for: LABABO, LABRH    Drug/Infectious Status (If Applicable):  No results found for: HIV, HEPCAB    COVID-19 Screening (If Applicable):   Lab Results   Component Value Date    COVID19 NOT DETECTED 02/10/2021         Anesthesia Evaluation  Patient summary reviewed  Airway: Mallampati: III        Dental:          Pulmonary:   (+) current smoker                           Cardiovascular:  Exercise tolerance: poor (<4 METS),   (+) hypertension:, CAD:, CABG/stent:, hyperlipidemia      NYHA Classification: III                 Beta Blocker:  Order written      ROS comment: Stress test 12/2019:  Summary   Normal exercise performance without angina and ischemic EKG changes. [facility] 11/2019:  Procedure Summary   1. 3 vessel mild to moderate CAD but ostial Diagonal disease is   critical.   2. Normal LV systolic function. LVEF > 65 %. 3. Successful Kissing stent ROBIN stenting of mid LAD bifurcation   stenosis. 4. Successful Kissing stent ROBIN stenting of Ostium of the   Diagonal.      Patient tolerated the procedure well. No immediate complications. Echo 11/2019:   Summary   Limited study due to patients body habitus. Left ventricular function is normal, EF is estimated at 55-60%. Severe left ventricular hypertrophy. Grade I diastolic dysfunction. There is elevated velocities across the LVOT. No evidence of pericardial effusion. Neuro/Psych:   Negative Neuro/Psych ROS              GI/Hepatic/Renal:   (+) morbid obesity         ROS comment: History of melenic stools with anemia; rule out upper  GI bleeding. .   Endo/Other:    (+) DiabetesType II DM, , blood dyscrasia: anemia:., .                 Abdominal:           Vascular: negative vascular ROS. Anesthesia Plan      MAC     ASA 4       Induction: intravenous. Anesthetic plan and risks discussed with patient. Plan discussed with attending. VIVIAN Schneider - CRNA   2/12/2021         Pre Anesthesia Assessment complete.  Chart reviewed on 2/12/2021

## 2021-02-12 NOTE — PROGRESS NOTES
Dr. Kai Barrios at bedside. Perirectal area examined. Discussed plan with patient. Will adjust ATB regimen.

## 2021-02-12 NOTE — PROGRESS NOTES
Nephrology Progress Note        [address], [address] [address]  Phone: [phone]  Office Hours: 8:30AM - 4:30PM  Monday - Friday 2/12/2021 6:54 AM  Subjective:   Admit Date: 2/10/2021  PCP: No primary care provider on file.   Interval History:   Was sent to the icu for dark stools and SBP down to 70  S/p ivf bolus and BP is now 90s, about to receive 1 u prbc    Diet: Diet NPO Effective Now      Data:   Scheduled Meds:   nystatin   Topical BID    lactobacillus  1 capsule Oral Daily with breakfast    pantoprazole  40 mg Intravenous BID    vancomycin  1,250 mg Intravenous Q12H    atorvastatin  40 mg Oral Daily    carvedilol  3.125 mg Oral BID WC    sodium chloride flush  10 mL Intravenous 2 times per day    miconazole   Topical BID    cefepime  2,000 mg Intravenous Q8H    insulin lispro  15 Units Subcutaneous TID WC    insulin glargine  30 Units Subcutaneous Daily    insulin lispro  0-12 Units Subcutaneous TID WC    insulin lispro  0-6 Units Subcutaneous 2 times per day     Continuous Infusions:   sodium chloride      dextrose       PRN Meds:sodium chloride, sodium chloride flush, [DISCONTINUED] promethazine **OR** ondansetron, polyethylene glycol, acetaminophen **OR** acetaminophen, glucose, dextrose, glucagon (rDNA), dextrose, gabapentin  I/O last 3 completed shifts:  In: -   Out: 1825 [Urine:1825]  I/O this shift:  In: -   Out: 1100 [Urine:1100]    Intake/Output Summary (Last 24 hours) at 2/12/2021 0654  Last data filed at 2/12/2021 0346  Gross per 24 hour   Intake --   Output 1825 ml   Net -1825 ml       CBC:   Recent Labs     02/10/21  0130 02/10/21  1048 02/12/21  0158 02/12/21  0601   WBC 19.5* 17.8*  --   --    HGB 14.1 13.0 9.0* 8.8*    162  --   --        BMP:    Recent Labs     02/10/21  1048 02/10/21  2039 02/11/21  0904   * 126* 129*   K 4.0 3.8 4.0   CL 93* 93* 98*   CO2 13* 15* 17*   BUN 26* 18 21   CREATININE 0.9 0.7 0.6   GLUCOSE 204*

## 2021-02-12 NOTE — PROGRESS NOTES
Hospitalist Progress Note      Name:  Jose De Jesus Benson /Age/Sex: 1965  (50 y.o. female)   MRN & CSN:  [id number] & [id number] Admission Date/Time: 2/10/2021 12:50 AM   Location:  -A PCP: No primary care provider on file. Hospital Day: 3      Assessment and Plan:     Sepsis  -He has bilateral groin cellulitis  -Continue IV cefepime  -Topical miconazole for yeast infection    GI bleed noted -Protonix drip    Acute blood loss anemia  -Admission hemoglobin was 14.1 and hemoglobin dropped to 8.32 days later on   -She was transfused 1 unit    Hypotension noted -she appeared to have early hypovolemic shock due to GI bleed  -Blood pressure improved with fluids    Encephalopathy-suspect related to sepsis  2-confusion improved    Hyponatremia  -Consulted nephrology  -Hyponatremia much improved    Metabolic acidosis with elevated anion gap  -Nephrology consult appreciated    Diabetes mellitus type 2  -Last available A1c was 13.3 in 2019  -Hemoglobin A1c this admission is 11.6%        Other problems    Tobacco use  -Encourage cessation    Coronary disease status post stents-continue DAPT    Body mass index is 43.3 kg/m². Consistent with morbid obesity    Dyslipidemia        Subjective:     -had lots of melena yesterday    -her mental status is much better today    2-10    Chief Complaint   Patient presents with    Fatigue    Fever     She is confused today. She is not very articulate when letting me know why she is here    She did state after questioning about her  called the squad at her request.  She could not tell me why other than she could not walk. Objective:        Intake/Output Summary (Last 24 hours) at 2021 0846  Last data filed at 2021 0346  Gross per 24 hour   Intake --   Output 1825 ml   Net -1825 ml      Vitals:   Vitals:    21 0815   BP: (!) 76/59   Pulse: 139   Resp: 25   Temp:    SpO2:      Physical Exam:       Physical Exam  Pulmonary:      Effort: Pulmonary effort is normal.   Neurological:      Mental Status: She is alert. Cranial Nerves: No cranial nerve deficit.          Medications:   Medications:    insulin lispro  0-12 Units Subcutaneous 2 times per day    gabapentin  300 mg Oral Nightly    LORazepam  0.5 mg Oral Once    nystatin   Topical BID    lactobacillus  1 capsule Oral Daily with breakfast    vancomycin  1,250 mg Intravenous Q12H    atorvastatin  40 mg Oral Daily    carvedilol  3.125 mg Oral BID WC    sodium chloride flush  10 mL Intravenous 2 times per day    miconazole   Topical BID    cefepime  2,000 mg Intravenous Q8H    insulin lispro  15 Units Subcutaneous TID WC    insulin glargine  30 Units Subcutaneous Daily    insulin lispro  0-12 Units Subcutaneous TID WC      Infusions:    sodium chloride      pantoprozole (PROTONIX) infusion      dextrose       PRN Meds:     sodium chloride, , PRN      sodium chloride flush, 10 mL, PRN      ondansetron, 4 mg, Q6H PRN      polyethylene glycol, 17 g, Daily PRN      acetaminophen, 650 mg, Q6H PRN    Or      acetaminophen, 650 mg, Q6H PRN      glucose, 15 g, PRN      dextrose, 12.5 g, PRN      glucagon (rDNA), 1 mg, PRN      dextrose, 100 mL/hr, PRN          Electronically signed by Caterina White MD on 2/12/2021 at 8:46 AM

## 2021-02-12 NOTE — PROGRESS NOTES
Call initiated by: Nursing staff:  [facility]  Call addressed around: 2/12/2021 3:59 AM  Reason for call: Dark stool x6. BP 70/50. Patient diaphoretic. Hgb down from 13 on 2/10 to 9  Orders placed: 1liter bolus LR.   -H&H q4.   -NPO.    -[place] position.   -Transfer to ICU  -Type and screen and prepare 1 unit of PRBC and transfuse if Hgb <8    Hafsa Clemente, VIVIAN - CNP

## 2021-02-12 NOTE — PLAN OF CARE
Problem: Pain:  Goal: Pain level will decrease  Description: Pain level will decrease  Outcome: Ongoing  Goal: Control of acute pain  Description: Control of acute pain  Outcome: Ongoing  Goal: Control of chronic pain  Description: Control of chronic pain  Outcome: Ongoing     Problem: Fluid Volume:  Goal: Ability to achieve a balanced intake and output will improve  Description: Ability to achieve a balanced intake and output will improve  Outcome: Ongoing     Problem: Physical Regulation:  Goal: Ability to maintain clinical measurements within normal limits will improve  Description: Ability to maintain clinical measurements within normal limits will improve  Outcome: Ongoing  Goal: Will show no signs and symptoms of electrolyte imbalance  Description: Will show no signs and symptoms of electrolyte imbalance  Outcome: Ongoing     Problem: Fluid Volume - Imbalance:  Goal: Absence of imbalanced fluid volume signs and symptoms  Description: Absence of imbalanced fluid volume signs and symptoms  Outcome: Ongoing     Problem: Falls - Risk of:  Goal: Will remain free from falls  Description: Will remain free from falls  Outcome: Ongoing  Goal: Absence of physical injury  Description: Absence of physical injury  Outcome: Ongoing     Problem: Skin Integrity:  Goal: Will show no infection signs and symptoms  Description: Will show no infection signs and symptoms  Outcome: Ongoing  Goal: Absence of new skin breakdown  Description: Absence of new skin breakdown  Outcome: Ongoing

## 2021-02-12 NOTE — CONSULTS
[address], [facility]                                  CONSULTATION    PATIENT NAME: Kika Rosales                      :        1965  MED REC NO:   [id number]                          ROOM:       2128  ACCOUNT NO:   [de-identified]                           ADMIT DATE: 02/10/2021  PROVIDER:     Ngozi Zhao MD    CONSULT DATE:  2021    CHIEF COMPLAINT:  History of melenic stools with anemia; rule out upper  GI bleeding. HISTORY OF PRESENT ILLNESS:  The patient is a 44-year-old white female  with past medical history significant for hypertension, diabetes  mellitus, coronary artery disease, status post PTCA with coronary stents  in place, hyperlipidemia, on Effient and Plavix, who presented to the  emergency room on the 02/10/2021 with fatigue, generalized weakness and  fever. The patient however denied abdominal pain, nausea, vomiting,  hematemesis, diarrhea, melena or hematochezia. However, the patient in  fact last night had multiple bowel movements, which are blackish in  color. The patient's hemoglobin upon admission was 14.1 gm percent and  it dropped to 8.3 gm percent. The patient was hypotensive also. She  was transfused with 1 unit of packed RBC. The patient is doing better  now. There is no history of nausea, vomiting or hematemesis. The  patient denies prior episodes of GI bleeding. The patient has never had  an EGD or colonoscopy done in the past.  The patient denies taking  NSAIDs. The patient is hemodynamically stable at present. REVIEW OF SYSTEMS:  CENTRAL NERVOUS SYSTEM:  The patient denies headache or focal  sensorimotor symptoms. CARDIOVASCULAR SYSTEM:  No history of chest pain, but the patient  complains of mild shortness of breath, but no leg swelling. GENITOURINARY SYSTEM:  No history of dysuria, pyuria, or hematuria. MUSCULOSKELETAL SYSTEM:  The patient complains of generalized weakness. RESPIRATORY SYSTEM:  No history of cough, hemoptysis, fever, or chills. PAST MEDICAL HISTORY:  Significant for history of hypertension, diabetes  mellitus, coronary artery disease, status post PTCA with coronary stents  in place, on Effient and Plavix, history of hyperlipidemia, and morbid  obesity. FAMILY HISTORY:  Noncontributory. MEDICATIONS:  Please refer to the chart (the patient was on Effient and  Plavix prior to coming to the hospital). SOCIOECONOMIC HISTORY:  The patient is a current every day smoker. There is no history of EtOH abuse or recreational drug use. PAST SURGICAL HISTORY:  The patient has had hernia repair done and PTCA  with coronary stents placed. ALLERGIES:  The patient is allergic to AMPICILLIN. PHYSICAL EXAMINATION:  GENERAL:  Shows a 26-year-old white female who is obese, lying flat in  bed, in no acute distress. She is awake, alert, and oriented and  pleasant to talk with. VITAL SIGNS:  Stable. HEENT:  Shows skull to be atraumatic. Conjunctivae are pale. NECK:  Supple. CHEST:  Clear. HEART:  S1 and S2 are normal.  ABDOMEN:  Soft, nontender and nondistended. Liver and spleen are not  palpable. Bowel sounds are present. RECTAL:  Deferred. CNS:  Shows the patient to be awake, alert, and oriented. There are no  focal sensorimotor signs. MUSCULOSKELETAL:  Shows evidence of degenerative joint disease changes. LABORATORY DATA:  The labs drawn during the present hospitalization  comprised of chem profile today, which is remarkable for BUN of 48,  creatinine 0.7. LFTs are within normal limits. CBC shows WBC count of  27.5, hemoglobin 8.3, platelet count is 743,805. The patient's INR is  pending. The patient did have a CAT scan done of the abdomen and pelvis  on the 02/10/2021 and no acute findings were noted. The patient was  noted to have a diffuse hepatic steatosis. IMPRESSION:  A 81-year-old white female with multiple comorbidities, on  Plavix and Effient for coronary stents, presents with generalized  weakness and has melenic stools with severe anemia, rule out GI bleeding  source most likely upper gastrointestinal tract. However, lower GI  bleeding lesion cannot be ruled out. RECOMMENDATIONS:  1. Agree with present management with IV fluids. 2.  Monitor the patient's serial H and H and transfuse on a p.r.n. basis  to keep hemoglobin above 7 gm percent. 3.  We will proceed with EGD in a.m. and if negative, the patient will  need a colonoscopy and small bowel evaluation as well. 4.  We will hold anticoagulants for now. 5.  The patient will need a screening colonoscopy later as an outpatient  as well. 6.  The case and plan have been discussed in detail with the patient.         Reji Arauz MD    D: 02/12/2021 12:52:33       T: 02/12/2021 14:02:48     [id number]  Job#: [id number]     Doc#: [id number]    CC:

## 2021-02-12 NOTE — PROGRESS NOTES
CT OF THE HEAD WITHOUT CONTRAST  2/10/2021 8:21 pm       No acute brain parenchymal abnormality. Xr Chest Portable    Result Date: 2/10/2021  EXAMINATION: ONE XRAY VIEW OF THE CHEST 2/10/2021 3:05 am COMPARISON: 11/26/2019 HISTORY: ORDERING SYSTEM PROVIDED HISTORY: fever        No acute process. Scheduled Medicines   Medications:    insulin lispro  0-12 Units Subcutaneous 2 times per day    nystatin   Topical BID    lactobacillus  1 capsule Oral Daily with breakfast    pantoprazole  40 mg Intravenous BID    vancomycin  1,250 mg Intravenous Q12H    atorvastatin  40 mg Oral Daily    carvedilol  3.125 mg Oral BID WC    sodium chloride flush  10 mL Intravenous 2 times per day    miconazole   Topical BID    cefepime  2,000 mg Intravenous Q8H    insulin lispro  15 Units Subcutaneous TID WC    insulin glargine  30 Units Subcutaneous Daily    insulin lispro  0-12 Units Subcutaneous TID       Infusions:    sodium chloride      dextrose           Objective:   Vitals: /62   Pulse 135   Temp 97.5 °F (36.4 °C) (Oral)   Resp 25   Ht 5' 6\" (1.676 m)   Wt 268 lb 4.8 oz (121.7 kg)   SpO2 95%   BMI 43.30 kg/m²   General appearance: alert and cooperative with exam  Neck: no JVD or bruit  Thyroid : Normal lobes   Lungs: Has Vesicular Breath sounds   Heart:  regular rate and rhythm  Abdomen: soft, non-tender; bowel sounds normal; no masses,  no organomegaly  Musculoskeletal: Normal  Extremities: extremities normal, , no edema  Neurologic:  Awake, alert, oriented to name, place and time. Cranial nerves II-XII are grossly intact. Motor is  intact. Sensory is intact. ,  and gait is normal.    Assessment:     Patient Active Problem List:     Acute chest pain     Unstable angina (HCC)     Morbid obesity ([facility] [address]     Type 2 diabetes mellitus with circulatory disorder, with long-term current use of insulin (HCC)     Essential hypertension     Dyslipidemia     Post PTCA     SOB (shortness of breath) CAD (coronary artery disease)     Palpitations     Hyponatremia     High anion gap metabolic acidosis     Diarrhea      Plan:     1. Reviewed POC blood glucose . Labs and X ray results   2. Reviewed Current Medicines   3. On meal/ Correction bolus Humalog/ Basal Lantus Insulin regime   4. Monitor Blood glucose frequently   5. Modified  the dose of Insulin/ other medicines as needed   6. Will follow     .      Kathy Aragon MD

## 2021-02-12 NOTE — PROGRESS NOTES
Dr. Sherri Bolivar at bedside, all updates, labs etc given  to him. Informed him of what had happened on the floor and the reason pt was transferred to ICU. Also informed him pt has been very restless, moving all over in the bed. She is stating she cannot get comfortable. She informed Dr. Sherri Bolivar that she feels stressed right now and she cannot relax.  See new orders

## 2021-02-12 NOTE — PROGRESS NOTES
Contacted patient  with updated information of patients condition. Lety Menard () verbalized understanding.

## 2021-02-12 NOTE — PROGRESS NOTES
GENERAL SURGERY INPATIENT PROGRESS NOTE  [facility]) Physicians    PATIENT: Silvia Crowe, 50 y.o., female, MRN: [id number]    Hospital Day:  LOS: 2 days     Silvia Crowe is a 50 y.o. female with sepsis, poorly controlled diabetes, diarrhea, and cellulitis of the bilateral groin folds, concern for GI bleed. Subjective:  Chief Complaint: dry mouth, thirsty  Pain: 0/10  BM: having dark stools/melena   Diet: Diet NPO Effective Now  Activity: as tolerated    She was transferred to the ICU last night due to hypotension and dark stools/melena. She is currently receiving a unit PRBC. Her bilateral groins are sore, have been weeping less.      Objective:    Vitals: BP (!) 82/55   Pulse 138   Temp 99.3 °F (37.4 °C) (Axillary)   Resp 22   Ht 5' 6\" (1.676 m)   Wt 268 lb 4.8 oz (121.7 kg)   SpO2 99%   BMI 43.30 kg/m²   Vital Signs (Last 24 Hours)  Temp  Av.5 °F (36.9 °C)  Min: 97.5 °F (36.4 °C)  Max: 101 °F (38.3 °C)  Pulse  Av  Min: 111  Max: 142  BP  Min: 76/59  Max: 116/74  Resp  Av.1  Min: 16  Max: 32  SpO2  Av.5 %  Min: 94 %  Max: 99 %  Wt Readings from Last 3 Encounters:   21 268 lb 4.8 oz (121.7 kg)   10/23/20 288 lb 9.6 oz (130.9 kg)   20 274 lb 12.8 oz (124.6 kg)       I/O:  0701 -  0700  In: -   Out: 9712 [Urine:1825]    IV Fluids:   sodium chloride    pantoprozole (PROTONIX) infusion Last Rate: 8 mg/hr (21 1052)    sodium chloride    dextrose    Scheduled Meds: insulin lispro, 0-12 Units, Subcutaneous, 2 times per day    gabapentin, 300 mg, Oral, Nightly    sodium chloride, 1,000 mL, Intravenous, Once    nystatin, , Topical, BID    lactobacillus, 1 capsule, Oral, Daily with breakfast    atorvastatin, 40 mg, Oral, Daily    carvedilol, 3.125 mg, Oral, BID WC    sodium chloride flush, 10 mL, Intravenous, 2 times per day    miconazole, , Topical, BID    cefepime, 2,000 mg, Intravenous, Q8H    insulin lispro, 15 Units, Subcutaneous, TID WC    insulin glargine, 30 Units, Subcutaneous, Daily    insulin lispro, 0-12 Units, Subcutaneous, TID WC    Physical Exam:  General Appearance:   Alert, cooperative, no distress    Head:   Normocephalic, atraumatic    Lungs:    Equal chest rise, respirations unlabored   Heart:   Regular rhythm, tachycardic    Abdomen:    Soft, morbidly obese, non-tender, no rebound or guarding. Bilateral groin folds with cellulitis and erythema. No apparent abscess.      Extremities:  No cyanosis or edema, scattered ecchymoses   Neurologic:  Nonfocal, grossly intact        Labs/Imaging Results:   Recent Results (from the past 24 hour(s))   POCT Glucose    Collection Time: 02/11/21  5:13 PM   Result Value Ref Range    POC Glucose 187 (H) 70 - 99 MG/DL   Occult blood x 3, stool    Collection Time: 02/11/21  9:40 PM   Result Value Ref Range    Occult Blood, Stool #1 POSITIVE (A) NEGATIVE   POCT Glucose    Collection Time: 02/11/21  9:44 PM   Result Value Ref Range    POC Glucose 209 (H) 70 - 99 MG/DL   Hemoglobin and hematocrit, blood    Collection Time: 02/12/21  1:58 AM   Result Value Ref Range    Hemoglobin 9.0 (L) 12.5 - 16.0 GM/DL    Hematocrit 31.4 (L) 37 - 47 %   POCT Glucose    Collection Time: 02/12/21  2:02 AM   Result Value Ref Range    POC Glucose 299 (H) 70 - 99 MG/DL   POCT Glucose    Collection Time: 02/12/21  3:05 AM   Result Value Ref Range    POC Glucose 305 (H) 70 - 99 MG/DL   TYPE AND SCREEN    Collection Time: 02/12/21  4:30 AM   Result Value Ref Range    ABO/Rh O POSITIVE     Antibody Screen NEGATIVE     Unit Number [id number]     Component LEUKO-POOR RED CELLS     Unit Divison 00     Status ISSUED     Transfusion Status OK TO TRANSFUSE     Crossmatch Result COMPATIBLE    POCT Glucose    Collection Time: 02/12/21  4:39 AM   Result Value Ref Range    POC Glucose 287 (H) 70 - 99 MG/DL   C-reactive protein    Collection Time: 02/12/21  6:01 AM   Result Value Ref Range    CRP, High Sensitivity 165.4 mg/L Glucose    Collection Time: 02/12/21 11:37 AM   Result Value Ref Range    POC Glucose 320 (H) 70 - 99 MG/DL   Hemoglobin and hematocrit, blood    Collection Time: 02/12/21 11:40 AM   Result Value Ref Range    Hemoglobin 8.3 (L) 12.5 - 16.0 GM/DL    Hematocrit 25.5 (L) 37 - 47 %     Assessment:  Papi Keita is a 50 y.o. female with sepsis, poorly controlled diabetes, diarrhea, and cellulitis of the bilateral groin folds, concern for GI bleed. Active Problems:    Hyponatremia    High anion gap metabolic acidosis    Diarrhea  Resolved Problems:    * No resolved hospital problems. *    Plan:  · Discussed findings and options with Papi Keita. Currently she appears to have cellulitis without apparent abscess of the bilateral groins and intertriginous folds. Hidradenitis is a consideration, but she denies any history of previous infection or hidradenitis, so it appears less likely at this time. Agree with continued empiric abx and local wound care. · Currently she doesn't appear to require surgical intervention for her groins. Will continue to follow. · Morbid obesity likely contributing to moisture in the pannus area. Agree with diabetes control and weight loss measures. · Encouraged careful hygiene, hadn't showered in more than a week before coming to the hospital   · Appreciate GI consult for suspected GI bleed. Will follow, currently getting 1 unit PRBC. Monitor Hb, transfuse PRN. May require a bleeding scan or angiogram depending on her clinical progress. No imminent surgical plans.     · Thank you for the consultation and the opportunity to care for Papi Keita    Electronically signed: Jenise Eckert MD 2/12/2021 12:41 PM

## 2021-02-12 NOTE — CONSULTS
Consult completed. Procedure/rationale explained to pt & consent obtained. #20ga Arrow Endurance Extended Dwell MidLine Catheter initiated to RUE Basilic Vein using sterile, UltraSound-guided technique without difficulty/complications. Positioning verified via UltraSound visualization of catheter within vessel lumen; site returns blood briskly and flushes without resistance/abnormalities. Sterile dressing with SkinPrep, StatLock Securing Device, BioPatch, SwabCap, and Limb Precautions band applied. Pt tolerated well & no other c/o or needs noted or reported. RN notified.

## 2021-02-12 NOTE — PROGRESS NOTES
Patients blood pressure 74/50 (MAP 59) and patient diaphoretic. Morenita Barrios notified and Edenilson Miranda reported to bedside. Patient being transported to ICU per Jigna Rodriguez NP and Dr. Alana Mckeon.

## 2021-02-12 NOTE — PROGRESS NOTES
Dr. Zac Macias had a nurse, Jessenia Tran RN from [facility],  call back- informed them this is not a pt of Dr. Shellie Plaza( I cancelled his consult after Adine NP came in)  and since Dr. Zac Macias  is on call- notified him- she  stated ok he would see pt.

## 2021-02-12 NOTE — PROGRESS NOTES
Sonia BOYCE here stated Dr. Rafael Henry is on call- notified [facility] that he did not need to see since Dr. Rafael Henry on call. Messages sent to Amanda Mckenzie

## 2021-02-12 NOTE — PROGRESS NOTES
Patients blood pressure 84/50 (MAP 60), temp 99.6 and heart rate jumped to 173 for 21 sec. Patients Hgb 9.0 and was 13.0 yesterday. Radha Lopez NP notified and orders placed for LR bolus. Will continue to monitor patient.

## 2021-02-12 NOTE — PROGRESS NOTES
Patient has had dark stool x4.  Radha John NP notified and per orders, hold anticoagulants, antiplatelets, H&H every 6 hours, Protonix 40 mg IV BID and GI consult

## 2021-02-12 NOTE — PROGRESS NOTES
Infectious Disease Progress Note  2021   Patient Name: Nicole May : 1965   Impression  · Sepsis Secondary to Bilateral Groin Cellulitis Without Abscess:   § Fever max 103.2, trended down to low grade temps  § Leukocytosis trending up  § Blood cultures 2/10-pending  § 2/10-RDP and rapid Flu Negative  § -GI Disease panel and CDif Negative  § -MRSA screen pending  § 2/10-UA WBC 11, RBC 13, Urine culture-Candida Albicans 25,000  § 2/10-CT A&P WO Contrast: No acute findings, diffuse hepatic steatosis  § Dr. Rona Hilton, General Surgery, onboard, impression of bilateral groins and intertriginous folds cellulitis without apparent abscess, Hidradenitis is a consideration but less likely due to no previous infection, rec empiric ABX and local wound care, no surgical intervention at this time.      · Anion Gap Metabolic Acidosis/Hyponatemia:  § Dr. Vladislav Garcia onboard  § Imp of volume depletion vs hypervolemia vs SIADH     ? Morbid Obesity: BMI 43.71     ? DMII:  § Dr. Juan Carlos Dorantes onboard  § HbAIC -.6, uncontrolled     ? Tobacco Abuse     ? HTN    ? Acute GIB:   ? -Dr. Carlitos Gomez onboard  ? Planning procedure      ? CAD     · Multi-morbidity: per PMHx:  CAD, Tobacco Abuse, DMII, HLD, HTN, Morbid Obesity, CAD s/p PCI, hernia repair, Allergy to Ampicillin, but not PCN (rash)  Plan:  · Continue Cefepime IV 2 gm q8h  ? Continue topical Nystatin to groin bid  ? Await MRSA screen   · Trend CRP and Pct, trending down  ? Check ESR, 40  ? Culture low abdomen/groin pending   ? Patient developed GIB last pm -, transferred to ICU, Hb dropped from 12 to 8.6, transfused with 1 U PRBCs, Dr. Carlitos Gomez onboard now    Ongoing Antimicrobial Therapy  Cefepime 2/10-  Nystatin topical -? Completed Antimicrobial Therapy  Vancomycin 2/10-11  Diflucan 2/10-11?? History:? Interval history noted. Chief complaint: sepsis secondary to bilateral groin cellulitis without abscess.   Denies n/v/d/f or untoward effects of antibiotics. Restless in the bed, states has abdominal cramping and discomfort in bilateral groins from her rash. Physical Exam:  Vital Signs: BP 98/73   Pulse 131   Temp 99.3 °F (37.4 °C) (Axillary)   Resp 28   Ht 5' 6\" (1.676 m)   Wt 268 lb 4.8 oz (121.7 kg)   SpO2 97%   BMI 43.30 kg/m²     Gen: alert and oriented X3, no distress  Skin: no stigmata of endocarditis  Wounds: bilateral groin areas with erythema, edema, foul odor  HEMT: AT/NC Oropharynx pink, moist, and without lesions or exudates; dentition in poor state of repair  Eyes: PERRLA, EOMI, conjunctiva pink, sclera anicteric. Neck: Supple. Trachea midline. No LAD. Chest: no distress and CTA. Good air movement. Room air. Heart: RRR to ST and no MRG. Abd: large, pendulous, soft, non-distended, no tenderness, no hepatomegaly. Normoactive bowel sounds. Ext: no clubbing, cyanosis, or edema  Catheter Site: without erythema or tenderness draining clear yellow urine. Neuro: Mental status intact. CN 2-12 intact and no focal sensory or motor deficits     Radiologic / Imaging / TESTING  2/10/21 XR Chest Portable:  Impression   No acute process.      2/10/21 CT Head WO Contrast:  Impression   No acute brain parenchymal abnormality.      2/10/21 CT Abdomen Pelvis WO Contrast:  Impression   No acute findings.       Diffuse hepatic steatosis.           Labs:    Recent Results (from the past 24 hour(s))   POCT Glucose    Collection Time: 02/11/21  5:13 PM   Result Value Ref Range    POC Glucose 187 (H) 70 - 99 MG/DL   Occult blood x 3, stool    Collection Time: 02/11/21  9:40 PM   Result Value Ref Range    Occult Blood, Stool #1 POSITIVE (A) NEGATIVE   POCT Glucose    Collection Time: 02/11/21  9:44 PM   Result Value Ref Range    POC Glucose 209 (H) 70 - 99 MG/DL   Hemoglobin and hematocrit, blood    Collection Time: 02/12/21  1:58 AM   Result Value Ref Range    Hemoglobin 9.0 (L) 12.5 - 16.0 GM/DL    Hematocrit 31.4 (L) 37 - 47 %   POCT Glucose Collection Time: 02/12/21  2:02 AM   Result Value Ref Range    POC Glucose 299 (H) 70 - 99 MG/DL   POCT Glucose    Collection Time: 02/12/21  3:05 AM   Result Value Ref Range    POC Glucose 305 (H) 70 - 99 MG/DL   TYPE AND SCREEN    Collection Time: 02/12/21  4:30 AM   Result Value Ref Range    ABO/Rh O POSITIVE     Antibody Screen NEGATIVE     Unit Number [id number]     Component LEUKO-POOR RED CELLS     Unit Divison 00     Status ISSUED     Transfusion Status OK TO TRANSFUSE     Crossmatch Result COMPATIBLE    POCT Glucose    Collection Time: 02/12/21  4:39 AM   Result Value Ref Range    POC Glucose 287 (H) 70 - 99 MG/DL   C-reactive protein    Collection Time: 02/12/21  6:01 AM   Result Value Ref Range    CRP, High Sensitivity 165.4 mg/L   Procalcitonin    Collection Time: 02/12/21  6:01 AM   Result Value Ref Range    Procalcitonin 1.25    Hemoglobin and hematocrit, blood    Collection Time: 02/12/21  6:01 AM   Result Value Ref Range    Hemoglobin 8.8 (L) 12.5 - 16.0 GM/DL    Hematocrit 27.4 (L) 37 - 47 %   Comprehensive Metabolic Panel    Collection Time: 02/12/21  6:01 AM   Result Value Ref Range    Sodium 131 (L) 135 - 145 MMOL/L    Potassium 4.1 3.5 - 5.1 MMOL/L    Chloride 100 99 - 110 mMol/L    CO2 18 (L) 21 - 32 MMOL/L    BUN 48 (H) 6 - 23 MG/DL    CREATININE 0.7 0.6 - 1.1 MG/DL    Glucose 264 (H) 70 - 99 MG/DL    Calcium 7.1 (L) 8.3 - 10.6 MG/DL    Albumin 2.4 (L) 3.4 - 5.0 GM/DL    Total Protein 4.7 (L) 6.4 - 8.2 GM/DL    Total Bilirubin 0.3 0.0 - 1.0 MG/DL    ALT 25 10 - 40 U/L    AST 31 15 - 37 IU/L    Alkaline Phosphatase 91 40 - 129 IU/L    GFR Non-African American >60 >60 mL/min/1.73m2    GFR African American >60 >60 mL/min/1.73m2    Anion Gap 13 4 - 16   EKG 12 Lead    Collection Time: 02/12/21  6:05 AM   Result Value Ref Range    Ventricular Rate 129 BPM    Atrial Rate 129 BPM    P-R Interval 166 ms    QRS Duration 70 ms    Q-T Interval 282 ms    QTc Calculation (Bazett) 413 ms    P Axis 70 degrees    R Axis 63 degrees    T Axis 57 degrees    Diagnosis       Sinus tachycardia  Low voltage QRS  Septal infarct (cited on or before 25-NOV-2019)  Abnormal ECG  When compared with ECG of 11-FEB-2021 06:09,  No significant change was found     Lactic acid, plasma    Collection Time: 02/12/21  6:20 AM   Result Value Ref Range    Lactate 2.4 (HH) 0.4 - 2.0 mMOL/L   CBC    Collection Time: 02/12/21  6:20 AM   Result Value Ref Range    WBC 27.5 (H) 4.0 - 10.5 K/CU MM    RBC 2.75 (L) 4.2 - 5.4 M/CU MM    Hemoglobin 8.6 (L) 12.5 - 16.0 GM/DL    Hematocrit 27.0 (L) 37 - 47 %    MCV 98.2 78 - 100 FL    MCH 31.3 (H) 27 - 31 PG    MCHC 31.9 (L) 32.0 - 36.0 %    RDW 13.7 11.7 - 14.9 %    Platelets 689 698 - 960 K/CU MM    MPV 10.7 7.5 - 11.1 FL   POCT Glucose    Collection Time: 02/12/21 11:37 AM   Result Value Ref Range    POC Glucose 320 (H) 70 - 99 MG/DL   Hemoglobin and hematocrit, blood    Collection Time: 02/12/21 11:40 AM   Result Value Ref Range    Hemoglobin 8.3 (L) 12.5 - 16.0 GM/DL    Hematocrit 25.5 (L) 37 - 47 %     CULTURE results: Invalid input(s): BLOOD CULTURE,  URINE CULTURE, SURGICAL CULTURE    Diagnosis:  Patient Active Problem List   Diagnosis    Acute chest pain    Unstable angina ([facility] [address]    Morbid obesity ([facility] [address]    Type 2 diabetes mellitus with circulatory disorder, with long-term current use of insulin (HCC)    Essential hypertension    Dyslipidemia    Post PTCA    SOB (shortness of breath)    CAD (coronary artery disease)    Palpitations    Hyponatremia    High anion gap metabolic acidosis    Diarrhea       Active Problems  Active Problems:    Hyponatremia    High anion gap metabolic acidosis    Diarrhea  Resolved Problems:    * No resolved hospital problems. *    Electronically signed by: Electronically signed by Vitaliy Spann.  VIVIAN Handley CNP on 2/12/2021 at 1:06 PM

## 2021-02-12 NOTE — PROGRESS NOTES
Progress Note( Dr. Swathi Duffy)  2/12/2021  Subjective:   Admit Date: 2/10/2021  PCP: No primary care provider on file. Admitted For : Nausea vomiting and abdominal pain  and cellulitis of the bilateral groin folds,    Consulted For: Better control of blood glucose    Interval History: Patient had GI bleeding with black tarry stools and drop in BP   Transferred to ICU  GI was consulted not seen yet  Patient to receive 1 unit of packed red cell    Denies any chest pains,   Yes SOB . Denies nausea or vomiting. Patient kept n.p.o. ice planning for possible endoscopy  No new bowel or bladder symptoms.        Intake/Output Summary (Last 24 hours) at 2/12/2021 0737  Last data filed at 2/12/2021 0346  Gross per 24 hour   Intake --   Output 1825 ml   Net -1825 ml       DATA    CBC:   Recent Labs     02/10/21  0130 02/10/21  1048 02/12/21  0158 02/12/21  0601   WBC 19.5* 17.8*  --   --    HGB 14.1 13.0 9.0* 8.8*    162  --   --     CMP:  Recent Labs     02/10/21  0130 02/10/21  1048 02/10/21  2039 02/11/21  0904   * 124* 126* 129*   K 4.5 4.0 3.8 4.0   CL 88* 93* 93* 98*   CO2 12* 13* 15* 17*   BUN 23 26* 18 21   CREATININE 1.0 0.9 0.7 0.6   CALCIUM 8.0* 7.5* 7.6* 7.5*   PROT 7.4  --   --   --    LABALBU 3.5  --   --   --    BILITOT 0.5  --   --   --    ALKPHOS 111  --   --   --    AST 42*  --   --   --    ALT 30  --   --   --      Lipids:   Lab Results   Component Value Date    CHOL 304 11/25/2019    HDL 30 11/25/2019    TRIG 775 11/25/2019     Glucose:  Recent Labs     02/12/21  0202 02/12/21  0305 02/12/21  0439   POCGLU 299* 305* 287*     [id number]:  Lab Results   Component Value Date    LABA1C 11.6 02/11/2021     High Sensitivity TSH:   Lab Results   Component Value Date    TSHHS 1.280 02/10/2021     Free T3: No results found for: FT3  Free T4:No results found for: T4FREE    Ct Abdomen Pelvis Wo Contrast Additional Contrast? None    Result Date: 2/10/2021  EXAMINATION: CT OF THE ABDOMEN AND PELVIS WITHOUT CONTRAST 2/10/2021 8:21 pm   .     No acute findings. Diffuse hepatic steatosis. Ct Head Wo Contrast    Result Date: 2/11/2021  EXAMINATION: CT OF THE HEAD WITHOUT CONTRAST  2/10/2021 8:21 pm \       No acute brain parenchymal abnormality. Xr Chest Portable    Result Date: 2/10/2021  EXAMINATION: ONE XRAY VIEW OF THE CHEST 2/10/2021 3:05 am COMPARISON: 11/26/2019 HISTORY: ORDERING SYSTEM PROVIDED HISTORY: fever   No acute process. Scheduled Medicines   Medications:    insulin lispro  0-12 Units Subcutaneous 2 times per day    nystatin   Topical BID    lactobacillus  1 capsule Oral Daily with breakfast    pantoprazole  40 mg Intravenous BID    vancomycin  1,250 mg Intravenous Q12H    atorvastatin  40 mg Oral Daily    carvedilol  3.125 mg Oral BID WC    sodium chloride flush  10 mL Intravenous 2 times per day    miconazole   Topical BID    cefepime  2,000 mg Intravenous Q8H    insulin lispro  15 Units Subcutaneous TID WC    insulin glargine  30 Units Subcutaneous Daily    insulin lispro  0-12 Units Subcutaneous TID WC      Infusions:    sodium chloride      dextrose           Objective:   Vitals: /62   Pulse 135   Temp 97.5 °F (36.4 °C) (Oral)   Resp 25   Ht 5' 6\" (1.676 m)   Wt 268 lb 4.8 oz (121.7 kg)   SpO2 95%   BMI 43.30 kg/m²   General appearance: alert and cooperative with exam  Neck: no JVD or bruit  Thyroid : Normal lobes   Lungs: Has Vesicular Breath sounds   Heart:  regular rate and rhythm  Abdomen: soft, non-tender; bowel sounds normal; no masses,  no organomegaly  Musculoskeletal: Normal  Extremities: extremities normal, , no edema  and cellulitis of the bilateral groin folds,  Neurologic:  Awake, alert, oriented to name, place and time. Cranial nerves II-XII are grossly intact. Motor is  intact. Sensory is intact. ,  and gait is normal.    Assessment:     Patient Active Problem List:     Acute chest pain     Unstable angina (HCC)     Morbid obesity ([facility] [address]     Type 2 diabetes mellitus with circulatory disorder, with long-term current use of insulin (HCC)     Essential hypertension     Dyslipidemia     Post PTCA     SOB (shortness of breath)     CAD (coronary artery disease)     Palpitations     Hyponatremia     High anion gap metabolic acidosis     Diarrhea      Plan:     1. Reviewed POC blood glucose . Labs and X ray results   2. Reviewed Current Medicines   3. Correction bolus Humalog/ Basal Lantus Insulin regime   4. Monitor Blood glucose frequently   5. Modified  the dose of Insulin/ other medicines as needed  6. Patient to go possibly for EGD after seen by GI possibly tomorrow  7. Will follow     .      Anthonette Rinne, MD

## 2021-02-13 ENCOUNTER — APPOINTMENT (OUTPATIENT)
Dept: ULTRASOUND IMAGING | Age: 56
DRG: 871 | End: 2021-02-13
Payer: COMMERCIAL

## 2021-02-13 ENCOUNTER — ANESTHESIA (OUTPATIENT)
Dept: ENDOSCOPY | Age: 56
DRG: 871 | End: 2021-02-13
Payer: COMMERCIAL

## 2021-02-13 VITALS
RESPIRATION RATE: 25 BRPM | SYSTOLIC BLOOD PRESSURE: 78 MMHG | DIASTOLIC BLOOD PRESSURE: 60 MMHG | OXYGEN SATURATION: 99 %

## 2021-02-13 LAB
ALBUMIN SERPL-MCNC: 2.4 GM/DL (ref 3.4–5)
ALP BLD-CCNC: 76 IU/L (ref 40–128)
ALT SERPL-CCNC: 23 U/L (ref 10–40)
AMYLASE: 105 U/L (ref 25–115)
ANION GAP SERPL CALCULATED.3IONS-SCNC: 8 MMOL/L (ref 4–16)
APTT: 21.8 SECONDS (ref 25.1–37.1)
AST SERPL-CCNC: 34 IU/L (ref 15–37)
BILIRUB SERPL-MCNC: 0.3 MG/DL (ref 0–1)
BUN BLDV-MCNC: 23 MG/DL (ref 6–23)
CALCIUM SERPL-MCNC: 6.6 MG/DL (ref 8.3–10.6)
CHLORIDE BLD-SCNC: 106 MMOL/L (ref 99–110)
CO2: 22 MMOL/L (ref 21–32)
CREAT SERPL-MCNC: 0.6 MG/DL (ref 0.6–1.1)
GFR AFRICAN AMERICAN: >60 ML/MIN/1.73M2
GFR NON-AFRICAN AMERICAN: >60 ML/MIN/1.73M2
GLUCOSE BLD-MCNC: 123 MG/DL (ref 70–99)
GLUCOSE BLD-MCNC: 163 MG/DL (ref 70–99)
GLUCOSE BLD-MCNC: 203 MG/DL (ref 70–99)
GLUCOSE BLD-MCNC: 211 MG/DL (ref 70–99)
GLUCOSE BLD-MCNC: 215 MG/DL (ref 70–99)
GLUCOSE BLD-MCNC: 243 MG/DL (ref 70–99)
HCT VFR BLD CALC: 20.7 % (ref 37–47)
HCT VFR BLD CALC: 21.7 % (ref 37–47)
HCT VFR BLD CALC: 23.8 % (ref 37–47)
HEMOGLOBIN: 6.7 GM/DL (ref 12.5–16)
HEMOGLOBIN: 7.1 GM/DL (ref 12.5–16)
HEMOGLOBIN: 7.8 GM/DL (ref 12.5–16)
HIGH SENSITIVE C-REACTIVE PROTEIN: 78.3 MG/L
INR BLD: 1.04 INDEX
LIPASE: 242 IU/L (ref 13–60)
MCH RBC QN AUTO: 30.1 PG (ref 27–31)
MCHC RBC AUTO-ENTMCNC: 32.7 % (ref 32–36)
MCV RBC AUTO: 91.9 FL (ref 78–100)
PDW BLD-RTO: 15.7 % (ref 11.7–14.9)
PLATELET # BLD: 189 K/CU MM (ref 140–440)
PMV BLD AUTO: 10.1 FL (ref 7.5–11.1)
POTASSIUM SERPL-SCNC: 3.6 MMOL/L (ref 3.5–5.1)
PROTHROMBIN TIME: 12.6 SECONDS (ref 11.7–14.5)
RBC # BLD: 2.36 M/CU MM (ref 4.2–5.4)
SODIUM BLD-SCNC: 136 MMOL/L (ref 135–145)
TOTAL PROTEIN: 4.5 GM/DL (ref 6.4–8.2)
VITAMIN D 25-HYDROXY: 9.86 NG/ML
WBC # BLD: 21.4 K/CU MM (ref 4–10.5)

## 2021-02-13 PROCEDURE — 82150 ASSAY OF AMYLASE: CPT

## 2021-02-13 PROCEDURE — 3609017100 HC EGD: Performed by: SPECIALIST

## 2021-02-13 PROCEDURE — 2580000003 HC RX 258: Performed by: INTERNAL MEDICINE

## 2021-02-13 PROCEDURE — 0DJ08ZZ INSPECTION OF UPPER INTESTINAL TRACT, VIA NATURAL OR ARTIFICIAL OPENING ENDOSCOPIC: ICD-10-PCS | Performed by: SPECIALIST

## 2021-02-13 PROCEDURE — 2580000003 HC RX 258: Performed by: NURSE ANESTHETIST, CERTIFIED REGISTERED

## 2021-02-13 PROCEDURE — 6370000000 HC RX 637 (ALT 250 FOR IP): Performed by: INTERNAL MEDICINE

## 2021-02-13 PROCEDURE — 36415 COLL VENOUS BLD VENIPUNCTURE: CPT

## 2021-02-13 PROCEDURE — 6360000002 HC RX W HCPCS: Performed by: NURSE ANESTHETIST, CERTIFIED REGISTERED

## 2021-02-13 PROCEDURE — 2500000003 HC RX 250 WO HCPCS: Performed by: NURSE ANESTHETIST, CERTIFIED REGISTERED

## 2021-02-13 PROCEDURE — 85027 COMPLETE CBC AUTOMATED: CPT

## 2021-02-13 PROCEDURE — C9113 INJ PANTOPRAZOLE SODIUM, VIA: HCPCS | Performed by: SPECIALIST

## 2021-02-13 PROCEDURE — 82330 ASSAY OF CALCIUM: CPT

## 2021-02-13 PROCEDURE — 2580000003 HC RX 258: Performed by: STUDENT IN AN ORGANIZED HEALTH CARE EDUCATION/TRAINING PROGRAM

## 2021-02-13 PROCEDURE — 85730 THROMBOPLASTIN TIME PARTIAL: CPT

## 2021-02-13 PROCEDURE — C9113 INJ PANTOPRAZOLE SODIUM, VIA: HCPCS | Performed by: INTERNAL MEDICINE

## 2021-02-13 PROCEDURE — 2000000000 HC ICU R&B

## 2021-02-13 PROCEDURE — 94761 N-INVAS EAR/PLS OXIMETRY MLT: CPT

## 2021-02-13 PROCEDURE — 2709999900 HC NON-CHARGEABLE SUPPLY: Performed by: SPECIALIST

## 2021-02-13 PROCEDURE — 6370000000 HC RX 637 (ALT 250 FOR IP): Performed by: STUDENT IN AN ORGANIZED HEALTH CARE EDUCATION/TRAINING PROGRAM

## 2021-02-13 PROCEDURE — 85014 HEMATOCRIT: CPT

## 2021-02-13 PROCEDURE — 99233 SBSQ HOSP IP/OBS HIGH 50: CPT | Performed by: SURGERY

## 2021-02-13 PROCEDURE — 6370000000 HC RX 637 (ALT 250 FOR IP): Performed by: NURSE PRACTITIONER

## 2021-02-13 PROCEDURE — 3700000001 HC ADD 15 MINUTES (ANESTHESIA): Performed by: SPECIALIST

## 2021-02-13 PROCEDURE — 6360000002 HC RX W HCPCS: Performed by: INTERNAL MEDICINE

## 2021-02-13 PROCEDURE — 3700000000 HC ANESTHESIA ATTENDED CARE: Performed by: SPECIALIST

## 2021-02-13 PROCEDURE — 86141 C-REACTIVE PROTEIN HS: CPT

## 2021-02-13 PROCEDURE — 85610 PROTHROMBIN TIME: CPT

## 2021-02-13 PROCEDURE — 6360000002 HC RX W HCPCS: Performed by: SPECIALIST

## 2021-02-13 PROCEDURE — 99232 SBSQ HOSP IP/OBS MODERATE 35: CPT | Performed by: INTERNAL MEDICINE

## 2021-02-13 PROCEDURE — 6360000002 HC RX W HCPCS: Performed by: STUDENT IN AN ORGANIZED HEALTH CARE EDUCATION/TRAINING PROGRAM

## 2021-02-13 PROCEDURE — 76705 ECHO EXAM OF ABDOMEN: CPT

## 2021-02-13 PROCEDURE — 83970 ASSAY OF PARATHORMONE: CPT

## 2021-02-13 PROCEDURE — P9016 RBC LEUKOCYTES REDUCED: HCPCS

## 2021-02-13 PROCEDURE — 82962 GLUCOSE BLOOD TEST: CPT

## 2021-02-13 PROCEDURE — 80053 COMPREHEN METABOLIC PANEL: CPT

## 2021-02-13 PROCEDURE — 83690 ASSAY OF LIPASE: CPT

## 2021-02-13 PROCEDURE — 82310 ASSAY OF CALCIUM: CPT

## 2021-02-13 PROCEDURE — 36430 TRANSFUSION BLD/BLD COMPNT: CPT

## 2021-02-13 PROCEDURE — 85018 HEMOGLOBIN: CPT

## 2021-02-13 PROCEDURE — 82306 VITAMIN D 25 HYDROXY: CPT

## 2021-02-13 PROCEDURE — 99222 1ST HOSP IP/OBS MODERATE 55: CPT | Performed by: INTERNAL MEDICINE

## 2021-02-13 RX ORDER — PANTOPRAZOLE SODIUM 40 MG/10ML
40 INJECTION, POWDER, LYOPHILIZED, FOR SOLUTION INTRAVENOUS 2 TIMES DAILY
Status: DISCONTINUED | OUTPATIENT
Start: 2021-02-13 | End: 2021-02-15 | Stop reason: HOSPADM

## 2021-02-13 RX ORDER — PROPOFOL 10 MG/ML
INJECTION, EMULSION INTRAVENOUS PRN
Status: DISCONTINUED | OUTPATIENT
Start: 2021-02-13 | End: 2021-02-13 | Stop reason: SDUPTHER

## 2021-02-13 RX ORDER — HYDROCODONE BITARTRATE AND ACETAMINOPHEN 5; 325 MG/1; MG/1
1 TABLET ORAL ONCE
Status: COMPLETED | OUTPATIENT
Start: 2021-02-13 | End: 2021-02-13

## 2021-02-13 RX ORDER — SODIUM CHLORIDE 9 MG/ML
INJECTION, SOLUTION INTRAVENOUS PRN
Status: DISCONTINUED | OUTPATIENT
Start: 2021-02-13 | End: 2021-02-15 | Stop reason: HOSPADM

## 2021-02-13 RX ORDER — CALCIUM GLUCONATE 20 MG/ML
1000 INJECTION, SOLUTION INTRAVENOUS ONCE
Status: DISCONTINUED | OUTPATIENT
Start: 2021-02-13 | End: 2021-02-13

## 2021-02-13 RX ORDER — SODIUM CHLORIDE, SODIUM LACTATE, POTASSIUM CHLORIDE, CALCIUM CHLORIDE 600; 310; 30; 20 MG/100ML; MG/100ML; MG/100ML; MG/100ML
INJECTION, SOLUTION INTRAVENOUS CONTINUOUS PRN
Status: DISCONTINUED | OUTPATIENT
Start: 2021-02-13 | End: 2021-02-13 | Stop reason: SDUPTHER

## 2021-02-13 RX ORDER — CALCIUM CARBONATE 200(500)MG
750 TABLET,CHEWABLE ORAL 3 TIMES DAILY
Status: DISCONTINUED | OUTPATIENT
Start: 2021-02-13 | End: 2021-02-15 | Stop reason: HOSPADM

## 2021-02-13 RX ORDER — LIDOCAINE HYDROCHLORIDE 20 MG/ML
INJECTION, SOLUTION EPIDURAL; INFILTRATION; INTRACAUDAL; PERINEURAL PRN
Status: DISCONTINUED | OUTPATIENT
Start: 2021-02-13 | End: 2021-02-13 | Stop reason: SDUPTHER

## 2021-02-13 RX ADMIN — CARVEDILOL 3.12 MG: 3.12 TABLET, FILM COATED ORAL at 17:31

## 2021-02-13 RX ADMIN — SODIUM CHLORIDE: 9 INJECTION, SOLUTION INTRAVENOUS at 08:40

## 2021-02-13 RX ADMIN — PANTOPRAZOLE SODIUM 40 MG: 40 INJECTION, POWDER, LYOPHILIZED, FOR SOLUTION INTRAVENOUS at 21:13

## 2021-02-13 RX ADMIN — ONDANSETRON 4 MG: 2 INJECTION INTRAMUSCULAR; INTRAVENOUS at 01:01

## 2021-02-13 RX ADMIN — PIPERACILLIN AND TAZOBACTAM 3375 MG: 3; .375 INJECTION, POWDER, LYOPHILIZED, FOR SOLUTION INTRAVENOUS at 19:46

## 2021-02-13 RX ADMIN — INSULIN LISPRO 4 UNITS: 100 INJECTION, SOLUTION INTRAVENOUS; SUBCUTANEOUS at 10:28

## 2021-02-13 RX ADMIN — PANTOPRAZOLE SODIUM 40 MG: 40 INJECTION, POWDER, LYOPHILIZED, FOR SOLUTION INTRAVENOUS at 10:24

## 2021-02-13 RX ADMIN — SODIUM CHLORIDE, POTASSIUM CHLORIDE, SODIUM LACTATE AND CALCIUM CHLORIDE: 600; 310; 30; 20 INJECTION, SOLUTION INTRAVENOUS at 07:19

## 2021-02-13 RX ADMIN — PHENYLEPHRINE HYDROCHLORIDE 100 MCG: 10 INJECTION INTRAVENOUS at 07:30

## 2021-02-13 RX ADMIN — GABAPENTIN 300 MG: 300 CAPSULE ORAL at 21:13

## 2021-02-13 RX ADMIN — LIDOCAINE HYDROCHLORIDE 100 MG: 20 INJECTION, SOLUTION EPIDURAL; INFILTRATION; INTRACAUDAL; PERINEURAL at 07:27

## 2021-02-13 RX ADMIN — INSULIN LISPRO 2 UNITS: 100 INJECTION, SOLUTION INTRAVENOUS; SUBCUTANEOUS at 18:12

## 2021-02-13 RX ADMIN — CARVEDILOL 3.12 MG: 3.12 TABLET, FILM COATED ORAL at 10:24

## 2021-02-13 RX ADMIN — HYDROCODONE BITARTRATE AND ACETAMINOPHEN 1 TABLET: 5; 325 TABLET ORAL at 13:07

## 2021-02-13 RX ADMIN — MICONAZOLE NITRATE: 2 POWDER TOPICAL at 21:15

## 2021-02-13 RX ADMIN — SODIUM CHLORIDE, PRESERVATIVE FREE 10 ML: 5 INJECTION INTRAVENOUS at 10:26

## 2021-02-13 RX ADMIN — PROPOFOL 50 MG: 10 INJECTION, EMULSION INTRAVENOUS at 07:31

## 2021-02-13 RX ADMIN — CALCIUM CARBONATE 750 MG: 500 TABLET, CHEWABLE ORAL at 21:13

## 2021-02-13 RX ADMIN — PROPOFOL 50 MG: 10 INJECTION, EMULSION INTRAVENOUS at 07:35

## 2021-02-13 RX ADMIN — ACETAMINOPHEN 650 MG: 325 TABLET ORAL at 12:12

## 2021-02-13 RX ADMIN — CALCIUM GLUCONATE 4000 MG: 98 INJECTION, SOLUTION INTRAVENOUS at 12:09

## 2021-02-13 RX ADMIN — ACETAMINOPHEN 650 MG: 325 TABLET ORAL at 03:39

## 2021-02-13 RX ADMIN — CEFEPIME 2000 MG: 2 INJECTION, POWDER, FOR SOLUTION INTRAVENOUS at 10:36

## 2021-02-13 RX ADMIN — INSULIN GLARGINE 30 UNITS: 100 INJECTION, SOLUTION SUBCUTANEOUS at 10:29

## 2021-02-13 RX ADMIN — INSULIN LISPRO 4 UNITS: 100 INJECTION, SOLUTION INTRAVENOUS; SUBCUTANEOUS at 13:24

## 2021-02-13 RX ADMIN — PANTOPRAZOLE SODIUM 8 MG/HR: 40 INJECTION, POWDER, FOR SOLUTION INTRAVENOUS at 06:00

## 2021-02-13 RX ADMIN — Medication 1 CAPSULE: at 10:25

## 2021-02-13 RX ADMIN — SODIUM CHLORIDE: 9 INJECTION, SOLUTION INTRAVENOUS at 00:12

## 2021-02-13 RX ADMIN — PROPOFOL 50 MG: 10 INJECTION, EMULSION INTRAVENOUS at 07:27

## 2021-02-13 RX ADMIN — CEFEPIME 2000 MG: 2 INJECTION, POWDER, FOR SOLUTION INTRAVENOUS at 03:24

## 2021-02-13 RX ADMIN — CALCIUM CARBONATE 750 MG: 500 TABLET, CHEWABLE ORAL at 13:24

## 2021-02-13 RX ADMIN — ATORVASTATIN CALCIUM 40 MG: 40 TABLET, FILM COATED ORAL at 10:35

## 2021-02-13 ASSESSMENT — PULMONARY FUNCTION TESTS
PIF_VALUE: 0
PIF_VALUE: 1
PIF_VALUE: 0

## 2021-02-13 ASSESSMENT — PAIN SCALES - GENERAL
PAINLEVEL_OUTOF10: 0
PAINLEVEL_OUTOF10: 6
PAINLEVEL_OUTOF10: 3

## 2021-02-13 NOTE — CONSULTS
Name:  Junior Stoner /Age/Sex: 1965  (50 y.o. female)   MRN & CSN:  [id number] & [id number] Admission Date/Time: 2/10/2021 12:50 AM   Location:  -A PCP: No primary care provider on file. Hospital Day: 4          Referring physician:  Burnett Litten, DO         Reason for consultation:  ? On antiplatelet        Thanks for referral.    Information source: patient    CC;  GI bleed      HPI:   Thank you for involving me in taking  care of Junior Stoner who  is a 50 y. o.year  Old female  Presents with  H/o CAD,  PCI, htn, hyperlipidimea, DM, now  With melanotic stools, diarrhea, had COVID,    Has   DU , has no CP, SOB, cardio consulted for ? Of DAPT. Past medical history:    has a past medical history of CAD (coronary artery disease), Current every day smoker, Diabetes mellitus ([address] History of exercise stress test, Hyperlipidemia, Hypertension, Morbid obesity ([address] Palpitations, Post PTCA, and SOB (shortness of breath). Past surgical history:   has a past surgical history that includes hernia repair and Percutaneous Transluminal Coronary Angio (2019). Social History:   reports that she has been smoking. She has been smoking about 1.00 pack per day. She has never used smokeless tobacco. She reports that she does not drink alcohol or use drugs. Family history:  family history is not on file.     Allergies   Allergen Reactions    Ampicillin Rash           pantoprazole (PROTONIX) injection 40 mg, BID      calcium carbonate (TUMS) chewable tablet 750 mg, TID      calcium gluconate 4,000 mg in dextrose 5 % 100 mL IVPB, Once      0.9 % sodium chloride infusion, PRN      insulin lispro (HUMALOG) injection vial 0-12 Units, 2 times per day      gabapentin (NEURONTIN) capsule 300 mg, Nightly      0.9 % sodium chloride infusion, Continuous      melatonin tablet 3 mg, Nightly PRN      0.9 % sodium chloride infusion, PRN      nystatin (MYCOSTATIN) cream, BID      lactobacillus (CULTURELLE) capsule 1 capsule, Daily with breakfast      atorvastatin (LIPITOR) tablet 40 mg, Daily      carvedilol (COREG) tablet 3.125 mg, BID WC      sodium chloride flush 0.9 % injection 10 mL, 2 times per day      sodium chloride flush 0.9 % injection 10 mL, PRN      ondansetron (ZOFRAN) injection 4 mg, Q6H PRN      polyethylene glycol (GLYCOLAX) packet 17 g, Daily PRN      acetaminophen (TYLENOL) tablet 650 mg, Q6H PRN    Or      acetaminophen (TYLENOL) suppository 650 mg, Q6H PRN      miconazole (MICOTIN) 2 % powder, BID      glucose (GLUTOSE) 40 % oral gel 15 g, PRN      dextrose 50 % IV solution, PRN      glucagon (rDNA) injection 1 mg, PRN      dextrose 5 % solution, PRN      cefepime (MAXIPIME) 2000 mg IVPB minibag, Q8H      insulin lispro (HUMALOG) injection vial 15 Units, TID WC      insulin glargine (LANTUS) injection vial 30 Units, Daily      insulin lispro (HUMALOG) injection vial 0-12 Units, TID       Current Facility-Administered Medications   Medication Dose Route Frequency Provider Last Rate Last Admin    pantoprazole (PROTONIX) injection 40 mg  40 mg Intravenous BID Paige Chau MD   40 mg at 02/13/21 1024    calcium carbonate (TUMS) chewable tablet 750 mg  750 mg Oral TID Bebeto Bridges MD        calcium gluconate 4,000 mg in dextrose 5 % 100 mL IVPB  4,000 mg Intravenous Once Stephen Rdz DO        0.9 % sodium chloride infusion   Intravenous PRN VIVIAN Ashton - CNP        insulin lispro (HUMALOG) injection vial 0-12 Units  0-12 Units Subcutaneous 2 times per day Bebeto Bridges MD   4 Units at 02/13/21 0215    gabapentin (NEURONTIN) capsule 300 mg  300 mg Oral Nightly Dorise Gowers, MD   300 mg at 02/12/21 2128    0.9 % sodium chloride infusion   Intravenous Continuous Dorise Gowers,  mL/hr at 02/13/21 0840 New Bag at 02/13/21 0840    melatonin tablet 3 mg  3 mg Oral Nightly PRN VIVIAN Ashton - CNP   3 mg at 02/12/21 2222    0.9 % sodium chloride infusion   Intravenous PRN Elvia Dominguez, APRN - CNP        nystatin (MYCOSTATIN) cream   Topical BID Chloé SESAYAmanda Handley, APRN - CNP   Given at 02/12/21 1311    lactobacillus (CULTURELLE) capsule 1 capsule  1 capsule Oral Daily with breakfast David Irving MD   1 capsule at 02/13/21 1025    atorvastatin (LIPITOR) tablet 40 mg  40 mg Oral Daily Miriam Valentin, DO   40 mg at 02/13/21 1035    carvedilol (COREG) tablet 3.125 mg  3.125 mg Oral BID WC Elvia Dominguez, APRN - CNP   3.125 mg at 02/13/21 1024    sodium chloride flush 0.9 % injection 10 mL  10 mL Intravenous 2 times per day [employer], DO   10 mL at 02/13/21 1026    sodium chloride flush 0.9 % injection 10 mL  10 mL Intravenous PRN [employer], DO        ondansetron (ZOFRAN) injection 4 mg  4 mg Intravenous Q6H PRN [employer], DO   4 mg at 02/13/21 0101    polyethylene glycol (GLYCOLAX) packet 17 g  17 g Oral Daily PRN [employer], DO        acetaminophen (TYLENOL) tablet 650 mg  650 mg Oral Q6H PRN [employer], DO   650 mg at 02/13/21 7777    Or    acetaminophen (TYLENOL) suppository 650 mg  650 mg Rectal Q6H PRN [employer], DO        miconazole (MICOTIN) 2 % powder   Topical BID David Irving MD   Given at 02/12/21 2137    glucose (GLUTOSE) 40 % oral gel 15 g  15 g Oral PRN David Irving MD        dextrose 50 % IV solution  12.5 g Intravenous PRN David Irving MD        glucagon (rDNA) injection 1 mg  1 mg Intramuscular PRN David Irving MD        dextrose 5 % solution  100 mL/hr Intravenous PRN David Irving MD        cefepime (MAXIPIME) 2000 mg IVPB minibag  2,000 mg Intravenous Q8H David Irving  mL/hr at 02/13/21 1036 2,000 mg at 02/13/21 1036    insulin lispro (HUMALOG) injection vial 15 Units  15 Units Subcutaneous TID  Noel Jules MD   Stopped at 02/12/21 1826    insulin glargine (LANTUS) injection vial 30 Units  30 Units Subcutaneous Daily Anya Sewell MD   30 Units at 02/13/21 1029    insulin lispro (HUMALOG) injection vial 0-12 Units  0-12 Units Subcutaneous TID  Anya Sewell MD   4 Units at 02/13/21 1028     Review of Systems:  All 14 systems reviewed, all negative except for  melena    Physical Examination:    BP 84/71   Pulse 105   Temp 98.5 °F (36.9 °C) (Oral)   Resp 24   Ht 5' 6\" (1.676 m)   Wt 275 lb 5.7 oz (124.9 kg)   SpO2 100%   BMI 44.44 kg/m²      Wt Readings from Last 3 Encounters:   02/13/21 275 lb 5.7 oz (124.9 kg)   10/23/20 288 lb 9.6 oz (130.9 kg)   03/11/20 274 lb 12.8 oz (124.6 kg)     Body mass index is 44.44 kg/m². General Appearance:  fair  Head: normocephalic     Eyes: normal, noninjected conjunctiva    ENT: normal mucosa, noninjected throat, normal     NECK: No JVP  No thyromegaly        Cardiovascular: No thrills palpated   Auscultation: Normal S1 and S2,  no murmur   carotid bruit no   Abdominal Aorta no bruit    Respiratory:    Breath sounds Clear = 0    Extremities:  none Edema clubbing ,   no cyanosis    SKIN: Warm and well perfused, no pallor or cyanosis    Vascular exam:  Pedal Pulses: palp  bilaterally        Abdomen:  No masses or tenderness. No organomegaly noted. Neurological:  Oriented to time, place, and person   No focal neurological deficit noted. Psychiatric:normal mood, no anxiety    Lab Review   Recent Labs     02/13/21  0530   WBC 21.4*   HGB 7.1*   HCT 21.7*         Recent Labs     02/13/21  0530      K 3.6      CO2 22   BUN 23   CREATININE 0.6     Recent Labs     02/13/21  0530   AST 34   ALT 23   BILITOT 0.3   ALKPHOS 76     No results for input(s): TROPONINI in the last 72 hours.   No results found for: BNP  Lab Results   Component Value Date    INR 1.04 02/13/2021    PROTIME 12.6 02/13/2021           Assessment/Recommendations:       - CAD; PCI 11/19, can Dc effient, CPM with ASA  - risk factor modification  - HTN stbale  - hyperlipidimnea on statin  Will FU as OP           Patricia Capone MD, 2/13/2021 11:26 AM

## 2021-02-13 NOTE — PROGRESS NOTES
Call initiated by: Nursing staff:  Benedicto Rogers  Call addressed around: 2/12/2021 9:37 PM  Reason for call: Hgb 6.5  Orders placed: -Transfuse1 unit of PRBC  -Monitor H&H and transfuse if Hgb <7     VIVIAN Lynn - CNP

## 2021-02-13 NOTE — PROGRESS NOTES
Hospitalist    Patient with GI bleed - HGB dropped to 6.5. Baseline HGB is about 14. She told me she is on aspirin and Effient. She has significant CAD and had 2 stents placed in November 2019. Will consult cardiology re: antiplatelet therapy.

## 2021-02-13 NOTE — PROGRESS NOTES
[id number] PHONE REPORT TO TALIA KENT, ADVISED OF FINDINGS, MEDICATION GIVEN, FOLLOWUP PER DR GHOTRA RECOMMENDATIONS. SHE ACKNOWLEDGED THIS AND WILL COME WITH STUDENT TO GET PT BACK TO ICU.

## 2021-02-13 NOTE — BRIEF OP NOTE
Brief Postoperative Note      Layne Moreno is a 50 y.o. female     Pre-operative Diagnosis:MELENA / ANEMIA    Post-operative Diagnosis: 2 CM SUPERFICIAL ULCERATION IN THE DUODENAL BULB WITH NO BLEEDING ---GASTRITIS--NO BLOOD NOTED IN THE U/ GIT    Procedure: EGD    Anesthesia: MAC    Surgeons/Assistants:Emory Ramirez     Estimated Blood Loss: NONE    Complications: None    Specimens: were not obtained  REC  COLONOSCOPY AS OUTPT  F/U H/H    Emory Ramirez   2/13/2021   7:42 AM

## 2021-02-13 NOTE — PROGRESS NOTES
Infectious Disease Progress Note  2021   Patient Name: Kin King : 1965   Impression  · Sepsis Secondary to Bilateral Groin Cellulitis Without Abscess:   § Fever max 103.2, trended down to low grade temps  § Leukocytosis trending up  § Blood cultures 2/10-pending  § 2/10-RDP and rapid Flu Negative  § -GI Disease panel and CDif Negative  § -MRSA screen pending  § 2/10-UA WBC 11, RBC 13, Urine culture-Candida Albicans 25,000  § 2/10-CT A&P WO Contrast: No acute findings, diffuse hepatic steatosis  § Dr. Estelle Markham, General Surgery, onboard, impression of bilateral groins and intertriginous folds cellulitis without apparent abscess, Hidradenitis is a consideration but less likely due to no previous infection, rec empiric ABX and local wound care, no surgical intervention at this time.      · Anion Gap Metabolic Acidosis/Hyponatemia:  § Dr. Zackery Lee onboard  § Imp of volume depletion vs hypervolemia vs SIADH     ? Morbid Obesity: BMI 43.71     ? DMII:  § Dr. Amparo Berumen onboard  § HbAIC -.6, uncontrolled     ? Tobacco Abuse     ? HTN    ? Acute GIB:   ? -Dr. Ana Boggs onboard  ? Planning procedure      ? CAD     · Multi-morbidity: per PMHx:  CAD, Tobacco Abuse, DMII, HLD, HTN, Morbid Obesity, CAD s/p PCI, hernia repair, Allergy to Ampicillin, but not PCN (rash)  Plan:  · Discontinue cefepime   · Start Zosyn, patient states that she tolerates penicillin but not ampicillin. Rash happened when she was age 17.  ? Continue topical Nystatin to groin bid  ? Await MRSA screen   · Trend CRP and Pct, trending down  ? Check ESR, 40  ? Culture low abdomen/groin pending   ? Patient developed GIB last pm -, transferred to ICU, Hb dropped from 12 to 8.6, transfused with 1 U PRBCs, Dr. Ana Boggs onboard now    Ongoing Antimicrobial Therapy  Cefepime 2/10-  Nystatin topical -? Completed Antimicrobial Therapy  Vancomycin 2/10-11  Diflucan 2/10-11?? History:? Interval history noted.   Chief complaint: sepsis secondary to bilateral groin cellulitis without abscess. Denies n/v/d/f or untoward effects of antibiotics. Restless in the bed, states has abdominal cramping and discomfort in bilateral groins from her rash. Physical Exam:  Vital Signs: /65   Pulse 105   Temp 98.7 °F (37.1 °C) (Oral)   Resp 24   Ht 5' 6\" (1.676 m)   Wt 275 lb 5.7 oz (124.9 kg)   SpO2 94%   BMI 44.44 kg/m²     Gen: alert and oriented X3, no distress  Skin: no stigmata of endocarditis  Wounds: bilateral groin areas with erythema, edema, foul odor  HEMT: AT/NC Oropharynx pink, moist, and without lesions or exudates; dentition in poor state of repair  Eyes: PERRLA, EOMI, conjunctiva pink, sclera anicteric. Neck: Supple. Trachea midline. No LAD. Chest: no distress and CTA. Good air movement. Room air. Heart: RRR to ST and no MRG. Abd: large, pendulous, soft, non-distended, no tenderness, no hepatomegaly. Normoactive bowel sounds. Ext: no clubbing, cyanosis, or edema  Catheter Site: without erythema or tenderness draining clear yellow urine. Neuro: Mental status intact. CN 2-12 intact and no focal sensory or motor deficits     Radiologic / Imaging / TESTING  2/10/21 XR Chest Portable:  Impression   No acute process.      2/10/21 CT Head WO Contrast:  Impression   No acute brain parenchymal abnormality.      2/10/21 CT Abdomen Pelvis WO Contrast:  Impression   No acute findings.       Diffuse hepatic steatosis.           Labs:    Recent Results (from the past 24 hour(s))   POCT Glucose    Collection Time: 02/12/21  6:26 PM   Result Value Ref Range    POC Glucose 252 (H) 70 - 99 MG/DL   Hemoglobin and hematocrit, blood    Collection Time: 02/12/21  8:20 PM   Result Value Ref Range    Hemoglobin 6.8 (LL) 12.5 - 16.0 GM/DL    Hematocrit 20.4 (L) 37 - 47 %   Hemoglobin and Hematocrit, Blood    Collection Time: 02/12/21  8:45 PM   Result Value Ref Range    Hemoglobin 6.5 (LL) 12.5 - 16.0 GM/DL    Hematocrit 20.4 (L) 37 - 47 %   POCT Glucose    Collection Time: 02/12/21  9:26 PM   Result Value Ref Range    POC Glucose 241 (H) 70 - 99 MG/DL   POCT Glucose    Collection Time: 02/13/21  2:14 AM   Result Value Ref Range    POC Glucose 215 (H) 70 - 99 MG/DL   Hemoglobin and hematocrit, blood    Collection Time: 02/13/21  2:30 AM   Result Value Ref Range    Hemoglobin 7.8 (L) 12.5 - 16.0 GM/DL    Hematocrit 23.8 (L) 37 - 47 %   CBC    Collection Time: 02/13/21  5:30 AM   Result Value Ref Range    WBC 21.4 (H) 4.0 - 10.5 K/CU MM    RBC 2.36 (L) 4.2 - 5.4 M/CU MM    Hemoglobin 7.1 (L) 12.5 - 16.0 GM/DL    Hematocrit 21.7 (L) 37 - 47 %    MCV 91.9 78 - 100 FL    MCH 30.1 27 - 31 PG    MCHC 32.7 32.0 - 36.0 %    RDW 15.7 (H) 11.7 - 14.9 %    Platelets 307 395 - 304 K/CU MM    MPV 10.1 7.5 - 11.1 FL   C-Reactive Protein    Collection Time: 02/13/21  5:30 AM   Result Value Ref Range    CRP, High Sensitivity 78.3 mg/L   Amylase    Collection Time: 02/13/21  5:30 AM   Result Value Ref Range    Amylase 105 25 - 115 U/L   Lipase    Collection Time: 02/13/21  5:30 AM   Result Value Ref Range    Lipase 242 (H) 13 - 60 IU/L   Comprehensive Metabolic Panel    Collection Time: 02/13/21  5:30 AM   Result Value Ref Range    Sodium 136 135 - 145 MMOL/L    Potassium 3.6 3.5 - 5.1 MMOL/L    Chloride 106 99 - 110 mMol/L    CO2 22 21 - 32 MMOL/L    BUN 23 6 - 23 MG/DL    CREATININE 0.6 0.6 - 1.1 MG/DL    Glucose 203 (H) 70 - 99 MG/DL    Calcium 6.6 (LL) 8.3 - 10.6 MG/DL    Albumin 2.4 (L) 3.4 - 5.0 GM/DL    Total Protein 4.5 (L) 6.4 - 8.2 GM/DL    Total Bilirubin 0.3 0.0 - 1.0 MG/DL    ALT 23 10 - 40 U/L    AST 34 15 - 37 IU/L    Alkaline Phosphatase 76 40 - 128 IU/L    GFR Non-African American >60 >60 mL/min/1.73m2    GFR African American >60 >60 mL/min/1.73m2    Anion Gap 8 4 - 16   Protime/INR & PTT    Collection Time: 02/13/21  5:30 AM   Result Value Ref Range    Protime 12.6 11.7 - 14.5 SECONDS    INR 1.04 INDEX    aPTT 21.8 (L) 25.1 - 37.1 SECONDS POCT Glucose    Collection Time: 02/13/21  8:32 AM   Result Value Ref Range    POC Glucose 211 (H) 70 - 99 MG/DL   POCT Glucose    Collection Time: 02/13/21  1:15 PM   Result Value Ref Range    POC Glucose 243 (H) 70 - 99 MG/DL   PTH, intact and calcium    Collection Time: 02/13/21  1:20 PM   Result Value Ref Range    Ionized Ca 1.15 1.12 - 1.32 mMOL/L    Calcium, Ion 4.60 4.48 - 5.28 MG/DL     CULTURE results: Invalid input(s): BLOOD CULTURE,  URINE CULTURE, SURGICAL CULTURE    Diagnosis:  Patient Active Problem List   Diagnosis    Acute chest pain    Unstable angina [facility]    Morbid obesity ([facility]    Type 2 diabetes mellitus with circulatory disorder, with long-term current use of insulin (HCC)    Essential hypertension    Dyslipidemia    Post PTCA    SOB (shortness of breath)    CAD (coronary artery disease)    Palpitations    Hyponatremia    High anion gap metabolic acidosis    Diarrhea    Encounter for monitoring antiplatelet therapy       Active Problems  Active Problems:    Hyponatremia    High anion gap metabolic acidosis    Diarrhea    Encounter for monitoring antiplatelet therapy  Resolved Problems:    * No resolved hospital problems.  *    Electronically signed by: Electronically signed by Adi Robert MD on 2/13/2021 at 1:45 PM

## 2021-02-13 NOTE — PROGRESS NOTES
Night shift stated they notified Hospitalist of Calcium, Perfect serve sent to Dr Di Brar to verify notification of Ca 6.6 this AM.

## 2021-02-13 NOTE — PROGRESS NOTES
Hospitalist Progress Note      Name:  Mervat Thompson /Age/Sex: 1965  (50 y.o. female)   MRN & CSN:  [id number] & [id number] Admission Date/Time: 2/10/2021 12:50 AM   Location:  ENDO/NONE PCP: No primary care provider on file. Hospital Day: 4      Assessment and Plan:     - EGD today by Dr. Enrique Clark - 2 cm superficial duodenal ulcer  -HGB 6.7 at 1:30 pm - one more unit pRBC today  -culture groin/lower abdomen  - Enterococcus and E coli - ID following  -lipase level elevated  - 242 - will repeat        Sepsis  -He has bilateral groin cellulitis  -Continue IV cefepime  -Topical miconazole for yeast infection    GI bleed noted -Protonix drip  -EGD shows superficial duodenal ulcer    Acute blood loss anemia  -Admission hemoglobin was 14.1 and hemoglobin dropped to 8.3 2 days later on , she is on ASA and Effient  -She was transfused 1 unit  - - HGB was 6.5 at  last night  - -hemoglobin 6.7 this afternoon even after transfusion earlier this admission, will give 1 more unit    Hypotension noted -she appeared to have early hypovolemic shock due to GI bleed  -Blood pressure improved with fluids  - at 0730 - SBP 90 to 101 past few hours -continue to monitor    Encephalopathy-suspect related to sepsis  2-confusion improved    Hyponatremia  -Consulted nephrology  -Hyponatremia much improved   - Na is 315    Metabolic acidosis with elevated anion gap  -Nephrology consult appreciated, problem resolved    Diabetes mellitus type 2  -Last available A1c was 13.3 in 2019  -Hemoglobin A1c this admission is 11.6%        Other problems    Tobacco use  -Encourage cessation    Coronary disease status post stents  -DAPT held due to GI bleed. She was not on a PPI at home or an H2 blocker. 3. Successful Kissing stent ROBIN stenting of mid LAD bifurcation   stenosis.    4. Successful Kissing stent ROBIN stenting of Ostium of the   Diagonal.Electronically signed by Yakov Reyes Miguel GARCÍA   (Performing Physician) on 11/26/2019 at 08:49      Body mass index is 44.44 kg/m². Consistent with morbid obesity    HTN  -on Coreg    Dyslipidemia    Sinus tachycardia  -improved    Restlessness 2/12 - likely due to GI bleed    Social History  -patient works long hours  -she lives with her   Quentin Kohler, her sister, helps communicate medical info to her     Subjective:     2/13- blood started last night about 10 pm as HGB was 6.5    -spoke to  Deondre Parson who referred me to Robby Kussmaul [phone] her sister, updated Robby Kussmaul with patient's permission      Julissa Garcia to room at 0730 - was not in room    2/12-had lots of melena yesterday    2/11-her mental status is much better today    2-10    Chief Complaint   Patient presents with    Fatigue    Fever     She is confused today. She is not very articulate when letting me know why she is here    She did state after questioning about her  called the squad at her request.  She could not tell me why other than she could not walk. Objective: Intake/Output Summary (Last 24 hours) at 2/13/2021 0725  Last data filed at 2/13/2021 0600  Gross per 24 hour   Intake 4346.01 ml   Output 3250 ml   Net 1096.01 ml      Vitals:   Vitals:    02/13/21 0700   BP: 84/71   Pulse: 105   Resp: 24   Temp:    SpO2: 100%     Physical Exam:       Physical Exam  Constitutional:       Appearance: She is obese. Eyes:      General:         Right eye: No discharge. Left eye: No discharge. Pulmonary:      Effort: Pulmonary effort is normal.   Skin:     Coloration: Skin is not jaundiced. Neurological:      Mental Status: She is alert. Cranial Nerves: No cranial nerve deficit. Body mass index is 44.44 kg/m².       Medications:   Medications:    insulin lispro  0-12 Units Subcutaneous 2 times per day    gabapentin  300 mg Oral Nightly    nystatin   Topical BID    lactobacillus  1 capsule Oral Daily with breakfast    atorvastatin  40 mg Oral Daily    carvedilol  3.125 mg Oral BID     sodium chloride flush  10 mL Intravenous 2 times per day    miconazole   Topical BID    cefepime  2,000 mg Intravenous Q8H    insulin lispro  15 Units Subcutaneous TID     insulin glargine  30 Units Subcutaneous Daily    insulin lispro  0-12 Units Subcutaneous TID       Infusions:    sodium chloride      pantoprozole (PROTONIX) infusion 8 mg/hr (02/13/21 0600)    sodium chloride 100 mL/hr at 02/13/21 0012    sodium chloride      dextrose       PRN Meds:     sodium chloride, , PRN      melatonin, 3 mg, Nightly PRN      sodium chloride, , PRN      sodium chloride flush, 10 mL, PRN      ondansetron, 4 mg, Q6H PRN      polyethylene glycol, 17 g, Daily PRN      acetaminophen, 650 mg, Q6H PRN    Or      acetaminophen, 650 mg, Q6H PRN      glucose, 15 g, PRN      dextrose, 12.5 g, PRN      glucagon (rDNA), 1 mg, PRN      dextrose, 100 mL/hr, PRN          Electronically signed by Jamshid Molina MD on 2/13/2021 at 7:25 AM

## 2021-02-13 NOTE — PROGRESS NOTES
Unable to draw lab x2 attempts. Midline has blood return but will not give enough for sample. Called phlebotomy to draw labs.

## 2021-02-13 NOTE — PLAN OF CARE
Problem: Pain:  Description: Pain management should include both nonpharmacologic and pharmacologic interventions.   Goal: Pain level will decrease  Description: Pain level will decrease  Outcome: Ongoing  Goal: Control of acute pain  Description: Control of acute pain  Outcome: Ongoing  Goal: Control of chronic pain  Description: Control of chronic pain  Outcome: Ongoing     Problem: Fluid Volume:  Goal: Ability to achieve a balanced intake and output will improve  Description: Ability to achieve a balanced intake and output will improve  Outcome: Ongoing     Problem: Physical Regulation:  Goal: Ability to maintain clinical measurements within normal limits will improve  Description: Ability to maintain clinical measurements within normal limits will improve  Outcome: Ongoing  Goal: Will show no signs and symptoms of electrolyte imbalance  Description: Will show no signs and symptoms of electrolyte imbalance  Outcome: Ongoing     Problem: Fluid Volume - Imbalance:  Goal: Absence of imbalanced fluid volume signs and symptoms  Description: Absence of imbalanced fluid volume signs and symptoms  Outcome: Ongoing     Problem: Falls - Risk of:  Goal: Will remain free from falls  Description: Will remain free from falls  Outcome: Ongoing  Goal: Absence of physical injury  Description: Absence of physical injury  Outcome: Ongoing     Problem: Skin Integrity:  Goal: Will show no infection signs and symptoms  Description: Will show no infection signs and symptoms  Outcome: Ongoing  Goal: Absence of new skin breakdown  Description: Absence of new skin breakdown  Outcome: Ongoing

## 2021-02-13 NOTE — PROGRESS NOTES
Progress Note( Dr. Zaldivar Settler)  2/13/2021  Subjective:   Admit Date: 2/10/2021  PCP: No primary care provider on file. Admitted For : Nausea vomiting and abdominal pain  and cellulitis of the bilateral groin folds,    Consulted For: Better control of blood glucose    Interval History: Patient had GI bleeding with black tarry stools and drop in BP   Transferred to ICU  Patient had EGD this morning findings as noted below     2 CM SUPERFICIAL ULCERATION IN THE DUODENAL BULB WITH NO BLEEDING ---GASTRITIS--NO BLOOD NOTED IN THE U/ GIT     Denies any chest pains,   Yes SOB . Denies nausea or vomiting. Patient kept n.p.o. ice planning for possible endoscopy  No new bowel or bladder symptoms. Intake/Output Summary (Last 24 hours) at 2/13/2021 1533  Last data filed at 2/13/2021 1500  Gross per 24 hour   Intake 4396.01 ml   Output 1600 ml   Net 2796.01 ml       DATA    CBC:   Recent Labs     02/12/21  0620 02/12/21  0620 02/12/21  2045 02/13/21  0230 02/13/21  0530   WBC 27.5*  --   --   --  21.4*   HGB 8.6*   < > 6.5* 7.8* 7.1*     --   --   --  189    < > = values in this interval not displayed.     CMP:  Recent Labs     02/11/21  0904 02/12/21  0601 02/13/21  0530 02/13/21  1320   * 131* 136  --    K 4.0 4.1 3.6  --    CL 98* 100 106  --    CO2 17* 18* 22  --    BUN 21 48* 23  --    CREATININE 0.6 0.7 0.6  --    CALCIUM 7.5* 7.1* 6.6* 7.5*   PROT  --  4.7* 4.5*  --    LABALBU  --  2.4* 2.4*  --    BILITOT  --  0.3 0.3  --    ALKPHOS  --  91 76  --    AST  --  31 34  --    ALT  --  25 23  --      Lipids:   Lab Results   Component Value Date    CHOL 304 11/25/2019    HDL 30 11/25/2019    TRIG 775 11/25/2019     Glucose:  Recent Labs     02/13/21  0214 02/13/21  0832 02/13/21  1315   POCGLU 215* 211* 243*     IiagaackzrM2M:  Lab Results   Component Value Date    LABA1C 11.6 02/11/2021     High Sensitivity TSH:   Lab Results   Component Value Date    TSHHS 1.280 02/10/2021     Free T3: No results found for: FT3  Free T4:No results found for: T4FREE    Ct Abdomen Pelvis Wo Contrast Additional Contrast? None    Result Date: 2/10/2021  EXAMINATION: CT OF THE ABDOMEN AND PELVIS WITHOUT CONTRAST 2/10/2021 8:21 pm   .     No acute findings. Diffuse hepatic steatosis. Ct Head Wo Contrast    Result Date: 2/11/2021  EXAMINATION: CT OF THE HEAD WITHOUT CONTRAST  2/10/2021 8:21 pm \       No acute brain parenchymal abnormality. Xr Chest Portable    Result Date: 2/10/2021  EXAMINATION: ONE XRAY VIEW OF THE CHEST 2/10/2021 3:05 am COMPARISON: 11/26/2019 HISTORY: ORDERING SYSTEM PROVIDED HISTORY: fever   No acute process.        Scheduled Medicines   Medications:    pantoprazole  40 mg Intravenous BID    calcium carbonate  750 mg Oral TID    insulin lispro  0-12 Units Subcutaneous 2 times per day    gabapentin  300 mg Oral Nightly    nystatin   Topical BID    lactobacillus  1 capsule Oral Daily with breakfast    atorvastatin  40 mg Oral Daily    carvedilol  3.125 mg Oral BID WC    sodium chloride flush  10 mL Intravenous 2 times per day    miconazole   Topical BID    cefepime  2,000 mg Intravenous Q8H    insulin lispro  15 Units Subcutaneous TID WC    insulin glargine  30 Units Subcutaneous Daily    insulin lispro  0-12 Units Subcutaneous TID WC      Infusions:    sodium chloride      sodium chloride 100 mL/hr at 02/13/21 0840    sodium chloride      dextrose           Objective:   Vitals: /62   Pulse 94   Temp 98.7 °F (37.1 °C) (Oral)   Resp 23   Ht 5' 6\" (1.676 m)   Wt 275 lb 5.7 oz (124.9 kg)   SpO2 100%   BMI 44.44 kg/m²   General appearance: alert and cooperative with exam  Neck: no JVD or bruit  Thyroid : Normal lobes   Lungs: Has Vesicular Breath sounds   Heart:  regular rate and rhythm  Abdomen: soft, non-tender; bowel sounds normal; no masses,  no organomegaly  Musculoskeletal: Normal  Extremities: extremities normal, , no edema  and cellulitis of the bilateral groin folds,  Neurologic:  Awake, alert, oriented to name, place and time. Cranial nerves II-XII are grossly intact. Motor is  intact. Sensory is intact. ,  and gait is normal.    Assessment:     Patient Active Problem List:     Acute chest pain     Unstable angina (HCC)     Morbid obesity ([facility] [address]     Type 2 diabetes mellitus with circulatory disorder, with long-term current use of insulin (HCC)     Essential hypertension     Dyslipidemia     Post PTCA     SOB (shortness of breath)     CAD (coronary artery disease)     Palpitations     Hyponatremia     High anion gap metabolic acidosis     Diarrhea     Gastritis/duodenal ulcer      Plan:     1. Reviewed POC blood glucose . Labs and X ray results   2. Reviewed Current Medicines   3. Now on meal plus correction bolus Humalog/ Basal Lantus Insulin regime   4. Monitor Blood glucose frequently   5. Modified  the dose of Insulin/ other medicines as needed  6. Will follow     .      Coy Snellen, MD

## 2021-02-13 NOTE — FLOWSHEET NOTE
Blood started as ordered. Patient awake and alert. Vital signs as charted.     Will continue to monitor closely    Johnnie Paniagua RN

## 2021-02-13 NOTE — PROGRESS NOTES
[facility]    PATIENT: Walter Perez, 50 y.o., female, MRN: [id number]    Hospital Day:  LOS: 3 days     Walter Perez is a 50 y.o. female with sepsis, poorly controlled diabetes, diarrhea, and cellulitis of the bilateral groin folds, concern for GI bleed. Subjective:  Chief Complaint: dry mouth, thirsty  Pain: 0/10  BM: having dark stools/melena   Diet: DIET CLEAR LIQUID;  Activity: as tolerated    Her bilateral groins are sore, have been weeping less. Redness appears to have improved. Remains tachycardic and periods of hypotension. Objective:    Vitals: BP 84/71   Pulse 105   Temp 98.5 °F (36.9 °C) (Oral)   Resp 24   Ht 5' 6\" (1.676 m)   Wt 275 lb 5.7 oz (124.9 kg)   SpO2 100%   BMI 44.44 kg/m²   Vital Signs (Last 24 Hours)  Temp  Av.4 °F (36.9 °C)  Min: 98 °F (36.7 °C)  Max: 99.3 °F (37.4 °C)  Pulse  Av.9  Min: 96  Max: 141  BP  Min: 60/50  Max: 129/76  Resp  Av.6  Min: 0  Max: 32  SpO2  Av.5 %  Min: 0 %  Max: 100 %  Wt Readings from Last 3 Encounters:   21 275 lb 5.7 oz (124.9 kg)   10/23/20 288 lb 9.6 oz (130.9 kg)   20 274 lb 12.8 oz (124.6 kg)       I/O:  0701 -  0700  In: 7399 [P.O.:600;  I.V.:2888]  Out: 3250 [Urine:3250]    IV Fluids: sodium chloride    sodium chloride Last Rate: 100 mL/hr at 21 0840    sodium chloride    dextrose    Scheduled Meds:   pantoprazole, 40 mg, Intravenous, BID    insulin lispro, 0-12 Units, Subcutaneous, 2 times per day    gabapentin, 300 mg, Oral, Nightly    nystatin, , Topical, BID    lactobacillus, 1 capsule, Oral, Daily with breakfast    atorvastatin, 40 mg, Oral, Daily    carvedilol, 3.125 mg, Oral, BID     sodium chloride flush, 10 mL, Intravenous, 2 times per day    miconazole, , Topical, BID    cefepime, 2,000 mg, Intravenous, Q8H    insulin lispro, 15 Units, Subcutaneous, TID     insulin glargine, 30 Units, Subcutaneous, Daily    insulin lispro, 0-12 Units, Subcutaneous, TID WC    Physical Exam:  General Appearance:   Alert, cooperative, no distress    Head:   Normocephalic, atraumatic    Lungs:    Equal chest rise, respirations unlabored   Heart:   Regular rhythm, tachycardic    Abdomen:    Soft, morbidly obese, non-tender, no rebound or guarding. Bilateral groin folds with cellulitis and erythema. No apparent abscess.      Extremities:  No cyanosis or edema, scattered ecchymoses   Neurologic:  Nonfocal, grossly intact        Labs/Imaging Results:   Recent Results (from the past 24 hour(s))   Culture, Wound    Collection Time: 02/12/21 10:25 AM    Specimen: Abdomen   Result Value Ref Range    Specimen ABDOMEN     Special Requests GROIN     Culture       Prelim Report Anaerobic culture further report to follow   POCT Glucose    Collection Time: 02/12/21 11:37 AM   Result Value Ref Range    POC Glucose 320 (H) 70 - 99 MG/DL   Hemoglobin and hematocrit, blood    Collection Time: 02/12/21 11:40 AM   Result Value Ref Range    Hemoglobin 8.3 (L) 12.5 - 16.0 GM/DL    Hematocrit 25.5 (L) 37 - 47 %   POCT Glucose    Collection Time: 02/12/21  6:26 PM   Result Value Ref Range    POC Glucose 252 (H) 70 - 99 MG/DL   Hemoglobin and hematocrit, blood    Collection Time: 02/12/21  8:20 PM   Result Value Ref Range    Hemoglobin 6.8 (LL) 12.5 - 16.0 GM/DL    Hematocrit 20.4 (L) 37 - 47 %   Hemoglobin and Hematocrit, Blood    Collection Time: 02/12/21  8:45 PM   Result Value Ref Range    Hemoglobin 6.5 (LL) 12.5 - 16.0 GM/DL    Hematocrit 20.4 (L) 37 - 47 %   POCT Glucose    Collection Time: 02/12/21  9:26 PM   Result Value Ref Range    POC Glucose 241 (H) 70 - 99 MG/DL   POCT Glucose    Collection Time: 02/13/21  2:14 AM   Result Value Ref Range    POC Glucose 215 (H) 70 - 99 MG/DL   Hemoglobin and hematocrit, blood    Collection Time: 02/13/21  2:30 AM   Result Value Ref Range    Hemoglobin 7.8 (L) 12.5 - 16.0 GM/DL    Hematocrit 23.8 (L) 37 - 47 %   CBC    Collection Time: 02/13/21  5:30 AM   Result Value Ref Range    WBC 21.4 (H) 4.0 - 10.5 K/CU MM    RBC 2.36 (L) 4.2 - 5.4 M/CU MM    Hemoglobin 7.1 (L) 12.5 - 16.0 GM/DL    Hematocrit 21.7 (L) 37 - 47 %    MCV 91.9 78 - 100 FL    MCH 30.1 27 - 31 PG    MCHC 32.7 32.0 - 36.0 %    RDW 15.7 (H) 11.7 - 14.9 %    Platelets 737 135 - 568 K/CU MM    MPV 10.1 7.5 - 11.1 FL   C-Reactive Protein    Collection Time: 02/13/21  5:30 AM   Result Value Ref Range    CRP, High Sensitivity 78.3 mg/L   Amylase    Collection Time: 02/13/21  5:30 AM   Result Value Ref Range    Amylase 105 25 - 115 U/L   Lipase    Collection Time: 02/13/21  5:30 AM   Result Value Ref Range    Lipase 242 (H) 13 - 60 IU/L   Comprehensive Metabolic Panel    Collection Time: 02/13/21  5:30 AM   Result Value Ref Range    Sodium 136 135 - 145 MMOL/L    Potassium 3.6 3.5 - 5.1 MMOL/L    Chloride 106 99 - 110 mMol/L    CO2 22 21 - 32 MMOL/L    BUN 23 6 - 23 MG/DL    CREATININE 0.6 0.6 - 1.1 MG/DL    Glucose 203 (H) 70 - 99 MG/DL    Calcium 6.6 (LL) 8.3 - 10.6 MG/DL    Albumin 2.4 (L) 3.4 - 5.0 GM/DL    Total Protein 4.5 (L) 6.4 - 8.2 GM/DL    Total Bilirubin 0.3 0.0 - 1.0 MG/DL    ALT 23 10 - 40 U/L    AST 34 15 - 37 IU/L    Alkaline Phosphatase 76 40 - 128 IU/L    GFR Non-African American >60 >60 mL/min/1.73m2    GFR African American >60 >60 mL/min/1.73m2    Anion Gap 8 4 - 16   Protime/INR & PTT    Collection Time: 02/13/21  5:30 AM   Result Value Ref Range    Protime 12.6 11.7 - 14.5 SECONDS    INR 1.04 INDEX    aPTT 21.8 (L) 25.1 - 37.1 SECONDS   POCT Glucose    Collection Time: 02/13/21  8:32 AM   Result Value Ref Range    POC Glucose 211 (H) 70 - 99 MG/DL     Assessment:  Lisa Boles is a 50 y.o. female with sepsis, poorly controlled diabetes, diarrhea, and cellulitis of the bilateral groin folds, concern for GI bleed.      Active Problems:    Hyponatremia    High anion gap metabolic acidosis    Diarrhea  Resolved Problems:    * No resolved hospital problems. *    Plan:  -Currently she doesn't appear to require surgical intervention for her groins. Will continue to follow. - Continue clears  -Continue antibiotics   -EGD this morning 2 cm superficial ulceration in the duodenal bulb with no bleeding and gastritis noted. -Trend WBC  -Education provided on importance smoking cessation.  -Continue PPI  -Plan discussed with patient and nurse at bedside. Rounded with and above plan discussed with Dr. Domenica Jesus      Electronically signed: VIVIAN Mcghee - CNP 2/13/2021 10:17 AM       Addendum:    Agree with above. Pt evaluated with CNP. Pt went for EGD today with Dr. Shankar Tian - reviewed brief op note. 2 cm superficial ulceration in duodenum without active bleed and gastritis. Groins seem to be improving. WBC elevated but improved from yesterday (21 from 27)  Continue Abx  Clears. Diet per GI. Smoking cessation, BS control  Hold blood thinners    Plan d/w pt and pt's nurse at bedside.

## 2021-02-13 NOTE — PROGRESS NOTES
--  189    < > = values in this interval not displayed. BMP:    Recent Labs     02/11/21  0904 02/12/21  0601 02/13/21  0530   * 131* 136   K 4.0 4.1 3.6   CL 98* 100 106   CO2 17* 18* 22   BUN 21 48* 23   CREATININE 0.6 0.7 0.6   GLUCOSE 168* 264* 203*     Hepatic:   Recent Labs     02/12/21  0601 02/13/21  0530   AST 31 34   ALT 25 23   BILITOT 0.3 0.3   ALKPHOS 91 76     Troponin: No results for input(s): TROPONINI in the last 72 hours. BNP: No results for input(s): BNP in the last 72 hours. Lipids: No results for input(s): CHOL, HDL in the last 72 hours.     Invalid input(s): LDLCALCU  ABGs: No results found for: PHART, PO2ART, CZI5MRL  INR:   Recent Labs     02/13/21  0530   INR 1.04       Objective:   Vitals: BP 84/71   Pulse 105   Temp 98.5 °F (36.9 °C) (Oral)   Resp 24   Ht 5' 6\" (1.676 m)   Wt 275 lb 5.7 oz (124.9 kg)   SpO2 100%   BMI 44.44 kg/m²   General appearance:  in no acute distress  HEENT: normocephalic, atraumatic,   Neck: supple, trachea midline  Lungs:, breathing comfortably  Heart[de-identified] regular rate and rhythm,   Extremities: extremities atraumatic, no cyanosis or edema    Assessment and Plan:     Patient Active Problem List   Diagnosis    Acute chest pain    Unstable angina (HCC)    Morbid obesity ([facility] [address]    Type 2 diabetes mellitus with circulatory disorder, with long-term current use of insulin (HCC)    Essential hypertension    Dyslipidemia    Post PTCA    SOB (shortness of breath)    CAD (coronary artery disease)    Palpitations    Hyponatremia   Hyponatremia: 122 from volume depletion//resolved with iNS  Anion gap metabolic acidosis  GI bleed from duodenal ulcers, repaired  hypocalcemia     Plan:  Give 4g calcium gluconate  The hyponatremia has resolved  Continue supportive mgmt  You may stop NS once able to keep food down  Please call me back if needed, will sign off today  Thank you                    Electronically signed by Yifan Orozco DO on 2/13/2021 at 10:46 AM    [facility]  Chris Vigil MD  [id number],  [address]  [facility], [place] [address]  PHONE: [phone]  FAX: [phone]

## 2021-02-14 LAB
ABO/RH: NORMAL
ALBUMIN SERPL-MCNC: 2.7 GM/DL (ref 3.4–5)
ALP BLD-CCNC: 101 IU/L (ref 40–128)
ALT SERPL-CCNC: 36 U/L (ref 10–40)
AMYLASE: 95 U/L (ref 25–115)
ANION GAP SERPL CALCULATED.3IONS-SCNC: 9 MMOL/L (ref 4–16)
ANTIBODY SCREEN: NEGATIVE
AST SERPL-CCNC: 60 IU/L (ref 15–37)
BILIRUB SERPL-MCNC: 0.4 MG/DL (ref 0–1)
BUN BLDV-MCNC: 10 MG/DL (ref 6–23)
CALCIUM SERPL-MCNC: 7.2 MG/DL (ref 8.3–10.6)
CHLORIDE BLD-SCNC: 101 MMOL/L (ref 99–110)
CO2: 24 MMOL/L (ref 21–32)
COMPONENT: NORMAL
CREAT SERPL-MCNC: 0.6 MG/DL (ref 0.6–1.1)
CROSSMATCH RESULT: NORMAL
CULTURE: NORMAL
GFR AFRICAN AMERICAN: >60 ML/MIN/1.73M2
GFR NON-AFRICAN AMERICAN: >60 ML/MIN/1.73M2
GLUCOSE BLD-MCNC: 127 MG/DL (ref 70–99)
GLUCOSE BLD-MCNC: 128 MG/DL (ref 70–99)
GLUCOSE BLD-MCNC: 137 MG/DL (ref 70–99)
GLUCOSE BLD-MCNC: 154 MG/DL (ref 70–99)
GLUCOSE BLD-MCNC: 155 MG/DL (ref 70–99)
GLUCOSE BLD-MCNC: 160 MG/DL (ref 70–99)
HCT VFR BLD CALC: 23.9 % (ref 37–47)
HCT VFR BLD CALC: 24.5 % (ref 37–47)
HCT VFR BLD CALC: 27.4 % (ref 37–47)
HEMOGLOBIN: 7.7 GM/DL (ref 12.5–16)
HEMOGLOBIN: 7.9 GM/DL (ref 12.5–16)
HEMOGLOBIN: 8.7 GM/DL (ref 12.5–16)
HIGH SENSITIVE C-REACTIVE PROTEIN: 45.3 MG/L
LIPASE: 153 IU/L (ref 13–60)
Lab: NORMAL
MCH RBC QN AUTO: 29.6 PG (ref 27–31)
MCHC RBC AUTO-ENTMCNC: 31.8 % (ref 32–36)
MCV RBC AUTO: 93.2 FL (ref 78–100)
PDW BLD-RTO: 15.9 % (ref 11.7–14.9)
PLATELET # BLD: 225 K/CU MM (ref 140–440)
PMV BLD AUTO: 9.4 FL (ref 7.5–11.1)
POTASSIUM SERPL-SCNC: 3.8 MMOL/L (ref 3.5–5.1)
PROCALCITONIN: 0.35
RBC # BLD: 2.94 M/CU MM (ref 4.2–5.4)
SODIUM BLD-SCNC: 134 MMOL/L (ref 135–145)
SPECIMEN: NORMAL
STATUS: NORMAL
TOTAL PROTEIN: 5.1 GM/DL (ref 6.4–8.2)
TRANSFUSION STATUS: NORMAL
UNIT DIVISION: 0
UNIT NUMBER: NORMAL
WBC # BLD: 22.7 K/CU MM (ref 4–10.5)

## 2021-02-14 PROCEDURE — 82150 ASSAY OF AMYLASE: CPT

## 2021-02-14 PROCEDURE — 94761 N-INVAS EAR/PLS OXIMETRY MLT: CPT

## 2021-02-14 PROCEDURE — 80053 COMPREHEN METABOLIC PANEL: CPT

## 2021-02-14 PROCEDURE — 97166 OT EVAL MOD COMPLEX 45 MIN: CPT

## 2021-02-14 PROCEDURE — 36415 COLL VENOUS BLD VENIPUNCTURE: CPT

## 2021-02-14 PROCEDURE — 2000000000 HC ICU R&B

## 2021-02-14 PROCEDURE — 6360000002 HC RX W HCPCS: Performed by: INTERNAL MEDICINE

## 2021-02-14 PROCEDURE — 97162 PT EVAL MOD COMPLEX 30 MIN: CPT

## 2021-02-14 PROCEDURE — 80048 BASIC METABOLIC PNL TOTAL CA: CPT

## 2021-02-14 PROCEDURE — 2580000003 HC RX 258: Performed by: STUDENT IN AN ORGANIZED HEALTH CARE EDUCATION/TRAINING PROGRAM

## 2021-02-14 PROCEDURE — 2580000003 HC RX 258: Performed by: INTERNAL MEDICINE

## 2021-02-14 PROCEDURE — 6370000000 HC RX 637 (ALT 250 FOR IP): Performed by: STUDENT IN AN ORGANIZED HEALTH CARE EDUCATION/TRAINING PROGRAM

## 2021-02-14 PROCEDURE — 85014 HEMATOCRIT: CPT

## 2021-02-14 PROCEDURE — 6370000000 HC RX 637 (ALT 250 FOR IP): Performed by: INTERNAL MEDICINE

## 2021-02-14 PROCEDURE — 6370000000 HC RX 637 (ALT 250 FOR IP): Performed by: NURSE PRACTITIONER

## 2021-02-14 PROCEDURE — 6360000002 HC RX W HCPCS: Performed by: SPECIALIST

## 2021-02-14 PROCEDURE — C9113 INJ PANTOPRAZOLE SODIUM, VIA: HCPCS | Performed by: SPECIALIST

## 2021-02-14 PROCEDURE — 86141 C-REACTIVE PROTEIN HS: CPT

## 2021-02-14 PROCEDURE — 84145 PROCALCITONIN (PCT): CPT

## 2021-02-14 PROCEDURE — 2500000003 HC RX 250 WO HCPCS: Performed by: INTERNAL MEDICINE

## 2021-02-14 PROCEDURE — 85018 HEMOGLOBIN: CPT

## 2021-02-14 PROCEDURE — 97116 GAIT TRAINING THERAPY: CPT

## 2021-02-14 PROCEDURE — 2700000000 HC OXYGEN THERAPY PER DAY

## 2021-02-14 PROCEDURE — 83690 ASSAY OF LIPASE: CPT

## 2021-02-14 PROCEDURE — 82962 GLUCOSE BLOOD TEST: CPT

## 2021-02-14 PROCEDURE — 97530 THERAPEUTIC ACTIVITIES: CPT

## 2021-02-14 PROCEDURE — 85027 COMPLETE CBC AUTOMATED: CPT

## 2021-02-14 RX ORDER — GABAPENTIN 300 MG/1
300 CAPSULE ORAL NIGHTLY
COMMUNITY
End: 2021-06-17

## 2021-02-14 RX ADMIN — PANTOPRAZOLE SODIUM 40 MG: 40 INJECTION, POWDER, LYOPHILIZED, FOR SOLUTION INTRAVENOUS at 21:24

## 2021-02-14 RX ADMIN — CALCIUM CARBONATE 750 MG: 500 TABLET, CHEWABLE ORAL at 09:11

## 2021-02-14 RX ADMIN — PIPERACILLIN AND TAZOBACTAM 3375 MG: 3; .375 INJECTION, POWDER, LYOPHILIZED, FOR SOLUTION INTRAVENOUS at 02:25

## 2021-02-14 RX ADMIN — SODIUM CHLORIDE: 9 INJECTION, SOLUTION INTRAVENOUS at 00:42

## 2021-02-14 RX ADMIN — GABAPENTIN 300 MG: 300 CAPSULE ORAL at 21:24

## 2021-02-14 RX ADMIN — INSULIN GLARGINE 30 UNITS: 100 INJECTION, SOLUTION SUBCUTANEOUS at 09:16

## 2021-02-14 RX ADMIN — PANTOPRAZOLE SODIUM 40 MG: 40 INJECTION, POWDER, LYOPHILIZED, FOR SOLUTION INTRAVENOUS at 09:12

## 2021-02-14 RX ADMIN — MICONAZOLE NITRATE: 2 POWDER TOPICAL at 09:12

## 2021-02-14 RX ADMIN — INSULIN LISPRO 2 UNITS: 100 INJECTION, SOLUTION INTRAVENOUS; SUBCUTANEOUS at 09:18

## 2021-02-14 RX ADMIN — CALCIUM CARBONATE 750 MG: 500 TABLET, CHEWABLE ORAL at 21:24

## 2021-02-14 RX ADMIN — SODIUM CHLORIDE, PRESERVATIVE FREE 10 ML: 5 INJECTION INTRAVENOUS at 21:25

## 2021-02-14 RX ADMIN — PIPERACILLIN AND TAZOBACTAM 3375 MG: 3; .375 INJECTION, POWDER, LYOPHILIZED, FOR SOLUTION INTRAVENOUS at 09:11

## 2021-02-14 RX ADMIN — Medication 1 CAPSULE: at 08:00

## 2021-02-14 RX ADMIN — INSULIN LISPRO 2 UNITS: 100 INJECTION, SOLUTION INTRAVENOUS; SUBCUTANEOUS at 17:58

## 2021-02-14 RX ADMIN — CARVEDILOL 3.12 MG: 3.12 TABLET, FILM COATED ORAL at 09:24

## 2021-02-14 RX ADMIN — ATORVASTATIN CALCIUM 40 MG: 40 TABLET, FILM COATED ORAL at 09:11

## 2021-02-14 RX ADMIN — CARVEDILOL 3.12 MG: 3.12 TABLET, FILM COATED ORAL at 17:30

## 2021-02-14 ASSESSMENT — PAIN SCALES - GENERAL: PAINLEVEL_OUTOF10: 0

## 2021-02-14 NOTE — OP NOTE
[address], [address] [place] [address]                                OPERATIVE REPORT    PATIENT NAME: Esthela Woodson                      :        1965  MED REC NO:   [id number]                          ROOM:       2128  ACCOUNT NO:   [de-identified]                           ADMIT DATE: 02/10/2021  PROVIDER:     Zak Stone MD    DATE OF PROCEDURE:  2021    The patient is in the ICU. PROCEDURE:  EGD. CHIEF COMPLAINT:  History of anemia, melenic stools; rule out upper GI  bleeding. PREMEDICATION:  Please refer to the anesthesiologist's notes. DESCRIPTION OF PROCEDURE:  The patient was placed in the left lateral  decubitus position and the video Olympus gastroscope was introduced in  the back of the throat and was advanced into the esophagus. ESOPHAGUS:  The mucosa of the esophagus was unremarkable. There was no  evidence of hyperemia, erosion, ulcer, stricture, Lares's esophagus or  mass lesion. STOMACH:  The fundus, cardia, body, antrum, lesser curvature, greater  curvature and the pyloric regions of the stomach were examined. The  mucosa of the stomach was slightly hyperemic, but no erosion or ulcers  were seen. The gastroscope was retroflexed and the fundus and cardia  were carefully examined and no mass lesions were seen. No blood recent  or old was seen in the lumen of the stomach. DUODENUM:  The first and second portions of the duodenum were examined  and a 2 cm superficial ulcer was noted at the junction of the first and  second portion of the duodenum with no recent bleeding. The mucosa was  slightly edematous and hyperemic as well. No blood recent or old was  seen in the lumen of the duodenum again. POSTOPERATIVE DIAGNOSES:  1.  A 2 cm superficial ulceration noted at the junction of the first and  second portion of the duodenum with no active bleeding. 2.  Mild superficial gastritis. RECOMMENDATIONS:  1. We will continue the patient on Protonix. 2.  Monitor the patient's serial H and H and transfuse on a p.r.n. basis  to keep hemoglobin above 7 gm percent. 3.  The patient has been instructed to have an outpatient colonoscopy  scheduled as well. 4.  Small bowel evaluation later if needed. The patient tolerated the procedure well. There were no postop  complications. The blood loss during the procedure was nil.         Cheyenne Ohara MD    D: 02/13/2021 8:38:32       T: 02/13/2021 10:16:30     [email or web address]  Job#: [id number]     Doc#: [id number]    CC:

## 2021-02-14 NOTE — CONSULTS
[facility] THERAPY EVALUATION    Brandy Prescott, 1965, 2128/2128-A, 2/14/2021    Discharge Recommendation: Home with initial 24 hour supervision/assistance, PRN assist.       History:  [place]:  The primary encounter diagnosis was Diarrhea, unspecified type. Diagnoses of Hyponatremia and High anion gap metabolic acidosis were also pertinent to this visit. Subjective:  Patient states: Agreeable to therapy. Pain: Pt reports discomfort related to cathetar. Communication with other providers: PT, RN  Restrictions: General Precautions, Fall Risk    Home Setup/Prior level of function:  Social/Functional History  Lives With: Spouse, Son  Type of Home: House  Home Layout: Two level, Able to Live on Main level with bedroom/bathroom  Home Access: Stairs to enter with rails  Entrance Stairs - Number of Steps: 2 steps  Bathroom Shower/Tub: Tub/Shower unit  Bathroom Toilet: Standard  Bathroom Equipment: Tub transfer bench  Home Equipment: (Pt does not own AD, but has a wheelchair accessible if needed)  Receives Help From: Family  ADL Assistance: Independent  Homemaking Assistance: Independent  Homemaking Responsibilities: Yes  Ambulation Assistance: Independent  Transfer Assistance: Independent  Active : Yes  Mode of Transportation: Car  Occupation: Full time employment  Type of occupation: [employer]    Examination:  · Observation: Supine in bed upon arrival  · Vision: [employer]  · Hearing: WFL  · Vitals: Stable vitals throughout session    Body Systems and functions:  · ROM: WFL   · Strength: 5/5 BUE shoulder flexion. · Sensation: WFL  · Tone: Normal  · Coordination: WFL  · Perception: WNL    Activities of Daily Living (ADLs):  · Feeding: Joe (setup and increased time). · Grooming: SBA (anticipate pt could complete in standing). · UB bathing: SBA (recommend pt complete in seated. Pt reports  is purchasing a TTB this date).    · LB bathing: SBA (in seated with Treatment:  Therapeutic Activity Training:   Therapeutic activity training was instructed today. Cues were given for safety, sequence, UE/LE placement, awareness, and balance. Activities performed today included bed mobility training, sup-sit, sit-stand, ambulation. Safety Measures: Gait belt used, Left in bed, Alarm in place    Assessment:  Assessment  Performance deficits / Impairments: Decreased endurance  Treatment Diagnosis: hyponatremia  Prognosis: Good  Decision Making: Medium Complexity  REQUIRES OT FOLLOW UP: No  Discharge Recommendations: Home with assist PRN    Pt is a 50year old F admitted for hyponatremia. Pt reports that prior to admission she was IND in ADLs, IADLs, and functional mobility. This date, pt demo good functional mobility for ADL status and reports feeling close to her baseline despite feeling slightly deconditioned. Pt educated on WS/EC while recovering and pt appeared receptive. Pt plans to purchase a TTB for home and reports having good supports to assist as needed upon d/c. No OT needs at this time. OT recommend pt d/c home with initial 24 hour SUP/assist, PRN assist.    Time:   Time in: 1035  Time out: 1055  Timed treatment minutes: 10  Total time: 20      Electronically signed by:       DILLON Linda/L, [place], [id number]

## 2021-02-14 NOTE — PROGRESS NOTES
DOING WELL NO ABD COMPLAINTS HAD BM BROWNISH IN COLOR NO VOMITING  VITALS STABLE   LABS NOTED 7.7 AFTER 4 UNITS PRBCS  WILL ADVANCE DIET  COLONOSCOPY AND SMALL BOWEL EVALUATION AS OUTPT

## 2021-02-14 NOTE — PROGRESS NOTES
Progress Note( Dr. Eliot Dunbar)  2/14/2021  Subjective:   Admit Date: 2/10/2021  PCP: No primary care provider on file. Admitted For : Nausea vomiting and abdominal pain  and cellulitis of the bilateral groin folds,    Consulted For: Better control of blood glucose    Interval History: Patient had GI bleeding with black tarry stools and drop in BP   Transferred to ICU  Patient had EGD this morning findings as noted below     2 CM SUPERFICIAL ULCERATION IN THE DUODENAL BULB WITH NO BLEEDING ---GASTRITIS--NO BLOOD NOTED IN THE U/ GIT     Denies any chest pains,   Yes SOB . Denies nausea or vomiting. Patient kept n.p.o. ice planning for possible endoscopy  No new bowel or bladder symptoms. Intake/Output Summary (Last 24 hours) at 2/14/2021 1212  Last data filed at 2/14/2021 0800  Gross per 24 hour   Intake 5226.75 ml   Output 2450 ml   Net 2776.75 ml       DATA    CBC:   Recent Labs     02/12/21  0620 02/12/21  0620 02/13/21  0530 02/13/21  1320 02/13/21  2315 02/14/21  0455   WBC 27.5*  --  21.4*  --   --  22.7*   HGB 8.6*   < > 7.1* 6.7* 7.9* 8.7*     --  189  --   --  225    < > = values in this interval not displayed.     CMP:  Recent Labs     02/12/21  0601 02/13/21  0530 02/13/21  1320 02/14/21  0455   * 136  --  134*   K 4.1 3.6  --  3.8    106  --  101   CO2 18* 22  --  24   BUN 48* 23  --  10   CREATININE 0.7 0.6  --  0.6   CALCIUM 7.1* 6.6* 7.5* 7.2*   PROT 4.7* 4.5*  --  5.1*   LABALBU 2.4* 2.4*  --  2.7*   BILITOT 0.3 0.3  --  0.4   ALKPHOS 91 76  --  101   AST 31 34  --  60*   ALT 25 23  --  36     Lipids:   Lab Results   Component Value Date    CHOL 304 11/25/2019    HDL 30 11/25/2019    TRIG 775 11/25/2019     Glucose:  Recent Labs     02/13/21  2111 02/14/21  0204 02/14/21  0859   POCGLU 123* 128* 154*     FvctrpxsabG3R:  Lab Results   Component Value Date    LABA1C 11.6 02/11/2021     High Sensitivity TSH:   Lab Results   Component Value Date    TSHHS 1.280 02/10/2021 Free T3: No results found for: FT3  Free T4:No results found for: T4FREE    Ct Abdomen Pelvis Wo Contrast Additional Contrast? None    Result Date: 2/10/2021  EXAMINATION: CT OF THE ABDOMEN AND PELVIS WITHOUT CONTRAST 2/10/2021 8:21 pm   .     No acute findings. Diffuse hepatic steatosis. Ct Head Wo Contrast    Result Date: 2/11/2021  EXAMINATION: CT OF THE HEAD WITHOUT CONTRAST  2/10/2021 8:21 pm \       No acute brain parenchymal abnormality. Xr Chest Portable    Result Date: 2/10/2021  EXAMINATION: ONE XRAY VIEW OF THE CHEST 2/10/2021 3:05 am COMPARISON: 11/26/2019 HISTORY: ORDERING SYSTEM PROVIDED HISTORY: fever   No acute process.        Scheduled Medicines   Medications:    insulin lispro  10 Units Subcutaneous TID WC    pantoprazole  40 mg Intravenous BID    calcium carbonate  750 mg Oral TID    piperacillin-tazobactam  3,375 mg Intravenous Q8H    insulin lispro  0-12 Units Subcutaneous 2 times per day    gabapentin  300 mg Oral Nightly    nystatin   Topical BID    lactobacillus  1 capsule Oral Daily with breakfast    atorvastatin  40 mg Oral Daily    carvedilol  3.125 mg Oral BID WC    sodium chloride flush  10 mL Intravenous 2 times per day    miconazole   Topical BID    insulin glargine  30 Units Subcutaneous Daily    insulin lispro  0-12 Units Subcutaneous TID WC      Infusions:    sodium chloride      sodium chloride      sodium chloride 75 mL/hr at 02/14/21 0042    sodium chloride      dextrose           Objective:   Vitals: /71   Pulse 102   Temp 98.5 °F (36.9 °C) (Oral)   Resp 30   Ht 5' 6\" (1.676 m)   Wt 285 lb 4.4 oz (129.4 kg)   SpO2 98%   BMI 46.04 kg/m²   General appearance: alert and cooperative with exam  Neck: no JVD or bruit  Thyroid : Normal lobes   Lungs: Has Vesicular Breath sounds   Heart:  regular rate and rhythm  Abdomen: soft, non-tender; bowel sounds normal; no masses,  no organomegaly  Musculoskeletal: Normal  Extremities: extremities normal, , no edema  and cellulitis of the bilateral groin folds,  Neurologic:  Awake, alert, oriented to name, place and time. Cranial nerves II-XII are grossly intact. Motor is  intact. Sensory is intact. ,  and gait is normal.    Assessment:     Patient Active Problem List:     Acute chest pain     Unstable angina (HCC)     Morbid obesity ([facility] [address]     Type 2 diabetes mellitus with circulatory disorder, with long-term current use of insulin (HCC)     Essential hypertension     Dyslipidemia     Post PTCA     SOB (shortness of breath)     CAD (coronary artery disease)     Palpitations     Hyponatremia     High anion gap metabolic acidosis     Diarrhea     Gastritis/duodenal ulcer      Plan:     1. Reviewed POC blood glucose . Labs and X ray results   2. Reviewed Current Medicines   3. Now on meal plus correction bolus Humalog/ Basal Lantus Insulin regime   4. Monitor Blood glucose frequently   5. Modified  the dose of Insulin/ other medicines as needed  6. Will follow     .      Maria Alejandra Painter MD

## 2021-02-14 NOTE — CONSULTS
[facility] PHYSICAL THERAPY EVALUATION  Silvia Crowe, 1965, 2128/2128-A, 2/14/2021    History  [place]:  The primary encounter diagnosis was Diarrhea, unspecified type. Diagnoses of Hyponatremia and High anion gap metabolic acidosis were also pertinent to this visit. Patient  has a past medical history of CAD (coronary artery disease), Current every day smoker, Diabetes mellitus ([address] History of exercise stress test, Hyperlipidemia, Hypertension, Morbid obesity ([address] Palpitations, Post PTCA, and SOB (shortness of breath). Patient  has a past surgical history that includes hernia repair and Percutaneous Transluminal Coronary Angio (11/26/2019). Subjective:  Patient states: \"The worst part is this catheter. \"    Pain:  States uncomfortable with catheter but denies pain.     Communication with other providers:  Handoff to RN, OT  Restrictions: general precautions, fall risk    Home Setup/Prior level of function  Social/Functional History  Lives With: Spouse, Son  Type of Home: House  Home Layout: Two level, Able to Live on Main level with bedroom/bathroom  Home Access: Stairs to enter with rails  Entrance Stairs - Number of Steps: 2 steps  Bathroom Shower/Tub: Tub/Shower unit  Bathroom Toilet: Standard  Bathroom Equipment: Tub transfer bench  Home Equipment: (Pt does not own AD, but has a wheelchair accessible if needed)  Receives Help From: Family  ADL Assistance: Independent  Homemaking Assistance: Independent  Homemaking Responsibilities: Yes  Ambulation Assistance: Independent  Transfer Assistance: Independent  Active : Yes  Mode of Transportation: Car  Occupation: Full time employment  Type of occupation: [employer]    Examination of body systems (includes body structures/functions, activity/participation limitations):  · Observation:  Pt supine in bed upon arrival and agreeable to therapy  · Vision:  Wears glasses  · Hearing:  [facility]  · Cardiopulmonary:  No O2 needs  · Cognition: WFL, see OT/SLP note for further evaluation. Musculoskeletal  · ROM R/L:  WFL. · Strength R/L:  5/5, minimal impairment in function and endurance. · Neuro:  [facility]      Mobility:  · Rolling L/R:  Mod I  · Supine to sit:  Mod I  · Transfers: Pt completed STS to/from bed with supervision  · Sitting balance:  good. · Standing balance: good. · Gait: Pt ambulated 120' with RW and gait belt CGA progressing to SBA with no LOB, SOB, or dizziness. Pt refused any ambulation trials without RW this date. Pt ambulated with good katarzyna and bilateral step length. · Education: pt educated on benefits of mobility and energy conservation techniques this session. Pt verbalized understanding    [facility] 6 Clicks Inpatient Mobility:  AM-PAC Inpatient Mobility Raw Score : 20    Safety: patient left supine in bed (refused chair) with alarm on, call light within reach, RN notified, gait belt used. Assessment:  Pt is a 50 y.o. female admitted to the hospital for hyponatremia. Pt is typically independently ambulating and transferring independently without an AD. Pt currently is transferring mod I and ambulating with SBA and RW. Pt is presenting with decreased endurance this session. Pt is functioning close to baseline and does not require any further acute care or outpatient PT at this time. Will discharge pt from caseload. Complexity: moderate  Prognosis: Good, no significant barriers to participation at this time.      Equipment: pt states she can obtain a RW    Treatment plan:  Bed mobility, transfers, balance, gait, TA, TX    Recommendations for NURSING mobility: amb with gait belt    Time:   Time in: 1035  Time out: 1055  Timed treatment minutes: 10  Total time: 20    Electronically signed by:    Maddie Palomo, PT  2/14/2021, 1:12 PM

## 2021-02-15 VITALS
OXYGEN SATURATION: 92 % | RESPIRATION RATE: 14 BRPM | WEIGHT: 280.2 LBS | HEART RATE: 92 BPM | SYSTOLIC BLOOD PRESSURE: 105 MMHG | BODY MASS INDEX: 45.03 KG/M2 | HEIGHT: 66 IN | DIASTOLIC BLOOD PRESSURE: 45 MMHG | TEMPERATURE: 98.8 F

## 2021-02-15 PROBLEM — E55.9 VITAMIN D DEFICIENCY: Status: ACTIVE | Noted: 2021-02-15

## 2021-02-15 LAB
ANION GAP SERPL CALCULATED.3IONS-SCNC: 8 MMOL/L (ref 4–16)
BUN BLDV-MCNC: 5 MG/DL (ref 6–23)
CALCIUM SERPL-MCNC: 6.8 MG/DL (ref 8.3–10.6)
CHLORIDE BLD-SCNC: 101 MMOL/L (ref 99–110)
CO2: 25 MMOL/L (ref 21–32)
CREAT SERPL-MCNC: 0.5 MG/DL (ref 0.6–1.1)
CULTURE: ABNORMAL
CULTURE: NORMAL
CULTURE: NORMAL
GFR AFRICAN AMERICAN: >60 ML/MIN/1.73M2
GFR NON-AFRICAN AMERICAN: >60 ML/MIN/1.73M2
GLUCOSE BLD-MCNC: 143 MG/DL (ref 70–99)
GLUCOSE BLD-MCNC: 147 MG/DL (ref 70–99)
GLUCOSE BLD-MCNC: 155 MG/DL (ref 70–99)
HCT VFR BLD CALC: 23.4 % (ref 37–47)
HCT VFR BLD CALC: 24.1 % (ref 37–47)
HEMOGLOBIN: 7.6 GM/DL (ref 12.5–16)
HEMOGLOBIN: 7.7 GM/DL (ref 12.5–16)
HIGH SENSITIVE C-REACTIVE PROTEIN: 19 MG/L
Lab: ABNORMAL
Lab: NORMAL
Lab: NORMAL
MCH RBC QN AUTO: 30.1 PG (ref 27–31)
MCHC RBC AUTO-ENTMCNC: 32 % (ref 32–36)
MCV RBC AUTO: 94.1 FL (ref 78–100)
PDW BLD-RTO: 15.7 % (ref 11.7–14.9)
PLATELET # BLD: 260 K/CU MM (ref 140–440)
PMV BLD AUTO: 9.3 FL (ref 7.5–11.1)
POTASSIUM SERPL-SCNC: 3.8 MMOL/L (ref 3.5–5.1)
RBC # BLD: 2.56 M/CU MM (ref 4.2–5.4)
SODIUM BLD-SCNC: 134 MMOL/L (ref 135–145)
SPECIMEN: ABNORMAL
SPECIMEN: NORMAL
SPECIMEN: NORMAL
WBC # BLD: 21.6 K/CU MM (ref 4–10.5)

## 2021-02-15 PROCEDURE — 2580000003 HC RX 258: Performed by: INTERNAL MEDICINE

## 2021-02-15 PROCEDURE — 94761 N-INVAS EAR/PLS OXIMETRY MLT: CPT

## 2021-02-15 PROCEDURE — 6370000000 HC RX 637 (ALT 250 FOR IP): Performed by: INTERNAL MEDICINE

## 2021-02-15 PROCEDURE — 6360000002 HC RX W HCPCS: Performed by: INTERNAL MEDICINE

## 2021-02-15 PROCEDURE — 85014 HEMATOCRIT: CPT

## 2021-02-15 PROCEDURE — 99232 SBSQ HOSP IP/OBS MODERATE 35: CPT | Performed by: SURGERY

## 2021-02-15 PROCEDURE — 6360000002 HC RX W HCPCS: Performed by: SPECIALIST

## 2021-02-15 PROCEDURE — 99232 SBSQ HOSP IP/OBS MODERATE 35: CPT | Performed by: NURSE PRACTITIONER

## 2021-02-15 PROCEDURE — 6370000000 HC RX 637 (ALT 250 FOR IP): Performed by: STUDENT IN AN ORGANIZED HEALTH CARE EDUCATION/TRAINING PROGRAM

## 2021-02-15 PROCEDURE — 36415 COLL VENOUS BLD VENIPUNCTURE: CPT

## 2021-02-15 PROCEDURE — 6370000000 HC RX 637 (ALT 250 FOR IP): Performed by: NURSE PRACTITIONER

## 2021-02-15 PROCEDURE — 80048 BASIC METABOLIC PNL TOTAL CA: CPT

## 2021-02-15 PROCEDURE — 85018 HEMOGLOBIN: CPT

## 2021-02-15 PROCEDURE — 86141 C-REACTIVE PROTEIN HS: CPT

## 2021-02-15 PROCEDURE — 82962 GLUCOSE BLOOD TEST: CPT

## 2021-02-15 PROCEDURE — 85027 COMPLETE CBC AUTOMATED: CPT

## 2021-02-15 PROCEDURE — 2580000003 HC RX 258: Performed by: STUDENT IN AN ORGANIZED HEALTH CARE EDUCATION/TRAINING PROGRAM

## 2021-02-15 PROCEDURE — C9113 INJ PANTOPRAZOLE SODIUM, VIA: HCPCS | Performed by: SPECIALIST

## 2021-02-15 RX ORDER — DOXYCYCLINE HYCLATE 100 MG
100 TABLET ORAL 2 TIMES DAILY
Qty: 28 TABLET | Refills: 0 | Status: SHIPPED | OUTPATIENT
Start: 2021-02-15 | End: 2021-02-25 | Stop reason: SDUPTHER

## 2021-02-15 RX ORDER — PANTOPRAZOLE SODIUM 40 MG/1
40 TABLET, DELAYED RELEASE ORAL
Qty: 60 TABLET | Refills: 1 | Status: SHIPPED | OUTPATIENT
Start: 2021-02-15 | End: 2021-03-09

## 2021-02-15 RX ORDER — CEFUROXIME AXETIL 250 MG/1
500 TABLET ORAL EVERY 12 HOURS SCHEDULED
Status: DISCONTINUED | OUTPATIENT
Start: 2021-02-15 | End: 2021-02-15 | Stop reason: HOSPADM

## 2021-02-15 RX ORDER — CALCIUM CARBONATE 200(500)MG
750 TABLET,CHEWABLE ORAL 3 TIMES DAILY
Qty: 135 TABLET | Refills: 0 | Status: SHIPPED | OUTPATIENT
Start: 2021-02-15 | End: 2021-02-25

## 2021-02-15 RX ORDER — CHOLECALCIFEROL (VITAMIN D3) 1250 MCG
1 CAPSULE ORAL WEEKLY
Qty: 1 CAPSULE | Refills: 1 | Status: SHIPPED | OUTPATIENT
Start: 2021-02-15 | End: 2021-02-15 | Stop reason: SDUPTHER

## 2021-02-15 RX ORDER — CEFUROXIME AXETIL 500 MG/1
500 TABLET ORAL 2 TIMES DAILY
Qty: 28 TABLET | Refills: 0 | Status: SHIPPED | OUTPATIENT
Start: 2021-02-15 | End: 2021-02-25 | Stop reason: SDUPTHER

## 2021-02-15 RX ORDER — DOXYCYCLINE HYCLATE 100 MG
100 TABLET ORAL EVERY 12 HOURS SCHEDULED
Status: DISCONTINUED | OUTPATIENT
Start: 2021-02-15 | End: 2021-02-15 | Stop reason: HOSPADM

## 2021-02-15 RX ORDER — BLOOD PRESSURE TEST KIT
1 KIT MISCELLANEOUS DAILY
Qty: 1 KIT | Refills: 0 | Status: SHIPPED | OUTPATIENT
Start: 2021-02-15 | End: 2022-03-26

## 2021-02-15 RX ORDER — CHOLECALCIFEROL (VITAMIN D3) 1250 MCG
1 CAPSULE ORAL WEEKLY
Qty: 8 CAPSULE | Refills: 1 | Status: SHIPPED | OUTPATIENT
Start: 2021-02-15 | End: 2021-02-25

## 2021-02-15 RX ADMIN — NYSTATIN: 100000 CREAM TOPICAL at 09:54

## 2021-02-15 RX ADMIN — CEFUROXIME AXETIL 500 MG: 250 TABLET, FILM COATED ORAL at 11:30

## 2021-02-15 RX ADMIN — INSULIN GLARGINE 30 UNITS: 100 INJECTION, SOLUTION SUBCUTANEOUS at 08:52

## 2021-02-15 RX ADMIN — PIPERACILLIN AND TAZOBACTAM 3375 MG: 3; .375 INJECTION, POWDER, LYOPHILIZED, FOR SOLUTION INTRAVENOUS at 02:42

## 2021-02-15 RX ADMIN — SODIUM CHLORIDE, PRESERVATIVE FREE 10 ML: 5 INJECTION INTRAVENOUS at 08:59

## 2021-02-15 RX ADMIN — Medication 1 CAPSULE: at 08:59

## 2021-02-15 RX ADMIN — PANTOPRAZOLE SODIUM 40 MG: 40 INJECTION, POWDER, LYOPHILIZED, FOR SOLUTION INTRAVENOUS at 08:58

## 2021-02-15 RX ADMIN — DOXYCYCLINE HYCLATE 100 MG: 100 TABLET, COATED ORAL at 11:31

## 2021-02-15 RX ADMIN — CARVEDILOL 3.12 MG: 3.12 TABLET, FILM COATED ORAL at 08:59

## 2021-02-15 RX ADMIN — PIPERACILLIN AND TAZOBACTAM 3375 MG: 3; .375 INJECTION, POWDER, LYOPHILIZED, FOR SOLUTION INTRAVENOUS at 09:54

## 2021-02-15 RX ADMIN — CALCIUM CARBONATE 750 MG: 500 TABLET, CHEWABLE ORAL at 08:58

## 2021-02-15 RX ADMIN — ATORVASTATIN CALCIUM 40 MG: 40 TABLET, FILM COATED ORAL at 08:58

## 2021-02-15 RX ADMIN — MICONAZOLE NITRATE: 2 POWDER TOPICAL at 09:54

## 2021-02-15 RX ADMIN — INSULIN LISPRO 2 UNITS: 100 INJECTION, SOLUTION INTRAVENOUS; SUBCUTANEOUS at 08:51

## 2021-02-15 ASSESSMENT — PAIN DESCRIPTION - ORIENTATION: ORIENTATION: RIGHT;LEFT

## 2021-02-15 ASSESSMENT — PAIN DESCRIPTION - LOCATION: LOCATION: GROIN

## 2021-02-15 ASSESSMENT — PAIN SCALES - GENERAL: PAINLEVEL_OUTOF10: 4

## 2021-02-15 ASSESSMENT — PAIN DESCRIPTION - DESCRIPTORS: DESCRIPTORS: DISCOMFORT;PRESSURE

## 2021-02-15 ASSESSMENT — PAIN DESCRIPTION - FREQUENCY: FREQUENCY: INTERMITTENT

## 2021-02-15 ASSESSMENT — PAIN DESCRIPTION - PAIN TYPE: TYPE: ACUTE PAIN

## 2021-02-15 NOTE — DISCHARGE SUMMARY
Discharge Summary    Name:  Mervat Thompson /Age/Sex: 1965  (50 y.o. female)   MRN & CSN:  [id number] & [id number] Admission Date/Time: 2/10/2021 12:50 AM   Attending:  David Irving MD Discharging Physician: Bryan Jarvis MD     HPI:     Per H&P:  Chief Complaint:   Mervat Thompson is a 50 y.o.  female  who presents with generalized weakness and diarrhea. A/w poor po intake and nausea. Patient reports having onset of symptoms for the last week or so but it was significantly worse today. She presented due to the weakness via EMS. No radiation of the symptoms. Denies having experienced this before. Recently tested for covid due to her fatigue but was found to be negative. Ed found her to be severely dehydrated and acidotic. She was stabilized and admitted to the medicine team.      Hospital Course:     Malka Wilson is a pleasant 68-year-old who presented to ED with weakness. While here she was found to have sepsis due to bilateral especially left groin cellulitis. She is diabetic. She had a fever up to 103.2 while here. Upon arrival she was also having diarrhea-it became evident that she was having melena. She had a significant GI bleed while here and required 3 units of blood. Endoscopy showed a 2 cm duodenal ulcer. Her DAPT was changed to the [facility] and she was started on a proton pump inhibitor. She was released home in stable condition today. Problem list    Sepsis  -She has bilateral groin cellulitis.   She did have an associated encephalopathy  -She was seen by infectious disease and treated with IV Zosyn and topical miconazole powder  -She was discharged with cefuroxime 500 mg twice daily and doxycycline 100 mg twice daily for 2 weeks from the day of discharge    GI bleed due to 2 cm superficial duodenal ulcer  -This was diagnosed the day after admission  -Treated with Protonix drip while here and discharged on Protonix 40 mg p.o. twice daily, follow-up with Dr. Enrique Clark  -The GI bleed not surprisingly was associated with hypotension and probable early hypovolemic shock of therapy with aspirin and Plavix for CAD. Cardiology has taken her off of Plavix at this time. Acute blood loss anemia  -Baseline hemoglobin is about 14.1. Minimum hemoglobin while here was as low as 6.5  -She did have 3 units while here  -Discharge hemoglobin is 7.7    H pylori  Testing: GI requested an H. pylori antigen on the stool, but that could not be done prior to discharge. Recommend checking it as an outpatient. Other problems    Coronary artery disease  -DAPT held due to GI bleed. She was not on a PPI at home or an H2 blocker. -s/p successful kissing stent ROBIN stenting of mid LAD bifurcation  stenosis in 2019   -s/p successful kissing stent ROBIN stenting of ostium of the diagonal in 2019 at the same time as above  -per cardiology - DAPT no longer needed, will just need ASA upon dc     Anion gap metabolic acidosis and hyponatremia, present on admission  -Sodium was 122 on admission, bicarb level was 22, anion gap was 22 and blood sugar was 204. These abnormalities were associated with sepsis and have all now resolved. Appreciate nephrology consult    WBC is 21.6 today - would recheck CBC as outpatient    Vitamin D deficiency  -level this admit is quite low - 9.86  -Supplementation has been started    Hepatic steatosis  -seen on CT this admission -recommend outpatient follow-up    Diabetes mellitus type 2  -A1c is 11.6 at this time-she reports that it has been in the 7 range and she will work on bringing it back down  -Continue insulin glargine, insulin aspart, canagliflozin, and linagliptin. Appreciate endocrinology consult    Tobacco use-encourage cessation    Hypertension  -Continue Coreg    Dyslipidemia-continue atorvastatin    Disposition-she works full-time and her goal is to get back to work soon.     The patient expressed appropriate understanding of and agreement with the discharge recommendations, medications, and plan. Consults this admission:  IP CONSULT TO HOSPITALIST  IP CONSULT TO ENDOCRINOLOGY  IP CONSULT TO NEPHROLOGY  IP CONSULT TO INFECTIOUS DISEASES  IP CONSULT TO GENERAL SURGERY  IP CONSULT TO GI  IP CONSULT TO IV TEAM  IP CONSULT TO CARDIOLOGY    Discharge Instruction:   Follow up appointments: GI, infectious disease, and endocrinology  Primary care physician:  within 2 weeks    Diet:  diabetic diet   Activity: activity as tolerated  Disposition: Discharged to:   [x]Home, []HHC, []SNF, []Acute Rehab, []Hospice   Condition on discharge: Stable    Discharge Medications:      Yaz Ducking   Home Medication Instructions LRT:[id number]    Printed on:02/15/21 4626   Medication Information                      aspirin (ASPIRIN LOW DOSE) 81 MG EC tablet  Take 1 tablet by mouth daily             atorvastatin (LIPITOR) 40 MG tablet  Take 1 tablet by mouth daily             blood glucose monitor kit and supplies  Test 4 times a day before meals and bedtime& as needed for symptoms of irregular blood glucose. blood glucose monitor strips  Test 4 times a day & as needed for symptoms of irregular blood glucose. Canagliflozin (INVOKANA PO)  Take 200 mg by mouth             carvedilol (COREG) 3.125 MG tablet  Take 1 tablet by mouth 2 times daily (with meals)             carvedilol (COREG) 6.25 MG tablet  Take 1 tablet by mouth 2 times daily (with meals)             clopidogrel (PLAVIX) 75 MG tablet  Take 1 tablet by mouth daily             gabapentin (NEURONTIN) 300 MG capsule  Take 300 mg by mouth nightly.              insulin aspart (NOVOLOG FLEXPEN) 100 UNIT/ML injection pen  Take 20 units with each meal in addition to sliding scale as below   *Medium Dose Correction Algorithm**Insulin: 3 TIMES DAILY WITH MEALSGlucose: Dose: No [id number] 2 [id number] 4 [id number] 6 [id number] 8 [id number] 10 [id number] and above 12 Units             insulin parenchyma, which may   reflect underlying hepatic steatosis or other chronic liver parenchymal   disease. BILIARY SYSTEM:  Gallbladder is unremarkable without evidence of   pericholecystic fluid, wall thickening or stones. Negative sonographic   Rosario's sign. Common bile duct is within normal limits measuring 2 mm. RIGHT KIDNEY: The right kidney is grossly unremarkable without evidence of   hydronephrosis. PANCREAS: Evaluation of the pancreas is limited due to overlying bowel gas. OTHER: No evidence of right upper quadrant ascites. Impression:       Increased echogenicity of the liver parenchyma, which may reflect underlying   hepatic steatosis or other chronic liver parenchymal disease. .     No sonographic evidence of cholelithiasis or acute cholecystitis seen. Evaluation of pancreas is limited due to overlying bowel gas. CT HEAD WO CONTRAST [[id number]] Collected: 02/10/21 2054     Order Status: Completed Updated: 02/11/21 2050     Narrative:       EXAMINATION:   CT OF THE HEAD WITHOUT CONTRAST  2/10/2021 8:21 pm     TECHNIQUE:   CT of the head was performed without the administration of intravenous   contrast. Dose modulation, iterative reconstruction, and/or weight based   adjustment of the mA/kV was utilized to reduce the radiation dose to as low   as reasonably achievable. COMPARISON:   None. HISTORY:   ORDERING SYSTEM PROVIDED HISTORY: altered mental status   TECHNOLOGIST PROVIDED HISTORY:   Reason for exam:->altered mental status   Has a \"code stroke\" or \"stroke alert\" been called? ->No   Is the patient pregnant?->No   Reason for Exam: ams   Acuity: Acute   Type of Exam: Initial     FINDINGS:   BRAIN/VENTRICLES: The ventricles and cisternal spaces are normal in size,   shape, and configuration for the age of the patient. No areas of abnormal   attenuation are identified in the brain parenchyma. There is no midline   shift or mass effect.  No hemorrhage is identified in the brain parenchyma. ORBITS: The visualized portion of the orbits demonstrate no acute abnormality. SINUSES: There is mucous retention cyst or polyps in the left maxillary   sinus. There is mild mucoperiosteal thickening in the right maxillary sinus. The remainder of the sinuses are clear. The mastoid air cells are clear. SOFT TISSUES/SKULL:  No acute abnormality of the visualized skull or soft   tissues. Impression:       No acute brain parenchymal abnormality. CT ABDOMEN PELVIS WO CONTRAST Additional Contrast? None [[id number]] Collected: 02/10/21 2103     Order Status: Completed Updated: 02/10/21 2112     Narrative:       EXAMINATION:   CT OF THE ABDOMEN AND PELVIS WITHOUT CONTRAST 2/10/2021 8:21 pm     TECHNIQUE:   CT of the abdomen and pelvis was performed without the administration of   intravenous contrast. Multiplanar reformatted images are provided for review. Dose modulation, iterative reconstruction, and/or weight based adjustment of   the mA/kV was utilized to reduce the radiation dose to as low as reasonably   achievable. COMPARISON:   None. HISTORY:   ORDERING SYSTEM PROVIDED HISTORY: Fever, rule out abdominal source   TECHNOLOGIST PROVIDED HISTORY:   Reason for exam:->Fever, rule out abdominal source   Additional Contrast?->None   Is the patient pregnant?->No   Reason for Exam: fever, rule out abdominal source   Acuity: Unknown   Type of Exam: Unknown     FINDINGS:   Lower Chest:  Visualized portion of the lower chest demonstrates no acute   abnormality. Organs: Is diffuse hepatic steatosis. The visualized portions of the liver,   spleen, pancreas and adrenal glands appear unremarkable within the   constraints of a noncontrast exam.  No biliary ductal dilatation. The   kidneys appear normal in size without evidence of a contour distorting mass. No renal stone or hydronephrosis. No perinephric stranding.      GI/Bowel: No bowel dilatation to

## 2021-02-15 NOTE — PROGRESS NOTES
Discharge instructions given to patient, states understanding. Transported to private auto per wheel chair.   Assisted into passenger seat

## 2021-02-15 NOTE — FLOWSHEET NOTE
Unable to flush or draw back on midline IV. Dressing broken down by SHAW Roberson RN and noted that catheter was kinked. Redressed and flushed without problem. Will continue to monitor IV closely. Patient awake and alert. Assessments per flowsheets. Will continue to monitor closely.     Aime Espitia RN

## 2021-02-15 NOTE — PROGRESS NOTES
[facility]    PATIENT: Han Alberts, 50 y.o., female, MRN: [id number]    Hospital Day:  LOS: 5 days     Han Alberts is a 50 y.o. female with sepsis, poorly controlled diabetes, diarrhea, and cellulitis of the bilateral groin folds, concern for GI bleed. Subjective:  Chief Complaint: wants to go home  Pain: 0/10  BM: brown stool   Diet: DIET GENERAL; Carb Control: 5 carb choices (75 gms)/meal; Daily Fluid Restriction: 1800 ml  Activity: as tolerated    Stable overnight. Now having brown stool. Her bilateral groins are less sore, have been weeping less.      Objective:    Vitals: BP 97/77   Pulse 95   Temp 99.2 °F (37.3 °C) (Oral)   Resp 27   Ht 5' 6\" (1.676 m)   Wt 280 lb 3.3 oz (127.1 kg)   SpO2 95%   BMI 45.23 kg/m²   Vital Signs (Last 24 Hours)  Temp  Av.2 °F (37.3 °C)  Min: 99.1 °F (37.3 °C)  Max: 99.2 °F (37.3 °C)  Pulse  Av.7  Min: 94  Max: 119  BP  Min: 83/67  Max: 141/70  Resp  Av.5  Min: 19  Max: 30  SpO2  Av.3 %  Min: 95 %  Max: 98 %  Wt Readings from Last 3 Encounters:   02/15/21 280 lb 3.3 oz (127.1 kg)   10/23/20 288 lb 9.6 oz (130.9 kg)   20 274 lb 12.8 oz (124.6 kg)       I/O:  0701 - 02/15 0700  In: -   Out: 2300 [Urine:2300]    IV Fluids: sodium chloride Last Rate: Stopped (21)    sodium chloride    sodium chloride    dextrose    Scheduled Meds:   insulin lispro, 10 Units, Subcutaneous, TID WC    pantoprazole, 40 mg, Intravenous, BID    calcium carbonate, 750 mg, Oral, TID    piperacillin-tazobactam, 3,375 mg, Intravenous, Q8H    insulin lispro, 0-12 Units, Subcutaneous, 2 times per day    gabapentin, 300 mg, Oral, Nightly    nystatin, , Topical, BID    lactobacillus, 1 capsule, Oral, Daily with breakfast    atorvastatin, 40 mg, Oral, Daily    carvedilol, 3.125 mg, Oral, BID WC    sodium chloride flush, 10 mL, Intravenous, 2 times per day    miconazole, , Topical, BID    insulin glargine, 30 Units, Subcutaneous, Daily    insulin lispro, 0-12 Units, Subcutaneous, TID WC    Physical Exam:  General Appearance:   Alert, cooperative, no distress    Head:   Normocephalic, atraumatic    Lungs:    Equal chest rise, respirations unlabored   Heart:   Regular rhythm and rate   Abdomen:    Soft, morbidly obese, non-tender, no rebound or guarding. Bilateral groin folds with cellulitis and decreasing erythema. No apparent abscess.      Extremities:  No cyanosis or edema, scattered ecchymoses   Neurologic:  Nonfocal, grossly intact        Labs/Imaging Results:   Recent Results (from the past 24 hour(s))   Hemoglobin and hematocrit, blood    Collection Time: 02/14/21 12:27 PM   Result Value Ref Range    Hemoglobin 7.7 (L) 12.5 - 16.0 GM/DL    Hematocrit 23.9 (L) 37 - 47 %   POCT Glucose    Collection Time: 02/14/21 12:55 PM   Result Value Ref Range    POC Glucose 137 (H) 70 - 99 MG/DL   POCT Glucose    Collection Time: 02/14/21  5:43 PM   Result Value Ref Range    POC Glucose 160 (H) 70 - 99 MG/DL   POCT Glucose    Collection Time: 02/14/21  9:22 PM   Result Value Ref Range    POC Glucose 155 (H) 70 - 99 MG/DL   Hemoglobin and hematocrit, blood    Collection Time: 02/15/21  1:00 AM   Result Value Ref Range    Hemoglobin 7.6 (L) 12.5 - 16.0 GM/DL    Hematocrit 23.4 (L) 37 - 47 %   POCT Glucose    Collection Time: 02/15/21  2:58 AM   Result Value Ref Range    POC Glucose 143 (H) 70 - 99 MG/DL   CBC    Collection Time: 02/15/21  6:16 AM   Result Value Ref Range    WBC 21.6 (H) 4.0 - 10.5 K/CU MM    RBC 2.56 (L) 4.2 - 5.4 M/CU MM    Hemoglobin 7.7 (L) 12.5 - 16.0 GM/DL    Hematocrit 24.1 (L) 37 - 47 %    MCV 94.1 78 - 100 FL    MCH 30.1 27 - 31 PG    MCHC 32.0 32.0 - 36.0 %    RDW 15.7 (H) 11.7 - 14.9 %    Platelets 779 823 - 418 K/CU MM    MPV 9.3 7.5 - 11.1 FL   C-Reactive Protein    Collection Time: 02/15/21  6:16 AM   Result Value Ref Range    CRP, High Sensitivity 19.0 mg/L   Basic

## 2021-02-15 NOTE — PROGRESS NOTES
Infectious Disease Progress Note  2/15/2021   Patient Name: Jannet Villanueva : 1965   Impression  · Sepsis Secondary to Bilateral Groin Cellulitis Without Abscess:   § Fever max 103.2, trended down to low grade temps  § Leukocytosis   § Blood cultures 2/10-0-NGTD  § 2/10-RDP and rapid Flu Negative  § -Groin cultures- Enterococcus Faecalis, E. Coli, MSSA  § -GI Disease panel and CDif Negative  § -MRSA Negative, MSSA Screen Positive  § 2/10-UA WBC 11, RBC 13, Urine culture-Candida Albicans 25,000  § 2/10-CT A&P WO Contrast: No acute findings, diffuse hepatic steatosis  § Dr. Gladys Singleton, General Surgery, onboard, impression of bilateral groins and intertriginous folds cellulitis without apparent abscess, Hidradenitis is a consideration but less likely due to no previous infection, rec empiric ABX and local wound care, no surgical intervention at this time.      · Anion Gap Metabolic Acidosis/Hyponatemia:  § Dr. Dipak Hearn onboard  § Imp of volume depletion vs hypervolemia vs SIADH     ? Morbid Obesity: BMI 43.71     ? DMII:  § Dr. Sanju Go onboard  § HbAIC -11.6, uncontrolled     ? Tobacco Abuse     ? HTN    ? Acute GIB:   ? -Dr. Thomas Mishra onboard  ? Planning procedure   ? US Liver, GB, Pancreas -Increased echogenicity of the liver parenchyma, which may reflect underlying hepatic steatosis or other chronic liver parechymal disease. No sonographic evidence of cholelithiasis or acute cholecystitis seen. Evaluation of pancreas is limited due to overlying bowel gas.      ? CAD     · Multi-morbidity: per PMHx:  CAD, Tobacco Abuse, DMII, HLD, HTN, Morbid Obesity, CAD s/p PCI, hernia repair, Allergy to Ampicillin, but not PCN (rash)  Plan:  · DC Zosyn   ? Continue topical Nystatin to groin bid x 7 days (end date 21)  ? Start Ceftin 500 mg po bid x 14 days (end date 3/1/21)  ? Start doxycycline 100 mg po bid x 14 days (end date 3/1/21)  ?  MSSA screen positive  · Trend CRP and Pct, trending may reflect underlying   hepatic steatosis or other chronic liver parenchymal disease. .       No sonographic evidence of cholelithiasis or acute cholecystitis seen.       Evaluation of pancreas is limited due to overlying bowel gas.           Labs:    Recent Results (from the past 24 hour(s))   Hemoglobin and hematocrit, blood    Collection Time: 02/14/21 12:27 PM   Result Value Ref Range    Hemoglobin 7.7 (L) 12.5 - 16.0 GM/DL    Hematocrit 23.9 (L) 37 - 47 %   POCT Glucose    Collection Time: 02/14/21 12:55 PM   Result Value Ref Range    POC Glucose 137 (H) 70 - 99 MG/DL   POCT Glucose    Collection Time: 02/14/21  5:43 PM   Result Value Ref Range    POC Glucose 160 (H) 70 - 99 MG/DL   POCT Glucose    Collection Time: 02/14/21  9:22 PM   Result Value Ref Range    POC Glucose 155 (H) 70 - 99 MG/DL   Hemoglobin and hematocrit, blood    Collection Time: 02/15/21  1:00 AM   Result Value Ref Range    Hemoglobin 7.6 (L) 12.5 - 16.0 GM/DL    Hematocrit 23.4 (L) 37 - 47 %   POCT Glucose    Collection Time: 02/15/21  2:58 AM   Result Value Ref Range    POC Glucose 143 (H) 70 - 99 MG/DL   CBC    Collection Time: 02/15/21  6:16 AM   Result Value Ref Range    WBC 21.6 (H) 4.0 - 10.5 K/CU MM    RBC 2.56 (L) 4.2 - 5.4 M/CU MM    Hemoglobin 7.7 (L) 12.5 - 16.0 GM/DL    Hematocrit 24.1 (L) 37 - 47 %    MCV 94.1 78 - 100 FL    MCH 30.1 27 - 31 PG    MCHC 32.0 32.0 - 36.0 %    RDW 15.7 (H) 11.7 - 14.9 %    Platelets 502 492 - 220 K/CU MM    MPV 9.3 7.5 - 11.1 FL   C-Reactive Protein    Collection Time: 02/15/21  6:16 AM   Result Value Ref Range    CRP, High Sensitivity 19.0 mg/L   Basic metabolic panel    Collection Time: 02/15/21  6:16 AM   Result Value Ref Range    Sodium 134 (L) 135 - 145 MMOL/L    Potassium 3.8 3.5 - 5.1 MMOL/L    Chloride 101 99 - 110 mMol/L    CO2 25 21 - 32 MMOL/L    Anion Gap 8 4 - 16    BUN 5 (L) 6 - 23 MG/DL    CREATININE 0.5 (L) 0.6 - 1.1 MG/DL    Glucose 147 (H) 70 - 99 MG/DL    Calcium 6.8 (LL) 8.3 - 10.6 MG/DL    GFR Non-African American >60 >60 mL/min/1.73m2    GFR African American >60 >60 mL/min/1.73m2   POCT Glucose    Collection Time: 02/15/21  8:45 AM   Result Value Ref Range    POC Glucose 155 (H) 70 - 99 MG/DL     CULTURE results: Invalid input(s): BLOOD CULTURE,  URINE CULTURE, SURGICAL CULTURE    Diagnosis:  Patient Active Problem List   Diagnosis    Acute chest pain    Unstable angina (HCC)    Morbid obesity ([address]    Type 2 diabetes mellitus with circulatory disorder, with long-term current use of insulin (HCC)    Essential hypertension    Dyslipidemia    Post PTCA    SOB (shortness of breath)    CAD (coronary artery disease)    Palpitations    Hyponatremia    High anion gap metabolic acidosis    Diarrhea    Encounter for monitoring antiplatelet therapy    Sepsis ([address]    Cellulitis    Vitamin D deficiency       Active Problems  Active Problems:    Hyponatremia    High anion gap metabolic acidosis    Diarrhea    Encounter for monitoring antiplatelet therapy    Sepsis ([facility] [address]    Cellulitis    Vitamin D deficiency  Resolved Problems:    * No resolved hospital problems. *    Electronically signed by: Electronically signed by Alexei Castillo.  VIVIAN Handley CNP on 2/15/2021 at 10:01 AM

## 2021-02-16 NOTE — PROGRESS NOTES
Progress Note( Dr. Ramírez Garcia)  2/15/2021  Subjective:   Admit Date: 2/10/2021  PCP: No primary care provider on file. Admitted For : Nausea vomiting and abdominal pain  and cellulitis of the bilateral groin folds,    Consulted For: Better control of blood glucose    Interval History: Patient had GI bleeding with black tarry stools and drop in BP   Transferred to ICU  Patient had EGD this morning findings as noted below     2 CM SUPERFICIAL ULCERATION IN THE DUODENAL BULB WITH NO BLEEDING ---GASTRITIS--NO BLOOD NOTED IN THE U/ GIT     Denies any chest pains,   Yes SOB . Denies nausea or vomiting. Patient kept n.p.o. ice planning for possible endoscopy  No new bowel or bladder symptoms. Intake/Output Summary (Last 24 hours) at 2/15/2021 2157  Last data filed at 2/15/2021 0930  Gross per 24 hour   Intake 390 ml   Output 2000 ml   Net -1610 ml       DATA    CBC:   Recent Labs     02/13/21  0530 02/13/21  0530 02/14/21  0455 02/14/21  1227 02/15/21  0100 02/15/21  0616   WBC 21.4*  --  22.7*  --   --  21.6*   HGB 7.1*   < > 8.7* 7.7* 7.6* 7.7*     --  225  --   --  260    < > = values in this interval not displayed.     CMP:  Recent Labs     02/13/21  0530 02/13/21  1320 02/14/21  0455 02/15/21  0616     --  134* 134*   K 3.6  --  3.8 3.8     --  101 101   CO2 22  --  24 25   BUN 23  --  10 5*   CREATININE 0.6  --  0.6 0.5*   CALCIUM 6.6* 7.5* 7.2* 6.8*   PROT 4.5*  --  5.1*  --    LABALBU 2.4*  --  2.7*  --    BILITOT 0.3  --  0.4  --    ALKPHOS 76  --  101  --    AST 34  --  60*  --    ALT 23  --  36  --      Lipids:   Lab Results   Component Value Date    CHOL 304 11/25/2019    HDL 30 11/25/2019    TRIG 775 11/25/2019     Glucose:  Recent Labs     02/14/21  2122 02/15/21  0258 02/15/21  0845   POCGLU 155* 143* 155*     GwawybrxbgC2H:  Lab Results   Component Value Date    LABA1C 11.6 02/11/2021     High Sensitivity TSH:   Lab Results   Component Value Date    TSHHS 1.280 02/10/2021 Free T3: No results found for: FT3  Free T4:No results found for: T4FREE    Ct Abdomen Pelvis Wo Contrast Additional Contrast? None    Result Date: 2/10/2021  EXAMINATION: CT OF THE ABDOMEN AND PELVIS WITHOUT CONTRAST 2/10/2021 8:21 pm   .     No acute findings. Diffuse hepatic steatosis. Ct Head Wo Contrast    Result Date: 2/11/2021  EXAMINATION: CT OF THE HEAD WITHOUT CONTRAST  2/10/2021 8:21 pm \       No acute brain parenchymal abnormality. Xr Chest Portable    Result Date: 2/10/2021  EXAMINATION: ONE XRAY VIEW OF THE CHEST 2/10/2021 3:05 am COMPARISON: 11/26/2019 HISTORY: ORDERING SYSTEM PROVIDED HISTORY: fever   No acute process. Scheduled Medicines   Medications:      Infusions:         Objective:   Vitals: BP (!) 105/45   Pulse 92   Temp 98.8 °F (37.1 °C) (Oral)   Resp 14   Ht 5' 6\" (1.676 m)   Wt 280 lb 3.3 oz (127.1 kg)   SpO2 92%   BMI 45.23 kg/m²   General appearance: alert and cooperative with exam  Neck: no JVD or bruit  Thyroid : Normal lobes   Lungs: Has Vesicular Breath sounds   Heart:  regular rate and rhythm  Abdomen: soft, non-tender; bowel sounds normal; no masses,  no organomegaly  Musculoskeletal: Normal  Extremities: extremities normal, , no edema  and cellulitis of the bilateral groin folds,  Neurologic:  Awake, alert, oriented to name, place and time. Cranial nerves II-XII are grossly intact. Motor is  intact. Sensory is intact. ,  and gait is normal.    Assessment:     Patient Active Problem List:     Acute chest pain     Unstable angina (HCC)     Morbid obesity ([facility] [address]     Type 2 diabetes mellitus with circulatory disorder, with long-term current use of insulin (HCC)     Essential hypertension     Dyslipidemia     Post PTCA     SOB (shortness of breath)     CAD (coronary artery disease)     Palpitations     Hyponatremia     High anion gap metabolic acidosis     Diarrhea     Gastritis/duodenal ulcer      Plan:     1. Reviewed POC blood glucose .  Labs and X ray results   2. Reviewed Current Medicines   3. Now on meal plus correction bolus Humalog/ Basal Lantus Insulin regime   4. Monitor Blood glucose frequently   5. Modified  the dose of Insulin/ other medicines as needed  6. Will follow     .      Noel Jules MD

## 2021-02-18 LAB
CALCIUM IONIZED: 4.6 MG/DL (ref 4.48–5.28)
CALCIUM SERPL-MCNC: 7.5 MG/DL (ref 8.3–10.6)
IONIZED CA: 1.15 MMOL/L (ref 1.12–1.32)
PARATHYROID HORMONE INTACT: 15 PG/ML (ref 15–65)

## 2021-02-22 ENCOUNTER — TELEPHONE (OUTPATIENT)
Dept: INTERNAL MEDICINE CLINIC | Age: 56
End: 2021-02-22

## 2021-02-22 NOTE — TELEPHONE ENCOUNTER
Patient left a voicemail stating she would like to set up a hospital follow up visit. Attempted to contact patient back, voicemail left.

## 2021-02-24 ENCOUNTER — OFFICE VISIT (OUTPATIENT)
Dept: INTERNAL MEDICINE CLINIC | Age: 56
End: 2021-02-24
Payer: COMMERCIAL

## 2021-02-24 VITALS
DIASTOLIC BLOOD PRESSURE: 60 MMHG | BODY MASS INDEX: 45 KG/M2 | TEMPERATURE: 98.3 F | WEIGHT: 280 LBS | OXYGEN SATURATION: 95 % | SYSTOLIC BLOOD PRESSURE: 108 MMHG | HEIGHT: 66 IN | HEART RATE: 100 BPM

## 2021-02-24 DIAGNOSIS — Z79.4 TYPE 2 DIABETES MELLITUS WITH KETOACIDOSIS WITHOUT COMA, WITH LONG-TERM CURRENT USE OF INSULIN (HCC): ICD-10-CM

## 2021-02-24 DIAGNOSIS — R19.7 DIARRHEA, UNSPECIFIED TYPE: Primary | ICD-10-CM

## 2021-02-24 DIAGNOSIS — E11.10 TYPE 2 DIABETES MELLITUS WITH KETOACIDOSIS WITHOUT COMA, WITH LONG-TERM CURRENT USE OF INSULIN (HCC): ICD-10-CM

## 2021-02-24 DIAGNOSIS — L03.314 CELLULITIS OF GROIN: ICD-10-CM

## 2021-02-24 PROCEDURE — 99214 OFFICE O/P EST MOD 30 MIN: CPT | Performed by: NURSE PRACTITIONER

## 2021-02-24 PROCEDURE — 1111F DSCHRG MED/CURRENT MED MERGE: CPT | Performed by: NURSE PRACTITIONER

## 2021-02-24 RX ORDER — OMEPRAZOLE 20 MG/1
20 CAPSULE, DELAYED RELEASE ORAL
Qty: 90 CAPSULE | Refills: 1 | Status: SHIPPED | OUTPATIENT
Start: 2021-02-24 | End: 2021-02-25

## 2021-02-24 ASSESSMENT — ENCOUNTER SYMPTOMS
SHORTNESS OF BREATH: 1
GASTROINTESTINAL NEGATIVE: 1

## 2021-02-24 ASSESSMENT — PATIENT HEALTH QUESTIONNAIRE - PHQ9
SUM OF ALL RESPONSES TO PHQ QUESTIONS 1-9: 0
SUM OF ALL RESPONSES TO PHQ QUESTIONS 1-9: 0
1. LITTLE INTEREST OR PLEASURE IN DOING THINGS: 0
SUM OF ALL RESPONSES TO PHQ QUESTIONS 1-9: 0

## 2021-02-24 NOTE — PROGRESS NOTES
Post-Discharge Transitional Care Management Services or Hospital Follow Up      Junior Stoner   YOB: 1965    Date of Office Visit:  2/24/2021  Date of Hospital Admission: 2/10/21  Date of Hospital Discharge: 2/15/21  Readmission Risk Score(high >=14%. Medium >=10%):Readmission Risk Score: 17      Care management risk score Rising risk (score 2-5) and Complex Care (Scores >=6): 1     Non face to face  following discharge, date last encounter closed (first attempt may have been earlier): *No documented post hospital discharge outreach found in the last 14 days *No documented post hospital discharge outreach found in the last 14 days    Call initiated 2 business days of discharge: *No response recorded in the last 14 days     Patient Active Problem List   Diagnosis    Acute chest pain    Unstable angina ([facility] [address]    Morbid obesity ([facility] [address]    Type 2 diabetes mellitus with circulatory disorder, with long-term current use of insulin ([facility] [address] [facility]    Essential hypertension    Dyslipidemia    Post PTCA    SOB (shortness of breath)    CAD (coronary artery disease)    Palpitations    Hyponatremia    High anion gap metabolic acidosis    Diarrhea    Encounter for monitoring antiplatelet therapy    Sepsis ([facility] [address] [facility]    Cellulitis    Vitamin D deficiency       Allergies   Allergen Reactions    Ampicillin Rash       Medications listed as ordered at the time of discharge from [facility] Medication Instructions ANNIE:    Printed on:02/24/21 0146   Medication Information                      aspirin (ASPIRIN LOW DOSE) 81 MG EC tablet  Take 1 tablet by mouth daily             atorvastatin (LIPITOR) 40 MG tablet  Take 1 tablet by mouth daily             blood glucose monitor kit and supplies  Test 4 times a day before meals and bedtime& as needed for symptoms of irregular blood glucose. blood glucose monitor strips  Test 4 times a day & as needed for symptoms of irregular blood glucose. Blood Pressure KIT  1 Act by Does not apply route daily             calcium carbonate (TUMS) 500 MG chewable tablet  Take 1.5 tablets by mouth 3 times daily             Canagliflozin (INVOKANA PO)  Take 200 mg by mouth             carvedilol (COREG) 3.125 MG tablet  Take 1 tablet by mouth 2 times daily (with meals)             cefUROXime (CEFTIN) 500 MG tablet  Take 1 tablet by mouth 2 times daily for 14 days             Cholecalciferol (VITAMIN D3) 1.25 MG (75419 UT) CAPS  Take 1 capsule by mouth once a week for 8 doses Take this for 8 weeks, then take Vitamin D 800 IU daily             Cholecalciferol (VITAMIN D3) 20 MCG (800 UNIT) TABS  Take 1 tablet by mouth daily Start this medication on April 13, 20201             doxycycline hyclate (VIBRA-TABS) 100 MG tablet  Take 1 tablet by mouth 2 times daily for 14 days             gabapentin (NEURONTIN) 300 MG capsule  Take 300 mg by mouth nightly. insulin aspart (NOVOLOG FLEXPEN) 100 UNIT/ML injection pen  Take 20 units with each meal in addition to sliding scale as below   *Medium Dose Correction Algorithm**Insulin: 3 TIMES DAILY WITH MEALSGlucose: Dose: No [id number] 2 [id number] 4 [id number] 6 [id number] 8 [id number] 10 [id number] and above 12 Units             insulin glargine (LANTUS SOLOSTAR) 100 UNIT/ML injection pen  Inject 40 Units into the skin daily             Insulin Pen Needle (PEN NEEDLES) 31G X 5 MM MISC  lantus QHS and Novolog QAC and HS             INVOKANA 300 MG TABS tablet               Lancets MISC  1 each by Does not apply route 4 times daily             linagliptin (TRADJENTA) 5 MG tablet  Take 1 tablet by mouth daily             lisinopril (PRINIVIL;ZESTRIL) 20 MG tablet  Take 1 tablet by mouth daily             miconazole (MICOTIN) 2 % powder  Apply topically 2 times daily.              omeprazole (PRILOSEC) 20 MG delayed release capsule  Take 1 capsule by mouth every morning (before breakfast) pantoprazole (PROTONIX) 40 MG tablet  Take 1 tablet by mouth 2 times daily (before meals)             Silver (INTERDRY AG TEXTILE 10\"X12') MISC  Apply 1 Act topically daily                   Medications marked \"taking\" at this time  Outpatient Medications Marked as Taking for the 2/24/21 encounter (Office Visit) with VIVIAN Ramirez - CNP   Medication Sig Dispense Refill    omeprazole (PRILOSEC) 20 MG delayed release capsule Take 1 capsule by mouth every morning (before breakfast) 90 capsule 1    [START ON 4/13/2021] Cholecalciferol (VITAMIN D3) 20 MCG (800 UNIT) TABS Take 1 tablet by mouth daily Start this medication on April 13, 20201 75 tablet 0    Blood Pressure KIT 1 Act by Does not apply route daily 1 kit 0    miconazole (MICOTIN) 2 % powder Apply topically 2 times daily. 45 g 1    calcium carbonate (TUMS) 500 MG chewable tablet Take 1.5 tablets by mouth 3 times daily 135 tablet 0    pantoprazole (PROTONIX) 40 MG tablet Take 1 tablet by mouth 2 times daily (before meals) 60 tablet 1    Silver (INTERDRY AG TEXTILE 10\"X12') MISC Apply 1 Act topically daily 30 each 0    cefUROXime (CEFTIN) 500 MG tablet Take 1 tablet by mouth 2 times daily for 14 days 28 tablet 0    doxycycline hyclate (VIBRA-TABS) 100 MG tablet Take 1 tablet by mouth 2 times daily for 14 days 28 tablet 0    Cholecalciferol (VITAMIN D3) 1.25 MG (94665 UT) CAPS Take 1 capsule by mouth once a week for 8 doses Take this for 8 weeks, then take Vitamin D 800 IU daily 8 capsule 1    gabapentin (NEURONTIN) 300 MG capsule Take 300 mg by mouth nightly.       INVOKANA 300 MG TABS tablet       carvedilol (COREG) 3.125 MG tablet Take 1 tablet by mouth 2 times daily (with meals) 60 tablet 2    atorvastatin (LIPITOR) 40 MG tablet Take 1 tablet by mouth daily 30 tablet 5    aspirin (ASPIRIN LOW DOSE) 81 MG EC tablet Take 1 tablet by mouth daily 30 tablet 5  lisinopril (PRINIVIL;ZESTRIL) 20 MG tablet Take 1 tablet by mouth daily 30 tablet 5    Canagliflozin (INVOKANA PO) Take 200 mg by mouth      linagliptin (TRADJENTA) 5 MG tablet Take 1 tablet by mouth daily 30 tablet 2    insulin glargine (LANTUS SOLOSTAR) 100 UNIT/ML injection pen Inject 40 Units into the skin daily 5 pen 3    insulin aspart (NOVOLOG FLEXPEN) 100 UNIT/ML injection pen Take 20 units with each meal in addition to sliding scale as below   *Medium Dose Correction Algorithm**Insulin: 3 TIMES DAILY WITH MEALSGlucose: Dose: No [id number] 2 [id number] 4 [id number] 6 [id number] 8 [id number] 10 Okkkt417 and above 12 Units 5 pen 3    blood glucose monitor kit and supplies Test 4 times a day before meals and bedtime& as needed for symptoms of irregular blood glucose. 1 kit 0    Insulin Pen Needle (PEN NEEDLES) 31G X 5 MM MISC lantus QHS and Novolog QAC and  each 2    Lancets MISC 1 each by Does not apply route 4 times daily 200 each 2    blood glucose monitor strips Test 4 times a day & as needed for symptoms of irregular blood glucose. 200 strip 2        Medications patient taking as of now reconciled against medications ordered at time of hospital discharge: Yes    No chief complaint on file. Pt doing well, has questions about admission and labs. Don't understand diagnosis of what happened to her. Pt and spouse are doing well with dressing changes and wound care. Pt wants to estabolish a PCP. Inpatient course: Discharge summary reviewed- see chart. Review of Systems   Constitutional: Positive for activity change and fatigue. HENT: Negative. Respiratory: Positive for shortness of breath. Cardiovascular: Negative. Gastrointestinal: Negative. Genitourinary: Negative. Musculoskeletal: Negative. Neurological: Negative. Hematological: Negative. Psychiatric/Behavioral: Negative.         Vitals:    02/24/21 1414   BP: 108/60 2. Type 2 diabetes mellitus with ketoacidosis without coma, with long-term current use of insulin (HCC)  Pt has been on insulin before, lost weight and had medication changes. Since admission has been put back on insulin. Pt  Is ok with dosing, injections and directions. 3. Cellulitis of bilateral groin   Pt spouse has been doing her daily dressing changes. Answered questions; advised pt to keep area dry and clean. No bath, showers only. Pt has f/u up appointment with wound care. Pt also have f/u appointment with infectious disease FNP. Educated pt on admission labs, diagnosis.               Medical Decision Making: moderate complexity

## 2021-02-25 ENCOUNTER — HOSPITAL ENCOUNTER (OUTPATIENT)
Dept: WOUND CARE | Age: 56
Discharge: HOME | End: 2021-02-25
Payer: COMMERCIAL

## 2021-02-25 ENCOUNTER — OFFICE VISIT (OUTPATIENT)
Dept: INFECTIOUS DISEASES | Age: 56
End: 2021-02-25
Payer: COMMERCIAL

## 2021-02-25 VITALS
HEIGHT: 66 IN | TEMPERATURE: 98.7 F | HEART RATE: 111 BPM | DIASTOLIC BLOOD PRESSURE: 73 MMHG | SYSTOLIC BLOOD PRESSURE: 138 MMHG | BODY MASS INDEX: 42.61 KG/M2 | RESPIRATION RATE: 18 BRPM

## 2021-02-25 VITALS
RESPIRATION RATE: 18 BRPM | HEART RATE: 66 BPM | WEIGHT: 264 LBS | BODY MASS INDEX: 42.61 KG/M2 | TEMPERATURE: 97.2 F | DIASTOLIC BLOOD PRESSURE: 71 MMHG | SYSTOLIC BLOOD PRESSURE: 127 MMHG

## 2021-02-25 DIAGNOSIS — L98.492 ULCER OF RIGHT GROIN WITH FAT LAYER EXPOSED (HCC): ICD-10-CM

## 2021-02-25 DIAGNOSIS — L03.314 CELLULITIS OF GROIN: Primary | ICD-10-CM

## 2021-02-25 DIAGNOSIS — E66.01 MORBID OBESITY (HCC): ICD-10-CM

## 2021-02-25 PROCEDURE — G8417 CALC BMI ABV UP PARAM F/U: HCPCS | Performed by: INTERNAL MEDICINE

## 2021-02-25 PROCEDURE — G8427 DOCREV CUR MEDS BY ELIG CLIN: HCPCS | Performed by: INTERNAL MEDICINE

## 2021-02-25 PROCEDURE — 99213 OFFICE O/P EST LOW 20 MIN: CPT | Performed by: INTERNAL MEDICINE

## 2021-02-25 PROCEDURE — G8484 FLU IMMUNIZE NO ADMIN: HCPCS | Performed by: INTERNAL MEDICINE

## 2021-02-25 PROCEDURE — 99213 OFFICE O/P EST LOW 20 MIN: CPT

## 2021-02-25 PROCEDURE — 4004F PT TOBACCO SCREEN RCVD TLK: CPT | Performed by: INTERNAL MEDICINE

## 2021-02-25 PROCEDURE — 1111F DSCHRG MED/CURRENT MED MERGE: CPT | Performed by: INTERNAL MEDICINE

## 2021-02-25 PROCEDURE — 99213 OFFICE O/P EST LOW 20 MIN: CPT | Performed by: SURGERY

## 2021-02-25 PROCEDURE — 3017F COLORECTAL CA SCREEN DOC REV: CPT | Performed by: INTERNAL MEDICINE

## 2021-02-25 RX ORDER — BACITRACIN ZINC AND POLYMYXIN B SULFATE 500; 1000 [USP'U]/G; [USP'U]/G
OINTMENT TOPICAL ONCE
Status: CANCELLED | OUTPATIENT
Start: 2021-02-25 | End: 2021-02-25

## 2021-02-25 RX ORDER — LIDOCAINE 50 MG/G
OINTMENT TOPICAL ONCE
Status: CANCELLED | OUTPATIENT
Start: 2021-02-25 | End: 2021-02-25

## 2021-02-25 RX ORDER — LIDOCAINE HYDROCHLORIDE 20 MG/ML
JELLY TOPICAL ONCE
Status: CANCELLED | OUTPATIENT
Start: 2021-02-25 | End: 2021-02-25

## 2021-02-25 RX ORDER — GINSENG 100 MG
CAPSULE ORAL ONCE
Status: CANCELLED | OUTPATIENT
Start: 2021-02-25 | End: 2021-02-25

## 2021-02-25 RX ORDER — DOXYCYCLINE HYCLATE 100 MG
100 TABLET ORAL 2 TIMES DAILY
Qty: 28 TABLET | Refills: 0 | Status: SHIPPED | OUTPATIENT
Start: 2021-02-25 | End: 2021-03-11

## 2021-02-25 RX ORDER — LIDOCAINE 40 MG/G
CREAM TOPICAL ONCE
Status: CANCELLED | OUTPATIENT
Start: 2021-02-25 | End: 2021-02-25

## 2021-02-25 RX ORDER — BETAMETHASONE DIPROPIONATE 0.05 %
OINTMENT (GRAM) TOPICAL ONCE
Status: CANCELLED | OUTPATIENT
Start: 2021-02-25 | End: 2021-02-25

## 2021-02-25 RX ORDER — CEFUROXIME AXETIL 500 MG/1
500 TABLET ORAL 2 TIMES DAILY
Qty: 28 TABLET | Refills: 0 | Status: SHIPPED | OUTPATIENT
Start: 2021-02-25 | End: 2021-03-11

## 2021-02-25 RX ORDER — BACITRACIN, NEOMYCIN, POLYMYXIN B 400; 3.5; 5 [USP'U]/G; MG/G; [USP'U]/G
OINTMENT TOPICAL ONCE
Status: CANCELLED | OUTPATIENT
Start: 2021-02-25 | End: 2021-02-25

## 2021-02-25 RX ORDER — LIDOCAINE HYDROCHLORIDE 40 MG/ML
SOLUTION TOPICAL ONCE
Status: CANCELLED | OUTPATIENT
Start: 2021-02-25 | End: 2021-02-25

## 2021-02-25 RX ORDER — CLOBETASOL PROPIONATE 0.5 MG/G
OINTMENT TOPICAL ONCE
Status: CANCELLED | OUTPATIENT
Start: 2021-02-25 | End: 2021-02-25

## 2021-02-25 RX ORDER — GENTAMICIN SULFATE 1 MG/G
OINTMENT TOPICAL ONCE
Status: CANCELLED | OUTPATIENT
Start: 2021-02-25 | End: 2021-02-25

## 2021-02-25 ASSESSMENT — PAIN SCALES - GENERAL: PAINLEVEL_OUTOF10: 0

## 2021-02-25 NOTE — PROGRESS NOTES
2/28/2021         Referring Physician: No ref. provider found  Primary Care Physician: No primary care provider on file. Impression/Plan:   Diagnosis Orders   1. Cellulitis of groin  cefUROXime (CEFTIN) 500 MG tablet    doxycycline hyclate (VIBRA-TABS) 100 MG tablet   2. Morbid obesity ([address]         Discussion:  No problem-specific Assessment & Plan notes found for this encounter. Return in about 4 weeks (around 3/25/2021). History: Brandy Prescott is a 50 y.o.  female presenting today for follow-up. Medical history of morbid obesity, type 2 diabetes mellitus, hyperlipidemia, hypertension (metabolic syndrome), tobacco abuse, CAD status post PCI who was seen during her 2/10/2021 admission. She had come in for generalized weakness, diarrhea, poor oral intake and nausea. She was noted to have bilateral groin erythema edematous area with foul odor. She was diagnosed sepsis secondary to bilateral groin cellulitis abdominal ulcers. Chronic cultures were positive for Enterococcus faecalis, E. coli, MSSA. She was discharged to complete a 7-day course of topical nystatin, 14-day course of Ceftin and doxycycline on 3/1/2021. Wound is healing, no n/v/d    Review of Systems   All other systems reviewed and are negative.       Allergies   Allergen Reactions    Ampicillin Rash       Patient Active Problem List   Diagnosis    Acute chest pain    Unstable angina (HCC)    Morbid obesity (HCC)    Type 2 diabetes mellitus with ketoacidosis without coma, with long-term current use of insulin (HCC)    Essential hypertension    Dyslipidemia    Post PTCA    SOB (shortness of breath)    CAD (coronary artery disease)    Palpitations    Hyponatremia    High anion gap metabolic acidosis    Diarrhea    Encounter for monitoring antiplatelet therapy    Sepsis ([address]    Cellulitis    Vitamin D deficiency    WD - Cellulitis of groin    WD - Ulcer of right groin with fat layer exposed ([address]       Current Outpatient Medications   Medication Sig Dispense Refill    cefUROXime (CEFTIN) 500 MG tablet Take 1 tablet by mouth 2 times daily for 14 days 28 tablet 0    doxycycline hyclate (VIBRA-TABS) 100 MG tablet Take 1 tablet by mouth 2 times daily for 14 days 28 tablet 0    [START ON 4/13/2021] Cholecalciferol (VITAMIN D3) 20 MCG (800 UNIT) TABS Take 1 tablet by mouth daily Start this medication on April 13, 20201 75 tablet 0    Blood Pressure KIT 1 Act by Does not apply route daily 1 kit 0    miconazole (MICOTIN) 2 % powder Apply topically 2 times daily. 45 g 1    pantoprazole (PROTONIX) 40 MG tablet Take 1 tablet by mouth 2 times daily (before meals) 60 tablet 1    Silver (INTERDRY AG TEXTILE 10\"X12') MISC Apply 1 Act topically daily 30 each 0    gabapentin (NEURONTIN) 300 MG capsule Take 300 mg by mouth nightly.  INVOKANA 300 MG TABS tablet       carvedilol (COREG) 3.125 MG tablet Take 1 tablet by mouth 2 times daily (with meals) 60 tablet 2    atorvastatin (LIPITOR) 40 MG tablet Take 1 tablet by mouth daily 30 tablet 5    aspirin (ASPIRIN LOW DOSE) 81 MG EC tablet Take 1 tablet by mouth daily 30 tablet 5    lisinopril (PRINIVIL;ZESTRIL) 20 MG tablet Take 1 tablet by mouth daily 30 tablet 5    linagliptin (TRADJENTA) 5 MG tablet Take 1 tablet by mouth daily 30 tablet 2    insulin glargine (LANTUS SOLOSTAR) 100 UNIT/ML injection pen Inject 40 Units into the skin daily 5 pen 3    insulin aspart (NOVOLOG FLEXPEN) 100 UNIT/ML injection pen Take 20 units with each meal in addition to sliding scale as below   *Medium Dose Correction Algorithm**Insulin: 3 TIMES DAILY WITH MEALSGlucose: Dose: No [id number] 2 [id number] 4 [id number] 6 [id number] 8 [id number] 10 [id number] and above 12 Units 5 pen 3    blood glucose monitor kit and supplies Test 4 times a day before meals and bedtime& as needed for symptoms of irregular blood glucose.  1 kit 0    Insulin Pen Needle (PEN NEEDLES) 31G X 5 MM MISC lantus QHS and Novolog QAC and  each 2    Lancets MISC 1 each by Does not apply route 4 times daily 200 each 2    blood glucose monitor strips Test 4 times a day & as needed for symptoms of irregular blood glucose. 200 strip 2     No current facility-administered medications for this visit. Past Medical History:   Diagnosis Date    CAD (coronary artery disease)     Current every day smoker     Diabetes mellitus ([facility] [address]     GERD (gastroesophageal reflux disease)     History of exercise stress test 12/18/2019    Treadmill, Normal exercise performance without angina and ischemic EKG changes.  Hyperlipidemia     Hypertension     Morbid obesity ([address]     Palpitations     Post PTCA 11/26/2019    Kissing stent to LAD & to Ostium of Diag.  SOB (shortness of breath)        Past Surgical History:   Procedure Laterality Date    HERNIA REPAIR      PTCA  11/26/2019    Kissing stents to LAD & Ostium of Diag.     UPPER GASTROINTESTINAL ENDOSCOPY N/A 2/13/2021    EGD DIAGNOSTIC ONLY performed by Rasta Barron MD at [address] [place] [address] History     Socioeconomic History    Marital status:      Spouse name: Not on file    Number of children: Not on file    Years of education: Not on file    Highest education level: Not on file   Occupational History    Not on file   Social Needs    Financial resource strain: Not on file    Food insecurity     Worry: Not on file     Inability: Not on file    Transportation needs     Medical: Not on file     Non-medical: Not on file   Tobacco Use    Smoking status: Current Some Day Smoker     Packs/day: 1.00    Smokeless tobacco: Never Used    Tobacco comment: 1 pack every four days    Substance and Sexual Activity    Alcohol use: Never     Frequency: Never    Drug use: Never    Sexual activity: Yes     Partners: Male, Female   Lifestyle    Physical activity     Days per week: Not on file     Minutes per session: Not on file    Stress: Not on file   Relationships    Social connections     Talks on phone: Not on file     Gets together: Not on file     Attends Mandaeism service: Not on file     Active member of club or organization: Not on file     Attends meetings of clubs or organizations: Not on file     Relationship status: Not on file    Intimate partner violence     Fear of current or ex partner: Not on file     Emotionally abused: Not on file     Physically abused: Not on file     Forced sexual activity: Not on file   Other Topics Concern    Not on file   Social History Narrative    Not on file       No family history on file. Vital Signs:  Vitals:    02/25/21 1050   BP: 127/71   Site: Right Upper Arm   Position: Sitting   Cuff Size: Medium Adult   Pulse: 66   Resp: 18   Temp: 97.2 °F (36.2 °C)   TempSrc: Infrared   Weight: 264 lb (119.7 kg)        Wt Readings from Last 3 Encounters:   02/25/21 264 lb (119.7 kg)   02/24/21 280 lb (127 kg)   02/15/21 280 lb 3.3 oz (127.1 kg)        Physical Exam:   Gen: alert and NAD  HEENT: sclera clear, pupils equal and reactive, extra ocular muscles intact, oropharynx clear, mucus membranes moist, tympanic membranes clear bilaterally, no cervical lymphadenopathy noted and neck supple  Neck: supple, no significant adenopathy  Chest: clear to auscultation, no wheezes, rales or rhonchi, symmetric air entry  Heart: regular rate and rhythm, no murmurs  ABD: abdomen is soft without significant tenderness, masses, organomegaly or guarding.   EXT:peripheral pulses normal, no pedal edema, no clubbing or cyanosis  NEURO: alert, oriented, normal speech, no focal findings or movement disorder noted  Skin: well hydrated, no lesions, surgical site examined  Wounds: none  Labs:   WBC   Date Value Ref Range Status   02/15/2021 21.6 (H) 4.0 - 10.5 K/CU MM Final   02/14/2021 22.7 (H) 4.0 - 10.5 K/CU MM Final   02/13/2021 21.4 (H) 4.0 - 10.5 K/CU MM Final     CREATININE   Date Value Ref Range Status   02/15/2021 0.5 (L) 0.6 - 1.1 MG/DL Final   02/14/2021 0.6 0.6 - 1.1 MG/DL Final   02/13/2021 0.6 0.6 - 1.1 MG/DL Final       Cultures:  Culture   Date Value Ref Range Status   02/12/2021 Final Report  Final   02/12/2021 ENTEROCOCCUS FAECALIS Light growth (A)  Final   02/12/2021 ESCHERICHIA COLI Light growth (A)  Final   02/12/2021 STAPHYLOCOCCUS AUREUS Rare growth PBP2= Negative (A)  Final   02/12/2021 (A)  Final    PREVOTELLA BIVIA Moderate growth Beta Lactamase POSITIVE. Sensitivities not routinely done.  Drugs of choice are: Metronidazole, Cefoxitin, or Piperacillin/Tazobactam.       Imaging Studies:         Electronicallysigned by Holli Vaca MD on 2/25/21 at 5:48 AM EST

## 2021-02-25 NOTE — PROGRESS NOTES
[facility] Initial Visit    Larry Padgett  AGE: 50 y.o. GENDER: female  : 1965  EPISODE DATE:  2021   Referred by: (post-hospital follow up, Dr. Flo Cowart)    Subjective:     CHIEF COMPLAINT: bilateral groin cellulitis     HISTORY of PRESENT ILLNESS      Larry Padgett is a 50 y.o. female known to me from recent inpatient evaluation who presents to the [address] [place] [address] for an initial visit for evaluation and treatment of Acute non-healing/non-surgical and post-infectious and diabetic ulcer(s) of the bilateral groins/pannus folds. The condition is of mild severity. The ulcer has been present for about 2 weeks. The underlying cause is thought to be infection/cellulitis and diabetes. The patients care to date has included recent hospitalization and treatment with antibiotics. She has been using desitin and Nystatin powder. The patient has significant underlying medical conditions as below. Wound Pain Timing/Severity: waxing and waning, mild  Quality of pain: dull, aching  Severity of pain:  3 / 10   Modifying Factors: edema, diabetes, poor glucose control, shear force, obesity and smoking  Associated Signs/Symptoms: edema, erythema, drainage and pain        PAST MEDICAL HISTORY        Diagnosis Date    CAD (coronary artery disease)     Current every day smoker     Diabetes mellitus ([address]     GERD (gastroesophageal reflux disease)     History of exercise stress test 2019    Treadmill, Normal exercise performance without angina and ischemic EKG changes.  Hyperlipidemia     Hypertension     Morbid obesity ([address]     Palpitations     Post PTCA 2019    Kissing stent to LAD & to Ostium of Diag.  SOB (shortness of breath)        PAST SURGICAL HISTORY    Past Surgical History:   Procedure Laterality Date    HERNIA REPAIR      PTCA  2019    Kissing stents to LAD & Ostium of Diag.     UPPER GASTROINTESTINAL ENDOSCOPY N/A 2021    EGD DIAGNOSTIC ONLY performed by Landon Ayala MD at [address] [facility]    History reviewed. No pertinent family history. SOCIAL HISTORY    Social History     Tobacco Use    Smoking status: Current Some Day Smoker     Packs/day: 1.00    Smokeless tobacco: Never Used    Tobacco comment: 1 pack every four days    Substance Use Topics    Alcohol use: Never     Frequency: Never    Drug use: Never       ALLERGIES    Allergies   Allergen Reactions    Ampicillin Rash       MEDICATIONS    Current Outpatient Medications on File Prior to Encounter   Medication Sig Dispense Refill    cefUROXime (CEFTIN) 500 MG tablet Take 1 tablet by mouth 2 times daily for 14 days 28 tablet 0    doxycycline hyclate (VIBRA-TABS) 100 MG tablet Take 1 tablet by mouth 2 times daily for 14 days 28 tablet 0    [START ON 4/13/2021] Cholecalciferol (VITAMIN D3) 20 MCG (800 UNIT) TABS Take 1 tablet by mouth daily Start this medication on April 13, 20201 75 tablet 0    Blood Pressure KIT 1 Act by Does not apply route daily 1 kit 0    miconazole (MICOTIN) 2 % powder Apply topically 2 times daily. 45 g 1    pantoprazole (PROTONIX) 40 MG tablet Take 1 tablet by mouth 2 times daily (before meals) 60 tablet 1    Silver (GentronixRY AG TEXTILE 10\"X12') MISC Apply 1 Act topically daily 30 each 0    gabapentin (NEURONTIN) 300 MG capsule Take 300 mg by mouth nightly.       INVOKANA 300 MG TABS tablet       carvedilol (COREG) 3.125 MG tablet Take 1 tablet by mouth 2 times daily (with meals) 60 tablet 2    atorvastatin (LIPITOR) 40 MG tablet Take 1 tablet by mouth daily 30 tablet 5    aspirin (ASPIRIN LOW DOSE) 81 MG EC tablet Take 1 tablet by mouth daily 30 tablet 5    lisinopril (PRINIVIL;ZESTRIL) 20 MG tablet Take 1 tablet by mouth daily 30 tablet 5    linagliptin (TRADJENTA) 5 MG tablet Take 1 tablet by mouth daily 30 tablet 2    insulin glargine (LANTUS SOLOSTAR) 100 UNIT/ML injection pen Inject 40 Units into the skin daily 5 pen 3    insulin aspart (NOVOLOG FLEXPEN) 100 UNIT/ML injection pen Take 20 units with each meal in addition to sliding scale as below   *Medium Dose Correction Algorithm**Insulin: 3 TIMES DAILY WITH MEALSGlucose: Dose: No [id number] 2 [id number] 4 [id number] 6 [id number] 8 [id number] 10 Cekxo202 and above 12 Units 5 pen 3    blood glucose monitor kit and supplies Test 4 times a day before meals and bedtime& as needed for symptoms of irregular blood glucose. 1 kit 0    Insulin Pen Needle (PEN NEEDLES) 31G X 5 MM MISC lantus QHS and Novolog QAC and  each 2    Lancets MISC 1 each by Does not apply route 4 times daily 200 each 2    blood glucose monitor strips Test 4 times a day & as needed for symptoms of irregular blood glucose. 200 strip 2     No current facility-administered medications on file prior to encounter. PROBLEM LIST    Patient Active Problem List   Diagnosis    Acute chest pain    Unstable angina (HCC)    Morbid obesity (HCC)    Type 2 diabetes mellitus with ketoacidosis without coma, with long-term current use of insulin (HCC)    Essential hypertension    Dyslipidemia    Post PTCA    SOB (shortness of breath)    CAD (coronary artery disease)    Palpitations    Hyponatremia    High anion gap metabolic acidosis    Diarrhea    Encounter for monitoring antiplatelet therapy    Sepsis ([address]    Cellulitis    Vitamin D deficiency    WD - Cellulitis of groin    WD - Ulcer of right groin with fat layer exposed ([address]       REVIEW OF SYSTEMS    Pertinent items are noted in HPI. Objective:      /73   Pulse 111   Temp 98.7 °F (37.1 °C) (Temporal)   Resp 18   Ht 5' 6\" (1.676 m)   BMI 42.61 kg/m²     PHYSICAL EXAM  Constitutional:       General: She is not in acute distress. Appearance: She is well-developed. She is obese. She is not diaphoretic. HENT:      Head: Normocephalic and atraumatic.       Mouth/Throat:      Mouth: Mucous membranes are moist.   Eyes: General:         Right eye: No discharge. Left eye: No discharge. Pupils: Pupils are equal, round, and reactive to light. Neck:      Musculoskeletal: Neck supple. Trachea: No tracheal deviation. Cardiovascular:      Rate and Rhythm: Regular rhythm and rate. Pulmonary:      Effort: Pulmonary effort is normal. No respiratory distress. Breath sounds: No wheezing. Abdominal:      General: There is no distension. Palpations: Abdomen is soft. Tenderness: There is abdominal tenderness (bilateral groin folds). There is no guarding or rebound. Comments: Morbidly obese   Musculoskeletal:         General: No tenderness or deformity. Skin:     General: Skin is warm and dry. Findings: Rash (erythema of the bilateral groin folds and intertigrinous areas with weeping of seropurulent fluid. No apparent fluid collection or abscess. Significantly decreased since inpatient evaluation.) present. Neurological:      Mental Status: She is alert and oriented to person, place, and time. Psychiatric:         Behavior: Behavior normal    Dermatologic exam: Visual inspection of the periwound reveals the skin to be moist  Wound exam: see wound description below in procedure note    Assessment:       Hung Tipton  appears to have a non-healing wound of the bilateral groin folds/pannus. The etiology of the wound is felt to be diabetic, non-healing/non-surgical and post-infectious. There are multiple complicating factors including edema, diabetes, poor glucose control, obesity and smoking. A comprehensive wound management program would be helpful to heal this wound. Assessments completed include fall risk and nutritional, functional,and psychological status. At this time appropriate care would include: periodic debridement and wound care as below.      Problem List Items Addressed This Visit     WD - Cellulitis of groin - Primary    WD - Ulcer of right groin with fat layer exposed ([address] Wound 02/10/21 Perineum cluster osiel rectal (Active)   Wound Etiology Other 02/15/21 0830   Dressing Status Other (Comment); New dressing applied 02/15/21 0830   Wound Cleansed Soap and water 02/15/21 0830   Dressing/Treatment Interdry Ag/wicking fabric with Ag 02/15/21 0830   Dressing Change Due 02/16/21 02/15/21 0830   Wound Length (cm) 3 cm 02/10/21 1030   Wound Width (cm) 4 cm 02/10/21 1030   Wound Depth (cm) 0.1 cm 02/10/21 1030   Wound Surface Area (cm^2) 12 cm^2 02/10/21 1030   Wound Volume (cm^3) 1.2 cm^3 02/10/21 1030   Distance Tunneling (cm) 0 cm 02/10/21 1030   Tunneling Position ___ O'Clock 0 02/10/21 1030   Undermining Starts ___ O'Clock 0 02/10/21 1030   Undermining Ends___ O'Clock 0 02/10/21 1030   Undermining Maxium Distance (cm) 0 02/10/21 1030   Wound Assessment Pink/red 02/15/21 0830   Drainage Amount Moderate 02/15/21 0830   Drainage Description Yellow 02/15/21 0830   Odor Mild 02/15/21 0830   Osiel-wound Assessment Blanchable erythema; Maceration 02/15/21 0830   Margins Defined edges 02/15/21 0830   Wound Thickness Description not for Pressure Injury Full thickness 02/13/21 0342   Number of days: 15       Wound 02/10/21 Groin Anterior; Left (Active)   Wound Etiology Other 02/15/21 0830   Dressing Status New dressing applied 02/15/21 0830   Wound Cleansed Soap and water 02/15/21 0830   Dressing/Treatment Interdry Ag/wicking fabric with Ag 02/15/21 0830   Wound Length (cm) 0.5 cm 02/10/21 1030   Wound Width (cm) 0.8 cm 02/10/21 1030   Wound Depth (cm) 0.1 cm 02/10/21 1030   Wound Surface Area (cm^2) 0.4 cm^2 02/10/21 1030   Wound Volume (cm^3) 0.04 cm^3 02/10/21 1030   Distance Tunneling (cm) 0 cm 02/10/21 1030   Tunneling Position ___ O'Clock 0 02/10/21 1030   Undermining Starts ___ O'Clock 0 02/10/21 1030   Undermining Ends___ O'Clock 0 02/10/21 1030   Undermining Maxium Distance (cm) 0 02/10/21 1030   Wound Assessment Slough 02/15/21 0830   Drainage Amount Moderate 02/15/21 0830 Drainage Description Yellow 02/13/21 0342   Odor Mild 02/15/21 0830   Dolores-wound Assessment Maceration 02/14/21 0415   Margins Defined edges 02/13/21 0342   Wound Thickness Description not for Pressure Injury Partial thickness 02/13/21 0342   Number of days: 15       Wound 02/25/21 Left #1 left groin (Active)   Wound Image   02/25/21 1424   Wound Cleansed Wound cleanser 02/25/21 1424   Wound Length (cm) 1 cm 02/25/21 1424   Wound Width (cm) 0.5 cm 02/25/21 1424   Wound Depth (cm) 0.1 cm 02/25/21 1424   Wound Surface Area (cm^2) 0.5 cm^2 02/25/21 1424   Wound Volume (cm^3) 0.05 cm^3 02/25/21 1424   Distance Tunneling (cm) 0 cm 02/25/21 1424   Tunneling Position ___ O'Clock 0 02/25/21 1424   Undermining Starts ___ O'Clock 0 02/25/21 1424   Undermining Ends___ O'Clock 0 02/25/21 1424   Undermining Maxium Distance (cm) 0 02/25/21 1424   Number of days: 0       Wound 02/25/21 Right #2 right abdomen (Active)   Wound Image   02/25/21 1424   Wound Cleansed Soap and water 02/25/21 1424   Wound Length (cm) 0.7 cm 02/25/21 1424   Wound Width (cm) 0.7 cm 02/25/21 1424   Wound Depth (cm) 0.1 cm 02/25/21 1424   Wound Surface Area (cm^2) 0.49 cm^2 02/25/21 1424   Wound Volume (cm^3) 0.05 cm^3 02/25/21 1424   Distance Tunneling (cm) 0 cm 02/25/21 1424   Tunneling Position ___ O'Clock 0 02/25/21 1424   Undermining Starts ___ O'Clock 0 02/25/21 1424   Undermining Ends___ O'Clock 0 02/25/21 1424   Undermining Maxium Distance (cm) 0 02/25/21 1424   Number of days: 0         Plan:   Continue abx as prescribed by Dr. Lydia Grayson  Keep the groin folds clean and dry - will DC Desitin, try Interdry and Ca Alginate    Discharge instructions:    Discharge Instructions       PHYSICIAN ORDERS AND DISCHARGE INSTRUCTIONS    NOTE: Upon discharge from the [address], you will receive a patient experience survey. We would be grateful if you would take the time to fill this survey out.     Wound care order history:     GALO's   Right       Left Date:   Cultures:     Grafts:     Antibiotics:        Continuing wound care orders and information:                Residence:                Continue home health care with:    Your wound-care supplies will be provided by: Wound cleansing:     Do not scrub or use excessive force. Wash hands with soap and water before and after dressing changes. Prior to applying a clean dressing, cleanse wound with normal saline, wound cleanser, or mild soap and water. Ask the physician or nurse before getting the wound(s) wet in a shower    Daily Wound management:   Keep weight off wounds and reposition every 2 hours. Avoid standing for long periods of time. Apply wraps/stockings in AM and remove at bedtime. If swelling is present, elevate legs to the level of the heart or above for 30 minutes 4-5 times a day and/or when sitting. When taking antibiotics take entire prescription as ordered by physician do not stop taking until medicine is all gone. Orders for this week: 2/25/21       Left and Right Groin/Abdominal folds - Wash with soap and water, pat dry. Stop Desitin. Continue to apply nystatin powder over wounds and to red areas. Tuck interdry sheets into abd folds. Keep areas clean and dry. Reapply 1-2 times daily and as needed. Follow up with Dr Marsha Anders in 1 week in the wound care center  Call [phone] for any questions or concerns.   Date__________   Time____________          Treatment Note      Written Patient Dismissal Instructions Given            Electronically signed by Graham Barajas MD on 2/25/2021 at 3:20 PM

## 2021-03-04 ENCOUNTER — HOSPITAL ENCOUNTER (OUTPATIENT)
Dept: WOUND CARE | Age: 56
Discharge: HOME | End: 2021-03-04
Payer: COMMERCIAL

## 2021-03-04 VITALS
SYSTOLIC BLOOD PRESSURE: 131 MMHG | TEMPERATURE: 98 F | HEART RATE: 94 BPM | RESPIRATION RATE: 18 BRPM | DIASTOLIC BLOOD PRESSURE: 70 MMHG

## 2021-03-04 DIAGNOSIS — L03.314 CELLULITIS OF GROIN: ICD-10-CM

## 2021-03-04 DIAGNOSIS — L98.492 ULCER OF RIGHT GROIN WITH FAT LAYER EXPOSED (HCC): Primary | ICD-10-CM

## 2021-03-04 PROCEDURE — 11042 DBRDMT SUBQ TIS 1ST 20SQCM/<: CPT | Performed by: SURGERY

## 2021-03-04 PROCEDURE — 11042 DBRDMT SUBQ TIS 1ST 20SQCM/<: CPT

## 2021-03-04 RX ORDER — LIDOCAINE HYDROCHLORIDE 20 MG/ML
JELLY TOPICAL ONCE
Status: CANCELLED | OUTPATIENT
Start: 2021-03-04 | End: 2021-03-04

## 2021-03-04 RX ORDER — GENTAMICIN SULFATE 1 MG/G
OINTMENT TOPICAL ONCE
Status: CANCELLED | OUTPATIENT
Start: 2021-03-04 | End: 2021-03-04

## 2021-03-04 RX ORDER — BACITRACIN ZINC AND POLYMYXIN B SULFATE 500; 1000 [USP'U]/G; [USP'U]/G
OINTMENT TOPICAL ONCE
Status: CANCELLED | OUTPATIENT
Start: 2021-03-04 | End: 2021-03-04

## 2021-03-04 RX ORDER — LIDOCAINE 50 MG/G
OINTMENT TOPICAL ONCE
Status: DISCONTINUED | OUTPATIENT
Start: 2021-03-04 | End: 2021-03-05 | Stop reason: HOSPADM

## 2021-03-04 RX ORDER — LIDOCAINE 40 MG/G
CREAM TOPICAL ONCE
Status: CANCELLED | OUTPATIENT
Start: 2021-03-04 | End: 2021-03-04

## 2021-03-04 RX ORDER — LIDOCAINE HYDROCHLORIDE 40 MG/ML
SOLUTION TOPICAL ONCE
Status: CANCELLED | OUTPATIENT
Start: 2021-03-04 | End: 2021-03-04

## 2021-03-04 RX ORDER — GINSENG 100 MG
CAPSULE ORAL ONCE
Status: CANCELLED | OUTPATIENT
Start: 2021-03-04 | End: 2021-03-04

## 2021-03-04 RX ORDER — BETAMETHASONE DIPROPIONATE 0.05 %
OINTMENT (GRAM) TOPICAL ONCE
Status: CANCELLED | OUTPATIENT
Start: 2021-03-04 | End: 2021-03-04

## 2021-03-04 RX ORDER — CLOBETASOL PROPIONATE 0.5 MG/G
OINTMENT TOPICAL ONCE
Status: CANCELLED | OUTPATIENT
Start: 2021-03-04 | End: 2021-03-04

## 2021-03-04 RX ORDER — LIDOCAINE 50 MG/G
OINTMENT TOPICAL ONCE
Status: CANCELLED | OUTPATIENT
Start: 2021-03-04 | End: 2021-03-04

## 2021-03-04 RX ORDER — BACITRACIN, NEOMYCIN, POLYMYXIN B 400; 3.5; 5 [USP'U]/G; MG/G; [USP'U]/G
OINTMENT TOPICAL ONCE
Status: CANCELLED | OUTPATIENT
Start: 2021-03-04 | End: 2021-03-04

## 2021-03-04 NOTE — PROGRESS NOTES
Wound Care Center Progress Note With Procedure    Kanchan Joseph  AGE: 50 y.o. GENDER: female  : 1965  EPISODE DATE:  3/4/2021     Subjective:     Chief Complaint   Patient presents with    Wound Check     abdomen          HISTORY of PRESENT ILLNESS     Kanchan Joseph is a 50 y.o. female known to me from recent inpatient evaluation who presents to the [address] [place] [address] for a follow up visit for evaluation and treatment of Acute non-healing/non-surgical and post-infectious and diabetic ulcer(s) of the bilateral groins/pannus folds. The condition is of mild severity. The underlying cause is thought to be infection/cellulitis and diabetes. She has been doing well since last seen, minimal to no drainage, no pain.      Wound Pain Timing/Severity: none  Quality of pain: none  Severity of pain:  0 / 10   Modifying Factors: edema, diabetes, poor glucose control, shear force, obesity and smoking  Associated Signs/Symptoms: edema, drainage        PAST MEDICAL HISTORY        Diagnosis Date    CAD (coronary artery disease)     Current every day smoker     Diabetes mellitus ([address]     GERD (gastroesophageal reflux disease)     History of exercise stress test 2019    Treadmill, Normal exercise performance without angina and ischemic EKG changes.  Hyperlipidemia     Hypertension     Morbid obesity ([address]     Palpitations     Post PTCA 2019    Kissing stent to LAD & to Ostium of Diag.  SOB (shortness of breath)        PAST SURGICAL HISTORY    Past Surgical History:   Procedure Laterality Date    HERNIA REPAIR      PTCA  2019    Kissing stents to LAD & Ostium of Diag.  UPPER GASTROINTESTINAL ENDOSCOPY N/A 2021    EGD DIAGNOSTIC ONLY performed by Cy Woods MD at [facility]    History reviewed. No pertinent family history.     SOCIAL HISTORY    Social History     Tobacco Use    Smoking status: Current Some Day Smoker     Packs/day: 1.00    Smokeless tobacco: Never Used    Tobacco comment: 1 pack every four days    Substance Use Topics    Alcohol use: Never     Frequency: Never    Drug use: Never       ALLERGIES    Allergies   Allergen Reactions    Ampicillin Rash       MEDICATIONS    Current Outpatient Medications on File Prior to Encounter   Medication Sig Dispense Refill    cefUROXime (CEFTIN) 500 MG tablet Take 1 tablet by mouth 2 times daily for 14 days 28 tablet 0    doxycycline hyclate (VIBRA-TABS) 100 MG tablet Take 1 tablet by mouth 2 times daily for 14 days 28 tablet 0    [START ON 4/13/2021] Cholecalciferol (VITAMIN D3) 20 MCG (800 UNIT) TABS Take 1 tablet by mouth daily Start this medication on April 13, 20201 75 tablet 0    Blood Pressure KIT 1 Act by Does not apply route daily 1 kit 0    miconazole (MICOTIN) 2 % powder Apply topically 2 times daily. 45 g 1    pantoprazole (PROTONIX) 40 MG tablet Take 1 tablet by mouth 2 times daily (before meals) 60 tablet 1    Silver (INTERDRY AG TEXTILE 10\"X12') MISC Apply 1 Act topically daily 30 each 0    gabapentin (NEURONTIN) 300 MG capsule Take 300 mg by mouth nightly.       INVOKANA 300 MG TABS tablet       carvedilol (COREG) 3.125 MG tablet Take 1 tablet by mouth 2 times daily (with meals) 60 tablet 2    atorvastatin (LIPITOR) 40 MG tablet Take 1 tablet by mouth daily 30 tablet 5    aspirin (ASPIRIN LOW DOSE) 81 MG EC tablet Take 1 tablet by mouth daily 30 tablet 5    lisinopril (PRINIVIL;ZESTRIL) 20 MG tablet Take 1 tablet by mouth daily 30 tablet 5    linagliptin (TRADJENTA) 5 MG tablet Take 1 tablet by mouth daily 30 tablet 2    insulin glargine (LANTUS SOLOSTAR) 100 UNIT/ML injection pen Inject 40 Units into the skin daily 5 pen 3    insulin aspart (NOVOLOG FLEXPEN) 100 UNIT/ML injection pen Take 20 units with each meal in addition to sliding scale as below   *Medium Dose Correction Algorithm**Insulin: 3 TIMES DAILY WITH MEALSGlucose: Dose: No [id number] 2 [id number] 4 [id number] 6 [id number] 8 [id number] 10 [id number] and above 12 Units 5 pen 3    blood glucose monitor kit and supplies Test 4 times a day before meals and bedtime& as needed for symptoms of irregular blood glucose. 1 kit 0    Insulin Pen Needle (PEN NEEDLES) 31G X 5 MM MISC lantus QHS and Novolog QAC and  each 2    Lancets MISC 1 each by Does not apply route 4 times daily 200 each 2    blood glucose monitor strips Test 4 times a day & as needed for symptoms of irregular blood glucose. 200 strip 2     No current facility-administered medications on file prior to encounter. REVIEW OF SYSTEMS    Pertinent items are noted in HPI. Constitutional: Negative for systemic symptoms including fever, chills and malaise. Objective:      /70   Pulse 94   Temp 98 °F (36.7 °C) (Temporal)   Resp 18     PHYSICAL EXAM  General: The patient is in no acute distress. Mental status:  Patient is appropriate, is  oriented to place and plan of care.   Dermatologic exam: Visual inspection of the periwound reveals the skin to be normal in turgor and texture  Wound exam: see wound description below in procedure note      Assessment:     Problem List Items Addressed This Visit     WD - Cellulitis of groin    Relevant Medications    lidocaine (XYLOCAINE) 5 % ointment (Start on 3/4/2021  1:45 PM)    Other Relevant Orders    Supply: Wound Cleanser    Supply: Wound Dressings    Supply: Cover and Secure    WD - Ulcer of right groin with fat layer exposed ([address] - Primary    Relevant Medications    lidocaine (XYLOCAINE) 5 % ointment (Start on 3/4/2021  1:45 PM)    Other Relevant Orders    Supply: Wound Cleanser    Supply: Wound Dressings    Supply: Cover and Secure        Procedure Note    Indications:  Based on my examination of this patient's wound(s) today, sharp excision into necrotic epidermis, dermis and subcutaneous tissue is required to promote healing and evaluate the extent of previous healing. Performed by: Stephany Rajput MD    Consent obtained: Yes    Time out taken:  Yes    Pain Control: topical lidocaine       Debridement:Excisional Debridement    Using curette the wound(s) was/were sharply debrided down through and including the removal of epidermis, dermis and subcutaneous tissue. Devitalized Tissue Debrided:  fibrin    Pre Debridement Measurements:  Are located in the Wound Documentation Flow Sheet    All active wounds listed below with today's date are evaluated  Wound(s)    debrided this date include # : 2     Post  Debridement Measurements:  Wound 02/10/21 Perineum cluster osiel rectal (Active)   Wound Etiology Other 02/15/21 0830   Dressing Status Other (Comment); New dressing applied 02/15/21 0830   Wound Cleansed Soap and water 02/15/21 0830   Dressing/Treatment Interdry Ag/wicking fabric with Ag 02/15/21 0830   Dressing Change Due 02/16/21 02/15/21 0830   Wound Length (cm) 3 cm 02/10/21 1030   Wound Width (cm) 4 cm 02/10/21 1030   Wound Depth (cm) 0.1 cm 02/10/21 1030   Wound Surface Area (cm^2) 12 cm^2 02/10/21 1030   Wound Volume (cm^3) 1.2 cm^3 02/10/21 1030   Distance Tunneling (cm) 0 cm 02/10/21 1030   Tunneling Position ___ O'Clock 0 02/10/21 1030   Undermining Starts ___ O'Clock 0 02/10/21 1030   Undermining Ends___ O'Clock 0 02/10/21 1030   Undermining Maxium Distance (cm) 0 02/10/21 1030   Wound Assessment Pink/red 02/15/21 0830   Drainage Amount Moderate 02/15/21 0830   Drainage Description Yellow 02/15/21 0830   Odor Mild 02/15/21 0830   Osiel-wound Assessment Blanchable erythema; Maceration 02/15/21 0830   Margins Defined edges 02/15/21 0830   Wound Thickness Description not for Pressure Injury Full thickness 02/13/21 0342   Number of days: 22       Wound 02/10/21 Groin Anterior; Left (Active)   Wound Etiology Other 02/15/21 0830   Dressing Status New dressing applied 02/15/21 0830   Wound Cleansed Soap and water 02/15/21 0830   Dressing/Treatment Interdry Ag/wicking fabric with Ag 02/15/21 0830   Wound Length (cm) 0.5 cm 02/10/21 1030   Wound Width (cm) 0.8 cm 02/10/21 1030   Wound Depth (cm) 0.1 cm 02/10/21 1030   Wound Surface Area (cm^2) 0.4 cm^2 02/10/21 1030   Wound Volume (cm^3) 0.04 cm^3 02/10/21 1030   Distance Tunneling (cm) 0 cm 02/10/21 1030   Tunneling Position ___ O'Clock 0 02/10/21 1030   Undermining Starts ___ O'Clock 0 02/10/21 1030   Undermining Ends___ O'Clock 0 02/10/21 1030   Undermining Maxium Distance (cm) 0 02/10/21 1030   Wound Assessment Slough 02/15/21 0830   Drainage Amount Moderate 02/15/21 0830   Drainage Description Yellow 02/13/21 0342   Odor Mild 02/15/21 0830   Dolores-wound Assessment Maceration 02/14/21 0415   Margins Defined edges 02/13/21 0342   Wound Thickness Description not for Pressure Injury Partial thickness 02/13/21 0342   Number of days: 22       Wound 02/25/21 Left #1 left groin (Active)   Wound Image   02/25/21 1424   Wound Etiology Diabetic 03/04/21 1311   Wound Cleansed Wound cleanser 03/04/21 1311   Wound Length (cm) 1 cm 03/04/21 1311   Wound Width (cm) 0.5 cm 03/04/21 1311   Wound Depth (cm) 0.1 cm 03/04/21 1311   Wound Surface Area (cm^2) 0.5 cm^2 03/04/21 1311   Change in Wound Size % (l*w) 0 03/04/21 1311   Wound Volume (cm^3) 0.05 cm^3 03/04/21 1311   Wound Healing % 0 03/04/21 1311   Post-Procedure Length (cm) 1 cm 03/04/21 1328   Post-Procedure Width (cm) 0.5 cm 03/04/21 1328   Post-Procedure Depth (cm) 0.1 cm 03/04/21 1328   Post-Procedure Surface Area (cm^2) 0.5 cm^2 03/04/21 1328   Post-Procedure Volume (cm^3) 0.05 cm^3 03/04/21 1328   Distance Tunneling (cm) 0 cm 03/04/21 1311   Tunneling Position ___ O'Clock 0 03/04/21 1311   Undermining Starts ___ O'Clock 0 03/04/21 1311   Undermining Ends___ O'Clock 0 03/04/21 1311   Undermining Maxium Distance (cm) 0 03/04/21 1311   Wound Assessment Epithelialization;Granulation tissue 03/04/21 1311   Drainage Amount Moderate 03/04/21 1311   Drainage Description Serosanguinous 03/04/21 1311   Odor None 03/04/21 1311   Dolores-wound Assessment Intact 03/04/21 1311   Margins Attached edges; Defined edges 03/04/21 1311   Wound Thickness Description not for Pressure Injury Partial thickness 03/04/21 1311   Number of days: 6       Wound 02/25/21 Right #2 right abdomen (Active)   Wound Image   02/25/21 1424   Wound Etiology Diabetic 03/04/21 1311   Wound Cleansed Cleansed with saline 03/04/21 1311   Wound Length (cm) 0.5 cm 03/04/21 1311   Wound Width (cm) 0.5 cm 03/04/21 1311   Wound Depth (cm) 0.1 cm 03/04/21 1311   Wound Surface Area (cm^2) 0.25 cm^2 03/04/21 1311   Change in Wound Size % (l*w) 48.98 03/04/21 1311   Wound Volume (cm^3) 0.02 cm^3 03/04/21 1311   Wound Healing % 60 03/04/21 1311   Post-Procedure Length (cm) 0.5 cm 03/04/21 1328   Post-Procedure Width (cm) 0.5 cm 03/04/21 1328   Post-Procedure Depth (cm) 0.1 cm 03/04/21 1328   Post-Procedure Surface Area (cm^2) 0.25 cm^2 03/04/21 1328   Post-Procedure Volume (cm^3) 0.02 cm^3 03/04/21 1328   Distance Tunneling (cm) 0 cm 03/04/21 1311   Tunneling Position ___ O'Clock 0 03/04/21 1311   Undermining Starts ___ O'Clock 0 03/04/21 1311   Undermining Ends___ O'Clock 0 03/04/21 1311   Undermining Maxium Distance (cm) 0 03/04/21 1311   Wound Assessment Fibrin 03/04/21 1311   Drainage Amount Moderate 03/04/21 1311   Drainage Description Serosanguinous 03/04/21 1311   Odor None 03/04/21 1311   Dolores-wound Assessment Intact 03/04/21 1311   Margins Attached edges 03/04/21 1311   Wound Thickness Description not for Pressure Injury Full thickness 03/04/21 1311   Number of days: 6       Percent of Wound(s) Debrided: approximately 100%    Total  Area  Debrided:  0.02 sq cm     Bleeding:  Minimal    Hemostasis Achieved:  by pressure    Procedural Pain:  1  / 10     Post Procedural Pain:  0 / 10     Response to treatment:  Well tolerated by patient. Status of wound progress and description from last visit:   improving.       Plan: Continue current care. Follow up in 2 weeks for recheck (will be finishing abx by then). Discharge Instructions       PHYSICIAN ORDERS AND DISCHARGE INSTRUCTIONS     NOTE: Upon discharge from the [address], you will receive a patient experience survey. We would be grateful if you would take the time to fill this survey out.     Wound care order history:                 GALO's   Right       Left               Date:              Cultures:                Grafts:                Antibiotics:           Continuing wound care orders and information:                 Residence:                Continue home health care with:               Your wound-care supplies will be provided by:      Wound cleansing:               Do not scrub or use excessive force. Wash hands with soap and water before and after dressing changes. Prior to applying a clean dressing, cleanse wound with normal saline, wound cleanser, or mild soap and water. Ask the physician or nurse before getting the wound(s) wet in a shower     Daily Wound management:              Keep weight off wounds and reposition every 2 hours. Avoid standing for long periods of time. Apply wraps/stockings in AM and remove at bedtime. If swelling is present, elevate legs to the level of the heart or above for 30 minutes 4-5 times a day and/or when sitting. When taking antibiotics take entire prescription as ordered by physician do not stop taking until medicine is all gone.                                                           Orders for this week: 3/4/21                  Left and Right Groin/Abdominal folds - Wash with soap and water, pat dry. Stop Desitin. Continue to apply nystatin powder over wounds and to red areas. Tuck interdry sheets into abd folds.   Keep areas clean and dry.     Reapply 1-2 times daily and as needed.     Follow up with  Jane Del Castillo in 2 weeks in the wound care center  Call  for any questions or concerns.   Date__________   Time____________        Treatment Note      Written Patient Dismissal Instructions Given            Electronically signed by Cyndy Pelaez MD on 3/4/2021 at 1:35 PM

## 2021-03-09 ENCOUNTER — OFFICE VISIT (OUTPATIENT)
Dept: INTERNAL MEDICINE CLINIC | Age: 56
End: 2021-03-09
Payer: COMMERCIAL

## 2021-03-09 VITALS
BODY MASS INDEX: 42.27 KG/M2 | OXYGEN SATURATION: 98 % | HEART RATE: 104 BPM | TEMPERATURE: 96.7 F | DIASTOLIC BLOOD PRESSURE: 80 MMHG | WEIGHT: 263 LBS | HEIGHT: 66 IN | SYSTOLIC BLOOD PRESSURE: 120 MMHG

## 2021-03-09 DIAGNOSIS — E11.42 DIABETIC POLYNEUROPATHY ASSOCIATED WITH TYPE 2 DIABETES MELLITUS (HCC): ICD-10-CM

## 2021-03-09 DIAGNOSIS — K26.9 DUODENAL ULCER: ICD-10-CM

## 2021-03-09 DIAGNOSIS — F51.01 PRIMARY INSOMNIA: ICD-10-CM

## 2021-03-09 DIAGNOSIS — E55.9 VITAMIN D DEFICIENCY: ICD-10-CM

## 2021-03-09 DIAGNOSIS — E11.69 DIABETES MELLITUS TYPE 2 IN OBESE (HCC): Primary | ICD-10-CM

## 2021-03-09 DIAGNOSIS — I10 ESSENTIAL HYPERTENSION: ICD-10-CM

## 2021-03-09 DIAGNOSIS — E78.2 MIXED HYPERLIPIDEMIA: ICD-10-CM

## 2021-03-09 DIAGNOSIS — E78.1 HYPERTRIGLYCERIDEMIA: ICD-10-CM

## 2021-03-09 DIAGNOSIS — E66.9 DIABETES MELLITUS TYPE 2 IN OBESE (HCC): Primary | ICD-10-CM

## 2021-03-09 DIAGNOSIS — R19.7 DIARRHEA, UNSPECIFIED TYPE: ICD-10-CM

## 2021-03-09 DIAGNOSIS — Z13.29 SCREENING FOR THYROID DISORDER: ICD-10-CM

## 2021-03-09 DIAGNOSIS — I25.10 CORONARY ARTERY DISEASE INVOLVING NATIVE CORONARY ARTERY OF NATIVE HEART WITHOUT ANGINA PECTORIS: ICD-10-CM

## 2021-03-09 PROBLEM — E11.10 TYPE 2 DIABETES MELLITUS WITH KETOACIDOSIS WITHOUT COMA, WITH LONG-TERM CURRENT USE OF INSULIN (HCC): Status: RESOLVED | Noted: 2019-11-26 | Resolved: 2021-03-09

## 2021-03-09 PROBLEM — Z79.4 TYPE 2 DIABETES MELLITUS WITH KETOACIDOSIS WITHOUT COMA, WITH LONG-TERM CURRENT USE OF INSULIN (HCC): Status: RESOLVED | Noted: 2019-11-26 | Resolved: 2021-03-09

## 2021-03-09 PROCEDURE — 3017F COLORECTAL CA SCREEN DOC REV: CPT | Performed by: INTERNAL MEDICINE

## 2021-03-09 PROCEDURE — 1111F DSCHRG MED/CURRENT MED MERGE: CPT | Performed by: INTERNAL MEDICINE

## 2021-03-09 PROCEDURE — 4004F PT TOBACCO SCREEN RCVD TLK: CPT | Performed by: INTERNAL MEDICINE

## 2021-03-09 PROCEDURE — 3046F HEMOGLOBIN A1C LEVEL >9.0%: CPT | Performed by: INTERNAL MEDICINE

## 2021-03-09 PROCEDURE — G8484 FLU IMMUNIZE NO ADMIN: HCPCS | Performed by: INTERNAL MEDICINE

## 2021-03-09 PROCEDURE — 99214 OFFICE O/P EST MOD 30 MIN: CPT | Performed by: INTERNAL MEDICINE

## 2021-03-09 PROCEDURE — G8417 CALC BMI ABV UP PARAM F/U: HCPCS | Performed by: INTERNAL MEDICINE

## 2021-03-09 PROCEDURE — 2022F DILAT RTA XM EVC RTNOPTHY: CPT | Performed by: INTERNAL MEDICINE

## 2021-03-09 PROCEDURE — G8427 DOCREV CUR MEDS BY ELIG CLIN: HCPCS | Performed by: INTERNAL MEDICINE

## 2021-03-09 RX ORDER — SODIUM, POTASSIUM,MAG SULFATES 17.5-3.13G
SOLUTION, RECONSTITUTED, ORAL ORAL
COMMUNITY
Start: 2021-03-04 | End: 2021-03-23

## 2021-03-09 RX ORDER — OMEPRAZOLE 20 MG/1
20 CAPSULE, DELAYED RELEASE ORAL DAILY
COMMUNITY
End: 2021-03-23

## 2021-03-09 RX ORDER — BISACODYL 5 MG
TABLET, DELAYED RELEASE (ENTERIC COATED) ORAL
COMMUNITY
Start: 2021-03-04 | End: 2021-06-17 | Stop reason: SDUPTHER

## 2021-03-09 ASSESSMENT — PATIENT HEALTH QUESTIONNAIRE - PHQ9: SUM OF ALL RESPONSES TO PHQ QUESTIONS 1-9: 0

## 2021-03-09 NOTE — PATIENT INSTRUCTIONS
Fasting for a blood test: taking the right steps before testing helps ensure your results will be accurate. Why do I need to fast before my blood test?  If your healthcare provider has told you to fast before a blood test, it means you should not eat or drink anything, except water, for several hours before your test. When you eat and drink normally, those foods and beverages are absorbed into your bloodstream. That could affect the results of certain types of blood tests. What types of blood tests require fasting? The most common tests that require fasting are:   Glucose tests, which measure your blood sugar.  Lipid tests, which measure cholesterol and triglycerides. You do not need to be fasting for HbA1C test.     How long do I have to fast before the test?  You usually need to fast for 812 hours before the test. Most tests that require fasting are scheduled for early in the morning. That way, most of your fasting time will be overnight. Can I drink anything besides water during a fast?  No. Juice, coffee, soda, and other beverages can get in your bloodstream and affect your results. In addition, you should not:   Chew gum    Smoke    Exercise  These activities can also affect your results. But you can drink water. It's actually encouraged that you drink 2 glasses of water before any blood test. It helps keep more fluid in your veins, which can make it easier to draw blood. If you are dehydrated, your blood draw experience may be unpleasant. Can I continue taking medicine during a fast?  Most of the time it's OK to take your usual medicines with water, unless otherwise specified by your healthcare provider. You may need to avoid certain medicines that you normally take with food.      What if I make a mistake and have something to eat or drink besides water during my fast?  Tell your healthcare provider before your test. Your test will most likely have to be re-scheduled for another time when you are able to complete your fast.    When can I eat and drink normally again? As soon as your test is over. You may want to bring a snack with you, so you can eat right away. Is there anything else I need to know about fasting before a blood test?  Be sure to talk to your healthcare provider if you have any questions or concerns about fasting. You should talk to your provider before taking any lab test. Most tests don't require fasting or other special preparations. For others, you may need to avoid certain foods, medicines, or activities.        Formerly [facility]  [phone]

## 2021-03-09 NOTE — PROGRESS NOTES
3/9/21    Bindu Gomez  1965    Chief Complaint   Patient presents with    New Patient       History of Present Illness:  Bindu Gomez is a 50 y.o. pleasant lady presenting today to establish care as a new patient with a chief complaint of DM, HL, HTN, diabetic neuropathy. She has a past medical history significant for:  HTN, on Coreg 3.125mg BID, Lisinopril 20mg QD  HL (,  on 11/25/19), on Atorvastatin 40mg QD  DM type 2 (HbA1C 11.6% on 2/11/2021), on Invokana 300mg QD, Tradjenta 5mg QD, Lantus 40u QHS, Novolog 20u AC + SSI   Diabetic neuropathy, on Gabapentin 300mg QHS   CAD s/p PCI (2019), on ASA, Coreg 3.125mg BID  HFpEF (G1DD, EF 55-60%, LVH on TTE 11/25/19)  Duodenal ulcer, on Prilosec 20mg QAM   Hepatic steatosis   Vitamin D deficiency (25-OH level was 9.86 on 2/13/2021), on 50,000u QD x8 doses (since 02/2021) then 1000u QD  Morbid obesity (BMI 42)     # Patient admitted Feb 2021 for L groin cellulitis c/b sepsis. Her hospital stay was c/b GIB requiring transfusions. EGD showed 2cm duodenal ulcer. Stopped DAPT. Only on ASA. On a PPI. Needs H pylori stool Ag as it was not done in hospital.   Will be getting colonoscopy at [facility]. Will be seeing wound clinic for abdominal ulcers. Cultures positive for MSSA, E. faecalis, E. coli. Seeing Dr. Maren Orozco, taking Ceftin and Doxycycline. # Patient with TG > 500 in Nov 2019. On Lipitor. LDL > 190 in Nov 2019. Not checked since then. # Patient with uncontrolled DM. BG at home has been 130-170's. She is on Lantus, Novolog, Invokana, Tradjenta. Did not tolerate Metformin. Sees Dr. Willian Herrera. Also takes Gabapentin for diabetic neuropathy. # Patient with HTN. BP today 120/80. Takes ACEi and BB. # Sees Dr. Garry Tejada from Cardiology. She has CAD s/p PCI in 2019. She is off DAPT and on ASA only with BB. # Patient on high doses of vitamin D for level < 10.   # Patient having difficulty falling asleep since hospital discharge. Health maintenance:   Health Maintenance Due   Topic Date Due    Hepatitis C screen  Never done    Diabetic foot exam  Never done    Diabetic retinal exam  Never done    HIV screen  Never done    Diabetic microalbuminuria test  Never done    Hepatitis B vaccine (1 of 3 - Risk 3-dose series) Never done    DTaP/Tdap/Td vaccine (1 - Tdap) Never done    Cervical cancer screen  Never done    Breast cancer screen  Never done    Shingles Vaccine (1 of 2) Never done    Flu vaccine (1) 09/01/2020    Lipid screen  11/25/2020         Review of Systems:  Constitutional: no fevers, no chills, no night sweats, no weight loss, no weight gain, no fatigue   Pain assessment: no pain  Head: no headaches  Ears: no hearing loss, no tinnitus, no vertigo  Eyes: no blurry vision, no diplopia, no dryness, no itchiness  Mouth: no oral ulcers, no dry mouth, no sore throat  Nose: no nasal congestion, no epistaxis  Cardiac: no chest pain, no palpitations, no leg swelling, no orthopnea, no PND, no syncope  Pulmonary: no dyspnea, no cough, no wheezing, no hemoptysis  GI: no nausea, no vomiting, no diarrhea, no constipation, no abdominal pain, no hematochezia  : no dysuria, no frequency, no urgency, no hematuria, no frothy urine, no dyspareunia, no pelvic pain, no vaginal bleeding, no abnormal vaginal discharge  MSK: no arthralgias, no myalgias, no early morning stiffness, no Raynaud's   Neuro: no focal neurological deficits, no seizures  Sleep: no snoring, no daytime somnolence   Psych: no depression, no anxiety, no suicidal ideation, insomnia      Physical Exam:  VITALS:   /80   Pulse 104   Temp 96.7 °F (35.9 °C)   Ht 5' 6\" (1.676 m)   Wt 263 lb (119.3 kg)   SpO2 98%   BMI 42.45 kg/m²     PHYSICAL EXAMINATION:  General: alert, awake, and oriented to time, place, person, and situation.  Not in acute distress   Skin: bilateral pannus erythema with ulceration bilaterally   Head: normocephalic/atraumatic  Eyes: anicteric sclera, well-injected conjunctiva. Pupils are equally round and reactive to light. Extraocular movements are intact   Nose: no septal deviation evident  Sinuses: no sinus tenderness  Ears: external ears normal  Neck: supple, no cervical lymphadenopathy, thyroid symmetric and not enlarged, no bruits   Heart: regular rate and rhythm, regular S1/S2, no S3/S4, no audible murmurs, no audible friction rub  Lungs: clear to auscultation bilaterally, no audible crackles, no audible wheezes, no audible rhonchi    Abdomen: normal bowel sounds, soft abdomen, non-tender, no palpable masses  Extremities: no edema, warm, no cyanosis, no clubbing. Good capillary refill   MSK: no tenderness across spinous processes, full ROM in all 4 extremities. No joint swelling or tenderness   Peripheral vascular: 2+ pulses symmetric (radial)  Neuro: gait normal, CN II-XII intact, motor power 5/5 in all 4 extremities, sensation intact and symmetric    Labs   I have personally reviewed labs, and discussed pertinent findings with patient     Imaging   I have personally reviewed imaging, and discussed pertinent findings with patient    Other notes  I have personally reviewed other notes, and discussed pertinent findings with patient      Assessment/Plan:     1. Diabetes mellitus type 2 in obese [facility])  Uncontrolled  Sees Dr. Ramírez Garcia  A1C 11.6% in Feb 2021  Continue Invokana 300mg QD, Tradjenta 5mg QD, Lantus 40u QHS, Novolog 20u AC + SSI   BG improving   - CBC Auto Differential; Future  - COMPREHENSIVE METABOLIC PANEL; Future    2. Diabetic polyneuropathy associated with type 2 diabetes mellitus (HCC)  Stable  OARRS reviewed  Gets Gabapentin from Dr. Ramírez Garcia    3. Essential hypertension  Stable  Continue Coreg 3.125mg BID, Lisinopril 20mg QD  - CBC Auto Differential; Future  - COMPREHENSIVE METABOLIC PANEL; Future    4.  Mixed hyperlipidemia  ,  in Nov 2019  Uncontrolled  Needs updated labs  If TG > 500 needs TG-lowering med   Continue Atorvastatin 40mg QD  - CBC Auto Differential; Future  - COMPREHENSIVE METABOLIC PANEL; Future  - Lipid, Fasting; Future    5. Hypertriglyceridemia  Needs updated labs  If TG > 500 needs TG-lowering med   Continue statin  - Lipid, Fasting; Future    6. Coronary artery disease involving native coronary artery of native heart without angina pectoris  Stable  S/p PCI > 1 year ago  Continue ASA  Continue BB    7. Duodenal ulcer  8. Diarrhea, unspecified type  H pylori on biopsy not done  Needs stool Ag however is on PPI. Due to large duodenal ulcer with significant bleed recently, do not recommend holding PPI for too long. However, PPI can interfere with accuracy of test, so recommend she hold PPI for 3 days prior to test  - H. PYLORI ANTIGEN, STOOL; Future    9. Vitamin D deficiency  (25-OH level was 9.86 on 2/13/2021), on 50,000u QD x8 doses (since 02/2021) then 1000u QD    10. Primary insomnia  Start Melatonin OTC 2-3 hours before bedtime     11. Screening for thyroid disorder  - TSH with Reflex; Future      Care discussed with patient and questions answered. Patient verbalizes understanding and agrees with plan. Discussed with patient the importance of continuity of care. I encouraged patient to schedule next appointment within 2 weeks with me. Patient prefers to be reached by Phone call at [phone] for future medical correspondence. Encouraged to activate [employer]. I reviewed and reconciled the medications this visit. I reviewed and updated the past medical, surgical, social, and family history during this visit. After visit summary provided.        Zahira Leach MD  Internal Medicine  3/9/2021   3:09 PM

## 2021-03-12 RX ORDER — CARVEDILOL 6.25 MG/1
TABLET ORAL
Qty: 60 TABLET | Refills: 2 | OUTPATIENT
Start: 2021-03-12

## 2021-03-12 RX ORDER — PRASUGREL 10 MG/1
TABLET, FILM COATED ORAL
Qty: 30 TABLET | Refills: 1 | OUTPATIENT
Start: 2021-03-12

## 2021-03-15 RX ORDER — CARVEDILOL 6.25 MG/1
TABLET ORAL
Qty: 60 TABLET | Refills: 2 | OUTPATIENT
Start: 2021-03-15

## 2021-03-18 ENCOUNTER — HOSPITAL ENCOUNTER (OUTPATIENT)
Dept: WOUND CARE | Age: 56
Discharge: HOME | End: 2021-03-18
Payer: COMMERCIAL

## 2021-03-18 DIAGNOSIS — E11.69 DIABETES MELLITUS TYPE 2 IN OBESE (HCC): ICD-10-CM

## 2021-03-18 DIAGNOSIS — E66.01 MORBID OBESITY (HCC): ICD-10-CM

## 2021-03-18 DIAGNOSIS — L03.314 CELLULITIS OF GROIN: ICD-10-CM

## 2021-03-18 DIAGNOSIS — E66.9 DIABETES MELLITUS TYPE 2 IN OBESE (HCC): ICD-10-CM

## 2021-03-18 DIAGNOSIS — L98.492 ULCER OF RIGHT GROIN WITH FAT LAYER EXPOSED (HCC): Primary | ICD-10-CM

## 2021-03-18 PROCEDURE — 99213 OFFICE O/P EST LOW 20 MIN: CPT | Performed by: SURGERY

## 2021-03-18 PROCEDURE — 99212 OFFICE O/P EST SF 10 MIN: CPT

## 2021-03-18 RX ORDER — LIDOCAINE 40 MG/G
CREAM TOPICAL ONCE
Status: CANCELLED | OUTPATIENT
Start: 2021-03-18 | End: 2021-03-18

## 2021-03-18 RX ORDER — LIDOCAINE 50 MG/G
OINTMENT TOPICAL ONCE
Status: CANCELLED | OUTPATIENT
Start: 2021-03-18 | End: 2021-03-18

## 2021-03-18 RX ORDER — GENTAMICIN SULFATE 1 MG/G
OINTMENT TOPICAL ONCE
Status: CANCELLED | OUTPATIENT
Start: 2021-03-18 | End: 2021-03-18

## 2021-03-18 RX ORDER — LIDOCAINE HYDROCHLORIDE 20 MG/ML
JELLY TOPICAL ONCE
Status: CANCELLED | OUTPATIENT
Start: 2021-03-18 | End: 2021-03-18

## 2021-03-18 RX ORDER — LIDOCAINE HYDROCHLORIDE 40 MG/ML
SOLUTION TOPICAL ONCE
Status: CANCELLED | OUTPATIENT
Start: 2021-03-18 | End: 2021-03-18

## 2021-03-18 RX ORDER — BACITRACIN ZINC AND POLYMYXIN B SULFATE 500; 1000 [USP'U]/G; [USP'U]/G
OINTMENT TOPICAL ONCE
Status: CANCELLED | OUTPATIENT
Start: 2021-03-18 | End: 2021-03-18

## 2021-03-18 RX ORDER — BETAMETHASONE DIPROPIONATE 0.05 %
OINTMENT (GRAM) TOPICAL ONCE
Status: CANCELLED | OUTPATIENT
Start: 2021-03-18 | End: 2021-03-18

## 2021-03-18 RX ORDER — GINSENG 100 MG
CAPSULE ORAL ONCE
Status: CANCELLED | OUTPATIENT
Start: 2021-03-18 | End: 2021-03-18

## 2021-03-18 RX ORDER — BACITRACIN, NEOMYCIN, POLYMYXIN B 400; 3.5; 5 [USP'U]/G; MG/G; [USP'U]/G
OINTMENT TOPICAL ONCE
Status: CANCELLED | OUTPATIENT
Start: 2021-03-18 | End: 2021-03-18

## 2021-03-18 RX ORDER — CLOBETASOL PROPIONATE 0.5 MG/G
OINTMENT TOPICAL ONCE
Status: CANCELLED | OUTPATIENT
Start: 2021-03-18 | End: 2021-03-18

## 2021-03-18 NOTE — PROGRESS NOTES
Wound Care Center Progress Note       Nitesh uGerrero  AGE: 50 y.o. GENDER: female  : 1965  TODAY'S DATE:  3/18/2021        Subjective:     Chief Complaint   Patient presents with    Wound Check     abdomen          HISTORY of PRESENT ILLNESS    Lakia Cote a 50 y. o. female known to me from inpatient evaluation who presents to the [address] [place] [address] for a follow up and treatment of Acute non-healing/non-surgical and post-infectious and diabetic ulcer(s) of the bilateral groins/pannus folds.  The condition is of mild severity. The underlying cause is thought to be infection/cellulitis and diabetes.    She has been doing well since last seen, minimal to no drainage, no pain. The open areas have now closed. She has lost about 30 lbs since her hospitalization -- is trying to eat healthier.      Wound Pain Timing/Severity: none  Quality of pain: none  Severity of pain:  0 / 10   Modifying Factors: edema, diabetes, poor glucose control, shear force, obesity and smoking  Associated Signs/Symptoms: denies        PAST MEDICAL HISTORY        Diagnosis Date    CAD (coronary artery disease)     Current every day smoker     Diabetes mellitus ([address]     GERD (gastroesophageal reflux disease)     History of exercise stress test 2019    Treadmill, Normal exercise performance without angina and ischemic EKG changes.  Hyperlipidemia     Hypertension     Morbid obesity ([address]     Palpitations     Post PTCA 2019    Kissing stent to LAD & to Ostium of Diag.  SOB (shortness of breath)     Type 2 diabetes mellitus with ketoacidosis without coma, with long-term current use of insulin ([address] 2019    DM diagnosed about        PAST SURGICAL HISTORY    Past Surgical History:   Procedure Laterality Date    HERNIA REPAIR      PTCA  2019    Kissing stents to LAD & Ostium of Diag.     UPPER GASTROINTESTINAL ENDOSCOPY N/A 2021    EGD DIAGNOSTIC ONLY performed by Paavn Rodriguez MD at [facility] END[facility]COPY       FAMILY HISTORY    History reviewed. No pertinent family history. SOCIAL HISTORY    Social History     Tobacco Use    Smoking status: Current Some Day Smoker     Packs/day: 1.00    Smokeless tobacco: Never Used    Tobacco comment: 1 pack every four days    Substance Use Topics    Alcohol use: Never     Frequency: Never    Drug use: Never       ALLERGIES    Allergies   Allergen Reactions    Ampicillin Rash       MEDICATIONS    Current Outpatient Medications on File Prior to Encounter   Medication Sig Dispense Refill    SUPREP BOWEL PREP KIT 17.5-3.13-1.6 GM/177ML SOLN       BISACODYL 5 MG EC tablet       omeprazole (PRILOSEC) 20 MG delayed release capsule Take 20 mg by mouth daily Before breakfast      [START ON 4/13/2021] Cholecalciferol (VITAMIN D3) 20 MCG (800 UNIT) TABS Take 1 tablet by mouth daily Start this medication on April 13, 20201 75 tablet 0    Blood Pressure KIT 1 Act by Does not apply route daily 1 kit 0    miconazole (MICOTIN) 2 % powder Apply topically 2 times daily. 45 g 1    Silver ([employer] TEXTILE 10\"X12') MISC Apply 1 Act topically daily 30 each 0    gabapentin (NEURONTIN) 300 MG capsule Take 300 mg by mouth nightly.       INVOKANA 300 MG TABS tablet       carvedilol (COREG) 3.125 MG tablet Take 1 tablet by mouth 2 times daily (with meals) (Patient taking differently: Take 6.25 mg by mouth 2 times daily (with meals) ) 60 tablet 2    atorvastatin (LIPITOR) 40 MG tablet Take 1 tablet by mouth daily 30 tablet 5    aspirin (ASPIRIN LOW DOSE) 81 MG EC tablet Take 1 tablet by mouth daily 30 tablet 5    lisinopril (PRINIVIL;ZESTRIL) 20 MG tablet Take 1 tablet by mouth daily 30 tablet 5    linagliptin (TRADJENTA) 5 MG tablet Take 1 tablet by mouth daily 30 tablet 2    insulin glargine (LANTUS SOLOSTAR) 100 UNIT/ML injection pen Inject 40 Units into the skin daily 5 pen 3    insulin aspart (NOVOLOG FLEXPEN) 100 UNIT/ML injection pen Take 20 units with each meal in addition to sliding scale as below   *Medium Dose Correction Algorithm**Insulin: 3 TIMES DAILY WITH MEALSGlucose: Dose: No [id number] 2 [id number] 4 [id number] 6 Qhoqk750-071 8 Gfrxa639-215 10 Kjarm396 and above 12 Units 5 pen 3    blood glucose monitor kit and supplies Test 4 times a day before meals and bedtime& as needed for symptoms of irregular blood glucose. 1 kit 0    Insulin Pen Needle (PEN NEEDLES) 31G X 5 MM MISC lantus QHS and Novolog QAC and  each 2    Lancets MISC 1 each by Does not apply route 4 times daily 200 each 2    blood glucose monitor strips Test 4 times a day & as needed for symptoms of irregular blood glucose. 200 strip 2     No current facility-administered medications on file prior to encounter. REVIEW OF SYSTEMS    Pertinent items are noted in HPI. Constitutional: Negative for systemic symptoms including fever, chills and malaise. Objective: There were no vitals taken for this visit. PHYSICAL EXAM  General: The patient is in no acute distress. Mental status:  Patient is appropriate, is  oriented to place and plan of care. Dermatologic exam: Visual inspection of the periwound reveals the skin to be normal in turgor and texture. Wound exam:  see wound description below     All active wounds listed below with today's date are evaluated           Assessment:     Problem List Items Addressed This Visit     WD - Cellulitis of groin    Relevant Orders    Supply: Wound Cleanser    WD - Ulcer of right groin with fat layer exposed ([address] - Primary    Relevant Orders    Supply: Wound Cleanser          Status of wound progress and description from last visit:   Open areas have healed. Cellulitis is resolved. Plan:   No further antibiotics needed. Ok to stop the nystatin powder  Continue measures to keep the pannus folds clean and dry  Encouraged her to continue weight loss efforts.  She is interested in medical weight loss assistance, will place a referral.   Follow up at the wound care center as needed    Discharge Instructions       [address]     NOTE: Upon discharge from the [address], you will receive a patient experience survey. We would be grateful if you would take the time to fill this survey out.     Wound care order history:                 GALO's   Right       Left               Date:              Cultures:                Grafts:                Antibiotics:           Continuing wound care orders and information:                 Residence:                Continue home health care with:               Your wound-care supplies will be provided by:      Wound cleansing:               IX not scrub or use excessive force.              Wash hands with soap and water before and after dressing changes.             TBFVJ to applying a clean dressing, cleanse wound with normal saline, wound cleanser, or mild soap and water.               Ask the physician or nurse before getting the wound(s) wet in a shower     Daily Wound management:              Keep weight off wounds and reposition every 2 hours.              Avoid standing for long periods of time.              Apply wraps/stockings in AM and remove at bedtime.              If swelling is present, elevate legs to the level of the heart or above for 30 minutes 4-5 times a day and/or when sitting.                                      When taking antibiotics take entire prescription as ordered by physician do not stop taking until medicine is all gone.                                                           Orders for this week: 3/18/21                  Left and Right Groin/Abdominal folds - Wash with soap and water, pat dry. Tuck interdry sheets into abd folds. Keep areas clean and dry. Reapply 1-2 times daily and as needed.     Nystatin only needed for red, inflamed skin in abd folds.        Discharged from wound clinic 3/18/21    Call [phone] for any questions or concerns.   Date__________   Time____________        Treatment Note      Written Patient Dismissal Instructions Given            Electronically signed by Shameka Jones MD on 3/18/2021 at 2:29 PM

## 2021-03-19 ENCOUNTER — HOSPITAL ENCOUNTER (OUTPATIENT)
Age: 56
Discharge: HOME | End: 2021-03-19
Payer: COMMERCIAL

## 2021-03-19 LAB
ALBUMIN SERPL-MCNC: 4 GM/DL (ref 3.4–5)
ALP BLD-CCNC: 103 IU/L (ref 40–129)
ALT SERPL-CCNC: 17 U/L (ref 10–40)
ANION GAP SERPL CALCULATED.3IONS-SCNC: 9 MMOL/L (ref 4–16)
AST SERPL-CCNC: 19 IU/L (ref 15–37)
BASOPHILS ABSOLUTE: 0.1 K/CU MM
BASOPHILS RELATIVE PERCENT: 0.5 % (ref 0–1)
BILIRUB SERPL-MCNC: 0.3 MG/DL (ref 0–1)
BUN BLDV-MCNC: 9 MG/DL (ref 6–23)
CALCIUM SERPL-MCNC: 9.8 MG/DL (ref 8.3–10.6)
CHLORIDE BLD-SCNC: 100 MMOL/L (ref 99–110)
CHOLESTEROL, FASTING: 144 MG/DL
CO2: 29 MMOL/L (ref 21–32)
CREAT SERPL-MCNC: 0.6 MG/DL (ref 0.6–1.1)
DIFFERENTIAL TYPE: ABNORMAL
EOSINOPHILS ABSOLUTE: 0.3 K/CU MM
EOSINOPHILS RELATIVE PERCENT: 2.8 % (ref 0–3)
GFR AFRICAN AMERICAN: >60 ML/MIN/1.73M2
GFR NON-AFRICAN AMERICAN: >60 ML/MIN/1.73M2
GLUCOSE FASTING: 180 MG/DL (ref 70–99)
HCT VFR BLD CALC: 39.8 % (ref 37–47)
HDLC SERPL-MCNC: 41 MG/DL
HEMOGLOBIN: 12.2 GM/DL (ref 12.5–16)
IMMATURE NEUTROPHIL %: 0.5 % (ref 0–0.43)
LDL CHOLESTEROL DIRECT: 86 MG/DL
LYMPHOCYTES ABSOLUTE: 2.9 K/CU MM
LYMPHOCYTES RELATIVE PERCENT: 27.2 % (ref 24–44)
MCH RBC QN AUTO: 29 PG (ref 27–31)
MCHC RBC AUTO-ENTMCNC: 30.7 % (ref 32–36)
MCV RBC AUTO: 94.8 FL (ref 78–100)
MONOCYTES ABSOLUTE: 0.8 K/CU MM
MONOCYTES RELATIVE PERCENT: 7.6 % (ref 0–4)
PDW BLD-RTO: 15 % (ref 11.7–14.9)
PLATELET # BLD: 255 K/CU MM (ref 140–440)
PMV BLD AUTO: 9.5 FL (ref 7.5–11.1)
POTASSIUM SERPL-SCNC: 4.5 MMOL/L (ref 3.5–5.1)
RBC # BLD: 4.2 M/CU MM (ref 4.2–5.4)
SEGMENTED NEUTROPHILS ABSOLUTE COUNT: 6.5 K/CU MM
SEGMENTED NEUTROPHILS RELATIVE PERCENT: 61.4 % (ref 36–66)
SODIUM BLD-SCNC: 138 MMOL/L (ref 135–145)
TOTAL IMMATURE NEUTOROPHIL: 0.05 K/CU MM
TOTAL PROTEIN: 7.6 GM/DL (ref 6.4–8.2)
TRIGLYCERIDE, FASTING: 190 MG/DL
TSH HIGH SENSITIVITY: 1.82 UIU/ML (ref 0.27–4.2)
WBC # BLD: 10.5 K/CU MM (ref 4–10.5)

## 2021-03-19 PROCEDURE — 80053 COMPREHEN METABOLIC PANEL: CPT

## 2021-03-19 PROCEDURE — 80061 LIPID PANEL: CPT

## 2021-03-19 PROCEDURE — 84443 ASSAY THYROID STIM HORMONE: CPT

## 2021-03-19 PROCEDURE — 36415 COLL VENOUS BLD VENIPUNCTURE: CPT

## 2021-03-19 PROCEDURE — 85025 COMPLETE CBC W/AUTO DIFF WBC: CPT

## 2021-03-22 ENCOUNTER — TELEPHONE (OUTPATIENT)
Dept: BARIATRICS/WEIGHT MGMT | Age: 56
End: 2021-03-22

## 2021-03-22 RX ORDER — CARVEDILOL 6.25 MG/1
TABLET ORAL
Qty: 60 TABLET | Refills: 2 | OUTPATIENT
Start: 2021-03-22

## 2021-03-23 ENCOUNTER — OFFICE VISIT (OUTPATIENT)
Dept: INTERNAL MEDICINE CLINIC | Age: 56
End: 2021-03-23
Payer: COMMERCIAL

## 2021-03-23 VITALS
HEART RATE: 106 BPM | BODY MASS INDEX: 42.11 KG/M2 | HEIGHT: 66 IN | SYSTOLIC BLOOD PRESSURE: 136 MMHG | TEMPERATURE: 98.7 F | OXYGEN SATURATION: 97 % | DIASTOLIC BLOOD PRESSURE: 86 MMHG | WEIGHT: 262 LBS

## 2021-03-23 DIAGNOSIS — E78.2 MIXED HYPERLIPIDEMIA: ICD-10-CM

## 2021-03-23 DIAGNOSIS — K26.9 DUODENAL ULCER: ICD-10-CM

## 2021-03-23 DIAGNOSIS — E11.42 DIABETIC POLYNEUROPATHY ASSOCIATED WITH TYPE 2 DIABETES MELLITUS (HCC): ICD-10-CM

## 2021-03-23 DIAGNOSIS — E66.9 DIABETES MELLITUS TYPE 2 IN OBESE (HCC): Primary | ICD-10-CM

## 2021-03-23 DIAGNOSIS — I25.10 CORONARY ARTERY DISEASE INVOLVING NATIVE CORONARY ARTERY OF NATIVE HEART WITHOUT ANGINA PECTORIS: ICD-10-CM

## 2021-03-23 DIAGNOSIS — E11.69 DIABETES MELLITUS TYPE 2 IN OBESE (HCC): Primary | ICD-10-CM

## 2021-03-23 DIAGNOSIS — D50.0 IRON DEFICIENCY ANEMIA DUE TO CHRONIC BLOOD LOSS: ICD-10-CM

## 2021-03-23 DIAGNOSIS — I10 ESSENTIAL HYPERTENSION: ICD-10-CM

## 2021-03-23 PROCEDURE — G8417 CALC BMI ABV UP PARAM F/U: HCPCS | Performed by: INTERNAL MEDICINE

## 2021-03-23 PROCEDURE — G8484 FLU IMMUNIZE NO ADMIN: HCPCS | Performed by: INTERNAL MEDICINE

## 2021-03-23 PROCEDURE — 2022F DILAT RTA XM EVC RTNOPTHY: CPT | Performed by: INTERNAL MEDICINE

## 2021-03-23 PROCEDURE — 99214 OFFICE O/P EST MOD 30 MIN: CPT | Performed by: INTERNAL MEDICINE

## 2021-03-23 PROCEDURE — G8427 DOCREV CUR MEDS BY ELIG CLIN: HCPCS | Performed by: INTERNAL MEDICINE

## 2021-03-23 PROCEDURE — 3017F COLORECTAL CA SCREEN DOC REV: CPT | Performed by: INTERNAL MEDICINE

## 2021-03-23 PROCEDURE — 3046F HEMOGLOBIN A1C LEVEL >9.0%: CPT | Performed by: INTERNAL MEDICINE

## 2021-03-23 PROCEDURE — 4004F PT TOBACCO SCREEN RCVD TLK: CPT | Performed by: INTERNAL MEDICINE

## 2021-03-23 RX ORDER — CANAGLIFLOZIN 300 MG/1
300 TABLET, FILM COATED ORAL
Qty: 30 TABLET | Refills: 0 | Status: SHIPPED | OUTPATIENT
Start: 2021-03-23 | End: 2021-05-14 | Stop reason: SDUPTHER

## 2021-03-23 RX ORDER — CLOPIDOGREL BISULFATE 75 MG/1
TABLET ORAL
COMMUNITY
Start: 2021-03-11 | End: 2021-03-23

## 2021-03-23 RX ORDER — PANTOPRAZOLE SODIUM 40 MG/1
40 TABLET, DELAYED RELEASE ORAL DAILY
COMMUNITY
Start: 2021-03-09 | End: 2021-06-17

## 2021-03-23 RX ORDER — FERROUS SULFATE 325(65) MG
325 TABLET ORAL
Qty: 90 TABLET | Refills: 0 | Status: SHIPPED | OUTPATIENT
Start: 2021-03-23 | End: 2021-05-14 | Stop reason: SDUPTHER

## 2021-03-23 NOTE — PROGRESS NOTES
3/23/21    Ector Gilliam  1965    Chief Complaint   Patient presents with    2 Week Follow-Up     lab results       History of Present Illness:  Ector Gilliam is a 50 y.o. pleasant lady presenting today with a chief complaint of DM, HTN, HL, ulcerative cellulitis. She has a past medical history significant for:  HTN, on Coreg 6.25mg BID, Lisinopril 20mg QD  HL (LDL 86,  on 3/19/2021), on Atorvastatin 40mg QD  DM type 2 (HbA1C 11.6% on 2/11/2021), on Invokana 300mg QD, Tradjenta 5mg QD, Lantus 40u QHS, Novolog 20u AC + SSI   Diabetic neuropathy, on Gabapentin 300mg QHS   CAD s/p PCI (2019), on ASA, Coreg 6.25mg BID  HFpEF (G1DD, EF 55-60%, LVH on TTE 11/25/19)  Duodenal ulcer, on Protonix 40mg QAM  Diverticulosis   Hepatic steatosis   Vitamin D deficiency (25-OH level was 9.86 on 2/13/2021), on 50,000u QD x8 doses (since 02/2021) then 1000u QD  Morbid obesity (BMI 42)     # Patient admitted Feb 2021 for L groin cellulitis c/b sepsis. Her hospital stay was c/b GIB requiring transfusions. Hb has improved. EGD showed 2cm duodenal ulcer. Stopped DAPT. Only on ASA. On a PPI. Needs H pylori stool Ag as it was not done in hospital.   S/p colonoscopy at [facility] with no focus of bleed. No polyps. Will be getting barium. She is s/p wound clinic for abdominal ulcers. Cultures positive for MSSA, E. faecalis, E. coli. Seeing Dr. Isabel Bernal, taking Ceftin and Doxycycline. Will be seeing weight management. # Patient with TG > 500 in Nov 2019. On Lipitor. LDL > 190 in Nov 2019. LDL and TG have improved since then. # Patient with uncontrolled DM. BG at home has been 130-170's. She is on Lantus, Novolog, Invokana, Tradjenta. Did not tolerate Metformin. Sees Dr. Jeanette Jefrfies. Also takes Gabapentin for diabetic neuropathy. # Patient with HTN. BP today 136/86. Takes ACEi and BB. # Sees Dr. Johnathan Hugo from Cardiology. She has CAD s/p PCI in 2019. She is off DAPT and on ASA only with BB.    # Patient on high doses of vitamin D for level < 10.       Health maintenance:   Health Maintenance Due   Topic Date Due    Hepatitis C screen  Never done    Diabetic foot exam  Never done    Diabetic retinal exam  Never done    HIV screen  Never done    COVID-19 Vaccine (1) Never done    Diabetic microalbuminuria test  Never done    Hepatitis B vaccine (1 of 3 - Risk 3-dose series) Never done    DTaP/Tdap/Td vaccine (1 - Tdap) Never done    Cervical cancer screen  Never done    Breast cancer screen  Never done    Shingles Vaccine (1 of 2) Never done    Flu vaccine (1) 09/01/2020         Review of Systems:  Constitutional: no fevers, no chills, no night sweats, no weight loss, no weight gain, no fatigue   Pain assessment: no pain  Head: no headaches  Ears: no hearing loss, no tinnitus, no vertigo  Eyes: no blurry vision, no diplopia, no dryness, no itchiness  Mouth: no oral ulcers, no dry mouth, no sore throat  Nose: no nasal congestion, no epistaxis  Cardiac: no chest pain, no palpitations, no leg swelling, no orthopnea, no PND, no syncope  Pulmonary: no dyspnea, no cough, no wheezing, no hemoptysis  GI: no nausea, no vomiting, no diarrhea, no constipation, no abdominal pain, no hematochezia  : no dysuria, no frequency, no urgency, no hematuria, no frothy urine, no dyspareunia, no pelvic pain, no vaginal bleeding, no abnormal vaginal discharge  MSK: no arthralgias, no myalgias, no early morning stiffness, no Raynaud's   Neuro: no focal neurological deficits, no seizures  Sleep: no snoring, no daytime somnolence   Psych: no depression, no anxiety, no suicidal ideation, insomnia      Physical Exam:  VITALS:   /86 (Site: Right Upper Arm)   Pulse 106   Temp 98.7 °F (37.1 °C)   Ht 5' 6\" (1.676 m)   Wt 262 lb (118.8 kg)   SpO2 97%   BMI 42.29 kg/m²     PHYSICAL EXAMINATION:  General: alert, awake, and oriented to time, place, person, and situation.  Not in acute distress   Skin: bilateral pannus erythema with ulceration bilaterally   Head: normocephalic/atraumatic  Eyes: anicteric sclera, well-injected conjunctiva. Pupils are equally round and reactive to light. Extraocular movements are intact   Nose: no septal deviation evident  Sinuses: no sinus tenderness  Ears: external ears normal  Neck: supple, no cervical lymphadenopathy, thyroid symmetric and not enlarged, no bruits   Heart: regular rate and rhythm, regular S1/S2, no C1/W5, J9/4 systolic murmur heard all over, no audible friction rub  Lungs: clear to auscultation bilaterally, no audible crackles, no audible wheezes, no audible rhonchi    Abdomen: normal bowel sounds, soft abdomen, non-tender, no palpable masses  Extremities: no edema, warm, no cyanosis, no clubbing. Good capillary refill   MSK: no tenderness across spinous processes, full ROM in all 4 extremities. No joint swelling or tenderness   Peripheral vascular: 2+ pulses symmetric (radial)  Neuro: gait normal, CN II-XII intact, motor power 5/5 in all 4 extremities, sensation intact and symmetric    Labs   I have personally reviewed labs, and discussed pertinent findings with patient     Imaging   I have personally reviewed imaging, and discussed pertinent findings with patient    Other notes  I have personally reviewed other notes, and discussed pertinent findings with patient      Assessment/Plan:     1. Diabetes mellitus type 2 in obese ([facility]  A1C 11.6% in Feb 2021  Following with Dr. Marilu Islas 300mg QD, Tradjenta 5mg QD, Lantus 40u QHS, Novolog 20u AC + SSI  Hyperglycemia improving     2. Diabetic polyneuropathy associated with type 2 diabetes mellitus ([facility]  Following with Dr. Zhou Borrero for refills  Stable  Continue Gabapentin 300mg QHS     3. Essential hypertension  Stable  Continue Coreg 6.25mg BID  Continue Lisinopril 20mg QD     4. Mixed hyperlipidemia  LDL 86,  in March 2021  Continue Atorvastatin 40mg QD     5.  Coronary artery disease involving native coronary artery of native heart without angina pectoris  Stable  S/p PCI in 2019  Continue ASA, BB, statin    6. Duodenal ulcer  Stable  Continue Protonix 40mg QAM    7. Iron deficiency anemia due to chronic blood loss  Start ferrous sulfate QD x3 months only  Feeling fatigued, had recent duodenal ulcer with massive GIB requiring transfusion, and has elevated RDW. Hb improving so will only give 3 months       Care discussed with patient and questions answered. Patient verbalizes understanding and agrees with plan. Discussed with patient the importance of continuity of care. I encouraged patient to schedule next appointment within 2 months with me. Patient prefers to be reached by Phone call at [phone] for future medical correspondence. Encouraged to activate [employer]. I reviewed and reconciled the medications this visit. I reviewed and updated the past medical, surgical, social, and family history during this visit. After visit summary provided.        Ada Alcala MD  Internal Medicine  3/23/2021   2:00 PM

## 2021-03-24 ENCOUNTER — TELEPHONE (OUTPATIENT)
Dept: INTERNAL MEDICINE CLINIC | Age: 56
End: 2021-03-24

## 2021-03-24 NOTE — TELEPHONE ENCOUNTER
Fax received from [employer] [facility] that patient's invokana 300 mg is covered by insurance and no prior auth is needed for medication

## 2021-03-29 RX ORDER — ASPIRIN 81 MG/1
81 TABLET ORAL DAILY
Qty: 30 TABLET | Refills: 4 | Status: SHIPPED | OUTPATIENT
Start: 2021-03-29

## 2021-03-29 RX ORDER — CARVEDILOL 3.12 MG/1
TABLET ORAL
Qty: 60 TABLET | Refills: 1 | OUTPATIENT
Start: 2021-03-29

## 2021-03-30 ENCOUNTER — OFFICE VISIT (OUTPATIENT)
Dept: CARDIOLOGY CLINIC | Age: 56
End: 2021-03-30
Payer: COMMERCIAL

## 2021-03-30 ENCOUNTER — TELEPHONE (OUTPATIENT)
Dept: INTERNAL MEDICINE CLINIC | Age: 56
End: 2021-03-30

## 2021-03-30 VITALS
DIASTOLIC BLOOD PRESSURE: 88 MMHG | HEART RATE: 96 BPM | BODY MASS INDEX: 43.13 KG/M2 | SYSTOLIC BLOOD PRESSURE: 132 MMHG | WEIGHT: 268.4 LBS | HEIGHT: 66 IN

## 2021-03-30 DIAGNOSIS — I51.7 LVH (LEFT VENTRICULAR HYPERTROPHY): ICD-10-CM

## 2021-03-30 DIAGNOSIS — E78.2 MIXED HYPERLIPIDEMIA: ICD-10-CM

## 2021-03-30 DIAGNOSIS — I25.10 CORONARY ARTERY DISEASE INVOLVING NATIVE CORONARY ARTERY OF NATIVE HEART WITHOUT ANGINA PECTORIS: Primary | ICD-10-CM

## 2021-03-30 DIAGNOSIS — I10 ESSENTIAL HYPERTENSION: ICD-10-CM

## 2021-03-30 PROCEDURE — G8427 DOCREV CUR MEDS BY ELIG CLIN: HCPCS | Performed by: NURSE PRACTITIONER

## 2021-03-30 PROCEDURE — 4004F PT TOBACCO SCREEN RCVD TLK: CPT | Performed by: NURSE PRACTITIONER

## 2021-03-30 PROCEDURE — 3017F COLORECTAL CA SCREEN DOC REV: CPT | Performed by: NURSE PRACTITIONER

## 2021-03-30 PROCEDURE — G8417 CALC BMI ABV UP PARAM F/U: HCPCS | Performed by: NURSE PRACTITIONER

## 2021-03-30 PROCEDURE — 99214 OFFICE O/P EST MOD 30 MIN: CPT | Performed by: NURSE PRACTITIONER

## 2021-03-30 PROCEDURE — G8484 FLU IMMUNIZE NO ADMIN: HCPCS | Performed by: NURSE PRACTITIONER

## 2021-03-30 RX ORDER — LISINOPRIL 20 MG/1
20 TABLET ORAL DAILY
Qty: 30 TABLET | Refills: 5 | Status: SHIPPED | OUTPATIENT
Start: 2021-03-30 | End: 2021-06-17 | Stop reason: SDUPTHER

## 2021-03-30 RX ORDER — CARVEDILOL 3.12 MG/1
6.25 TABLET ORAL 2 TIMES DAILY WITH MEALS
Qty: 60 TABLET | Refills: 5 | Status: CANCELLED | OUTPATIENT
Start: 2021-03-30

## 2021-03-30 RX ORDER — CARVEDILOL 6.25 MG/1
6.25 TABLET ORAL DAILY
Qty: 60 TABLET | Refills: 5 | Status: SHIPPED | OUTPATIENT
Start: 2021-03-30 | End: 2021-06-17 | Stop reason: SDUPTHER

## 2021-03-30 RX ORDER — PRASUGREL 10 MG/1
TABLET, FILM COATED ORAL
Qty: 30 TABLET | Refills: 1 | OUTPATIENT
Start: 2021-03-30

## 2021-03-30 RX ORDER — ATORVASTATIN CALCIUM 40 MG/1
40 TABLET, FILM COATED ORAL DAILY
Qty: 30 TABLET | Refills: 5 | Status: SHIPPED | OUTPATIENT
Start: 2021-03-30 | End: 2021-06-17 | Stop reason: SDUPTHER

## 2021-03-30 ASSESSMENT — ENCOUNTER SYMPTOMS
ORTHOPNEA: 0
SHORTNESS OF BREATH: 0

## 2021-03-30 NOTE — PATIENT INSTRUCTIONS
**It is YOUR responsibilty to bring medication bottles and/or updated medication list to [address] [place] [address]. This will allow us to better serve you and all your healthcare needs**      Please be informed that if you contact our office outside of normal business hours the physician on call cannot help with any scheduling or rescheduling issues, procedure instruction questions or any type of medication issue. We advise you for any urgent/emergency that you go to the nearest emergency room!     PLEASE CALL OUR OFFICE DURING NORMAL BUSINESS HOURS    Monday - Friday   8 am to 5 pm    [place]: [place] [address]: [phone]    [place]:  [phone]

## 2021-03-30 NOTE — PROGRESS NOTES
No chest tenderness. Extremities:  no edema to the lower extremities    All pertinent data reviewed and discussed with patient including:    Transthoracic echocardiogram : 11/2019   Limited study due to patients body habitus. Left ventricular function is normal, EF is estimated at 55-60%. Severe left ventricular hypertrophy. Grade I diastolic dysfunction. There is elevated velocities across the LVOT. No evidence of pericardial effusion. NM spect: 11/2019  Abnormal Stress nuclear scintigraphic study suggestive of abnormal    myocardial perfusion.    Moderate degree of apical wall ischemia noted involving a medium sized area    of left ventricular myocardium.    Gated images demonstrate normal left ventricular systolic function with EF    of 60 %. LHC: 11/2019    1. 3 vessel mild to moderate CAD but ostial Diagonal disease is   critical.   2. Normal LV systolic function. LVEF > 65 %. 3. Successful Kissing stent ROBIN stenting of mid LAD bifurcation   stenosis. 4. Successful Kissing stent ROBIN stenting of Ostium of the   Diagonal.    ASSESSMENT/PLAN:    1. Coronary artery disease   H/o of PCI with kissing stent ROBIN to LAD bifurcation and kissing stent ROBIN of Ostium of the LAD   Patient is not having cardiac symptoms   clopidogrel was discontinued secondary to GI bleed-continue with aspirin 81 mg, atorvastatin  Commended continue with aggressive risk factor modification including diet, good blood pressure control, and lipid management      2. Essential hypertension  Blood pressure is Controlled  She is to continue current medications lisinopril  Encouraged a low sodium diet  Echocardiogram to show severe LVH-recommend repeat echocardiogram at next visit      3. Mixed hyperlipidemia  Lipids reviewed reviewed from March 2021-is not yet at goal.  HDL 41 LDL 86  Recommend LDL 70 or <  She is working on diet, weight loss and lifestyle modification.   At this time will work continue with atorvastatin 40

## 2021-03-30 NOTE — LETTER
Obinna Adair  1965  [id number]    Have you had any Chest Pain that is not new? - No           Have you had any Shortness of Breath - No  If Yes - When         Have you had any dizziness - Yes due to anemia      Have you had any palpitations that are not new? - No     Is the patient on any of the following medications - NONE  If Yes DO EKG - Needs done every 6 months    Do you have any edema - swelling in No      When did you have your last labs drawn 3/19/2021 in Epic        If we do not have these labs you are retrieve these labs for these providers!     Do you have a surgery or procedure scheduled in the near future - No   If Yes- DO EKG      Ask patient [facility] if they are not already signed up     Check to see if we have an E-MAIL on file for the patient     Check medication list thoroughly!!! AND RECONCILE OUTSIDE MEDICATIONS  If dose has changed change the entire order not just the MG  BE SURE TO ASK PATIENT IF THEY NEED MEDICATION REFILLS     At check out add to every patient's \"wrap up\" the following dot phrase AFTERHOURSEDUCATION and ensure we explain this to our patients

## 2021-03-31 ENCOUNTER — TELEPHONE (OUTPATIENT)
Dept: CARDIOLOGY CLINIC | Age: 56
End: 2021-03-31

## 2021-04-06 ENCOUNTER — TELEPHONE (OUTPATIENT)
Dept: CARDIOLOGY CLINIC | Age: 56
End: 2021-04-06

## 2021-04-07 ENCOUNTER — OFFICE VISIT (OUTPATIENT)
Dept: INFECTIOUS DISEASES | Age: 56
End: 2021-04-07
Payer: COMMERCIAL

## 2021-04-07 VITALS
TEMPERATURE: 97.8 F | WEIGHT: 267.4 LBS | BODY MASS INDEX: 43.16 KG/M2 | SYSTOLIC BLOOD PRESSURE: 126 MMHG | RESPIRATION RATE: 18 BRPM | DIASTOLIC BLOOD PRESSURE: 75 MMHG | HEART RATE: 88 BPM

## 2021-04-07 DIAGNOSIS — L03.314 CELLULITIS OF GROIN: ICD-10-CM

## 2021-04-07 PROCEDURE — 99212 OFFICE O/P EST SF 10 MIN: CPT | Performed by: INTERNAL MEDICINE

## 2021-04-07 PROCEDURE — 3017F COLORECTAL CA SCREEN DOC REV: CPT | Performed by: INTERNAL MEDICINE

## 2021-04-07 PROCEDURE — G8417 CALC BMI ABV UP PARAM F/U: HCPCS | Performed by: INTERNAL MEDICINE

## 2021-04-07 PROCEDURE — G8427 DOCREV CUR MEDS BY ELIG CLIN: HCPCS | Performed by: INTERNAL MEDICINE

## 2021-04-07 PROCEDURE — 4004F PT TOBACCO SCREEN RCVD TLK: CPT | Performed by: INTERNAL MEDICINE

## 2021-04-07 NOTE — PROGRESS NOTES
groin    WD - Ulcer of right groin with fat layer exposed ([address]    Diabetes mellitus type 2 in obese [facility]    Mixed hyperlipidemia    Hypertriglyceridemia    Duodenal ulcer    Diabetic polyneuropathy associated with type 2 diabetes mellitus (HCC)    Primary insomnia       Current Outpatient Medications   Medication Sig Dispense Refill    atorvastatin (LIPITOR) 40 MG tablet Take 1 tablet by mouth daily 30 tablet 5    lisinopril (PRINIVIL;ZESTRIL) 20 MG tablet Take 1 tablet by mouth daily 30 tablet 5    carvedilol (COREG) 6.25 MG tablet Take 1 tablet by mouth daily 60 tablet 5    nicotine (NICODERM CQ) 7 MG/24HR Place 1 patch onto the skin daily for 14 days 14 patch 0    aspirin (ASPIRIN ADULT LOW STRENGTH) 81 MG EC tablet Take 1 tablet by mouth daily 30 tablet 4    pantoprazole (PROTONIX) 40 MG tablet Take 40 mg by mouth daily       INVOKANA 300 MG TABS tablet Take 1 tablet by mouth every morning (before breakfast) 30 tablet 0    ferrous sulfate (IRON 325) 325 (65 Fe) MG tablet Take 1 tablet by mouth daily (with breakfast) 90 tablet 0    BISACODYL 5 MG EC tablet       [START ON 4/13/2021] Cholecalciferol (VITAMIN D3) 20 MCG (800 UNIT) TABS Take 1 tablet by mouth daily Start this medication on April 13, 20201 75 tablet 0    Blood Pressure KIT 1 Act by Does not apply route daily 1 kit 0    miconazole (MICOTIN) 2 % powder Apply topically 2 times daily. 45 g 1    Silver (INTERDRY AG TEXTILE 10\"X12') MISC Apply 1 Act topically daily 30 each 0    gabapentin (NEURONTIN) 300 MG capsule Take 300 mg by mouth nightly.       linagliptin (TRADJENTA) 5 MG tablet Take 1 tablet by mouth daily 30 tablet 2    insulin glargine (LANTUS SOLOSTAR) 100 UNIT/ML injection pen Inject 40 Units into the skin daily 5 pen 3    insulin aspart (NOVOLOG FLEXPEN) 100 UNIT/ML injection pen Take 20 units with each meal in addition to sliding scale as below   *Medium Dose Correction Algorithm**Insulin: 3 TIMES DAILY WITH MEALSGlucose: Dose: No [id number] 2 [id number] 4 [id number] 6 [id number] 8 [id number] 10 Slbmc666 and above 12 Units 5 pen 3    blood glucose monitor kit and supplies Test 4 times a day before meals and bedtime& as needed for symptoms of irregular blood glucose. 1 kit 0    Insulin Pen Needle (PEN NEEDLES) 31G X 5 MM MISC lantus QHS and Novolog QAC and  each 2    Lancets MISC 1 each by Does not apply route 4 times daily 200 each 2    blood glucose monitor strips Test 4 times a day & as needed for symptoms of irregular blood glucose. 200 strip 2     No current facility-administered medications for this visit. Past Medical History:   Diagnosis Date    CAD (coronary artery disease)     Current every day smoker     Diabetes mellitus ([address]     GERD (gastroesophageal reflux disease)     History of exercise stress test 12/18/2019    Treadmill, Normal exercise performance without angina and ischemic EKG changes.  Hyperlipidemia     Hypertension     Morbid obesity ([address]     Palpitations     Post PTCA 11/26/2019    Kissing stent to LAD & to Ostium of Diag.  SOB (shortness of breath)     Type 2 diabetes mellitus with ketoacidosis without coma, with long-term current use of insulin ([address] 11/26/2019    DM diagnosed about 2013       Past Surgical History:   Procedure Laterality Date    HERNIA REPAIR      PTCA  11/26/2019    Kissing stents to LAD & Ostium of Diag.     UPPER GASTROINTESTINAL ENDOSCOPY N/A 2/13/2021    EGD DIAGNOSTIC ONLY performed by Kirill Howard MD at [address] [place] [address] Marital status:      Spouse name: Not on file    Number of children: Not on file    Years of education: Not on file    Highest education level: Not on file   Occupational History    Not on file   Social Needs    Financial resource strain: Not on file    Food insecurity     Worry: Not on file     Inability: Not on file   VulevÃƒÂº needs     Medical: Not on file     Non-medical: Not on file   Tobacco Use    Smoking status: Current Some Day Smoker     Packs/day: 0.25    Smokeless tobacco: Never Used    Tobacco comment: 1 pack every four days    Substance and Sexual Activity    Alcohol use: Never     Frequency: Never    Drug use: Never    Sexual activity: Yes     Partners: Male, Female   Lifestyle    Physical activity     Days per week: Not on file     Minutes per session: Not on file    Stress: Not on file   Relationships    Social connections     Talks on phone: Not on file     Gets together: Not on file     Attends [employer] service: Not on file     Active member of club or organization: Not on file     Attends meetings of clubs or organizations: Not on file     Relationship status: Not on file    Intimate partner violence     Fear of current or ex partner: Not on file     Emotionally abused: Not on file     Physically abused: Not on file     Forced sexual activity: Not on file   Other Topics Concern    Not on file   Social History Narrative    Not on file       No family history on file.     Vital Signs:  Vitals:    04/07/21 1224   BP: 126/75   Site: Right Upper Arm   Position: Sitting   Cuff Size: Large Adult   Pulse: 88   Resp: 18   Temp: 97.8 °F (36.6 °C)   TempSrc: Infrared   Weight: 267 lb 6.4 oz (121.3 kg)        Wt Readings from Last 3 Encounters:   04/07/21 267 lb 6.4 oz (121.3 kg)   03/30/21 268 lb 6.4 oz (121.7 kg)   03/23/21 262 lb (118.8 kg)        Physical Exam:   Gen: alert and NAD  HEENT: sclera clear, pupils equal and reactive, extra ocular muscles intact, oropharynx clear, mucus membranes moist, tympanic membranes clear bilaterally, no cervical lymphadenopathy noted and neck supple  Neck: supple, no significant adenopathy  Chest: clear to auscultation, no wheezes, rales or rhonchi, symmetric air entry  Heart: regular rate and rhythm, no murmurs  ABD: abdomen is soft without significant tenderness, masses,

## 2021-04-19 RX ORDER — CLOPIDOGREL BISULFATE 75 MG/1
TABLET ORAL
Qty: 30 TABLET | Refills: 4 | OUTPATIENT
Start: 2021-04-19

## 2021-04-27 RX ORDER — CLOPIDOGREL BISULFATE 75 MG/1
TABLET ORAL
Qty: 30 TABLET | Refills: 4 | OUTPATIENT
Start: 2021-04-27

## 2021-04-30 ENCOUNTER — TELEPHONE (OUTPATIENT)
Dept: INTERNAL MEDICINE CLINIC | Age: 56
End: 2021-04-30

## 2021-05-03 RX ORDER — CLOPIDOGREL BISULFATE 75 MG/1
TABLET ORAL
Qty: 30 TABLET | Refills: 4 | OUTPATIENT
Start: 2021-05-03

## 2021-05-12 ENCOUNTER — TELEPHONE (OUTPATIENT)
Dept: INTERNAL MEDICINE CLINIC | Age: 56
End: 2021-05-12

## 2021-05-14 ENCOUNTER — PATIENT MESSAGE (OUTPATIENT)
Dept: INTERNAL MEDICINE CLINIC | Age: 56
End: 2021-05-14

## 2021-05-14 NOTE — TELEPHONE ENCOUNTER
From: Jannie Duverney  To: David Lorenzo MD  Sent: 5/14/2021 4:11 PM EDT  Subject: Prescription Question    Can you possibly do a refill for me on trajenta please

## 2021-05-17 RX ORDER — ATORVASTATIN CALCIUM 40 MG/1
40 TABLET, FILM COATED ORAL DAILY
Qty: 30 TABLET | Refills: 5 | OUTPATIENT
Start: 2021-05-17

## 2021-05-17 RX ORDER — LISINOPRIL 20 MG/1
20 TABLET ORAL DAILY
Qty: 30 TABLET | Refills: 5 | OUTPATIENT
Start: 2021-05-17

## 2021-05-17 RX ORDER — CARVEDILOL 6.25 MG/1
6.25 TABLET ORAL DAILY
Qty: 60 TABLET | Refills: 5 | OUTPATIENT
Start: 2021-05-17

## 2021-05-17 RX ORDER — ASPIRIN 81 MG/1
81 TABLET ORAL DAILY
Qty: 30 TABLET | Refills: 4 | OUTPATIENT
Start: 2021-05-17

## 2021-05-24 RX ORDER — CLOPIDOGREL BISULFATE 75 MG/1
TABLET ORAL
Qty: 30 TABLET | Refills: 4 | OUTPATIENT
Start: 2021-05-24

## 2021-06-17 ENCOUNTER — OFFICE VISIT (OUTPATIENT)
Dept: INTERNAL MEDICINE CLINIC | Age: 56
End: 2021-06-17
Payer: COMMERCIAL

## 2021-06-17 VITALS
BODY MASS INDEX: 42.91 KG/M2 | HEIGHT: 66 IN | SYSTOLIC BLOOD PRESSURE: 120 MMHG | WEIGHT: 267 LBS | DIASTOLIC BLOOD PRESSURE: 80 MMHG | HEART RATE: 86 BPM | OXYGEN SATURATION: 98 %

## 2021-06-17 DIAGNOSIS — E66.01 MORBID OBESITY (HCC): ICD-10-CM

## 2021-06-17 DIAGNOSIS — E78.2 MIXED HYPERLIPIDEMIA: ICD-10-CM

## 2021-06-17 DIAGNOSIS — E66.9 DIABETES MELLITUS TYPE 2 IN OBESE (HCC): Primary | ICD-10-CM

## 2021-06-17 DIAGNOSIS — E11.69 DIABETES MELLITUS TYPE 2 IN OBESE (HCC): Primary | ICD-10-CM

## 2021-06-17 DIAGNOSIS — I10 ESSENTIAL HYPERTENSION: ICD-10-CM

## 2021-06-17 LAB — HBA1C MFR BLD: 9 %

## 2021-06-17 PROCEDURE — 3046F HEMOGLOBIN A1C LEVEL >9.0%: CPT | Performed by: INTERNAL MEDICINE

## 2021-06-17 PROCEDURE — 2022F DILAT RTA XM EVC RTNOPTHY: CPT | Performed by: INTERNAL MEDICINE

## 2021-06-17 PROCEDURE — G8427 DOCREV CUR MEDS BY ELIG CLIN: HCPCS | Performed by: INTERNAL MEDICINE

## 2021-06-17 PROCEDURE — 99214 OFFICE O/P EST MOD 30 MIN: CPT | Performed by: INTERNAL MEDICINE

## 2021-06-17 PROCEDURE — 83036 HEMOGLOBIN GLYCOSYLATED A1C: CPT | Performed by: INTERNAL MEDICINE

## 2021-06-17 PROCEDURE — G8417 CALC BMI ABV UP PARAM F/U: HCPCS | Performed by: INTERNAL MEDICINE

## 2021-06-17 PROCEDURE — 4004F PT TOBACCO SCREEN RCVD TLK: CPT | Performed by: INTERNAL MEDICINE

## 2021-06-17 PROCEDURE — 3017F COLORECTAL CA SCREEN DOC REV: CPT | Performed by: INTERNAL MEDICINE

## 2021-06-17 RX ORDER — CARVEDILOL 6.25 MG/1
6.25 TABLET ORAL DAILY
Qty: 60 TABLET | Refills: 5 | Status: CANCELLED | OUTPATIENT
Start: 2021-06-17

## 2021-06-17 RX ORDER — CARVEDILOL 6.25 MG/1
6.25 TABLET ORAL DAILY
Qty: 90 TABLET | Refills: 1 | Status: SHIPPED | OUTPATIENT
Start: 2021-06-17

## 2021-06-17 RX ORDER — BISACODYL 5 MG
10 TABLET, DELAYED RELEASE (ENTERIC COATED) ORAL DAILY PRN
Qty: 60 TABLET | Refills: 1 | Status: SHIPPED | OUTPATIENT
Start: 2021-06-17 | End: 2022-03-26

## 2021-06-17 RX ORDER — OMEPRAZOLE 20 MG/1
CAPSULE, DELAYED RELEASE ORAL
COMMUNITY
Start: 2021-05-13 | End: 2021-08-26

## 2021-06-17 RX ORDER — LISINOPRIL 20 MG/1
20 TABLET ORAL DAILY
Qty: 90 TABLET | Refills: 1 | Status: SHIPPED | OUTPATIENT
Start: 2021-06-17 | End: 2022-05-03

## 2021-06-17 RX ORDER — CANAGLIFLOZIN 300 MG/1
300 TABLET, FILM COATED ORAL
Qty: 90 TABLET | Refills: 1 | Status: SHIPPED | OUTPATIENT
Start: 2021-06-17 | End: 2021-12-02

## 2021-06-17 RX ORDER — ATORVASTATIN CALCIUM 40 MG/1
40 TABLET, FILM COATED ORAL DAILY
Qty: 90 TABLET | Refills: 1 | Status: SHIPPED | OUTPATIENT
Start: 2021-06-17 | End: 2022-05-03

## 2021-06-17 NOTE — PROGRESS NOTES
6/17/21    Janette Braswell  1965    Chief Complaint   Patient presents with    Medication Refill       History of Present Illness:  Janette Braswell is a 50 y.o. pleasant lady presenting today with a chief complaint of DM, HTN, HL. She has a past medical history significant for:  HTN, on Coreg 6.25mg QD, Lisinopril 20mg QD  HL (LDL 86,  on 3/19/2021), on Atorvastatin 40mg QD  DM type 2 (HbA1C 11.6% on 2/11/2021), on Invokana 300mg QD, Tradjenta 5mg QD, Lantus 40u QHS, Novolog 20u AC + SSI   Diabetic neuropathy, off Gabapentin  CAD s/p PCI (2019), on ASA, Coreg 6.25mg BID  HFpEF (G1DD, EF 55-60%, LVH on TTE 11/25/19)  Duodenal ulcer, on Protonix 40mg QAM  Diverticulosis   Hepatic steatosis   Vitamin D deficiency (25-OH level was 9.86 on 2/13/2021), on 50,000u QD x8 doses (since 02/2021) then 1000u QD  Morbid obesity (BMI 42)      # Patient admitted Feb 2021 for L groin cellulitis c/b sepsis. Her hospital stay was c/b GIB requiring transfusions. Hb has improved. EGD showed 2cm duodenal ulcer. Stopped DAPT. Only on ASA. On a PPI. Needs H pylori stool Ag as it was not done in hospital.   S/p colonoscopy at [place] [address] with no focus of bleed. No polyps. Will be getting barium. She is s/p wound clinic for abdominal ulcers. Cultures positive for MSSA, E. faecalis, E. coli. Seeing Dr. Solis Stone, taking Ceftin and Doxycycline. Will be seeing weight management. # Patient with TG > 500 in Nov 2019. On Lipitor. LDL > 190 in Nov 2019. LDL and TG have improved since then. # Patient with uncontrolled DM. BG recently at home has been 130-170's. She is on Lantus, Novolog, Invokana, Tradjenta. Did not tolerate Metformin. Sees Dr. Catherine Linn. Also takes Gabapentin for diabetic neuropathy. # Patient with HTN. BP today 120/80. Takes ACEi and BB. # Sees Dr. Domenica White from Cardiology. She has CAD s/p PCI in 2019. She is off DAPT and on ASA only with BB. # Patient on high doses of vitamin D for level < 10. Health maintenance:   Health Maintenance Due   Topic Date Due    Hepatitis C screen  Never done    Diabetic foot exam  Never done    Diabetic retinal exam  Never done    HIV screen  Never done    Diabetic microalbuminuria test  Never done    Hepatitis B vaccine (1 of 3 - Risk 3-dose series) Never done    DTaP/Tdap/Td vaccine (1 - Tdap) Never done    Cervical cancer screen  Never done    Breast cancer screen  Never done    Shingles Vaccine (1 of 2) Never done    A1C test (Diabetic or Prediabetic)  05/11/2021         Review of Systems:  Constitutional: no fevers, no chills, no night sweats, no weight loss, no weight gain, no fatigue   Pain assessment: no pain  Head: no headaches  Ears: no hearing loss, no tinnitus, no vertigo  Eyes: no blurry vision, no diplopia, no dryness, no itchiness  Mouth: no oral ulcers, no dry mouth, no sore throat  Nose: no nasal congestion, no epistaxis  Cardiac: no chest pain, no palpitations, no leg swelling, no orthopnea, no PND, no syncope  Pulmonary: no dyspnea, no cough, no wheezing, no hemoptysis  GI: no nausea, no vomiting, no diarrhea, no constipation, no abdominal pain, no hematochezia  : no dysuria, no frequency, no urgency, no hematuria, no frothy urine, no dyspareunia, no pelvic pain, no vaginal bleeding, no abnormal vaginal discharge  MSK: no arthralgias, no myalgias, no early morning stiffness, no Raynaud's   Neuro: no focal neurological deficits, no seizures  Sleep: no snoring, no daytime somnolence   Psych: no depression, no anxiety, no suicidal ideation, insomnia      Physical Exam:  VITALS:   /80   Pulse 86   Ht 5' 6\" (1.676 m)   Wt 267 lb (121.1 kg)   SpO2 98%   BMI 43.09 kg/m²     PHYSICAL EXAMINATION:  General: alert, awake, and oriented to time, place, person, and situation. Not in acute distress   Skin:  no suspicious rashes, no jaundice  Head: normocephalic/atraumatic  Eyes: anicteric sclera, well-injected conjunctiva.  Pupils are equally round and reactive to light. Extraocular movements are intact   Nose: no septal deviation evident  Sinuses: no sinus tenderness  Ears: external ears normal  Neck: supple, no cervical lymphadenopathy, thyroid symmetric and not enlarged, no bruits   Heart: regular rate and rhythm, regular S1/S2, no K0/V5, O3/8 systolic murmur heard all over, no audible friction rub  Lungs: clear to auscultation bilaterally, no audible crackles, no audible wheezes, no audible rhonchi    Abdomen: normal bowel sounds, soft abdomen, non-tender, bilateral oannus  Extremities: no edema, warm, no cyanosis, no clubbing. Good capillary refill   MSK: no tenderness across spinous processes, full ROM in all 4 extremities. No joint swelling or tenderness   Peripheral vascular: 2+ pulses symmetric (radial)  Neuro: gait normal, CN II-XII intact, motor power 5/5 in all 4 extremities, sensation intact and symmetric    Labs   I have personally reviewed labs, and discussed pertinent findings with patient on this date 6/17/2021     Imaging   I have personally reviewed imaging, and discussed pertinent findings with patient on this date 6/17/2021     Other notes  I have personally reviewed other notes, and discussed pertinent findings with patient on this date 6/17/2021       Assessment/Plan:     1. Diabetes mellitus type 2 in obese [facility])  Patient follows with Dr. Macey Busby but wants A1C drawn today  A1C 11.6% in Feb 2021, today 9.0%   Significant improvement but still uncontrolled  Continue Invokana 300mg QD, Tradjenta 5mg QD, Lantus 40u QHS, Novolog 20u AC + SSI for now  Encouraged increased lifestyle modifications   - POCT glycosylated hemoglobin (Hb A1C)    2. Essential hypertension  Stable  Continue Coreg 6.25mg QD (as per patient QD not BID)  Continue Lisinopril 20mg QD    3. Mixed hyperlipidemia  LDL 86,  in March 2021  Continue Atorvastatin 40mg QD    4.  Morbid obesity ([address]  Encouraged weight loss  Patient is not ready yet to go to [facility] Care discussed with patient and questions answered. Patient verbalizes understanding and agrees with plan. Discussed with patient the importance of continuity of care. I encouraged patient to schedule next appointment within 3 months with me. Patient prefers to be reached by Phone call at [phone] for future medical correspondence. Encouraged to activate [employer]. I reviewed and reconciled the medications this visit. I reviewed and updated the past medical, surgical, social, and family history during this visit. After visit summary provided.        Karina Wayne MD  Internal Medicine  6/17/2021   9:13 AM

## 2021-08-26 RX ORDER — OMEPRAZOLE 20 MG/1
CAPSULE, DELAYED RELEASE ORAL
Qty: 90 CAPSULE | Refills: 0 | Status: SHIPPED | OUTPATIENT
Start: 2021-08-26 | End: 2021-11-23 | Stop reason: SDUPTHER

## 2021-08-27 RX ORDER — CLOPIDOGREL BISULFATE 75 MG/1
TABLET ORAL
Qty: 30 TABLET | Refills: 5 | OUTPATIENT
Start: 2021-08-27

## 2021-09-15 RX ORDER — ASPIRIN 81 MG/1
TABLET ORAL
Qty: 30 TABLET | Refills: 4 | OUTPATIENT
Start: 2021-09-15

## 2021-10-01 ENCOUNTER — TELEPHONE (OUTPATIENT)
Dept: INTERNAL MEDICINE CLINIC | Age: 56
End: 2021-10-01

## 2021-10-01 ENCOUNTER — VIRTUAL VISIT (OUTPATIENT)
Dept: INTERNAL MEDICINE CLINIC | Age: 56
End: 2021-10-01
Payer: COMMERCIAL

## 2021-10-01 DIAGNOSIS — E66.9 DIABETES MELLITUS TYPE 2 IN OBESE (HCC): ICD-10-CM

## 2021-10-01 DIAGNOSIS — R05.9 COUGH: ICD-10-CM

## 2021-10-01 DIAGNOSIS — Z20.822 SUSPECTED COVID-19 VIRUS INFECTION: Primary | ICD-10-CM

## 2021-10-01 DIAGNOSIS — E11.69 DIABETES MELLITUS TYPE 2 IN OBESE (HCC): ICD-10-CM

## 2021-10-01 LAB — HBA1C MFR BLD: 10 %

## 2021-10-01 PROCEDURE — 83036 HEMOGLOBIN GLYCOSYLATED A1C: CPT | Performed by: INTERNAL MEDICINE

## 2021-10-01 PROCEDURE — 2022F DILAT RTA XM EVC RTNOPTHY: CPT | Performed by: INTERNAL MEDICINE

## 2021-10-01 PROCEDURE — 3052F HG A1C>EQUAL 8.0%<EQUAL 9.0%: CPT | Performed by: INTERNAL MEDICINE

## 2021-10-01 PROCEDURE — G8427 DOCREV CUR MEDS BY ELIG CLIN: HCPCS | Performed by: INTERNAL MEDICINE

## 2021-10-01 PROCEDURE — 3017F COLORECTAL CA SCREEN DOC REV: CPT | Performed by: INTERNAL MEDICINE

## 2021-10-01 PROCEDURE — 99214 OFFICE O/P EST MOD 30 MIN: CPT | Performed by: INTERNAL MEDICINE

## 2021-10-01 RX ORDER — ASPIRIN 81 MG/1
TABLET ORAL
Qty: 30 TABLET | Refills: 4 | OUTPATIENT
Start: 2021-10-01

## 2021-10-01 RX ORDER — BENZONATATE 100 MG/1
100-200 CAPSULE ORAL 3 TIMES DAILY PRN
Qty: 60 CAPSULE | Refills: 0 | Status: SHIPPED | OUTPATIENT
Start: 2021-10-01 | End: 2021-10-08

## 2021-10-01 ASSESSMENT — PATIENT HEALTH QUESTIONNAIRE - PHQ9: DEPRESSION UNABLE TO ASSESS: URGENT/EMERGENT SITUATION

## 2021-10-01 NOTE — TELEPHONE ENCOUNTER
----- Message from Negin Santana MD sent at 10/1/2021 12:50 PM EDT -----  Please inform patient that her A1C has gone up significantly. We need to discuss this over a VV or in person (if feeling better and covid test is negative) within the next week.

## 2021-10-01 NOTE — LETTER
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 She may return to work on 10/5/2021 with no restrictions. If you have any questions or concerns, please don't hesitate to call.     Sincerely,    Althia Burkitt, MD

## 2021-10-01 NOTE — PROGRESS NOTES
TELEHEALTH EVALUATION -- Audio/Visual (During NMRLF-60 public health emergency)      Cristal Henson  1965    Patient location: Patient Dagoberto Urbina is a 49 y.o. pleasant lady evaluated via video on 10/1/21       Consent:  She and/or health care decision maker is aware that that she may receive a bill for this virtual visit service, depending on her insurance coverage, and has provided verbal consent to proceed: Yes      History of Present Illness:  Cristal Henson is a 49 y.o. pleasant lady who has requested an audio/video evaluation for the following concern(s): cough and congestion. She has a past medical history significant for:  HTN, on Coreg 6.25mg QD, Lisinopril 20mg QD  HL (LDL 86,  on 3/19/2021), on Atorvastatin 40mg QD  DM type 2 (HbA1C 9.0% on 6/17/2021), on Invokana 300mg QD, Tradjenta 5mg QD, Lantus 40u QHS, Novolog 20u AC + SSI   Diabetic neuropathy, off Gabapentin  CAD s/p PCI (2019), on ASA, Coreg 6.25mg BID  HFpEF (G1DD, EF 55-60%, LVH on TTE 11/25/19)  Duodenal ulcer, on Protonix 40mg QAM  Diverticulosis   Hepatic steatosis   Vitamin D deficiency (25-OH level was 9.86 on 2/13/2021), on 50,000u QD x8 doses (since 02/2021) then 1000u QD  Morbid obesity (BMI 43)   Current smoker     # Over the past week patient has been having cough, congestion, rhinorrhea (symptom start date 9/24). Cough is productive of green-yellow sputum and thick green-yellow rhinorrhea as well. No fever or chills. No anosmia or dysgeusia. No sick contacts. UTD on COVID-19 vaccination (J[employer]J). She has not been tested since her symptoms started.        Health maintenance:   Health Maintenance Due   Topic Date Due    Hepatitis C screen  Never done    Diabetic foot exam  Never done    Diabetic retinal exam  Never done    HIV screen  Never done    Diabetic microalbuminuria test  Never done    Hepatitis B vaccine (1 of 3 - Risk 3-dose series) Never done    DTaP/Tdap/Td vaccine (1 - Tdap) Never done   Verl Raw today   LTFU with Dr. Gianni Roth? Continue for now Invokana 300mg QD, Tradjenta 5mg QD, Lantus 40u QHS, Novolog 20u AC + SSI   - POCT glycosylated hemoglobin (Hb A1C)      Care discussed with patient and questions answered. Patient verbalizes understanding and agrees with plan. Discussed with patient the importance of continuity of care. I encouraged patient to schedule next appointment within 3 months with me. I reviewed and reconciled the medications this visit. I reviewed and updated the past medical, surgical, social, and family history during this visit. Pursuant to the emergency declaration under the [address] [place] [address], [facility] waiver authority and the [employer] Act, this Virtual Visit was conducted, with patient's consent, to reduce the patient's risk of exposure to COVID-19 and provide continuity of care for an established patient. Services were provided through a video synchronous discussion virtually to substitute for in-person clinic visit.       Sofiya Teague MD  Internal Medicine  10/1/2021   9:48 AM

## 2021-10-02 LAB — SARS-COV-2: NOT DETECTED

## 2021-10-04 ENCOUNTER — TELEPHONE (OUTPATIENT)
Dept: INTERNAL MEDICINE CLINIC | Age: 56
End: 2021-10-04

## 2021-10-04 NOTE — LETTER
[address] Internal Med  [facility]  Phone: [phone]  Fax: [phone]    Darío Quintanilla MD        October 4, 2021     Patient: Angelo Laws   YOB: 1965   Date of Visit: 10/4/2021       To Whom It May Concern: It is my medical opinion that Angelo Laws may return to work on 10/11/2021 with no restrictions. If you have any questions or concerns, please don't hesitate to call.     Sincerely,    Darío Quintanilla MD

## 2021-11-04 ENCOUNTER — VIRTUAL VISIT (OUTPATIENT)
Dept: INTERNAL MEDICINE CLINIC | Age: 56
End: 2021-11-04
Payer: COMMERCIAL

## 2021-11-04 DIAGNOSIS — R05.8 COUGH PRODUCTIVE OF PURULENT SPUTUM: ICD-10-CM

## 2021-11-04 DIAGNOSIS — J04.0 LARYNGITIS: Primary | ICD-10-CM

## 2021-11-04 PROCEDURE — 3017F COLORECTAL CA SCREEN DOC REV: CPT | Performed by: NURSE PRACTITIONER

## 2021-11-04 PROCEDURE — 4004F PT TOBACCO SCREEN RCVD TLK: CPT | Performed by: NURSE PRACTITIONER

## 2021-11-04 PROCEDURE — G8484 FLU IMMUNIZE NO ADMIN: HCPCS | Performed by: NURSE PRACTITIONER

## 2021-11-04 PROCEDURE — G8428 CUR MEDS NOT DOCUMENT: HCPCS | Performed by: NURSE PRACTITIONER

## 2021-11-04 PROCEDURE — G8417 CALC BMI ABV UP PARAM F/U: HCPCS | Performed by: NURSE PRACTITIONER

## 2021-11-04 PROCEDURE — 99213 OFFICE O/P EST LOW 20 MIN: CPT | Performed by: NURSE PRACTITIONER

## 2021-11-04 RX ORDER — AMOXICILLIN 875 MG/1
875 TABLET, COATED ORAL 2 TIMES DAILY
Qty: 20 TABLET | Refills: 0 | Status: SHIPPED | OUTPATIENT
Start: 2021-11-04 | End: 2021-11-14

## 2021-11-04 RX ORDER — BENZONATATE 200 MG/1
200 CAPSULE ORAL 3 TIMES DAILY PRN
Qty: 30 CAPSULE | Refills: 0 | Status: SHIPPED | OUTPATIENT
Start: 2021-11-04 | End: 2021-11-11

## 2021-11-04 NOTE — PROGRESS NOTES
Subjective:       History was provided by the patient. Janene Modi is a 49 y.o. female who presents for evaluation of a sore throat. Associated symptoms include loss of voice. Onset of symptoms was 2 days ago, unchanged since that time. She is drinking plenty of fluids. She has not had recent close exposure to someone with proven streptococcal pharyngitis. Past Medical History:   Diagnosis Date    CAD (coronary artery disease)     Current every day smoker     Diabetes mellitus ([address]     GERD (gastroesophageal reflux disease)     History of exercise stress test 12/18/2019    Treadmill, Normal exercise performance without angina and ischemic EKG changes.  Hyperlipidemia     Hypertension     Morbid obesity ([address]     Palpitations     Post PTCA 11/26/2019    Kissing stent to LAD & to Ostium of Diag.     SOB (shortness of breath)     Type 2 diabetes mellitus with ketoacidosis without coma, with long-term current use of insulin ([address] 11/26/2019    DM diagnosed about 2013     Patient Active Problem List    Diagnosis Date Noted    Diabetes mellitus type 2 in obese ([address] 03/09/2021    Mixed hyperlipidemia 03/09/2021    Hypertriglyceridemia 03/09/2021    Duodenal ulcer 03/09/2021    Diabetic polyneuropathy associated with type 2 diabetes mellitus ([address] 03/09/2021    Primary insomnia 03/09/2021    WD - Ulcer of right groin with fat layer exposed ([address] 02/25/2021    WD - Cellulitis of groin 02/24/2021    Vitamin D deficiency 02/15/2021    Encounter for monitoring antiplatelet therapy     Sepsis ([address]     Cellulitis     High anion gap metabolic acidosis     Diarrhea     Hyponatremia 02/10/2021    SOB (shortness of breath)     CAD (coronary artery disease)     Palpitations     Unstable angina ([address] 11/26/2019    Morbid obesity ([address] 11/26/2019    Essential hypertension 11/26/2019    Dyslipidemia 11/26/2019    Post PTCA 11/26/2019    Acute chest pain 11/25/2019     Past Surgical History: Procedure Laterality Date    HERNIA REPAIR      PTCA  11/26/2019    Kissing stents to LAD & Ostium of Diag.  UPPER GASTROINTESTINAL ENDOSCOPY N/A 2/13/2021    EGD DIAGNOSTIC ONLY performed by Shaun Grant MD at [facility] ENDOSCOPY     Allergies   Allergen Reactions    Ampicillin Rash   Pt reports that she can and has taken amoxicillin before with no reactions. Review of Systems  Pertinent items are noted in HPI. Objective:      General appearance: alert, appears stated age and cooperative  Head: Normocephalic, without obvious abnormality, atraumatic    Due to this being a TeleHealth encounter, evaluation of the following organ systems is limited: Vitals/Constitutional/EENT/Resp/CV/GI//MSK/Neuro/Skin/Heme-Lymph-Imm. Assessment:      Laryngitis. Plan:      Pt placed on antibiotics. Use of OTC analgesics recommended as well as salt water gargles. Use of decongestant recommended. Follow up as needed. 1. Laryngitis  Pt had a sore throat, that has gone away now she is loosing her voice. Does not hurt to swallow. No exposure. No fever, no chills. Pt refused covid test.     - amoxicillin (AMOXIL) 875 MG tablet; Take 1 tablet by mouth 2 times daily for 10 days  Dispense: 20 tablet; Refill: 0    2. Cough productive of purulent sputum  Chronic cough, has been present since her last visit with PCP. She uses the benzonatate to help control the cough at work. Suggested to pt to try mucinex OTC as well. - benzonatate (TESSALON) 200 MG capsule; Take 1 capsule by mouth 3 times daily as needed for Cough  Dispense: 30 capsule; Refill: 0         Jong Rein, was evaluated through a synchronous (real-time) audio-video encounter. The patient (or guardian if applicable) is aware that this is a billable service. Verbal consent to proceed has been obtained within the past 12 months.  The visit was conducted pursuant to the emergency declaration under the [address] [place] [address]

## 2021-11-16 ENCOUNTER — VIRTUAL VISIT (OUTPATIENT)
Dept: INTERNAL MEDICINE CLINIC | Age: 56
End: 2021-11-16
Payer: COMMERCIAL

## 2021-11-16 DIAGNOSIS — J02.9 PHARYNGITIS, UNSPECIFIED ETIOLOGY: Primary | ICD-10-CM

## 2021-11-16 DIAGNOSIS — R05.9 COUGH: ICD-10-CM

## 2021-11-16 PROCEDURE — VIRTUALHLTH VIRTUAL HEALTH SAME DAY: Performed by: NURSE PRACTITIONER

## 2021-11-16 PROCEDURE — 3017F COLORECTAL CA SCREEN DOC REV: CPT | Performed by: NURSE PRACTITIONER

## 2021-11-16 PROCEDURE — 4004F PT TOBACCO SCREEN RCVD TLK: CPT | Performed by: NURSE PRACTITIONER

## 2021-11-16 PROCEDURE — G8427 DOCREV CUR MEDS BY ELIG CLIN: HCPCS | Performed by: NURSE PRACTITIONER

## 2021-11-16 PROCEDURE — 99213 OFFICE O/P EST LOW 20 MIN: CPT | Performed by: NURSE PRACTITIONER

## 2021-11-16 PROCEDURE — G8484 FLU IMMUNIZE NO ADMIN: HCPCS | Performed by: NURSE PRACTITIONER

## 2021-11-16 PROCEDURE — G8417 CALC BMI ABV UP PARAM F/U: HCPCS | Performed by: NURSE PRACTITIONER

## 2021-11-16 NOTE — PROGRESS NOTES
2021    TELEHEALTH EVALUATION -- Audio/Visual (During ORHUX-88 public health emergency)    Due to COVID-19 related state of emergency restrictions , as an alternative to an in-person session, the clinical decision was made to utilize a virtual visit to provide services for this patient's visit. These services were provided via [employer]. me with the patient in their home while I was located at the [facility]. Identity was confirmed via patient name and . Verbal consent for use of telehealth was provided to and completed by the patient. HPI:    Gela Porter (:  1965) has requested an audio/video evaluation for the following concern(s):    Chief Complaint   Patient presents with    Cough    Congestion    Pharyngitis       She is being seen today through the [facility]. PCP:  Dr. Ralph Bartholomew    Pharyngitis/Cough/Congestion:  She is being seen today for a 15 day history of intermittent sore throat that continues after treatment with Amoxicillin for 10 days. Describes the sore throat as an irritation when she wakes up, not painful, able to eat and drink without difficulty, but will lose her voice by the end of the day. States that she has return of sinus congestion and cough started yesterday. Denies sob or difficulty breathing or swallowing. Denies fever or chills. She was seen 2021 and placed on Amoxicillin, refused COVID test at that time. Vaccinated, J&J  Speaking in full sentences without distress. Voice is hoarse. No problem-specific Assessment & Plan notes found for this encounter. Review of Systems    Prior to Visit Medications    Medication Sig Taking?  Authorizing Provider   omeprazole (PRILOSEC) 20 MG delayed release capsule TAKE ONE CAPSULE BY MOUTH EVERY MORNING BEFORE BREAKFAST Yes Olga Amaro MD   atorvastatin (LIPITOR) 40 MG tablet Take 1 tablet by mouth daily Yes Olga Amaro MD   Cholecalciferol (VITAMIN D3) 20 MCG (800 UNIT) TABS Take 1 tablet by mouth daily Yes Tika St MD   carvedilol (COREG) 6.25 MG tablet Take 1 tablet by mouth daily Yes Tika St MD   INVOKANA 300 MG TABS tablet Take 1 tablet by mouth every morning (before breakfast) Yes Tika St MD   lisinopril (PRINIVIL;ZESTRIL) 20 MG tablet Take 1 tablet by mouth daily Yes Tika St MD   linagliptin (TRADJENTA) 5 MG tablet Take 1 tablet by mouth daily Yes Tika St MD   aspirin (ASPIRIN ADULT LOW STRENGTH) 81 MG EC tablet Take 1 tablet by mouth daily Yes VIVIAN Patel CNP   insulin glargine (LANTUS SOLOSTAR) 100 UNIT/ML injection pen Inject 40 Units into the skin daily Yes Constance Melendez MD   insulin aspart (NOVOLOG FLEXPEN) 100 UNIT/ML injection pen Take 20 units with each meal in addition to sliding scale as below   *Medium Dose Correction Algorithm**Insulin: 3 TIMES DAILY WITH MEALSGlucose: Dose: No [id number] 2 [id number] 4 [id number] 6 [id number] 8 [id number] 10 [id number] and above 12 Units Yes Nigel Bobby MD   BISACODYL 5 MG EC tablet Take 2 tablets by mouth daily as needed for Constipation  Tika St MD   ferrous sulfate (IRON 325) 325 (65 Fe) MG tablet Take 1 tablet by mouth daily (with breakfast)  Patient not taking: Reported on 11/16/2021  Tika St MD   Blood Pressure KIT 1 Act by Does not apply route daily  Abdon Faria MD   blood glucose monitor kit and supplies Test 4 times a day before meals and bedtime& as needed for symptoms of irregular blood glucose. Constance Melendez MD   Insulin Pen Needle (PEN NEEDLES) 31G X 5 MM MISC lantus QHS and Novolog QAC and HS  Constance Melendez MD   Lancets MISC 1 each by Does not apply route 4 times daily  Constance Melendez MD   blood glucose monitor strips Test 4 times a day & as needed for symptoms of irregular blood glucose.   Constance Melendez MD       Allergies   Allergen Reactions    Ampicillin Rash       Social History     Tobacco Use    Smoking status: Current Some Day Smoker     Packs/day: 0.25    Smokeless tobacco: Never Used    Tobacco comment: 1 pack every four days    Substance Use Topics    Alcohol use: Never    Drug use: Never      Past Medical History:   Diagnosis Date    CAD (coronary artery disease)     Current every day smoker     Diabetes mellitus ([address]     GERD (gastroesophageal reflux disease)     History of exercise stress test 12/18/2019    Treadmill, Normal exercise performance without angina and ischemic EKG changes.  Hyperlipidemia     Hypertension     Morbid obesity ([address]     Palpitations     Post PTCA 11/26/2019    Kissing stent to LAD & to Ostium of Diag.  SOB (shortness of breath)     Type 2 diabetes mellitus with ketoacidosis without coma, with long-term current use of insulin ([facility] 11/26/2019    DM diagnosed about 2013     Past Surgical History:   Procedure Laterality Date    HERNIA REPAIR      PTCA  11/26/2019    Kissing stents to LAD & Ostium of Diag.  UPPER GASTROINTESTINAL ENDOSCOPY N/A 2/13/2021    EGD DIAGNOSTIC ONLY performed by Kelli Ayala MD at [address] [place] [address] Nw:    Vital Signs: (As obtained by patient/caregiver or practitioner observation)    Blood pressure-  Heart rate-    Respiratory rate-    Temperature-  Pulse oximetry-     Physical Exam  Constitutional:       General: She is not in acute distress. Appearance: Normal appearance. She is obese. She is not ill-appearing, toxic-appearing or diaphoretic. HENT:      Head: Normocephalic and atraumatic. Nose: Congestion present. Mouth/Throat:      Mouth: Mucous membranes are moist.   Pulmonary:      Effort: Pulmonary effort is normal. No respiratory distress. Musculoskeletal:      Cervical back: Normal range of motion. Neurological:      General: No focal deficit present. Mental Status: She is alert and oriented to person, place, and time.    Psychiatric:         Mood and Affect: Mood normal. Behavior: Behavior normal.         Thought Content: Thought content normal.         Judgment: Judgment normal.         Other pertinent observable physical exam findings-     Due to this being a TeleHealth encounter, evaluation of the following organ systems is limited: Vitals/Constitutional/EENT/Resp/CV/GI//MS/Neuro/Skin/Heme-Lymph-Imm. ASSESSMENT/PLAN:    1. Pharyngitis, unspecified etiology  She is willing to have COVID testing today and is requesting to be tested. She feels that she is now sick with something different. Advised that she can come to the [facility] today and I will test her for COVID and complete a throat culture. - Encourage clear fluids without caffeine to ensure hydration.  - Warm salt water gurgles to soothe throat. - May use spoonfuls of honey to coat throat. - Rest voice. - Tylenol or ibuprofen as needed for fever, pain. - Counseled on signs of increased work of breathing.   - RTO if sxs increase or no improvement. - Covid-19 Ambulatory; Future  - Culture, Throat  - Covid-19 Ambulatory    2. Cough    - Covid-19 Ambulatory; Future  - Covid-19 Ambulatory      Return if symptoms worsen or fail to improve. An  electronic signature was used to authenticate this note. --VIVIAN Barboza - CNP on 11/16/2021 at 6:29 PM             Pursuant to the emergency declaration under the [facility] waiver authority and the [employer] Act, this Virtual  Visit was conducted, with patient's consent, to reduce the patient's risk of exposure to COVID-19 and provide necessary medical care. The patient (and/or legal guardian) has also been advised to contact this office for worsening conditions or problems, and seek emergency medical treatment and/or call 911 if deemed necessary.      Services were provided through a video synchronous discussion virtually to substitute for in-person clinic visit.        (Please note that portions of this note may have been completed with a voice recognition program. Efforts were made to edit the dictations but occasionally words aremis-transcribed.)

## 2021-11-16 NOTE — PATIENT INSTRUCTIONS
Your COVID 19 test can take 1-5 days for the results to come back. We ask that you make a Mychart page and view your test results this way. You will need to Self quarantine until you know your results. Increase fluids and rest  Saline nasal spray as needed for nasal congestion  Warm salt gargles as needed for throat discomfort  Monitor temperature twice a day  Tylenol as needed for fevers and/or discomfort. Big deep breaths periodically throughout the day  Regular Mucinex over the counter as needed for chest congestion  If symptoms worsen -Go to the ER. Follow up with your primary care provider      To Whom it May Concern:    Gela Porter was tested for COVID-19 11/16/2021. He/she must stay home until test results are back. If test is positive, he/she must quarantine for a total of 10 days starting from day one of symptom onset. He/she must also be fever-free for 24 hours at that time, and also have improvement in symptoms. We do not recommend retesting as patients may continue to test positive for months even though no longer contagious. It is suggested you call [address] or [address] [place] [address] with any questions regarding quarantine timeframe/return to work/school details.
normal...

## 2021-11-17 LAB — SARS-COV-2: NOT DETECTED

## 2021-11-18 ENCOUNTER — PATIENT MESSAGE (OUTPATIENT)
Dept: INTERNAL MEDICINE CLINIC | Age: 56
End: 2021-11-18

## 2021-11-18 DIAGNOSIS — J06.9 UPPER RESPIRATORY TRACT INFECTION, UNSPECIFIED TYPE: Primary | ICD-10-CM

## 2021-11-18 LAB — THROAT CULTURE: NORMAL

## 2021-11-18 NOTE — TELEPHONE ENCOUNTER
From: Harvest Moritz  To:  Yevgeniy Brennan  Sent: 11/18/2021 12:03 PM EST  Subject: Question regarding CULTURE, THROAT    Will you be calling anything in for my throat irritation and lack of voice by chance

## 2021-11-19 RX ORDER — AZITHROMYCIN 250 MG/1
250 TABLET, FILM COATED ORAL SEE ADMIN INSTRUCTIONS
Qty: 6 TABLET | Refills: 0 | Status: SHIPPED | OUTPATIENT
Start: 2021-11-19 | End: 2021-11-24

## 2021-11-23 RX ORDER — OMEPRAZOLE 20 MG/1
CAPSULE, DELAYED RELEASE ORAL
Qty: 90 CAPSULE | Refills: 0 | Status: SHIPPED | OUTPATIENT
Start: 2021-11-23 | End: 2022-02-21

## 2021-11-26 ENCOUNTER — E-VISIT (OUTPATIENT)
Dept: OTHER | Facility: CLINIC | Age: 56
End: 2021-11-26
Payer: COMMERCIAL

## 2021-11-26 DIAGNOSIS — J06.9 UPPER RESPIRATORY TRACT INFECTION, UNSPECIFIED TYPE: Primary | ICD-10-CM

## 2021-11-26 PROCEDURE — 99422 OL DIG E/M SVC 11-20 MIN: CPT | Performed by: INTERNAL MEDICINE

## 2021-11-26 RX ORDER — LEVOFLOXACIN 500 MG/1
500 TABLET, FILM COATED ORAL DAILY
Qty: 7 TABLET | Refills: 0 | Status: SHIPPED | OUTPATIENT
Start: 2021-11-26 | End: 2021-12-03

## 2021-11-26 ASSESSMENT — LIFESTYLE VARIABLES
SMOKING_YEARS: 35
SMOKING_STATUS: YES

## 2021-11-26 NOTE — PROGRESS NOTES
TELEHEALTH EVALUATION -- Evisit (During WCDQJ-71 public health emergency)      Jong Lawrence  1965      SUBJECTIVE    CC: congestion    Briefly, Jong Lawrence is a 49 y.o. pleasant lady with a past medical history is significant for:  HTN, on Coreg 6.25mg QD, Lisinopril 20mg QD  HL (LDL 86,  on 3/19/2021), on Atorvastatin 40mg QD  DM type 2 (HbA1C 10% on 10/1/2021), on Invokana 300mg QD, Tradjenta 5mg QD, Lantus 40u QHS, Novolog 20u AC + SSI   Diabetic neuropathy, off Gabapentin  CAD s/p PCI (2019), on ASA, Coreg 6.25mg BID  HFpEF (G1DD, EF 55-60%, LVH on TTE 11/25/19)  Duodenal ulcer, on Protonix 40mg QAM  Diverticulosis   Hepatic steatosis   Vitamin D deficiency (25-OH level was 9.86 on 2/13/2021), on 50,000u QD x8 doses (since 02/2021) then 1000u QD  Morbid obesity (BMI 43)   Current smoker     HPI: as per patient provided history      OBJECTIVE    Exam: N/A (asynchronous electronic visit)     Recent labs and imaging reviewed. Current meds reviewed. ASSESSMENT/PLAN    1. Upper respiratory tract infection, unspecified type  Chart reviewed. Had VV with walk-in clinic on 11/4 s/p Amoxicillin. Declined covid test at the time. Had VV on 11/16 with walk-in clinic. covid test negative. S/p Azithromycin. Started to have some improvement but then worse again. No steroids due to poorly controlled diabetes, unless necessary. Will send Levofloxacin. If no improvement, may need further eval.       The patient was advised to call or return to clinic prn if these symptoms worsen or fail to improve as anticipated. Follow up 1 month(s) for Office Visit. 11-20 minutes were spent on the digital evaluation and management of this patient.       Nighat Bowden MD  Internal Medicine  11/26/21  10:29 AM

## 2021-12-02 DIAGNOSIS — E11.69 DIABETES MELLITUS TYPE 2 IN OBESE (HCC): ICD-10-CM

## 2021-12-02 DIAGNOSIS — E66.9 DIABETES MELLITUS TYPE 2 IN OBESE (HCC): ICD-10-CM

## 2021-12-02 RX ORDER — OMEGA-3S/DHA/EPA/FISH OIL/D3 300MG-1000
CAPSULE ORAL
Qty: 180 TABLET | Refills: 1 | Status: SHIPPED | OUTPATIENT
Start: 2021-12-02

## 2021-12-02 RX ORDER — CANAGLIFLOZIN 300 MG/1
TABLET, FILM COATED ORAL
Qty: 90 TABLET | Refills: 1 | Status: SHIPPED | OUTPATIENT
Start: 2021-12-02 | End: 2022-06-23 | Stop reason: SDUPTHER

## 2021-12-03 RX ORDER — ASPIRIN 81 MG/1
TABLET ORAL
Qty: 30 TABLET | Refills: 4 | OUTPATIENT
Start: 2021-12-03

## 2021-12-10 ENCOUNTER — TELEPHONE (OUTPATIENT)
Dept: BARIATRICS/WEIGHT MGMT | Age: 56
End: 2021-12-10

## 2022-01-11 DIAGNOSIS — E11.69 DIABETES MELLITUS TYPE 2 IN OBESE (HCC): ICD-10-CM

## 2022-01-11 DIAGNOSIS — E66.9 DIABETES MELLITUS TYPE 2 IN OBESE (HCC): ICD-10-CM

## 2022-01-12 RX ORDER — LINAGLIPTIN 5 MG/1
TABLET, FILM COATED ORAL
Qty: 90 TABLET | Refills: 1 | Status: SHIPPED | OUTPATIENT
Start: 2022-01-12 | End: 2022-08-10

## 2022-01-12 NOTE — TELEPHONE ENCOUNTER
Medication:   Requested Prescriptions     Pending Prescriptions Disp Refills    TRADJENTA 5 MG tablet [Pharmacy Med Name: TRADJENTA 5 MG TABLET] 90 tablet 1     Sig: TAKE ONE TABLET BY MOUTH DAILY        Last Filled:      Patient Phone Number: [phone] (home) [phone] (work)    Last appt: 11/16/2021   Next appt: Visit date not found    Last OARRS: No flowsheet data found.

## 2022-02-19 NOTE — TELEPHONE ENCOUNTER
Medication:   Requested Prescriptions     Pending Prescriptions Disp Refills    omeprazole (PRILOSEC) 20 MG delayed release capsule [Pharmacy Med Name: OMEPRAZOLE DR 20 MG CAPSULE] 90 capsule 0     Sig: TAKE ONE CAPSULE BY MOUTH EVERY MORNING BEFORE BREAKFAST        Last Filled:      Patient Phone Number: [phone] (home) [phone] (work)    Last appt: 11/16/2021   Next appt: Visit date not found    Last OARRS: No flowsheet data found.

## 2022-02-21 RX ORDER — OMEPRAZOLE 20 MG/1
CAPSULE, DELAYED RELEASE ORAL
Qty: 90 CAPSULE | Refills: 0 | Status: SHIPPED | OUTPATIENT
Start: 2022-02-21 | End: 2022-05-25 | Stop reason: SDUPTHER

## 2022-03-02 ENCOUNTER — TELEMEDICINE (OUTPATIENT)
Dept: INTERNAL MEDICINE CLINIC | Age: 57
End: 2022-03-02
Payer: COMMERCIAL

## 2022-03-02 DIAGNOSIS — R49.9 HOARSENESS OR CHANGING VOICE: Primary | ICD-10-CM

## 2022-03-02 PROCEDURE — G8428 CUR MEDS NOT DOCUMENT: HCPCS | Performed by: PHYSICIAN ASSISTANT

## 2022-03-02 PROCEDURE — 99213 OFFICE O/P EST LOW 20 MIN: CPT | Performed by: PHYSICIAN ASSISTANT

## 2022-03-02 PROCEDURE — 4004F PT TOBACCO SCREEN RCVD TLK: CPT | Performed by: PHYSICIAN ASSISTANT

## 2022-03-02 PROCEDURE — G8417 CALC BMI ABV UP PARAM F/U: HCPCS | Performed by: PHYSICIAN ASSISTANT

## 2022-03-02 PROCEDURE — 3017F COLORECTAL CA SCREEN DOC REV: CPT | Performed by: PHYSICIAN ASSISTANT

## 2022-03-02 PROCEDURE — G8484 FLU IMMUNIZE NO ADMIN: HCPCS | Performed by: PHYSICIAN ASSISTANT

## 2022-03-02 ASSESSMENT — ENCOUNTER SYMPTOMS
ABDOMINAL PAIN: 0
NAUSEA: 0
COLOR CHANGE: 0
RHINORRHEA: 0
SHORTNESS OF BREATH: 0
BACK PAIN: 0
EYE PAIN: 0
COUGH: 0
CONSTIPATION: 0
PHOTOPHOBIA: 0
EYE REDNESS: 0
VOMITING: 0
SORE THROAT: 0
BLOOD IN STOOL: 0
DIARRHEA: 0
EYE DISCHARGE: 0
CHEST TIGHTNESS: 0
VOICE CHANGE: 1
WHEEZING: 0

## 2022-03-02 NOTE — PROGRESS NOTES
Madhuri Burk (:  1965) is a Established patient, here for evaluation of the following:    Assessment & Plan   Below is the assessment and plan developed based on review of pertinent history, physical exam, labs, studies, and medications. 1. Hoarseness or changing voice   -Likely post viral laryngitis; patient reports that other URI symptoms have resolved and only symptom at this time is hoarse/lost voice; patient is predisposed based on occupation; discussed treatment strategies; stressed importance of vocal rest, warm salt water gargles, honey with tea etc.; ibuprofen and/or acetaminophen as needed for pain/discomfort etc.  Return to clinic/reach out if symptoms change or worsen or do not improve etc.  Work note provided for vocal rest.      No follow-ups on file. Subjective   HPI   Madhuri Burk is a pleasant 49 y.o. female presenting to clinic today for hoarse voice. Laryngitispatient reports mild viral URI symptoms in the preceding 3 to 4 days which have resolved; patient states that yesterday and today she has had loss of voice and hoarse voice; reports that she does talk on the phone throughout her workday. Patient reports similar episode last year which did improve with time. Patient denies cough, shortness of breath, fevers, chills, chest pains etc.     Review of Systems   Constitutional: Negative for appetite change, chills, fatigue and fever. HENT: Positive for voice change. Negative for congestion, ear pain, hearing loss, rhinorrhea and sore throat. Eyes: Negative for photophobia, pain, discharge and redness. Respiratory: Negative for cough, chest tightness, shortness of breath and wheezing. Cardiovascular: Negative for chest pain, palpitations and leg swelling. Gastrointestinal: Negative for abdominal pain, blood in stool, constipation, diarrhea, nausea and vomiting. Endocrine: Negative for polyuria.    Genitourinary: Negative for difficulty urinating, dysuria, flank pain, frequency, hematuria and urgency. Musculoskeletal: Negative for arthralgias, back pain, gait problem and joint swelling. Skin: Negative for color change and rash. Neurological: Negative for dizziness, syncope, weakness, light-headedness and headaches. Hematological: Negative for adenopathy. Psychiatric/Behavioral: Negative for agitation, behavioral problems and suicidal ideas. The patient is not nervous/anxious.            Objective   Patient-Reported Vitals  No data recorded     Physical Exam  [INSTRUCTIONS:  \"[x]\" Indicates a positive item  \"[]\" Indicates a negative item  -- DELETE ALL ITEMS NOT EXAMINED]    Constitutional: [x] Appears well-developed and well-nourished [x] No apparent distress      [] Abnormal - hoarse voice      Mental status: [x] Alert and awake  [x] Oriented to person/place/time [x] Able to follow commands    [] Abnormal -     Eyes:   EOM    [x]  Normal    [] Abnormal -   Sclera  [x]  Normal    [] Abnormal -          Discharge [x]  None visible   [] Abnormal -     HENT: [x] Normocephalic, atraumatic  [] Abnormal -   [x] Mouth/Throat: Mucous membranes are moist    External Ears [x] Normal  [] Abnormal -    Neck: [x] No visualized mass [] Abnormal -     Pulmonary/Chest: [x] Respiratory effort normal   [x] No visualized signs of difficulty breathing or respiratory distress        [] Abnormal -      Musculoskeletal:   [x] Normal gait with no signs of ataxia         [x] Normal range of motion of neck        [] Abnormal -     Neurological:        [x] No Facial Asymmetry (Cranial nerve 7 motor function) (limited exam due to video visit)          [x] No gaze palsy        [] Abnormal -          Skin:        [x] No significant exanthematous lesions or discoloration noted on facial skin         [] Abnormal -            Psychiatric:       [x] Normal Affect [] Abnormal -        [x] No Hallucinations    Other pertinent observable physical exam findings:-                 Marco Davis, was evaluated through a synchronous (real-time) audio-video encounter. The patient (or guardian if applicable) is aware that this is a billable service, which includes applicable co-pays. This Virtual Visit was conducted with patient's (and/or legal guardian's) consent. The visit was conducted pursuant to the emergency declaration under the [address] [place] [address] authority and the [employer] Act. Patient identification was verified, and a caregiver was present when appropriate. The patient was located at home in a state where the provider was licensed to provide care.        --AYDEE Lynn

## 2022-03-02 NOTE — LETTER
[address] [place]  [address]  Phone: [phone]  Fax: [phone]    Tin Beauchamp PA-C        March 2, 2022     Patient: Izzy Hughes   YOB: 1965   Date of Visit: 3/2/2022       To Whom It May Concern: It is my medical opinion that Izzy Hughes was seen today for laryngitis and would benefit from vocal rest as much as possible. If you have any questions or concerns, please don't hesitate to call.     Sincerely,        Tin Beauchamp PA-C

## 2022-03-25 ENCOUNTER — E-VISIT (OUTPATIENT)
Dept: PRIMARY CARE CLINIC | Age: 57
End: 2022-03-25
Payer: COMMERCIAL

## 2022-03-25 DIAGNOSIS — J40 BRONCHITIS: Primary | ICD-10-CM

## 2022-03-25 PROCEDURE — 99421 OL DIG E/M SVC 5-10 MIN: CPT | Performed by: NURSE PRACTITIONER

## 2022-03-25 ASSESSMENT — LIFESTYLE VARIABLES
SMOKING_YEARS: 40
SMOKING_STATUS: YES

## 2022-03-26 RX ORDER — BENZONATATE 100 MG/1
100-200 CAPSULE ORAL 3 TIMES DAILY PRN
Qty: 30 CAPSULE | Refills: 0 | Status: SHIPPED | OUTPATIENT
Start: 2022-03-26 | End: 2022-04-02

## 2022-03-26 RX ORDER — AZITHROMYCIN 250 MG/1
250 TABLET, FILM COATED ORAL SEE ADMIN INSTRUCTIONS
Qty: 6 TABLET | Refills: 0 | Status: SHIPPED | OUTPATIENT
Start: 2022-03-26 | End: 2022-03-31

## 2022-03-26 RX ORDER — FLUTICASONE PROPIONATE 50 MCG
2 SPRAY, SUSPENSION (ML) NASAL DAILY
Qty: 16 G | Refills: 0 | Status: SHIPPED | OUTPATIENT
Start: 2022-03-26

## 2022-03-26 NOTE — PROGRESS NOTES
HPI: as per patient provided history  Exam: N/A (electronic visit)  ASSESSMENT/PLAN:  1. Bronchitis-ONGOING  If no continued improvement patient would need to be seen in the office for examination. Possible CXR if continues is also recommended. You should also try to drink plenty of fluids and rest.  For additional symptom relief, I suggest the following over-the-counter remedies: For fevers or pain: acetaminophen (Tylenol) or ibuprofen (Advil, Motrin) or naproxen (Aleve)      If you have high blood pressure, you should avoid medications containing pseudoephedrine or phenylephrine, such as Sudafed. Running a humidifier in your bedroom may be helpful for many of your symptoms. If your cough is keeping you awake at night, you can try raising your head with an extra pillow. If the skin around your nose and lips becomes sore, you can put some petroleum jelly on the area. If taking an antibiotic, adding a probiotic, such as DanActiv yogurt drink, Florastor or Culturelle, twice daily while on the antibiotic (1-2 hours before or after antibiotic doses) and for 1 week after may help to prevent antibiotic-associated diarrhea.       Please contact your primary care provider's office if your symptoms worsen or do not improve within 3 days. Please seek more urgent medical attention if you develop any of the following:  Chest pain  Shortness of breath  Fevers greater than 103  Severe headache   Dizziness  A new rash  Confusion  Extreme weakness  - azithromycin (ZITHROMAX) 250 MG tablet; Take 1 tablet by mouth See Admin Instructions for 5 days 500mg on day 1 followed by 250mg on days 2 - 5  Dispense: 6 tablet; Refill: 0  - benzonatate (TESSALON) 100 MG capsule; Take 1-2 capsules by mouth 3 times daily as needed for Cough  Dispense: 30 capsule;  Refill: 0  - fluticasone (FLONASE) 50 MCG/ACT nasal spray; 2 sprays by Each Nostril route daily  Dispense: 16 g; Refill: 0      Patient instructed to call the office if worsens, or fails to improve as anticipated. 5-10 minutes were spent on the digital evaluation and management of this patient.

## 2022-05-03 DIAGNOSIS — E78.2 MIXED HYPERLIPIDEMIA: ICD-10-CM

## 2022-05-03 DIAGNOSIS — I10 ESSENTIAL HYPERTENSION: ICD-10-CM

## 2022-05-03 RX ORDER — LISINOPRIL 20 MG/1
TABLET ORAL
Qty: 90 TABLET | Refills: 0 | Status: SHIPPED | OUTPATIENT
Start: 2022-05-03 | End: 2022-08-10

## 2022-05-03 RX ORDER — ATORVASTATIN CALCIUM 40 MG/1
TABLET, FILM COATED ORAL
Qty: 90 TABLET | Refills: 0 | Status: SHIPPED | OUTPATIENT
Start: 2022-05-03 | End: 2022-08-10

## 2022-05-03 RX ORDER — ASPIRIN 81 MG/1
TABLET ORAL
Qty: 30 TABLET | Refills: 4 | OUTPATIENT
Start: 2022-05-03

## 2022-05-25 ENCOUNTER — OFFICE VISIT (OUTPATIENT)
Dept: INTERNAL MEDICINE CLINIC | Age: 57
End: 2022-05-25
Payer: COMMERCIAL

## 2022-05-25 VITALS
OXYGEN SATURATION: 98 % | WEIGHT: 274 LBS | BODY MASS INDEX: 44.03 KG/M2 | HEIGHT: 66 IN | HEART RATE: 108 BPM | DIASTOLIC BLOOD PRESSURE: 66 MMHG | SYSTOLIC BLOOD PRESSURE: 118 MMHG

## 2022-05-25 DIAGNOSIS — K76.0 FATTY LIVER: ICD-10-CM

## 2022-05-25 DIAGNOSIS — K26.9 DUODENAL ULCER: ICD-10-CM

## 2022-05-25 DIAGNOSIS — R19.8 ABNORMAL BOWEL HABITS: Primary | ICD-10-CM

## 2022-05-25 DIAGNOSIS — R10.9 ABDOMINAL PAIN, UNSPECIFIED ABDOMINAL LOCATION: ICD-10-CM

## 2022-05-25 PROCEDURE — G8427 DOCREV CUR MEDS BY ELIG CLIN: HCPCS | Performed by: PHYSICIAN ASSISTANT

## 2022-05-25 PROCEDURE — 99213 OFFICE O/P EST LOW 20 MIN: CPT | Performed by: PHYSICIAN ASSISTANT

## 2022-05-25 PROCEDURE — 4004F PT TOBACCO SCREEN RCVD TLK: CPT | Performed by: PHYSICIAN ASSISTANT

## 2022-05-25 PROCEDURE — 3017F COLORECTAL CA SCREEN DOC REV: CPT | Performed by: PHYSICIAN ASSISTANT

## 2022-05-25 PROCEDURE — G8417 CALC BMI ABV UP PARAM F/U: HCPCS | Performed by: PHYSICIAN ASSISTANT

## 2022-05-25 RX ORDER — OMEPRAZOLE 20 MG/1
CAPSULE, DELAYED RELEASE ORAL
Qty: 90 CAPSULE | Refills: 1 | Status: SHIPPED | OUTPATIENT
Start: 2022-05-25

## 2022-05-25 RX ORDER — DICYCLOMINE HYDROCHLORIDE 10 MG/1
10 CAPSULE ORAL 4 TIMES DAILY PRN
Qty: 120 CAPSULE | Refills: 1 | Status: SHIPPED | OUTPATIENT
Start: 2022-05-25 | End: 2022-10-03

## 2022-05-25 ASSESSMENT — ENCOUNTER SYMPTOMS
EYE DISCHARGE: 0
VOMITING: 0
SORE THROAT: 0
COUGH: 0
EYE PAIN: 0
WHEEZING: 0
CHEST TIGHTNESS: 0
PHOTOPHOBIA: 0
SHORTNESS OF BREATH: 0
RHINORRHEA: 0
BLOOD IN STOOL: 0
ABDOMINAL PAIN: 0
COLOR CHANGE: 0
DIARRHEA: 1
CONSTIPATION: 0
EYE REDNESS: 0
BACK PAIN: 0
NAUSEA: 0

## 2022-05-25 NOTE — PROGRESS NOTES
Giana Rojas (:  1965) is a 49 y.o. female,Established patient, here for evaluation of the following chief complaint(s):    No chief complaint on file. SUBJECTIVE/OBJECTIVE:  HPI  Giana Rojas is a pleasant 49 y.o. female presenting to clinic today for GI issue. Abnormal bowel habits-patient reports several weeks of intermittent diarrhea; patient was seen at local urgent care twice; stool studies were normal; patient denies blood in stool or black tarry stools; denies melena; does report occasional abdominal discomfort however denies sharp pains or overt nausea or vomiting; patient does also have history of duodenal ulcer; was previously established with GI however was released and had not seen in the past year; patient does have history of fatty liver however denies gallbladder issues previously. Patient does report she is possibly more sensitive to greasy fatty foods over the past several weeks and does notice oiliness/greasiness to her stools. Patient denies fever, jaundice or other symptoms at this time. Patient was initiated on trial of Bentyl by urgent care and does report this did provide significant benefit and would like to continue. Patient's reports that her previous EGD and colonoscopy were normal.        CULTURE STOOL  Specimen:  Stool - Stool specimen (specimen)  Component 6 d ago   [address] CAMPYLOBACTER , SALMONELLA , SHIGELLA , VIBRIO , YERSINIA ,   EHEC (SHIGA TOXIN 1, SHIGA TOXIN 2), NOROVIRUS , AND ROTAVIRUS BY PCR. Resulting Agency [facility]   Specimen Collected: 22  2:16 PM Last Resulted: 22  6:02 PM   Received From: Humberto Malin  Result Received: 22  8:03 AM         Component 6 d ago   [address] [facility] [place] by rapid immunoassay for Giardia lamblia antigen    CRYPTOSPORIDIUM,STL  NEGATIVE by rapid immunoassay for Cryptosporidium parvum antigen.    This Ova and Parasite Screen tests for the presence of   Giardia lamblia and Cryptosporidium parvum antigen ONLY. Please call the Microbiology Laboratory to request a   comprehensive Ova and Parasite examination if needed. NEGATIVE by rapid immunoassay for Cryptosporidium parvum antigen. This Ova and Parasite Screen tests for the presence of   Giardia lamblia and Cryptosporidium parvum antigen ONLY. Please call the Microbiology Laboratory to request a   comprehensive Ova and Parasite examination if needed. The specimen will be held for seven days. Abdominal ultrasound 2/13/2021:     Impression   Increased echogenicity of the liver parenchyma, which may reflect underlying   hepatic steatosis or other chronic liver parenchymal disease. .       No sonographic evidence of cholelithiasis or acute cholecystitis seen.       Evaluation of pancreas is limited due to overlying bowel gas.         CT abdomen pelvis 2/10/2021:  Impression   No acute findings.       Diffuse hepatic steatosis.                Current Outpatient Medications   Medication Sig Dispense Refill    dicyclomine (BENTYL) 10 MG capsule Take 1 capsule by mouth 4 times daily as needed (abdominal pain) 120 capsule 1    omeprazole (PRILOSEC) 20 MG delayed release capsule TAKE ONE CAPSULE BY MOUTH EVERY MORNING BEFORE BREAKFAST 90 capsule 1    lisinopril (PRINIVIL;ZESTRIL) 20 MG tablet TAKE ONE TABLET BY MOUTH DAILY 90 tablet 0    atorvastatin (LIPITOR) 40 MG tablet TAKE ONE TABLET BY MOUTH DAILY 90 tablet 0    fluticasone (FLONASE) 50 MCG/ACT nasal spray 2 sprays by Each Nostril route daily 16 g 0    TRADJENTA 5 MG tablet TAKE ONE TABLET BY MOUTH DAILY 90 tablet 1    INVOKANA 300 MG TABS tablet TAKE ONE TABLET BY MOUTH EVERY MORNING BEFORE BREAKFAST 90 tablet 1    vitamin D3 (CHOLECALCIFEROL) 10 MCG (400 UNIT) TABS tablet TAKE TWO TABLETS BY MOUTH DAILY 180 tablet 1    carvedilol (COREG) 6.25 MG tablet Take 1 tablet by mouth daily 90 tablet 1    aspirin (ASPIRIN ADULT LOW STRENGTH) 81 MG EC tablet Take 1 tablet by mouth daily 30 tablet 4    insulin glargine (LANTUS SOLOSTAR) 100 UNIT/ML injection pen Inject 40 Units into the skin daily 5 pen 3    insulin aspart (NOVOLOG FLEXPEN) 100 UNIT/ML injection pen Take 20 units with each meal in addition to sliding scale as below   *Medium Dose Correction Algorithm**Insulin: 3 TIMES DAILY WITH MEALSGlucose: Dose: No [id number] 2 [id number] 4 [id number] 6 [id number] 8 [id number] 10 [id number] and above 12 Units 5 pen 3    blood glucose monitor kit and supplies Test 4 times a day before meals and bedtime& as needed for symptoms of irregular blood glucose. 1 kit 0    Insulin Pen Needle (PEN NEEDLES) 31G X 5 MM MISC lantus QHS and Novolog QAC and  each 2    Lancets MISC 1 each by Does not apply route 4 times daily 200 each 2    blood glucose monitor strips Test 4 times a day & as needed for symptoms of irregular blood glucose. 200 strip 2     No current facility-administered medications for this visit. Review of Systems   Constitutional: Negative for appetite change, chills, fatigue and fever. HENT: Negative for congestion, ear pain, hearing loss, rhinorrhea and sore throat. Eyes: Negative for photophobia, pain, discharge and redness. Respiratory: Negative for cough, chest tightness, shortness of breath and wheezing. Cardiovascular: Negative for chest pain, palpitations and leg swelling. Gastrointestinal: Positive for diarrhea. Negative for abdominal pain ( bloating), blood in stool, constipation, nausea and vomiting. Endocrine: Negative for polyuria. Genitourinary: Negative for difficulty urinating, dysuria, flank pain, frequency, hematuria and urgency. Musculoskeletal: Negative for arthralgias, back pain, gait problem and joint swelling. Skin: Negative for color change and rash. Neurological: Negative for dizziness, syncope, weakness, light-headedness and headaches.    Hematological: Negative for adenopathy. Psychiatric/Behavioral: Negative for agitation, behavioral problems and suicidal ideas. The patient is not nervous/anxious. Physical Exam  Constitutional:       Appearance: She is obese. HENT:      Head: Normocephalic and atraumatic. Right Ear: External ear normal.      Left Ear: External ear normal.   Cardiovascular:      Rate and Rhythm: Regular rhythm. Pulses: Normal pulses. Pulmonary:      Effort: No respiratory distress. Abdominal:      General: Abdomen is flat. Bowel sounds are normal. There is no distension. Palpations: Abdomen is soft. There is no mass. Tenderness: There is abdominal tenderness ( Generalized mild upper abdominal tenderness; no significant in right upper quadrant; negative Rosario sign; limited due to body habitus. ). There is no right CVA tenderness, left CVA tenderness, guarding or rebound. Hernia: No hernia is present. Musculoskeletal:         General: Normal range of motion. Skin:     General: Skin is warm and dry. Neurological:      General: No focal deficit present. Mental Status: She is alert and oriented to person, place, and time. Mental status is at baseline. Psychiatric:         Behavior: Behavior normal.         ASSESSMENT/PLAN:  1.  Abnormal bowel habits   -Unclear etiology; differential including IBS, gallbladder / bile duct issue, fatty liver, history of duodenal ulcer etc.; patient has been not taking Prilosec for some time; will reinitiate this and recheck ultrasound; reestablish with GI; can continue with as needed use of Bentyl however advised patient to use this sparingly and only when needed; recommend probiotic and gradual introduction of Metamucil or Citrucel fiber supplementation; discussed dietary practices and avoidance of foods which worsen symptoms etc.  Follow-up in 2 to 4 weeks to reassess etc.; advised patient to continue to monitor symptoms and return to clinic or report to emergency department if black tarry stools develop, vomiting or melena or other concerning symptoms arise.  -     dicyclomine (BENTYL) 10 MG capsule; Take 1 capsule by mouth 4 times daily as needed (abdominal pain), Disp-120 capsule, R-1Normal  -     Amb External Referral To Gastroenterology  -     US GALLBLADDER RUQ; Future  2. Abdominal pain, unspecified abdominal location  -     dicyclomine (BENTYL) 10 MG capsule; Take 1 capsule by mouth 4 times daily as needed (abdominal pain), Disp-120 capsule, R-1Normal  -     Amb External Referral To Gastroenterology  -     US GALLBLADDER RUQ; Future  3. Duodenal ulcer  -     omeprazole (PRILOSEC) 20 MG delayed release capsule; TAKE ONE CAPSULE BY MOUTH EVERY MORNING BEFORE BREAKFAST, Disp-90 capsule, R-1Normal  4. Fatty liver  -     Amb External Referral To Gastroenterology  -     US GALLBLADDER RUQ; Future      No follow-ups on file. An electronic signature was used to authenticate this note.     --AYDEE Humphrey

## 2022-06-12 DIAGNOSIS — E11.69 DIABETES MELLITUS TYPE 2 IN OBESE (HCC): ICD-10-CM

## 2022-06-12 DIAGNOSIS — E66.9 DIABETES MELLITUS TYPE 2 IN OBESE (HCC): ICD-10-CM

## 2022-06-12 DIAGNOSIS — I10 ESSENTIAL HYPERTENSION: ICD-10-CM

## 2022-06-13 RX ORDER — CANAGLIFLOZIN 300 MG/1
TABLET, FILM COATED ORAL
Qty: 90 TABLET | Refills: 1 | OUTPATIENT
Start: 2022-06-13

## 2022-06-13 RX ORDER — CARVEDILOL 6.25 MG/1
TABLET ORAL
Qty: 90 TABLET | Refills: 1 | OUTPATIENT
Start: 2022-06-13

## 2022-06-23 DIAGNOSIS — E66.9 DIABETES MELLITUS TYPE 2 IN OBESE (HCC): ICD-10-CM

## 2022-06-23 DIAGNOSIS — E11.69 DIABETES MELLITUS TYPE 2 IN OBESE (HCC): ICD-10-CM

## 2022-06-24 RX ORDER — CANAGLIFLOZIN 300 MG/1
TABLET, FILM COATED ORAL
Qty: 90 TABLET | Refills: 1 | Status: SHIPPED | OUTPATIENT
Start: 2022-06-24

## 2022-08-06 DIAGNOSIS — E66.9 DIABETES MELLITUS TYPE 2 IN OBESE (HCC): ICD-10-CM

## 2022-08-06 DIAGNOSIS — E11.69 DIABETES MELLITUS TYPE 2 IN OBESE (HCC): ICD-10-CM

## 2022-08-06 DIAGNOSIS — I10 ESSENTIAL HYPERTENSION: ICD-10-CM

## 2022-08-06 DIAGNOSIS — E78.2 MIXED HYPERLIPIDEMIA: ICD-10-CM

## 2022-08-08 RX ORDER — ATORVASTATIN CALCIUM 40 MG/1
TABLET, FILM COATED ORAL
Qty: 90 TABLET | Refills: 0 | OUTPATIENT
Start: 2022-08-08

## 2022-08-08 RX ORDER — LINAGLIPTIN 5 MG/1
TABLET, FILM COATED ORAL
Qty: 90 TABLET | Refills: 1 | OUTPATIENT
Start: 2022-08-08

## 2022-08-08 RX ORDER — LISINOPRIL 20 MG/1
TABLET ORAL
Qty: 90 TABLET | Refills: 0 | OUTPATIENT
Start: 2022-08-08

## 2022-08-10 DIAGNOSIS — E66.9 DIABETES MELLITUS TYPE 2 IN OBESE (HCC): ICD-10-CM

## 2022-08-10 DIAGNOSIS — E11.69 DIABETES MELLITUS TYPE 2 IN OBESE (HCC): ICD-10-CM

## 2022-08-10 DIAGNOSIS — E78.2 MIXED HYPERLIPIDEMIA: ICD-10-CM

## 2022-08-10 DIAGNOSIS — I10 ESSENTIAL HYPERTENSION: ICD-10-CM

## 2022-08-10 RX ORDER — LINAGLIPTIN 5 MG/1
TABLET, FILM COATED ORAL
Qty: 90 TABLET | Refills: 0 | Status: SHIPPED | OUTPATIENT
Start: 2022-08-10

## 2022-08-10 RX ORDER — ATORVASTATIN CALCIUM 40 MG/1
TABLET, FILM COATED ORAL
Qty: 90 TABLET | Refills: 0 | Status: SHIPPED | OUTPATIENT
Start: 2022-08-10

## 2022-08-10 RX ORDER — LISINOPRIL 20 MG/1
TABLET ORAL
Qty: 90 TABLET | Refills: 0 | Status: SHIPPED | OUTPATIENT
Start: 2022-08-10

## 2022-09-07 ENCOUNTER — COMMUNITY OUTREACH (OUTPATIENT)
Dept: INTERNAL MEDICINE CLINIC | Age: 57
End: 2022-09-07

## 2022-09-07 NOTE — PROGRESS NOTES
Patient's HM shows they are overdue for Mammogram Screening. BioPheresis and  files searched. No results to attach to order nor HM updated.

## 2022-09-09 ENCOUNTER — TELEMEDICINE (OUTPATIENT)
Dept: INTERNAL MEDICINE CLINIC | Age: 57
End: 2022-09-09
Payer: COMMERCIAL

## 2022-09-09 DIAGNOSIS — M54.42 ACUTE LEFT-SIDED LOW BACK PAIN WITH LEFT-SIDED SCIATICA: Primary | ICD-10-CM

## 2022-09-09 PROCEDURE — 4004F PT TOBACCO SCREEN RCVD TLK: CPT | Performed by: PHYSICIAN ASSISTANT

## 2022-09-09 PROCEDURE — 99213 OFFICE O/P EST LOW 20 MIN: CPT | Performed by: PHYSICIAN ASSISTANT

## 2022-09-09 PROCEDURE — 3017F COLORECTAL CA SCREEN DOC REV: CPT | Performed by: PHYSICIAN ASSISTANT

## 2022-09-09 PROCEDURE — G8428 CUR MEDS NOT DOCUMENT: HCPCS | Performed by: PHYSICIAN ASSISTANT

## 2022-09-09 PROCEDURE — G8417 CALC BMI ABV UP PARAM F/U: HCPCS | Performed by: PHYSICIAN ASSISTANT

## 2022-09-09 RX ORDER — CYCLOBENZAPRINE HCL 5 MG
5-10 TABLET ORAL NIGHTLY PRN
Qty: 30 TABLET | Refills: 0 | Status: SHIPPED | OUTPATIENT
Start: 2022-09-09 | End: 2022-10-11 | Stop reason: SDUPTHER

## 2022-09-09 RX ORDER — NAPROXEN 500 MG/1
500 TABLET ORAL 2 TIMES DAILY PRN
Qty: 60 TABLET | Refills: 0 | Status: SHIPPED | OUTPATIENT
Start: 2022-09-09

## 2022-09-09 ASSESSMENT — ENCOUNTER SYMPTOMS
ABDOMINAL PAIN: 0
SORE THROAT: 0
PHOTOPHOBIA: 0
BLOOD IN STOOL: 0
CONSTIPATION: 0
EYE DISCHARGE: 0
BACK PAIN: 1
RHINORRHEA: 0
WHEEZING: 0
VOMITING: 0
NAUSEA: 0
EYE REDNESS: 0
CHEST TIGHTNESS: 0
COUGH: 0
SHORTNESS OF BREATH: 0
COLOR CHANGE: 0
EYE PAIN: 0
DIARRHEA: 0

## 2022-09-09 NOTE — PROGRESS NOTES
Cristal Henson (:  1965) is a Established patient, here for evaluation of the following:    Assessment & Plan   Below is the assessment and plan developed based on review of pertinent history, physical exam, labs, studies, and medications. 1. Acute left-sided low back pain with left-sided sciatica   -Symptoms began yesterday; no acute inciting event or injury; no red flag symptoms at this time; patient is in no acute distress on virtual visit; possibly related to body habitus/ergonomics at work/sleep etc.; trial naproxen muscle relaxer; patient is poorly controlled diabetic; not on steroid at this time. Advised patient on proper use, monitoring, side effects of medication sent in; advised patient to report to clinic or to the emergency department if symptoms worsen or change or do not improve. Recommend physical therapy however patient declines at this time; provided patient with home exercises to perform. Recommend against bed rest, can try heating pad etc.  -     naproxen (NAPROSYN) 500 MG tablet; Take 1 tablet by mouth 2 times daily as needed for Pain, Disp-60 tablet, R-0Normal  -     cyclobenzaprine (FLEXERIL) 5 MG tablet; Take 1-2 tablets by mouth nightly as needed for Muscle spasms, Disp-30 tablet, R-0Normal  Return in about 1 week (around 2022), or if symptoms worsen or fail to improve, for Follow Up. Subjective   HPI  Cristal Henson is a pleasant 49 y.o. female presenting to clinic today for left-sided low back pain with sciatica. 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   Patient takes 800 mg ibuprofen every 4 hours over the past day and reports this has not provided any benefit. Patient reports pain occurs in most positions except when she lays down on her opposite side; patient reports pain is worsened with walking or any activity. Patient denies saddle anesthesias, incontinence, fever. Review of Systems   Constitutional:  Negative for appetite change, chills, fatigue and fever. HENT:  Negative for congestion, ear pain, hearing loss, rhinorrhea and sore throat. Eyes:  Negative for photophobia, pain, discharge and redness. Respiratory:  Negative for cough, chest tightness, shortness of breath and wheezing. Cardiovascular:  Negative for chest pain, palpitations and leg swelling. Gastrointestinal:  Negative for abdominal pain, blood in stool, constipation, diarrhea, nausea and vomiting. Endocrine: Negative for polyuria. Genitourinary:  Negative for difficulty urinating, dysuria, flank pain, frequency, hematuria and urgency. Musculoskeletal:  Positive for back pain. Negative for arthralgias, gait problem and joint swelling. Skin:  Negative for color change and rash. Neurological:  Negative for dizziness, syncope, weakness, light-headedness and headaches. Hematological:  Negative for adenopathy. Psychiatric/Behavioral:  Negative for agitation, behavioral problems and suicidal ideas. The patient is not nervous/anxious.          Objective   Patient-Reported Vitals  No data recorded     Physical Exam  [INSTRUCTIONS:  \"[x]\" Indicates a positive item  \"[]\" Indicates a negative item  -- DELETE ALL ITEMS NOT EXAMINED]    Constitutional: [x] Appears well-developed and well-nourished [x] No apparent distress      [] Abnormal -obese, patient appears uncomfortable, frequently changing positions on couch    Mental status: [x] Alert and awake  [x] Oriented to person/place/time [x] Able to follow commands    [] Abnormal -     Eyes:   EOM    [x]  Normal    [] Abnormal -   Sclera  [x]

## 2022-09-29 ENCOUNTER — PATIENT MESSAGE (OUTPATIENT)
Dept: INTERNAL MEDICINE CLINIC | Age: 57
End: 2022-09-29

## 2022-09-29 DIAGNOSIS — R19.8 ABNORMAL BOWEL HABITS: ICD-10-CM

## 2022-09-29 DIAGNOSIS — R10.9 ABDOMINAL PAIN, UNSPECIFIED ABDOMINAL LOCATION: ICD-10-CM

## 2022-09-29 RX ORDER — DICYCLOMINE HYDROCHLORIDE 10 MG/1
CAPSULE ORAL
Qty: 120 CAPSULE | Refills: 1 | OUTPATIENT
Start: 2022-09-29

## 2022-10-02 DIAGNOSIS — R19.8 ABNORMAL BOWEL HABITS: ICD-10-CM

## 2022-10-02 DIAGNOSIS — R10.9 ABDOMINAL PAIN, UNSPECIFIED ABDOMINAL LOCATION: ICD-10-CM

## 2022-10-03 RX ORDER — DICYCLOMINE HYDROCHLORIDE 10 MG/1
CAPSULE ORAL
Qty: 120 CAPSULE | Refills: 0 | Status: SHIPPED | OUTPATIENT
Start: 2022-10-03

## 2022-10-03 NOTE — TELEPHONE ENCOUNTER
Pt last seen by you in office 6/2021, Have contacted pt multiple times to schedule. Pt still has not scheduled. Please Advise.

## 2022-10-11 ENCOUNTER — TELEMEDICINE (OUTPATIENT)
Dept: INTERNAL MEDICINE CLINIC | Age: 57
End: 2022-10-11
Payer: COMMERCIAL

## 2022-10-11 DIAGNOSIS — M54.42 ACUTE LEFT-SIDED LOW BACK PAIN WITH LEFT-SIDED SCIATICA: Primary | ICD-10-CM

## 2022-10-11 DIAGNOSIS — M54.42 ACUTE LEFT-SIDED LOW BACK PAIN WITH LEFT-SIDED SCIATICA: ICD-10-CM

## 2022-10-11 PROCEDURE — 3017F COLORECTAL CA SCREEN DOC REV: CPT | Performed by: INTERNAL MEDICINE

## 2022-10-11 PROCEDURE — G8427 DOCREV CUR MEDS BY ELIG CLIN: HCPCS | Performed by: INTERNAL MEDICINE

## 2022-10-11 PROCEDURE — 99213 OFFICE O/P EST LOW 20 MIN: CPT | Performed by: INTERNAL MEDICINE

## 2022-10-11 RX ORDER — CYCLOBENZAPRINE HCL 5 MG
2.5 TABLET ORAL 2 TIMES DAILY PRN
Qty: 30 TABLET | Refills: 1 | Status: SHIPPED | OUTPATIENT
Start: 2022-10-11 | End: 2022-11-10

## 2022-10-11 RX ORDER — NAPROXEN 500 MG/1
500 TABLET ORAL 2 TIMES DAILY PRN
Qty: 60 TABLET | Refills: 0 | OUTPATIENT
Start: 2022-10-11

## 2022-10-11 NOTE — PROGRESS NOTES
TELEHEALTH EVALUATION -- Audio/Visual (During YCSGR-52 public health emergency)      Rigoberto Olivares  1965    Patient location: Patient Teresa Walter is a 56 y.o. pleasant lady evaluated via video on 10/11/22       Consent:  She and/or health care decision maker is aware that that she may receive a bill for this virtual visit service, depending on her insurance coverage, and has provided verbal consent to proceed: Yes      History of Present Illness:  Rigoberto Olivares is a 56 y.o. pleasant lady who has requested an audio/video evaluation for the following concern(s): L sciatica. She has a past medical history significant for:  HTN, on Coreg 6.25mg QD, Lisinopril 20mg QD  HL (LDL 86,  on 3/19/2021), on Atorvastatin 40mg QD  DM type 2 (HbA1C 9% ~ 07/2022), on Invokana 300mg QD, Tradjenta 5mg QD, Lantus 40u QHS, Novolog 20u AC + SSI   Diabetic neuropathy, off Gabapentin  CAD s/p PCI (2019), on ASA, Coreg 6.25mg BID  HFpEF (G1DD, EF 55-60%, LVH on TTE 11/25/19)  Duodenal ulcer, on Protonix 40mg QAM  Diverticulosis   Hepatic steatosis   Vitamin D deficiency (25-OH level was 9.86 on 2/13/2021), on 50,000u QD x8 doses (since 02/2021) then 1000u QD  Morbid obesity (BMI 43)   Current smoker      # Patient admitted Feb 2021 for L groin cellulitis c/b sepsis. Her hospital stay was c/b GIB requiring transfusions. Hb has improved. EGD showed 2cm duodenal ulcer. Stopped DAPT. Only on ASA. On a PPI. Needs H pylori stool Ag as it was not done in hospital.   S/p colonoscopy at [facility] with no focus of bleed. No polyps. Will be getting barium. She is s/p wound clinic for abdominal ulcers. Cultures positive for MSSA, E. faecalis, E. coli. Seeing Dr. Vidhya Garza, taking Ceftin and Doxycycline. Will be seeing weight management. # Patient with TG > 500 in Nov 2019. On Lipitor. LDL > 190 in Nov 2019. LDL and TG have improved since then. # Patient with uncontrolled DM. Seeing Dr. Steven Thomas.   Considering bariatric surgery. She is on Lantus, Novolog, Invokana, Tradjenta. Did not tolerate Metformin. Also takes Gabapentin for diabetic neuropathy. # Sees Dr. Santos Later from Cardiology. She has CAD s/p PCI in 2019. She is off DAPT and on ASA only with BB. # Patient on high doses of vitamin D for level < 10.   # HTN well controlled. # Patient was seen at walk-in clinic last month for L sciatica. She has been taking Ibuprofen. Through walk-in was recommended muscle relaxant and Naproxen which helped at the time but recently worse again. Got a new bed/mattress. No fall or injury. No imaging done. Applying heating pad.        Health maintenance:   Health Maintenance Due   Topic Date Due    Diabetic foot exam  Never done    HIV screen  Never done    Diabetic microalbuminuria test  Never done    Diabetic retinal exam  Never done    Hepatitis C screen  Never done    Hepatitis B vaccine (1 of 3 - Risk 3-dose series) Never done    DTaP/Tdap/Td vaccine (1 - Tdap) Never done    Cervical cancer screen  Never done    Breast cancer screen  Never done    Shingles vaccine (1 of 2) Never done    COVID-19 Vaccine (2 - Booster for Rossi series) 05/25/2021    Lipids  03/19/2022    Flu vaccine (1) 08/01/2022    A1C test (Diabetic or Prediabetic)  10/01/2022    Depression Screen  10/07/2022         Review of Systems:  Constitutional: no fevers, no chills   Pain assessment: L sciatica  Head: no headaches  Ears: no hearing loss, no tinnitus, no vertigo  Eyes: no blurry vision, no diplopia, no dryness, no itchiness  Mouth: no oral ulcers, no dry mouth, no sore throat  Nose: no nasal congestion, no epistaxis  Cardiac: no chest pain, no palpitations, no leg swelling, no orthopnea, no PND, no syncope  Pulmonary: no dyspnea, no cough, no wheezing, no hemoptysis  GI: no nausea, no vomiting, no diarrhea, no constipation, no abdominal pain, no hematochezia  : no dysuria, no frequency, no urgency, no hematuria, no frothy urine, no dyspareunia, no pelvic pain, no vaginal bleeding, no abnormal vaginal discharge  MSK: no Raynaud's   Neuro: no focal neurological deficits, no seizures  Sleep: no snoring, no daytime somnolence   Psych: no depression, no anxiety, no suicidal ideation      Physical Exam:  Due to this being a TeleHealth encounter, evaluation of the following organ systems is limited: Vitals/Constitutional/EENT/Resp/CV/GI//MSK/Neuro/Skin/Heme-Lymph-Imm. General: alert, awake, and oriented to time, place, person, and situation. Not in acute distress. Not toxic appearing. Mood appears normal   Skin: no jaundice, no rash on visible skin  Head: appears grossly normocephalic/atraumatic  Eyes: anicteric sclera, extraocular movements are intact   Oropharynx: lips are not cyanotic  Lungs: breathing appears normal, does not appear tachypneic, does not appear labored  Abdomen: abdomen does not appear to be distended  Extremities: no swelling noted in bilateral lower extremities  MSK: full ROM in all 4 extremities. No joint swelling or erythema noted   Neuro: gait normal, CN II-XII grossly intact, motor power grossly intact in all 4 extremities      Labs   I have personally reviewed labs, and discussed pertinent findings with patient on this date 10/11/2022     Imaging   I have personally reviewed imaging, and discussed pertinent findings with patient on this date 10/11/2022     Other notes  I have personally reviewed other notes, and discussed pertinent findings with patient on this date 10/11/2022       Assessment/Plan:     1. Acute left-sided low back pain with left-sided sciatica  Re-introduce Flexeril 5mg BID PRN   Continue Naproxen PRN. No steroids d/t uncontrolled diabetes  Continue heating pad  Continue topicals  May need XR and PT if symptoms worsen       Care discussed with patient and questions answered. Patient verbalizes understanding and agrees with plan. Discussed with patient the importance of continuity of care.  I encouraged patient to schedule next appointment within 3 months with me. I reviewed and reconciled the medications this visit. I reviewed and updated the past medical, surgical, social, and family history during this visit. Pursuant to the emergency declaration under the [address] [place] [address] waiver authority and the [employer] Act, this Virtual Visit was conducted, with patient's consent, to reduce the patient's risk of exposure to COVID-19 and provide continuity of care for an established patient. Services were provided through a video synchronous discussion virtually to substitute for in-person clinic visit.       Ruth Stanley MD  Internal Medicine  10/11/2022   10:47 AM

## 2022-10-12 RX ORDER — NAPROXEN 500 MG/1
TABLET ORAL
Qty: 60 TABLET | Refills: 0 | OUTPATIENT
Start: 2022-10-12

## 2022-11-07 DIAGNOSIS — E11.69 DIABETES MELLITUS TYPE 2 IN OBESE (HCC): ICD-10-CM

## 2022-11-07 DIAGNOSIS — E66.9 DIABETES MELLITUS TYPE 2 IN OBESE (HCC): ICD-10-CM

## 2022-11-07 DIAGNOSIS — I10 ESSENTIAL HYPERTENSION: ICD-10-CM

## 2022-11-07 DIAGNOSIS — E78.2 MIXED HYPERLIPIDEMIA: ICD-10-CM

## 2022-11-07 RX ORDER — LISINOPRIL 20 MG/1
TABLET ORAL
Qty: 90 TABLET | Refills: 0 | Status: SHIPPED | OUTPATIENT
Start: 2022-11-07

## 2022-11-07 RX ORDER — LINAGLIPTIN 5 MG/1
TABLET, FILM COATED ORAL
Qty: 90 TABLET | Refills: 0 | Status: SHIPPED | OUTPATIENT
Start: 2022-11-07

## 2022-11-07 RX ORDER — ATORVASTATIN CALCIUM 40 MG/1
TABLET, FILM COATED ORAL
Qty: 90 TABLET | Refills: 0 | Status: SHIPPED | OUTPATIENT
Start: 2022-11-07

## 2022-11-20 DIAGNOSIS — K26.9 DUODENAL ULCER: ICD-10-CM

## 2022-11-21 DIAGNOSIS — K26.9 DUODENAL ULCER: ICD-10-CM

## 2022-11-21 RX ORDER — OMEPRAZOLE 20 MG/1
CAPSULE, DELAYED RELEASE ORAL
Qty: 90 CAPSULE | Refills: 1 | OUTPATIENT
Start: 2022-11-21

## 2022-11-22 RX ORDER — OMEPRAZOLE 20 MG/1
CAPSULE, DELAYED RELEASE ORAL
Qty: 90 CAPSULE | Refills: 1 | OUTPATIENT
Start: 2022-11-22

## 2022-11-25 DIAGNOSIS — K26.9 DUODENAL ULCER: ICD-10-CM

## 2022-11-28 RX ORDER — OMEPRAZOLE 20 MG/1
CAPSULE, DELAYED RELEASE ORAL
Qty: 90 CAPSULE | Refills: 0 | Status: SHIPPED | OUTPATIENT
Start: 2022-11-28

## 2022-12-31 DIAGNOSIS — E11.69 DIABETES MELLITUS TYPE 2 IN OBESE (HCC): ICD-10-CM

## 2022-12-31 DIAGNOSIS — E66.9 DIABETES MELLITUS TYPE 2 IN OBESE (HCC): ICD-10-CM

## 2023-01-03 RX ORDER — CANAGLIFLOZIN 300 MG/1
TABLET, FILM COATED ORAL
Qty: 90 TABLET | Refills: 1 | OUTPATIENT
Start: 2023-01-03

## 2023-01-04 DIAGNOSIS — E11.69 DIABETES MELLITUS TYPE 2 IN OBESE (HCC): ICD-10-CM

## 2023-01-04 DIAGNOSIS — E66.9 DIABETES MELLITUS TYPE 2 IN OBESE (HCC): ICD-10-CM

## 2023-01-04 RX ORDER — CANAGLIFLOZIN 300 MG/1
TABLET, FILM COATED ORAL
Qty: 90 TABLET | Refills: 1 | OUTPATIENT
Start: 2023-01-04

## 2023-01-08 DIAGNOSIS — E66.9 DIABETES MELLITUS TYPE 2 IN OBESE (HCC): ICD-10-CM

## 2023-01-08 DIAGNOSIS — E11.69 DIABETES MELLITUS TYPE 2 IN OBESE (HCC): ICD-10-CM

## 2023-01-09 RX ORDER — CANAGLIFLOZIN 300 MG/1
TABLET, FILM COATED ORAL
Qty: 90 TABLET | Refills: 1 | OUTPATIENT
Start: 2023-01-09

## 2023-02-01 DIAGNOSIS — E11.69 DIABETES MELLITUS TYPE 2 IN OBESE (HCC): ICD-10-CM

## 2023-02-01 DIAGNOSIS — I10 ESSENTIAL HYPERTENSION: ICD-10-CM

## 2023-02-01 DIAGNOSIS — E78.2 MIXED HYPERLIPIDEMIA: ICD-10-CM

## 2023-02-01 DIAGNOSIS — M54.42 ACUTE LEFT-SIDED LOW BACK PAIN WITH LEFT-SIDED SCIATICA: ICD-10-CM

## 2023-02-01 DIAGNOSIS — E66.9 DIABETES MELLITUS TYPE 2 IN OBESE (HCC): ICD-10-CM

## 2023-02-01 RX ORDER — LINAGLIPTIN 5 MG/1
TABLET, FILM COATED ORAL
Qty: 90 TABLET | Refills: 0 | OUTPATIENT
Start: 2023-02-01

## 2023-02-01 RX ORDER — LISINOPRIL 20 MG/1
TABLET ORAL
Qty: 90 TABLET | Refills: 0 | OUTPATIENT
Start: 2023-02-01

## 2023-02-01 RX ORDER — CANAGLIFLOZIN 300 MG/1
TABLET, FILM COATED ORAL
Qty: 90 TABLET | Refills: 1 | OUTPATIENT
Start: 2023-02-01

## 2023-02-01 RX ORDER — NAPROXEN 500 MG/1
TABLET ORAL
Qty: 60 TABLET | Refills: 0 | OUTPATIENT
Start: 2023-02-01

## 2023-02-01 RX ORDER — ATORVASTATIN CALCIUM 40 MG/1
TABLET, FILM COATED ORAL
Qty: 90 TABLET | Refills: 0 | OUTPATIENT
Start: 2023-02-01

## 2023-02-04 DIAGNOSIS — E11.69 DIABETES MELLITUS TYPE 2 IN OBESE (HCC): ICD-10-CM

## 2023-02-04 DIAGNOSIS — I10 ESSENTIAL HYPERTENSION: ICD-10-CM

## 2023-02-04 DIAGNOSIS — E78.2 MIXED HYPERLIPIDEMIA: ICD-10-CM

## 2023-02-04 DIAGNOSIS — E66.9 DIABETES MELLITUS TYPE 2 IN OBESE (HCC): ICD-10-CM

## 2023-02-04 DIAGNOSIS — M54.42 ACUTE LEFT-SIDED LOW BACK PAIN WITH LEFT-SIDED SCIATICA: ICD-10-CM

## 2023-02-06 RX ORDER — ATORVASTATIN CALCIUM 40 MG/1
TABLET, FILM COATED ORAL
Qty: 90 TABLET | Refills: 0 | OUTPATIENT
Start: 2023-02-06

## 2023-02-06 RX ORDER — LISINOPRIL 20 MG/1
TABLET ORAL
Qty: 90 TABLET | Refills: 0 | OUTPATIENT
Start: 2023-02-06

## 2023-02-06 RX ORDER — CYCLOBENZAPRINE HCL 5 MG
TABLET ORAL
Qty: 30 TABLET | Refills: 1 | OUTPATIENT
Start: 2023-02-06

## 2023-02-06 RX ORDER — LINAGLIPTIN 5 MG/1
TABLET, FILM COATED ORAL
Qty: 90 TABLET | Refills: 0 | OUTPATIENT
Start: 2023-02-06

## 2023-02-06 RX ORDER — CANAGLIFLOZIN 300 MG/1
TABLET, FILM COATED ORAL
Qty: 90 TABLET | Refills: 1 | OUTPATIENT
Start: 2023-02-06

## 2023-02-08 DIAGNOSIS — M54.42 ACUTE LEFT-SIDED LOW BACK PAIN WITH LEFT-SIDED SCIATICA: ICD-10-CM

## 2023-02-08 DIAGNOSIS — E11.69 DIABETES MELLITUS TYPE 2 IN OBESE (HCC): ICD-10-CM

## 2023-02-08 DIAGNOSIS — I10 ESSENTIAL HYPERTENSION: ICD-10-CM

## 2023-02-08 DIAGNOSIS — E66.9 DIABETES MELLITUS TYPE 2 IN OBESE (HCC): ICD-10-CM

## 2023-02-08 DIAGNOSIS — E78.2 MIXED HYPERLIPIDEMIA: ICD-10-CM

## 2023-02-08 RX ORDER — LINAGLIPTIN 5 MG/1
TABLET, FILM COATED ORAL
Qty: 90 TABLET | Refills: 0 | OUTPATIENT
Start: 2023-02-08

## 2023-02-08 RX ORDER — CANAGLIFLOZIN 300 MG/1
TABLET, FILM COATED ORAL
Qty: 90 TABLET | Refills: 1 | OUTPATIENT
Start: 2023-02-08

## 2023-02-08 RX ORDER — CYCLOBENZAPRINE HCL 5 MG
TABLET ORAL
Qty: 30 TABLET | Refills: 1 | OUTPATIENT
Start: 2023-02-08

## 2023-02-08 RX ORDER — LISINOPRIL 20 MG/1
TABLET ORAL
Qty: 90 TABLET | Refills: 0 | OUTPATIENT
Start: 2023-02-08

## 2023-02-08 RX ORDER — ATORVASTATIN CALCIUM 40 MG/1
TABLET, FILM COATED ORAL
Qty: 90 TABLET | Refills: 0 | OUTPATIENT
Start: 2023-02-08

## 2023-02-10 ENCOUNTER — OFFICE VISIT (OUTPATIENT)
Dept: CARDIOLOGY CLINIC | Age: 58
End: 2023-02-10

## 2023-02-10 VITALS
OXYGEN SATURATION: 96 % | WEIGHT: 278 LBS | HEART RATE: 118 BPM | BODY MASS INDEX: 44.68 KG/M2 | SYSTOLIC BLOOD PRESSURE: 160 MMHG | DIASTOLIC BLOOD PRESSURE: 88 MMHG | HEIGHT: 66 IN

## 2023-02-10 DIAGNOSIS — E11.69 DIABETES MELLITUS TYPE 2 IN OBESE (HCC): ICD-10-CM

## 2023-02-10 DIAGNOSIS — I25.10 CORONARY ARTERY DISEASE INVOLVING NATIVE CORONARY ARTERY OF NATIVE HEART WITHOUT ANGINA PECTORIS: Primary | ICD-10-CM

## 2023-02-10 DIAGNOSIS — I10 ESSENTIAL HYPERTENSION: ICD-10-CM

## 2023-02-10 DIAGNOSIS — E66.9 DIABETES MELLITUS TYPE 2 IN OBESE (HCC): ICD-10-CM

## 2023-02-10 DIAGNOSIS — E78.2 MIXED HYPERLIPIDEMIA: ICD-10-CM

## 2023-02-10 RX ORDER — ATORVASTATIN CALCIUM 40 MG/1
40 TABLET, FILM COATED ORAL DAILY
Qty: 90 TABLET | Refills: 3 | Status: SHIPPED | OUTPATIENT
Start: 2023-02-10

## 2023-02-10 RX ORDER — LISINOPRIL 20 MG/1
TABLET ORAL
Qty: 90 TABLET | Refills: 3 | Status: SHIPPED | OUTPATIENT
Start: 2023-02-10

## 2023-02-10 RX ORDER — CARVEDILOL 6.25 MG/1
6.25 TABLET ORAL DAILY
Qty: 90 TABLET | Refills: 3 | Status: SHIPPED | OUTPATIENT
Start: 2023-02-10

## 2023-02-10 ASSESSMENT — ENCOUNTER SYMPTOMS
ORTHOPNEA: 0
SHORTNESS OF BREATH: 1

## 2023-02-10 NOTE — PROGRESS NOTES
2/10/2023  Primary cardiologist: Dr. Alexandria Tovar:   Kirstin Blackman  is an established 56 y.o.  female here for a follow up on CAD      SUBJECTIVE/OBJECTIVE:  Kirstin Blackman is a 56 y.o. female with a history of coronary artery disease, PCI, hypertension, hyperlipidemia, diabetes mellitus, GERD , GIB and obesity    In November 2019 Kirstin Blackman underwent showed mild to moderate CAD but ostial diagnosis disease was critical.  She underwent kissing stent to the mid LAD and ostium of the diagonal.     HPI :   Kirstin Blackman reports she is feeling fair. She has not been seen in the office in over 1 year. She reports she ran out of her medications a few days ago. She denies chest pain. She has chronic shortness of breath. States she continues to smoke. She is not following any particular diet. She is trying to keep active with exercise and has lost some weight since her last office visit. States her blood sugars not been well controlled at home. Review of Systems   Constitutional: Negative for diaphoresis and malaise/fatigue. Cardiovascular:  Negative for chest pain, claudication, dyspnea on exertion, irregular heartbeat, leg swelling, near-syncope, orthopnea, palpitations and paroxysmal nocturnal dyspnea. Respiratory:  Positive for shortness of breath. Neurological:  Negative for dizziness and light-headedness.      Vitals:    02/10/23 0809 02/10/23 0815   BP: (!) 174/82 (!) 160/88   Site: Left Upper Arm Left Wrist   Position: Sitting Sitting   Cuff Size: Large Adult Medium Adult   Pulse: (!) 118    SpO2: 96%    Weight: 278 lb (126.1 kg)    Height: 5' 6\" (1.676 m)      Patient-Reported Vitals 10/11/2022   Patient-Reported Weight 274   Patient-Reported Height 56   Patient-Reported Systolic 439   Patient-Reported Diastolic 88   Patient-Reported Pulse 96   Patient-Reported Temperature 98.4     Wt Readings from Last 3 Encounters:   02/10/23 278 lb (126.1 kg)   05/25/22 274 lb (124.3 kg)   06/17/21 267 lb (121.1 kg)     Body mass index is 44.87 kg/m². Physical Exam  Constitutional:       Appearance: Normal appearance. She is obese. HENT:      Head: Normocephalic and atraumatic. Eyes:      Extraocular Movements: Extraocular movements intact. Pupils: Pupils are equal, round, and reactive to light. Neck:      Vascular: No carotid bruit. Cardiovascular:      Rate and Rhythm: Normal rate and regular rhythm. Pulses: Normal pulses. Pulmonary:      Effort: Pulmonary effort is normal.      Breath sounds: Normal breath sounds. No rales. Chest:      Chest wall: No tenderness. Abdominal:      General: There is no distension. Palpations: Abdomen is soft. Tenderness: There is no abdominal tenderness. Musculoskeletal:      Cervical back: No tenderness. Right lower leg: No edema. Left lower leg: No edema. Skin:     General: Skin is warm and dry. Capillary Refill: Capillary refill takes less than 2 seconds. Neurological:      General: No focal deficit present. Mental Status: She is alert and oriented to person, place, and time.    Psychiatric:         Mood and Affect: Mood normal.         Behavior: Behavior normal.              Current Outpatient Medications   Medication Sig Dispense Refill    omeprazole (PRILOSEC) 20 MG delayed release capsule TAKE ONE CAPSULE BY MOUTH EVERY MORNING BEFORE BREAKFAST 90 capsule 0    atorvastatin (LIPITOR) 40 MG tablet TAKE ONE TABLET BY MOUTH DAILY 90 tablet 0    lisinopril (PRINIVIL;ZESTRIL) 20 MG tablet TAKE ONE TABLET BY MOUTH DAILY 90 tablet 0    TRADJENTA 5 MG tablet TAKE ONE TABLET BY MOUTH DAILY 90 tablet 0    dicyclomine (BENTYL) 10 MG capsule TAKE ONE CAPSULE BY MOUTH FOUR TIMES A DAY AS NEEDED FOR ABDOMINAL PAIN 120 capsule 0    INVOKANA 300 MG TABS tablet TAKE ONE TABLET BY MOUTH EVERY MORNING BEFORE BREAKFAST 90 tablet 1    vitamin D3 (CHOLECALCIFEROL) 10 MCG (400 UNIT) TABS tablet TAKE TWO TABLETS BY MOUTH DAILY 180 tablet 1    carvedilol (COREG) 6.25 MG tablet Take 1 tablet by mouth daily 90 tablet 1    aspirin (ASPIRIN ADULT LOW STRENGTH) 81 MG EC tablet Take 1 tablet by mouth daily 30 tablet 4    blood glucose monitor kit and supplies Test 4 times a day before meals and bedtime& as needed for symptoms of irregular blood glucose. 1 kit 0    Lancets MISC 1 each by Does not apply route 4 times daily 200 each 2    blood glucose monitor strips Test 4 times a day & as needed for symptoms of irregular blood glucose. 200 strip 2    naproxen (NAPROSYN) 500 MG tablet Take 1 tablet by mouth 2 times daily as needed for Pain (Patient not taking: Reported on 2/10/2023) 60 tablet 0    fluticasone (FLONASE) 50 MCG/ACT nasal spray 2 sprays by Each Nostril route daily (Patient not taking: Reported on 2/10/2023) 16 g 0    insulin glargine (LANTUS SOLOSTAR) 100 UNIT/ML injection pen Inject 40 Units into the skin daily (Patient not taking: Reported on 2/10/2023) 5 pen 3    insulin aspart (NOVOLOG FLEXPEN) 100 UNIT/ML injection pen Take 20 units with each meal in addition to sliding scale as below   *Medium Dose Correction Algorithm**Insulin: 3 TIMES DAILY WITH MEALSGlucose: Dose: No [id number] 2 [id number] 4 [id number] 6 Akegs704-650 8 Sccbg987-292 10 Lenjn148 and above 12 Units (Patient not taking: Reported on 2/10/2023) 5 pen 3    Insulin Pen Needle (PEN NEEDLES) 31G X 5 MM MISC lantus QHS and Novolog QAC and HS (Patient not taking: Reported on 2/10/2023) 150 each 2     No current facility-administered medications for this visit. All pertinent data reviewed and discussed with patient       ASSESSMENT/PLAN:      Coronary artery disease  Remained stable. Currently chest pain-free. Has chronic shortness of breath which may be secondary to tachycardia, ongoing tobacco use, deconditioning and obesity. She has been off of her medications for at least last couple of days. We will resume carvedilol. She is to continue aspirin and atorvastatin as well.     Encouraged secondary prevention with diet and exercise and lifestyle modification. Hypertension   Blood pressure is elevated today and not well controlled. Has been out of her lisinopril and her carvedilol :will resume both. She is to monitor blood pressure at home and phone office with an update and with blood pressure in 2 weeks of medications can be further adjusted if necessary. Dyslipidemia   No lipids available for review. Will have labs done in office today. For now continue with atorvastatin 40 mg once daily. She denies myalgia. We discussed goals and guidelines with goal of HDL 40 or better and LDL less than 70. Obesity  BMI 44.87   Her weight has trended down since last office visit. Morbidity and mortality are increased with obesity : Importance of ongoing weight management     Diabetes mellitus  Has not had A1C done - not following diet - unsure what blood sugars have has been   Advised to follow with endocrinology for blood sugar management   Will check A1C with BMP  Discussed importance of blood sugar control with results of uncontrolled blood sugar being kidney dysfunction, diabetic retinopathy/neuropathy and microvascular disease. Tachycardia  EKG sinus tachycardia, no acute ST or T wave abnormalities. Has been off beta-blocker for  2 days which may cause rebound tachycardia. We will resume carvedilol      Tests ordered:  a1c bmp  lipids  Follow-up  6 mo      Signed:  VIVIAN Robert CNP, 2/10/2023, 8:25 AM    An electronic signature was used to authenticate this note. Please note this report has been partially produced using speech recognition software and may contain errors related to that system including errors in grammar, punctuation, and spelling, as well as words and phrases that may be inappropriate. If there are any questions or concerns please feel free to contact the dictating provider for clarification.

## 2023-02-10 NOTE — PATIENT INSTRUCTIONS
Please be informed that if you contact our office outside of normal business hours the physician on call cannot help with any scheduling or rescheduling issues, procedure instruction questions or any type of medication issue. We advise you for any urgent/emergency that you go to the nearest emergency room! PLEASE CALL OUR OFFICE DURING NORMAL BUSINESS HOURS    Monday - Friday   8 am to 5 pm    [address] [address]: [phone]    [place]:  [phone]    **It is YOUR responsibilty to bring medication bottles and/or updated medication list to [address] [place] [address]. This will allow us to better serve you and all your healthcare needs**    [facility] Locations - No appointment necessary. Sites open Monday to Friday. Call your preferred location for test preparation, business   hours and other information you need. [facility] [address] [employer]. [id number]  Johnathan Fletcher, [address] [facility]  Phone: [phone] Alejandra munoz  [address] [facility]  [address] Alejandra munoz, [address]  Phone: [phone]

## 2023-02-11 LAB
ANION GAP SERPL CALCULATED.3IONS-SCNC: 15 MMOL/L (ref 3–16)
BUN BLDV-MCNC: 17 MG/DL (ref 7–20)
CALCIUM SERPL-MCNC: 9.9 MG/DL (ref 8.3–10.6)
CHLORIDE BLD-SCNC: 96 MMOL/L (ref 99–110)
CHOLESTEROL, TOTAL: 200 MG/DL (ref 0–199)
CO2: 24 MMOL/L (ref 21–32)
CREAT SERPL-MCNC: 0.6 MG/DL (ref 0.6–1.1)
ESTIMATED AVERAGE GLUCOSE: 309.2 MG/DL
GFR SERPL CREATININE-BSD FRML MDRD: >60 ML/MIN/{1.73_M2}
GLUCOSE BLD-MCNC: 501 MG/DL (ref 70–99)
HBA1C MFR BLD: 12.4 %
HDLC SERPL-MCNC: 40 MG/DL (ref 40–60)
LDL CHOLESTEROL CALCULATED: 101 MG/DL
POTASSIUM SERPL-SCNC: 4.5 MMOL/L (ref 3.5–5.1)
SODIUM BLD-SCNC: 135 MMOL/L (ref 136–145)
TRIGL SERPL-MCNC: 297 MG/DL (ref 0–150)
VLDLC SERPL CALC-MCNC: 59 MG/DL

## 2023-02-13 ENCOUNTER — TELEPHONE (OUTPATIENT)
Dept: CARDIOLOGY CLINIC | Age: 58
End: 2023-02-13

## 2023-02-13 DIAGNOSIS — K26.9 DUODENAL ULCER: ICD-10-CM

## 2023-02-13 RX ORDER — CANAGLIFLOZIN 300 MG/1
TABLET, FILM COATED ORAL
Qty: 90 TABLET | Refills: 1 | OUTPATIENT
Start: 2023-02-13

## 2023-02-13 RX ORDER — LINAGLIPTIN 5 MG/1
TABLET, FILM COATED ORAL
Qty: 90 TABLET | Refills: 0 | OUTPATIENT
Start: 2023-02-13

## 2023-02-13 NOTE — TELEPHONE ENCOUNTER
Called and left VM for labs: needs to see PCP- blood sugar is not well controlled  A1C  up to 12.4  lipids not at goal... has she taken the atorvastatin regularly ?

## 2023-02-14 ENCOUNTER — TELEPHONE (OUTPATIENT)
Dept: CARDIOLOGY CLINIC | Age: 58
End: 2023-02-14

## 2023-02-14 RX ORDER — OMEPRAZOLE 20 MG/1
CAPSULE, DELAYED RELEASE ORAL
Qty: 30 CAPSULE | Refills: 0 | Status: SHIPPED | OUTPATIENT
Start: 2023-02-14

## 2023-02-14 NOTE — TELEPHONE ENCOUNTER
Second attempt to call and talk to pt about  labs. VM was left to call back. : needs to see PCP- blood sugar is not well controlled  A1C  up to 12.4  lipids not at goal... has she taken the atorvastatin regularly ?

## 2023-02-15 ENCOUNTER — TELEPHONE (OUTPATIENT)
Dept: CARDIOLOGY CLINIC | Age: 58
End: 2023-02-15

## 2023-02-15 NOTE — TELEPHONE ENCOUNTER
Third attempt to call pt. Called  and left VM for pt to call back for labs: needs to see PCP- blood sugar is not well controlled  A1C  up to 12.4  lipids not at goal... has she taken the atorvastatin regularly ?

## 2023-02-15 NOTE — TELEPHONE ENCOUNTER
Returned pt call. Spoke with pt about labs: needs to see PCP- blood sugar is not well controlled  A1C  up to 12.4  lipids not at goal... has she taken the atorvastatin regularly ? Pt is taking atorvastatin at night regularly. Making appt with endocrinologist today.

## 2023-02-20 ENCOUNTER — HOSPITAL ENCOUNTER (OUTPATIENT)
Dept: WOMENS IMAGING | Age: 58
Discharge: HOME | End: 2023-02-20
Payer: COMMERCIAL

## 2023-02-20 ENCOUNTER — HOSPITAL ENCOUNTER (OUTPATIENT)
Dept: ULTRASOUND IMAGING | Age: 58
Discharge: HOME | End: 2023-02-20
Payer: COMMERCIAL

## 2023-02-20 ENCOUNTER — OFFICE VISIT (OUTPATIENT)
Dept: INTERNAL MEDICINE CLINIC | Age: 58
End: 2023-02-20
Payer: COMMERCIAL

## 2023-02-20 ENCOUNTER — ANCILLARY ORDERS (OUTPATIENT)
Dept: INTERNAL MEDICINE CLINIC | Age: 58
End: 2023-02-20

## 2023-02-20 VITALS — OXYGEN SATURATION: 98 % | TEMPERATURE: 97.2 F | HEART RATE: 113 BPM

## 2023-02-20 DIAGNOSIS — N63.20 PAINFUL LUMPY LEFT BREAST: ICD-10-CM

## 2023-02-20 DIAGNOSIS — L53.9 BREAST ERYTHEMA: ICD-10-CM

## 2023-02-20 DIAGNOSIS — N64.4 PAINFUL LUMPY LEFT BREAST: ICD-10-CM

## 2023-02-20 DIAGNOSIS — N64.4 BREAST PAIN, LEFT: Primary | ICD-10-CM

## 2023-02-20 DIAGNOSIS — N64.4 BREAST PAIN, LEFT: ICD-10-CM

## 2023-02-20 DIAGNOSIS — N61.1 LEFT BREAST ABSCESS: Primary | ICD-10-CM

## 2023-02-20 PROCEDURE — 4004F PT TOBACCO SCREEN RCVD TLK: CPT | Performed by: PHYSICIAN ASSISTANT

## 2023-02-20 PROCEDURE — 76642 ULTRASOUND BREAST LIMITED: CPT

## 2023-02-20 PROCEDURE — G8417 CALC BMI ABV UP PARAM F/U: HCPCS | Performed by: PHYSICIAN ASSISTANT

## 2023-02-20 PROCEDURE — 99214 OFFICE O/P EST MOD 30 MIN: CPT | Performed by: PHYSICIAN ASSISTANT

## 2023-02-20 PROCEDURE — G0279 TOMOSYNTHESIS, MAMMO: HCPCS

## 2023-02-20 PROCEDURE — G8428 CUR MEDS NOT DOCUMENT: HCPCS | Performed by: PHYSICIAN ASSISTANT

## 2023-02-20 PROCEDURE — G8484 FLU IMMUNIZE NO ADMIN: HCPCS | Performed by: PHYSICIAN ASSISTANT

## 2023-02-20 PROCEDURE — 3017F COLORECTAL CA SCREEN DOC REV: CPT | Performed by: PHYSICIAN ASSISTANT

## 2023-02-20 RX ORDER — AMOXICILLIN AND CLAVULANATE POTASSIUM 875; 125 MG/1; MG/1
1 TABLET, FILM COATED ORAL 2 TIMES DAILY
Qty: 20 TABLET | Refills: 0 | Status: ON HOLD | OUTPATIENT
Start: 2023-02-20 | End: 2023-03-02

## 2023-02-20 ASSESSMENT — ENCOUNTER SYMPTOMS
CONSTIPATION: 0
RHINORRHEA: 0
BLOOD IN STOOL: 0
SORE THROAT: 0
ABDOMINAL PAIN: 0
SHORTNESS OF BREATH: 0
EYE PAIN: 0
EYE REDNESS: 0
WHEEZING: 0
COLOR CHANGE: 1
VOMITING: 0
PHOTOPHOBIA: 0
CHEST TIGHTNESS: 0
BACK PAIN: 0
DIARRHEA: 0
COUGH: 0
NAUSEA: 0
EYE DISCHARGE: 0

## 2023-02-20 NOTE — LETTER
[address] [place] [address] Internal Med  [facility]  Phone: [phone]  Fax: [phone]    Madeline Lucas        February 20, 2023     Patient: Rianna Mtz   YOB: 1965   Date of Visit: 2/20/2023       To Whom It May Concern: It is my medical opinion that Rianna Mtz was seen in clinic today and can return to work Monday 2/27/23 if symptoms are improved. If you have any questions or concerns, please don't hesitate to call.     Sincerely,        Yaakov Siegel PA-C

## 2023-02-20 NOTE — RESULT ENCOUNTER NOTE
Vianey:    Vazquez Ms. Milli Kim,    I tried to give you a call however there was no answer; just wanted to let you know that your imaging results indicate likely a breast abscess/infection; low suspicion for inflammatory breast cancer at this time; we placed referral to general surgeon office for drainage of the abscess and will try to get you in with them as soon as possible. Be sure to continue the antibiotic as prescribed for now. It would be recommended to obtain follow-up breast imaging in 2 to 3 months to ensure resolution. Please let me know if you have any other questions or concerns.   Thanks,  Elsie Tracy

## 2023-02-20 NOTE — PROGRESS NOTES
Devika Floyd (:  1965) is a 56 y.o. female,Established patient, here for evaluation of the following chief complaint(s): Wound Infection, Headache, Generalized Body Aches, and Fever      SUBJECTIVE/OBJECTIVE:  HPI  Devika Floyd is a pleasant 56 y.o. female presenting to clinic today for breast problem. Patient reports onset of left breast pain about 2 days ago; patient reports onset of redness and worsening pain yesterday with continuing worsening pain into today; patient does report she notices lump in left breast, significant tenderness to palpation; reports she did have a fever at home this morning of 101 °F, has been alternating ibuprofen and Tylenol which has helped to control fever. Patient reports she has been feeling more fatigued and having mild myalgias with onset of breast problems; reports that she has had about 15 pounds of unintentional weight loss over the past few months secondary to not being as hungry as before.   Patient denies any night sweats; chest pains, shortness of breath etc.    Allergies   Allergen Reactions    Ampicillin Rash       Current Outpatient Medications   Medication Sig Dispense Refill    amoxicillin-clavulanate (AUGMENTIN) 875-125 MG per tablet Take 1 tablet by mouth 2 times daily for 10 days 20 tablet 0    omeprazole (PRILOSEC) 20 MG delayed release capsule TAKE ONE CAPSULE BY MOUTH EVERY MORNING BEFORE BREAKFAST 30 capsule 0    lisinopril (PRINIVIL;ZESTRIL) 20 MG tablet TAKE ONE TABLET BY MOUTH DAILY 90 tablet 3    atorvastatin (LIPITOR) 40 MG tablet Take 1 tablet by mouth daily 90 tablet 3    carvedilol (COREG) 6.25 MG tablet Take 1 tablet by mouth daily 90 tablet 3    TRADJENTA 5 MG tablet TAKE ONE TABLET BY MOUTH DAILY 90 tablet 0    dicyclomine (BENTYL) 10 MG capsule TAKE ONE CAPSULE BY MOUTH FOUR TIMES A DAY AS NEEDED FOR ABDOMINAL PAIN 120 capsule 0    naproxen (NAPROSYN) 500 MG tablet Take 1 tablet by mouth 2 times daily as needed for Pain (Patient not taking: Reported on 2/10/2023) 60 tablet 0    INVOKANA 300 MG TABS tablet TAKE ONE TABLET BY MOUTH EVERY MORNING BEFORE BREAKFAST 90 tablet 1    fluticasone (FLONASE) 50 MCG/ACT nasal spray 2 sprays by Each Nostril route daily (Patient not taking: Reported on 2/10/2023) 16 g 0    vitamin D3 (CHOLECALCIFEROL) 10 MCG (400 UNIT) TABS tablet TAKE TWO TABLETS BY MOUTH DAILY 180 tablet 1    aspirin (ASPIRIN ADULT LOW STRENGTH) 81 MG EC tablet Take 1 tablet by mouth daily 30 tablet 4    insulin glargine (LANTUS SOLOSTAR) 100 UNIT/ML injection pen Inject 40 Units into the skin daily (Patient not taking: Reported on 2/10/2023) 5 pen 3    insulin aspart (NOVOLOG FLEXPEN) 100 UNIT/ML injection pen Take 20 units with each meal in addition to sliding scale as below   *Medium Dose Correction Algorithm**Insulin: 3 TIMES DAILY WITH MEALSGlucose: Dose: No [id number] 2 [id number] 4 Xmexm858-601 6 Rgojz284-551 8 Shuxz139-027 10 Rryax046 and above 12 Units (Patient not taking: Reported on 2/10/2023) 5 pen 3    blood glucose monitor kit and supplies Test 4 times a day before meals and bedtime& as needed for symptoms of irregular blood glucose. 1 kit 0    Insulin Pen Needle (PEN NEEDLES) 31G X 5 MM MISC lantus QHS and Novolog QAC and HS (Patient not taking: Reported on 2/10/2023) 150 each 2    Lancets MISC 1 each by Does not apply route 4 times daily 200 each 2    blood glucose monitor strips Test 4 times a day & as needed for symptoms of irregular blood glucose. 200 strip 2     No current facility-administered medications for this visit. Pulse (!) 113   Temp 97.2 °F (36.2 °C)   SpO2 98%     Review of Systems   Constitutional:  Positive for appetite change, fatigue and fever. Negative for chills. HENT:  Negative for congestion, ear pain, hearing loss, rhinorrhea and sore throat. Eyes:  Negative for photophobia, pain, discharge and redness.    Respiratory:  Negative for cough, chest tightness, shortness of breath and wheezing. Cardiovascular:  Negative for chest pain, palpitations and leg swelling. Gastrointestinal:  Negative for abdominal pain, blood in stool, constipation, diarrhea, nausea and vomiting. Endocrine: Negative for polyuria. Genitourinary:  Negative for difficulty urinating, dysuria, flank pain, frequency, hematuria and urgency. Musculoskeletal:  Positive for myalgias. Negative for arthralgias, back pain, gait problem and joint swelling. Skin:  Positive for color change and rash. Neurological:  Negative for dizziness, syncope, weakness, light-headedness and headaches. Hematological:  Negative for adenopathy. Psychiatric/Behavioral:  Negative for agitation, behavioral problems and suicidal ideas. The patient is not nervous/anxious. Physical Exam  Exam conducted with a chaperone present. Constitutional:       General: She is not in acute distress. Appearance: She is obese. She is not ill-appearing, toxic-appearing or diaphoretic. HENT:      Head: Normocephalic and atraumatic. Right Ear: External ear normal.      Left Ear: External ear normal.   Cardiovascular:      Rate and Rhythm: Regular rhythm. Pulses: Normal pulses. Pulmonary:      Effort: No respiratory distress. Chest:   Breasts:     Breasts are asymmetrical.      Left: Swelling, mass, nipple discharge, skin change and tenderness present. Comments: Chaperone present for breast exam; large area of erythema, warmth, firm skin with underlying palpable mass approximate 4 to 5 inches in diameter; scant amount of dried blood at areola; no palpable axillary lymph nodes  Musculoskeletal:         General: Normal range of motion. Lymphadenopathy:      Upper Body:      Left upper body: No supraclavicular, axillary or pectoral adenopathy. Skin:     General: Skin is warm and dry. Neurological:      General: No focal deficit present. Mental Status: She is alert and oriented to person, place, and time. Mental status is at baseline. Psychiatric:         Behavior: Behavior normal.       ASSESSMENT/PLAN:  1. Breast pain, left   -Possible mastitis/cellulitis/abscess so will treat with empiric antibiotics; however patient is postmenopausal, is not breast-feeding etc.; concern for possible IBC; will obtain stat ultrasound and mammogram, can consider referral to specialist and/or surgery pending result etc.  Reviewed proper use, monitoring, side effects medication sent in; can use warm compress; Return to clinic or report to emergency department if symptoms worsen, change, persist.  -     [facility] HAZEL DIGITAL DIAGNOSTIC UNILATERAL LEFT; Future  -     US BREAST LEFT COMPLETE; Future  -     amoxicillin-clavulanate (AUGMENTIN) 875-125 MG per tablet; Take 1 tablet by mouth 2 times daily for 10 days, Disp-20 tablet, R-0Normal  2. Painful lumpy left breast  -     [facility] HAZEL DIGITAL DIAGNOSTIC UNILATERAL LEFT; Future  -     US BREAST LEFT COMPLETE; Future  -     amoxicillin-clavulanate (AUGMENTIN) 875-125 MG per tablet; Take 1 tablet by mouth 2 times daily for 10 days, Disp-20 tablet, R-0Normal  3. Breast erythema  -     [facility] HAZEL DIGITAL DIAGNOSTIC UNILATERAL LEFT; Future  -     US BREAST LEFT COMPLETE; Future  -     amoxicillin-clavulanate (AUGMENTIN) 875-125 MG per tablet; Take 1 tablet by mouth 2 times daily for 10 days, Disp-20 tablet, R-0Normal    Return in about 1 week (around 2/27/2023), or if symptoms worsen or fail to improve, for Follow Up. An electronic signature was used to authenticate this note.     --AYDEE Kat

## 2023-02-22 ENCOUNTER — TELEPHONE (OUTPATIENT)
Dept: SURGERY | Age: 58
End: 2023-02-22

## 2023-02-22 ENCOUNTER — OFFICE VISIT (OUTPATIENT)
Dept: SURGERY | Age: 58
End: 2023-02-22
Payer: COMMERCIAL

## 2023-02-22 ENCOUNTER — ANESTHESIA EVENT (OUTPATIENT)
Dept: OPERATING ROOM | Age: 58
End: 2023-02-22
Payer: COMMERCIAL

## 2023-02-22 VITALS
DIASTOLIC BLOOD PRESSURE: 84 MMHG | BODY MASS INDEX: 43.92 KG/M2 | HEIGHT: 66 IN | SYSTOLIC BLOOD PRESSURE: 130 MMHG | WEIGHT: 273.3 LBS

## 2023-02-22 DIAGNOSIS — N61.1 LEFT BREAST ABSCESS: Primary | ICD-10-CM

## 2023-02-22 DIAGNOSIS — Z01.818 PRE-OPERATIVE CLEARANCE: Primary | ICD-10-CM

## 2023-02-22 PROCEDURE — G8484 FLU IMMUNIZE NO ADMIN: HCPCS | Performed by: SURGERY

## 2023-02-22 PROCEDURE — 3075F SYST BP GE 130 - 139MM HG: CPT | Performed by: SURGERY

## 2023-02-22 PROCEDURE — 3017F COLORECTAL CA SCREEN DOC REV: CPT | Performed by: SURGERY

## 2023-02-22 PROCEDURE — 99214 OFFICE O/P EST MOD 30 MIN: CPT | Performed by: SURGERY

## 2023-02-22 PROCEDURE — 3079F DIAST BP 80-89 MM HG: CPT | Performed by: SURGERY

## 2023-02-22 PROCEDURE — 4004F PT TOBACCO SCREEN RCVD TLK: CPT | Performed by: SURGERY

## 2023-02-22 PROCEDURE — G8427 DOCREV CUR MEDS BY ELIG CLIN: HCPCS | Performed by: SURGERY

## 2023-02-22 PROCEDURE — G8417 CALC BMI ABV UP PARAM F/U: HCPCS | Performed by: SURGERY

## 2023-02-22 RX ORDER — SODIUM CHLORIDE 0.9 % (FLUSH) 0.9 %
5-40 SYRINGE (ML) INJECTION EVERY 12 HOURS SCHEDULED
Status: CANCELLED | OUTPATIENT
Start: 2023-02-22

## 2023-02-22 RX ORDER — SODIUM CHLORIDE 9 MG/ML
INJECTION, SOLUTION INTRAVENOUS CONTINUOUS
Status: CANCELLED | OUTPATIENT
Start: 2023-02-22

## 2023-02-22 RX ORDER — SODIUM CHLORIDE 0.9 % (FLUSH) 0.9 %
5-40 SYRINGE (ML) INJECTION PRN
Status: CANCELLED | OUTPATIENT
Start: 2023-02-22

## 2023-02-22 RX ORDER — SODIUM CHLORIDE 9 MG/ML
INJECTION, SOLUTION INTRAVENOUS PRN
Status: CANCELLED | OUTPATIENT
Start: 2023-02-22

## 2023-02-22 ASSESSMENT — ENCOUNTER SYMPTOMS
SORE THROAT: 0
BACK PAIN: 0
TROUBLE SWALLOWING: 0
STRIDOR: 0
ABDOMINAL PAIN: 0
VOMITING: 0
SHORTNESS OF BREATH: 0
BLOOD IN STOOL: 0
ABDOMINAL DISTENTION: 0
COUGH: 0
CHOKING: 0
NAUSEA: 0
SHORTNESS OF BREATH: 1
COLOR CHANGE: 0

## 2023-02-22 NOTE — TELEPHONE ENCOUNTER
Notification or Prior Authorization is not required for the requested services    This [employer] plan does not currently require a prior authorization for these services. If you have general questions about the prior authorization requirements, please call us at [phone] or visit [employer] > Clinician Resources > Advance and Admission Notification Requirements. The number above acknowledges your notification. Please write this number down for future reference. Notification is not a guarantee of coverage or payment.     Decision ID #:[id number]

## 2023-02-22 NOTE — PROGRESS NOTES
SUBJECTIVE:  HPI:     Patient here with 5-day history of worsening left breast abscess. Notes increased erythema/pain/drainage to left breast.  She had mammogram with subsequent ultrasound of left breast.  Findings consistent with abscess. 1. Large abscess noted in the retroareolar region of the left breast at 11   o'clock, 3 cm from the nipple measuring 5.1 x 4.0 x 2.4 cm. Additionally,   there is diffuse increased density in the left breast, compatible with an   associated mastitis. Surgical consultation is recommended. 2. Enlarged left axillary lymph nodes, likely reactive. 3. Benign right breast mammogram.       Denies prior mammograms. No history of breast biopsies. She smokes 1/2 pack/day. Notes increased erythema and pain over left breast with new drainage from nipple over the last 1 to 2 days. Past Surgical History:   Procedure Laterality Date    HERNIA REPAIR      PTCA  11/26/2019    Kissing stents to LAD & Ostium of Diag. UPPER GASTROINTESTINAL ENDOSCOPY N/A 2/13/2021    EGD DIAGNOSTIC ONLY performed by Gavin Falcon MD at [facility] ENDOSCOPY     Past Medical History:   Diagnosis Date    CAD (coronary artery disease)     Current every day smoker     Diabetes mellitus ([address]     GERD (gastroesophageal reflux disease)     History of exercise stress test 12/18/2019    Treadmill, Normal exercise performance without angina and ischemic EKG changes. Hyperlipidemia     Hypertension     Morbid obesity ([address]     Palpitations     Post PTCA 11/26/2019    Kissing stent to LAD & to Ostium of Diag. SOB (shortness of breath)     Type 2 diabetes mellitus with ketoacidosis without coma, with long-term current use of insulin ([address] 11/26/2019    DM diagnosed about 2013     No family history on file.   Social History     Socioeconomic History    Marital status:      Spouse name: Not on file    Number of children: Not on file    Years of education: Not on file    Highest education level: Not on file Occupational History    Not on file   Tobacco Use    Smoking status: Some Days     Packs/day: 0.50     Years: 40.00     Pack years: 20.00     Types: Cigarettes    Smokeless tobacco: Never    Tobacco comments:     1 pack every four days    Substance and Sexual Activity    Alcohol use: Never     Comment: Caffeine: 1 cup coffee daily, 3 cups iced tea a day    Drug use: Never    Sexual activity: Yes     Partners: Male, Female   Other Topics Concern    Not on file   Social History Narrative    Not on file     Social Determinants of Health     Financial Resource Strain: Not on file   Food Insecurity: Not on file   Transportation Needs: Not on file   Physical Activity: Not on file   Stress: Not on file   Social Connections: Not on file   Intimate Partner Violence: Not on file   Housing Stability: Not on file       Current Outpatient Medications   Medication Sig Dispense Refill    amoxicillin-clavulanate (AUGMENTIN) 875-125 MG per tablet Take 1 tablet by mouth 2 times daily for 10 days 20 tablet 0    omeprazole (PRILOSEC) 20 MG delayed release capsule TAKE ONE CAPSULE BY MOUTH EVERY MORNING BEFORE BREAKFAST 30 capsule 0    lisinopril (PRINIVIL;ZESTRIL) 20 MG tablet TAKE ONE TABLET BY MOUTH DAILY 90 tablet 3    atorvastatin (LIPITOR) 40 MG tablet Take 1 tablet by mouth daily 90 tablet 3    carvedilol (COREG) 6.25 MG tablet Take 1 tablet by mouth daily 90 tablet 3    TRADJENTA 5 MG tablet TAKE ONE TABLET BY MOUTH DAILY 90 tablet 0    dicyclomine (BENTYL) 10 MG capsule TAKE ONE CAPSULE BY MOUTH FOUR TIMES A DAY AS NEEDED FOR ABDOMINAL PAIN 120 capsule 0    INVOKANA 300 MG TABS tablet TAKE ONE TABLET BY MOUTH EVERY MORNING BEFORE BREAKFAST 90 tablet 1    vitamin D3 (CHOLECALCIFEROL) 10 MCG (400 UNIT) TABS tablet TAKE TWO TABLETS BY MOUTH DAILY 180 tablet 1    aspirin (ASPIRIN ADULT LOW STRENGTH) 81 MG EC tablet Take 1 tablet by mouth daily 30 tablet 4    blood glucose monitor kit and supplies Test 4 times a day before meals and bedtime& as needed for symptoms of irregular blood glucose. 1 kit 0    Lancets MISC 1 each by Does not apply route 4 times daily 200 each 2    blood glucose monitor strips Test 4 times a day & as needed for symptoms of irregular blood glucose. 200 strip 2    naproxen (NAPROSYN) 500 MG tablet Take 1 tablet by mouth 2 times daily as needed for Pain (Patient not taking: No sig reported) 60 tablet 0    fluticasone (FLONASE) 50 MCG/ACT nasal spray 2 sprays by Each Nostril route daily (Patient not taking: No sig reported) 16 g 0    insulin glargine (LANTUS SOLOSTAR) 100 UNIT/ML injection pen Inject 40 Units into the skin daily (Patient not taking: No sig reported) 5 pen 3    insulin aspart (NOVOLOG FLEXPEN) 100 UNIT/ML injection pen Take 20 units with each meal in addition to sliding scale as below   *Medium Dose Correction Algorithm**Insulin: 3 TIMES DAILY WITH MEALSGlucose: Dose: No [id number] 2 [id number] 4 [id number] 6 [id number] 8 Zvusc343-572 10 [id number] and above 12 Units (Patient not taking: No sig reported) 5 pen 3    Insulin Pen Needle (PEN NEEDLES) 31G X 5 MM MISC lantus QHS and Novolog QAC and HS (Patient not taking: No sig reported) 150 each 2     No current facility-administered medications for this visit. Allergies   Allergen Reactions    Ampicillin Rash       Review of Systems:         Review of Systems   Constitutional:  Negative for chills, fatigue, fever and unexpected weight change. HENT:  Negative for sore throat and trouble swallowing. Respiratory:  Negative for cough, choking, shortness of breath and stridor. Cardiovascular:  Negative for chest pain, palpitations and leg swelling. Gastrointestinal:  Negative for abdominal distention, abdominal pain, blood in stool, nausea and vomiting. Genitourinary:  Negative for difficulty urinating and dysuria. Musculoskeletal:  Negative for back pain, gait problem and joint swelling. Skin:  Positive for wound.  Negative for color change and rash. Allergic/Immunologic: Negative for immunocompromised state. Neurological:  Negative for dizziness, speech difficulty, weakness and light-headedness. Hematological:  Negative for adenopathy. Does not bruise/bleed easily. Psychiatric/Behavioral:  Negative for agitation and confusion. The patient is not nervous/anxious. OBJECTIVE:  Physical Exam:    /84 (Site: Right Upper Arm, Position: Sitting, Cuff Size: Large Adult)   Ht 5' 6\" (1.676 m)   Wt 273 lb 4.8 oz (124 kg)   BMI 44.11 kg/m²      Physical Exam    Chaperone present for exam    General: No acute distress  Neck: No JVD, supple  Lungs: Chest rise equal bilaterally  Cardiac: Appears well perfused   Abdomen: Soft, nontender, nondistended, no rebound or guarding  Extremities, no edema, cyanosis, or clubbing  Skin: No rashes or breakdown  Breast: No axillary lymphadenopathy bilaterally. Right breast with no dominant mass, skin changes, no nipple retraction. Left breast with purulent drainage from nipple. Large area of surrounding erythema induration and fluctuance. ASSESSMENT:  1. Left breast abscess          PLAN:    Large left breast abscess. Given size of abscess and surrounding indurated tissue I do not think she will do well with aspiration therefore will proceed to OR for I&D of abscess. Discussed risk and benefits with patient. She agrees to proceed with procedure. She should continue her antibiotics and we will schedule her for procedure tomorrow AM.    Orders Placed This Encounter   Procedures    Initiate PAT Protocol        No orders of the defined types were placed in this encounter. Follow Up:  No follow-ups on file.       Betty Roberson DO

## 2023-02-22 NOTE — ANESTHESIA PRE PROCEDURE
Department of Anesthesiology  Preprocedure Note       Name:  Ilana Bradford   Age:  56 y.o.  :  1965                                          MRN:  [id number]         Date:  2023      Surgeon: Rosemarie Rodriguez):  Pamela Metcalf DO    Procedure: Procedure(s):  LEFT BREAST INCISION AND DRAINAGE    Medications prior to admission:   Prior to Admission medications    Medication Sig Start Date End Date Taking?  Authorizing Provider   amoxicillin-clavulanate (AUGMENTIN) 875-125 MG per tablet Take 1 tablet by mouth 2 times daily for 10 days 2/20/23 3/2/23  AYDEE Callejas   omeprazole (PRILOSEC) 20 MG delayed release capsule TAKE ONE CAPSULE BY MOUTH EVERY MORNING BEFORE BREAKFAST 23   Anoop Granado MD   lisinopril (PRINIVIL;ZESTRIL) 20 MG tablet TAKE ONE TABLET BY MOUTH DAILY 2/10/23   VIVIAN Kirkland CNP   atorvastatin (LIPITOR) 40 MG tablet Take 1 tablet by mouth daily 2/10/23   VIVIAN Kirkland CNP   carvedilol (COREG) 6.25 MG tablet Take 1 tablet by mouth daily 2/10/23   VIVIAN Kirkland CNP   TRADJENTA 5 MG tablet TAKE ONE TABLET BY MOUTH DAILY 22   Anoop Granado MD   dicyclomine (BENTYL) 10 MG capsule TAKE ONE CAPSULE BY MOUTH FOUR TIMES A DAY AS NEEDED FOR ABDOMINAL PAIN 10/3/22   Anoop Granado MD   naproxen (NAPROSYN) 500 MG tablet Take 1 tablet by mouth 2 times daily as needed for Pain  Patient not taking: No sig reported 22   AYDEE Callejas   INVOKANA 300 MG TABS tablet TAKE ONE TABLET BY MOUTH EVERY MORNING BEFORE BREAKFAST 22   Anoop Granado MD   fluticasone (FLONASE) 50 MCG/ACT nasal spray 2 sprays by Each Nostril route daily  Patient not taking: No sig reported 3/26/22   VIVIAN Parker CNP   vitamin D3 (CHOLECALCIFEROL) 10 MCG (400 UNIT) TABS tablet TAKE TWO TABLETS BY MOUTH DAILY 21   Anoop Granado MD   aspirin (ASPIRIN ADULT LOW STRENGTH) 81 MG EC tablet Take 1 tablet by mouth daily 3/29/21   VIVAIN Kirkland CNP   insulin glargine (LANTUS SOLOSTAR) 100 UNIT/ML injection pen Inject 40 Units into the skin daily  Patient not taking: No sig reported 11/27/19   Nigel Bobby MD   insulin aspart (NOVOLOG FLEXPEN) 100 UNIT/ML injection pen Take 20 units with each meal in addition to sliding scale as below   *Medium Dose Correction Algorithm**Insulin: 3 TIMES DAILY WITH MEALSGlucose: Dose: No [id number] 2 [id number] 4 Fxmqt477-745 6 Qbhhd671-976 8 Twbyk555-051 10 Dskze480 and above 12 Units  Patient not taking: No sig reported 11/27/19   Nigel Bobby MD   blood glucose monitor kit and supplies Test 4 times a day before meals and bedtime& as needed for symptoms of irregular blood glucose. 11/27/19   Nigel Bobby MD   Insulin Pen Needle (PEN NEEDLES) 31G X 5 MM MISC lantus QHS and Novolog QAC and HS  Patient not taking: No sig reported 11/27/19   Nigel Bobby MD   Lancets MISC 1 each by Does not apply route 4 times daily 11/27/19   Nigel Bobby MD   blood glucose monitor strips Test 4 times a day & as needed for symptoms of irregular blood glucose. 11/27/19   Nigel Bobby MD       Current medications:    No current facility-administered medications for this encounter.     Current Outpatient Medications   Medication Sig Dispense Refill   • amoxicillin-clavulanate (AUGMENTIN) 875-125 MG per tablet Take 1 tablet by mouth 2 times daily for 10 days 20 tablet 0   • omeprazole (PRILOSEC) 20 MG delayed release capsule TAKE ONE CAPSULE BY MOUTH EVERY MORNING BEFORE BREAKFAST 30 capsule 0   • lisinopril (PRINIVIL;ZESTRIL) 20 MG tablet TAKE ONE TABLET BY MOUTH DAILY 90 tablet 3   • atorvastatin (LIPITOR) 40 MG tablet Take 1 tablet by mouth daily 90 tablet 3   • carvedilol (COREG) 6.25 MG tablet Take 1 tablet by mouth daily 90 tablet 3   • TRADJENTA 5 MG tablet TAKE ONE TABLET BY MOUTH DAILY 90 tablet 0   • dicyclomine (BENTYL) 10 MG capsule TAKE ONE CAPSULE BY MOUTH FOUR TIMES A DAY AS NEEDED FOR ABDOMINAL PAIN 120  capsule 0    naproxen (NAPROSYN) 500 MG tablet Take 1 tablet by mouth 2 times daily as needed for Pain (Patient not taking: No sig reported) 60 tablet 0    INVOKANA 300 MG TABS tablet TAKE ONE TABLET BY MOUTH EVERY MORNING BEFORE BREAKFAST 90 tablet 1    fluticasone (FLONASE) 50 MCG/ACT nasal spray 2 sprays by Each Nostril route daily (Patient not taking: No sig reported) 16 g 0    vitamin D3 (CHOLECALCIFEROL) 10 MCG (400 UNIT) TABS tablet TAKE TWO TABLETS BY MOUTH DAILY 180 tablet 1    aspirin (ASPIRIN ADULT LOW STRENGTH) 81 MG EC tablet Take 1 tablet by mouth daily 30 tablet 4    insulin glargine (LANTUS SOLOSTAR) 100 UNIT/ML injection pen Inject 40 Units into the skin daily (Patient not taking: No sig reported) 5 pen 3    insulin aspart (NOVOLOG FLEXPEN) 100 UNIT/ML injection pen Take 20 units with each meal in addition to sliding scale as below   *Medium Dose Correction Algorithm**Insulin: 3 TIMES DAILY WITH MEALSGlucose: Dose: No [id number] 2 Ptljl870-888 4 Zutoc951-491 6 Loliz707-930 8 Qqfvo749-602 10 Azzut133 and above 12 Units (Patient not taking: No sig reported) 5 pen 3    blood glucose monitor kit and supplies Test 4 times a day before meals and bedtime& as needed for symptoms of irregular blood glucose. 1 kit 0    Insulin Pen Needle (PEN NEEDLES) 31G X 5 MM MISC lantus QHS and Novolog QAC and HS (Patient not taking: No sig reported) 150 each 2    Lancets MISC 1 each by Does not apply route 4 times daily 200 each 2    blood glucose monitor strips Test 4 times a day & as needed for symptoms of irregular blood glucose. 200 strip 2       Allergies:     Allergies   Allergen Reactions    Ampicillin Rash       Problem List:    Patient Active Problem List   Diagnosis Code    Acute chest pain R07.9    Unstable angina (HCC) I20.0    Morbid obesity (HCC) E66.01    Essential hypertension I10    Dyslipidemia E78.5    Post PTCA Z98.61    SOB (shortness of breath) R06.02    CAD (coronary artery disease) I25.10    Palpitations R00.2    Hyponatremia E87.1    High anion gap metabolic acidosis N16.72    Diarrhea R19.7    Encounter for monitoring antiplatelet therapy S64.80, Z79.02    Sepsis (HCC) A41.9    Cellulitis L03.90    Vitamin D deficiency E55.9    WD - Cellulitis of groin L03.314    WD - Ulcer of right groin with fat layer exposed ([address] L98.492    Diabetes mellitus type 2 in obese (HCC) E11.69, E66.9    Mixed hyperlipidemia E78.2    Hypertriglyceridemia E78.1    Duodenal ulcer K26.9    Diabetic polyneuropathy associated with type 2 diabetes mellitus (HCC) E11.42    Primary insomnia F51.01       Past Medical History:        Diagnosis Date    CAD (coronary artery disease)     Current every day smoker     Diabetes mellitus ([address]     GERD (gastroesophageal reflux disease)     History of exercise stress test 12/18/2019    Treadmill, Normal exercise performance without angina and ischemic EKG changes.  Hyperlipidemia     Hypertension     Morbid obesity ([address]     Palpitations     Post PTCA 11/26/2019    Kissing stent to LAD & to Ostium of Diag.  SOB (shortness of breath)     Type 2 diabetes mellitus with ketoacidosis without coma, with long-term current use of insulin ([address] 11/26/2019    DM diagnosed about 2013       Past Surgical History:        Procedure Laterality Date    HERNIA REPAIR      PTCA  11/26/2019    Kissing stents to LAD & Ostium of Diag.     UPPER GASTROINTESTINAL ENDOSCOPY N/A 2/13/2021    EGD DIAGNOSTIC ONLY performed by Trey Rinaldi MD at [facility] ENDOSCOPY       Social History:    Social History     Tobacco Use    Smoking status: Some Days     Packs/day: 0.50     Years: 40.00     Pack years: 20.00     Types: Cigarettes    Smokeless tobacco: Never    Tobacco comments:     1 pack every four days    Substance Use Topics    Alcohol use: Never     Comment: Caffeine: 1 cup coffee daily, 3 cups iced tea a day                                Ready to quit: Not Answered  Counseling given: Not Answered  Tobacco comments: 1 pack every four days       Vital Signs (Current): There were no vitals filed for this visit. BP Readings from Last 3 Encounters:   02/22/23 130/84   02/10/23 (!) 160/88   05/25/22 118/66       NPO Status:                                                                                 BMI:   Wt Readings from Last 3 Encounters:   02/22/23 273 lb 4.8 oz (124 kg)   02/10/23 278 lb (126.1 kg)   05/25/22 274 lb (124.3 kg)     There is no height or weight on file to calculate BMI.    CBC:   Lab Results   Component Value Date/Time    WBC 10.5 03/19/2021 09:00 AM    RBC 4.20 03/19/2021 09:00 AM    HGB 12.2 03/19/2021 09:00 AM    HCT 39.8 03/19/2021 09:00 AM    MCV 94.8 03/19/2021 09:00 AM    RDW 15.0 03/19/2021 09:00 AM     03/19/2021 09:00 AM       CMP:   Lab Results   Component Value Date/Time     02/10/2023 09:09 AM    K 4.5 02/10/2023 09:09 AM    CL 96 02/10/2023 09:09 AM    CO2 24 02/10/2023 09:09 AM    BUN 17 02/10/2023 09:09 AM    CREATININE 0.6 02/10/2023 09:09 AM    GFRAA >60 03/19/2021 09:00 AM    LABGLOM >60 02/10/2023 09:09 AM    GLUCOSE 501 02/10/2023 09:09 AM    PROT 7.6 03/19/2021 09:00 AM    CALCIUM 9.9 02/10/2023 09:09 AM    BILITOT 0.3 03/19/2021 09:00 AM    ALKPHOS 103 03/19/2021 09:00 AM    AST 19 03/19/2021 09:00 AM    ALT 17 03/19/2021 09:00 AM       POC Tests: No results for input(s): POCGLU, POCNA, POCK, POCCL, POCBUN, POCHEMO, POCHCT in the last 72 hours. Coags:   Lab Results   Component Value Date/Time    PROTIME 12.6 02/13/2021 05:30 AM    INR 1.04 02/13/2021 05:30 AM    APTT 21.8 02/13/2021 05:30 AM       HCG (If Applicable): No results found for: PREGTESTUR, PREGSERUM, HCG, HCGQUANT     ABGs: No results found for: PHART, PO2ART, SCZ6WXH, FHL4SCY, BEART, G8NCZMEP     Type & Screen (If Applicable):  No results found for: LABABO, LABRH    Drug/Infectious Status (If Applicable):   No results found for: HIV, HEPCAB    COVID-19 Screening (If Applicable):   Lab Results   Component Value Date/Time    COVID19 Not Detected 11/16/2021 03:23 PM           Anesthesia Evaluation  Patient summary reviewed  Airway: Mallampati: I          Dental:          Pulmonary:   (+) shortness of breath:  decreased breath sounds                            Cardiovascular:    (+) hypertension:, angina:, CAD:, CABG/stent:, hyperlipidemia        Rhythm: regular    Echocardiogram reviewed  Stress test reviewed                Neuro/Psych:               GI/Hepatic/Renal:   (+) GERD:, PUD, morbid obesity          Endo/Other:    (+) DiabetesType II DM, using insulin, . Abdominal:   (+) obese,           Vascular: Other Findings:           Anesthesia Plan      general     ASA 4     (Chart review)  Induction: intravenous. MIPS: Postoperative opioids intended. Anesthetic plan and risks discussed with patient. Plan discussed with CRNA.     Attending anesthesiologist reviewed and agrees with Preprocedure content                VIVIAN Lafleur - CRNA   2/22/2023

## 2023-02-22 NOTE — H&P (VIEW-ONLY)
SUBJECTIVE:  HPI:     Patient here with 5-day history of worsening left breast abscess. Notes increased erythema/pain/drainage to left breast.  She had mammogram with subsequent ultrasound of left breast.  Findings consistent with abscess. 1. Large abscess noted in the retroareolar region of the left breast at 11   o'clock, 3 cm from the nipple measuring 5.1 x 4.0 x 2.4 cm. Additionally,   there is diffuse increased density in the left breast, compatible with an   associated mastitis. Surgical consultation is recommended. 2. Enlarged left axillary lymph nodes, likely reactive. 3. Benign right breast mammogram.       Denies prior mammograms. No history of breast biopsies. She smokes 1/2 pack/day. Notes increased erythema and pain over left breast with new drainage from nipple over the last 1 to 2 days. Past Surgical History:   Procedure Laterality Date    HERNIA REPAIR      PTCA  11/26/2019    Kissing stents to LAD & Ostium of Diag. UPPER GASTROINTESTINAL ENDOSCOPY N/A 2/13/2021    EGD DIAGNOSTIC ONLY performed by Hung Bryan MD at [facility] ENDOSCOPY     Past Medical History:   Diagnosis Date    CAD (coronary artery disease)     Current every day smoker     Diabetes mellitus ([address]     GERD (gastroesophageal reflux disease)     History of exercise stress test 12/18/2019    Treadmill, Normal exercise performance without angina and ischemic EKG changes. Hyperlipidemia     Hypertension     Morbid obesity ([address]     Palpitations     Post PTCA 11/26/2019    Kissing stent to LAD & to Ostium of Diag. SOB (shortness of breath)     Type 2 diabetes mellitus with ketoacidosis without coma, with long-term current use of insulin ([address] 11/26/2019    DM diagnosed about 2013     No family history on file.   Social History     Socioeconomic History    Marital status:      Spouse name: Not on file    Number of children: Not on file    Years of education: Not on file    Highest education level: Not on file Occupational History    Not on file   Tobacco Use    Smoking status: Some Days     Packs/day: 0.50     Years: 40.00     Pack years: 20.00     Types: Cigarettes    Smokeless tobacco: Never    Tobacco comments:     1 pack every four days    Substance and Sexual Activity    Alcohol use: Never     Comment: Caffeine: 1 cup coffee daily, 3 cups iced tea a day    Drug use: Never    Sexual activity: Yes     Partners: Male, Female   Other Topics Concern    Not on file   Social History Narrative    Not on file     Social Determinants of Health     Financial Resource Strain: Not on file   Food Insecurity: Not on file   Transportation Needs: Not on file   Physical Activity: Not on file   Stress: Not on file   Social Connections: Not on file   Intimate Partner Violence: Not on file   Housing Stability: Not on file       Current Outpatient Medications   Medication Sig Dispense Refill    amoxicillin-clavulanate (AUGMENTIN) 875-125 MG per tablet Take 1 tablet by mouth 2 times daily for 10 days 20 tablet 0    omeprazole (PRILOSEC) 20 MG delayed release capsule TAKE ONE CAPSULE BY MOUTH EVERY MORNING BEFORE BREAKFAST 30 capsule 0    lisinopril (PRINIVIL;ZESTRIL) 20 MG tablet TAKE ONE TABLET BY MOUTH DAILY 90 tablet 3    atorvastatin (LIPITOR) 40 MG tablet Take 1 tablet by mouth daily 90 tablet 3    carvedilol (COREG) 6.25 MG tablet Take 1 tablet by mouth daily 90 tablet 3    TRADJENTA 5 MG tablet TAKE ONE TABLET BY MOUTH DAILY 90 tablet 0    dicyclomine (BENTYL) 10 MG capsule TAKE ONE CAPSULE BY MOUTH FOUR TIMES A DAY AS NEEDED FOR ABDOMINAL PAIN 120 capsule 0    INVOKANA 300 MG TABS tablet TAKE ONE TABLET BY MOUTH EVERY MORNING BEFORE BREAKFAST 90 tablet 1    vitamin D3 (CHOLECALCIFEROL) 10 MCG (400 UNIT) TABS tablet TAKE TWO TABLETS BY MOUTH DAILY 180 tablet 1    aspirin (ASPIRIN ADULT LOW STRENGTH) 81 MG EC tablet Take 1 tablet by mouth daily 30 tablet 4    blood glucose monitor kit and supplies Test 4 times a day before meals and bedtime& as needed for symptoms of irregular blood glucose. 1 kit 0    Lancets MISC 1 each by Does not apply route 4 times daily 200 each 2    blood glucose monitor strips Test 4 times a day & as needed for symptoms of irregular blood glucose. 200 strip 2    naproxen (NAPROSYN) 500 MG tablet Take 1 tablet by mouth 2 times daily as needed for Pain (Patient not taking: No sig reported) 60 tablet 0    fluticasone (FLONASE) 50 MCG/ACT nasal spray 2 sprays by Each Nostril route daily (Patient not taking: No sig reported) 16 g 0    insulin glargine (LANTUS SOLOSTAR) 100 UNIT/ML injection pen Inject 40 Units into the skin daily (Patient not taking: No sig reported) 5 pen 3    insulin aspart (NOVOLOG FLEXPEN) 100 UNIT/ML injection pen Take 20 units with each meal in addition to sliding scale as below   *Medium Dose Correction Algorithm**Insulin: 3 TIMES DAILY WITH MEALSGlucose: Dose: No [id number] 2 [id number] 4 [id number] 6 [id number] 8 [id number] 10 [id number] and above 12 Units (Patient not taking: No sig reported) 5 pen 3    Insulin Pen Needle (PEN NEEDLES) 31G X 5 MM MISC lantus QHS and Novolog QAC and HS (Patient not taking: No sig reported) 150 each 2     No current facility-administered medications for this visit. Allergies   Allergen Reactions    Ampicillin Rash       Review of Systems:         Review of Systems   Constitutional:  Negative for chills, fatigue, fever and unexpected weight change. HENT:  Negative for sore throat and trouble swallowing. Respiratory:  Negative for cough, choking, shortness of breath and stridor. Cardiovascular:  Negative for chest pain, palpitations and leg swelling. Gastrointestinal:  Negative for abdominal distention, abdominal pain, blood in stool, nausea and vomiting. Genitourinary:  Negative for difficulty urinating and dysuria. Musculoskeletal:  Negative for back pain, gait problem and joint swelling. Skin:  Positive for wound.  Negative for color change and rash. Allergic/Immunologic: Negative for immunocompromised state. Neurological:  Negative for dizziness, speech difficulty, weakness and light-headedness. Hematological:  Negative for adenopathy. Does not bruise/bleed easily. Psychiatric/Behavioral:  Negative for agitation and confusion. The patient is not nervous/anxious. OBJECTIVE:  Physical Exam:    /84 (Site: Right Upper Arm, Position: Sitting, Cuff Size: Large Adult)   Ht 5' 6\" (1.676 m)   Wt 273 lb 4.8 oz (124 kg)   BMI 44.11 kg/m²      Physical Exam    Chaperone present for exam    General: No acute distress  Neck: No JVD, supple  Lungs: Chest rise equal bilaterally  Cardiac: Appears well perfused   Abdomen: Soft, nontender, nondistended, no rebound or guarding  Extremities, no edema, cyanosis, or clubbing  Skin: No rashes or breakdown  Breast: No axillary lymphadenopathy bilaterally. Right breast with no dominant mass, skin changes, no nipple retraction. Left breast with purulent drainage from nipple. Large area of surrounding erythema induration and fluctuance. ASSESSMENT:  1. Left breast abscess          PLAN:    Large left breast abscess. Given size of abscess and surrounding indurated tissue I do not think she will do well with aspiration therefore will proceed to OR for I&D of abscess. Discussed risk and benefits with patient. She agrees to proceed with procedure. She should continue her antibiotics and we will schedule her for procedure tomorrow AM.    Orders Placed This Encounter   Procedures    Initiate PAT Protocol        No orders of the defined types were placed in this encounter. Follow Up:  No follow-ups on file.       Josiah Meckel, DO

## 2023-02-23 ENCOUNTER — APPOINTMENT (OUTPATIENT)
Dept: GENERAL RADIOLOGY | Age: 58
End: 2023-02-23
Attending: SURGERY
Payer: COMMERCIAL

## 2023-02-23 ENCOUNTER — HOSPITAL ENCOUNTER (INPATIENT)
Age: 58
LOS: 4 days | Discharge: HOME | End: 2023-02-27
Attending: SURGERY | Admitting: SURGERY
Payer: COMMERCIAL

## 2023-02-23 ENCOUNTER — ANESTHESIA (OUTPATIENT)
Dept: OPERATING ROOM | Age: 58
End: 2023-02-23
Payer: COMMERCIAL

## 2023-02-23 DIAGNOSIS — N61.1 ABSCESS OF LEFT BREAST: ICD-10-CM

## 2023-02-23 DIAGNOSIS — N61.1 BREAST ABSCESS: Primary | ICD-10-CM

## 2023-02-23 LAB
GLUCOSE BLD-MCNC: 210 MG/DL (ref 70–99)
GLUCOSE BLD-MCNC: 228 MG/DL (ref 70–99)
GLUCOSE BLD-MCNC: 265 MG/DL (ref 70–99)
GLUCOSE BLD-MCNC: 274 MG/DL (ref 70–99)
GLUCOSE BLD-MCNC: 294 MG/DL (ref 70–99)
GLUCOSE BLD-MCNC: 321 MG/DL (ref 70–99)

## 2023-02-23 PROCEDURE — 87040 BLOOD CULTURE FOR BACTERIA: CPT

## 2023-02-23 PROCEDURE — 0H9U0ZZ DRAINAGE OF LEFT BREAST, OPEN APPROACH: ICD-10-PCS | Performed by: SURGERY

## 2023-02-23 PROCEDURE — 7100000001 HC PACU RECOVERY - ADDTL 15 MIN: Performed by: SURGERY

## 2023-02-23 PROCEDURE — 93005 ELECTROCARDIOGRAM TRACING: CPT | Performed by: STUDENT IN AN ORGANIZED HEALTH CARE EDUCATION/TRAINING PROGRAM

## 2023-02-23 PROCEDURE — 3600000012 HC SURGERY LEVEL 2 ADDTL 15MIN: Performed by: SURGERY

## 2023-02-23 PROCEDURE — 82962 GLUCOSE BLOOD TEST: CPT

## 2023-02-23 PROCEDURE — 94761 N-INVAS EAR/PLS OXIMETRY MLT: CPT

## 2023-02-23 PROCEDURE — 3600000002 HC SURGERY LEVEL 2 BASE: Performed by: SURGERY

## 2023-02-23 PROCEDURE — 6370000000 HC RX 637 (ALT 250 FOR IP): Performed by: ANESTHESIOLOGY

## 2023-02-23 PROCEDURE — 2580000003 HC RX 258: Performed by: SURGERY

## 2023-02-23 PROCEDURE — 6360000002 HC RX W HCPCS

## 2023-02-23 PROCEDURE — 6360000002 HC RX W HCPCS: Performed by: SURGERY

## 2023-02-23 PROCEDURE — 6370000000 HC RX 637 (ALT 250 FOR IP): Performed by: STUDENT IN AN ORGANIZED HEALTH CARE EDUCATION/TRAINING PROGRAM

## 2023-02-23 PROCEDURE — 87186 SC STD MICRODIL/AGAR DIL: CPT

## 2023-02-23 PROCEDURE — 87205 SMEAR GRAM STAIN: CPT

## 2023-02-23 PROCEDURE — 3700000000 HC ANESTHESIA ATTENDED CARE: Performed by: SURGERY

## 2023-02-23 PROCEDURE — 80053 COMPREHEN METABOLIC PANEL: CPT

## 2023-02-23 PROCEDURE — 87075 CULTR BACTERIA EXCEPT BLOOD: CPT

## 2023-02-23 PROCEDURE — 86140 C-REACTIVE PROTEIN: CPT

## 2023-02-23 PROCEDURE — 7100000000 HC PACU RECOVERY - FIRST 15 MIN: Performed by: SURGERY

## 2023-02-23 PROCEDURE — 87070 CULTURE OTHR SPECIMN AEROBIC: CPT

## 2023-02-23 PROCEDURE — 71045 X-RAY EXAM CHEST 1 VIEW: CPT

## 2023-02-23 PROCEDURE — 87147 CULTURE TYPE IMMUNOLOGIC: CPT

## 2023-02-23 PROCEDURE — 2500000003 HC RX 250 WO HCPCS

## 2023-02-23 PROCEDURE — 6370000000 HC RX 637 (ALT 250 FOR IP): Performed by: SURGERY

## 2023-02-23 PROCEDURE — 3700000001 HC ADD 15 MINUTES (ANESTHESIA): Performed by: SURGERY

## 2023-02-23 PROCEDURE — 1200000000 HC SEMI PRIVATE

## 2023-02-23 PROCEDURE — 87077 CULTURE AEROBIC IDENTIFY: CPT

## 2023-02-23 PROCEDURE — 2709999900 HC NON-CHARGEABLE SUPPLY: Performed by: SURGERY

## 2023-02-23 RX ORDER — LISINOPRIL 20 MG/1
20 TABLET ORAL DAILY
Status: DISCONTINUED | OUTPATIENT
Start: 2023-02-23 | End: 2023-02-27 | Stop reason: HOSPADM

## 2023-02-23 RX ORDER — SODIUM CHLORIDE 9 MG/ML
INJECTION, SOLUTION INTRAVENOUS CONTINUOUS
Status: DISCONTINUED | OUTPATIENT
Start: 2023-02-23 | End: 2023-02-23 | Stop reason: HOSPADM

## 2023-02-23 RX ORDER — LIDOCAINE HYDROCHLORIDE 20 MG/ML
INJECTION, SOLUTION EPIDURAL; INFILTRATION; INTRACAUDAL; PERINEURAL PRN
Status: DISCONTINUED | OUTPATIENT
Start: 2023-02-23 | End: 2023-02-23 | Stop reason: SDUPTHER

## 2023-02-23 RX ORDER — MIDAZOLAM HYDROCHLORIDE 2 MG/2ML
2 INJECTION, SOLUTION INTRAMUSCULAR; INTRAVENOUS
Status: DISCONTINUED | OUTPATIENT
Start: 2023-02-23 | End: 2023-02-23 | Stop reason: HOSPADM

## 2023-02-23 RX ORDER — INSULIN LISPRO 100 [IU]/ML
0-4 INJECTION, SOLUTION INTRAVENOUS; SUBCUTANEOUS NIGHTLY
Status: DISCONTINUED | OUTPATIENT
Start: 2023-02-23 | End: 2023-02-24

## 2023-02-23 RX ORDER — OXYCODONE HYDROCHLORIDE 5 MG/1
5 TABLET ORAL
Status: DISCONTINUED | OUTPATIENT
Start: 2023-02-23 | End: 2023-02-23 | Stop reason: HOSPADM

## 2023-02-23 RX ORDER — FENTANYL CITRATE 50 UG/ML
INJECTION, SOLUTION INTRAMUSCULAR; INTRAVENOUS PRN
Status: DISCONTINUED | OUTPATIENT
Start: 2023-02-23 | End: 2023-02-23 | Stop reason: SDUPTHER

## 2023-02-23 RX ORDER — HYDRALAZINE HYDROCHLORIDE 20 MG/ML
10 INJECTION INTRAMUSCULAR; INTRAVENOUS
Status: DISCONTINUED | OUTPATIENT
Start: 2023-02-23 | End: 2023-02-23 | Stop reason: HOSPADM

## 2023-02-23 RX ORDER — DEXTROSE MONOHYDRATE 100 MG/ML
INJECTION, SOLUTION INTRAVENOUS CONTINUOUS PRN
Status: DISCONTINUED | OUTPATIENT
Start: 2023-02-23 | End: 2023-02-23 | Stop reason: SDUPTHER

## 2023-02-23 RX ORDER — SODIUM CHLORIDE 0.9 % (FLUSH) 0.9 %
5-40 SYRINGE (ML) INJECTION EVERY 12 HOURS SCHEDULED
Status: DISCONTINUED | OUTPATIENT
Start: 2023-02-23 | End: 2023-02-23 | Stop reason: HOSPADM

## 2023-02-23 RX ORDER — ACETAMINOPHEN 325 MG/1
650 TABLET ORAL EVERY 6 HOURS PRN
Status: DISCONTINUED | OUTPATIENT
Start: 2023-02-23 | End: 2023-02-27 | Stop reason: HOSPADM

## 2023-02-23 RX ORDER — INSULIN LISPRO 100 [IU]/ML
10 INJECTION, SOLUTION INTRAVENOUS; SUBCUTANEOUS
Status: DISCONTINUED | OUTPATIENT
Start: 2023-02-23 | End: 2023-02-24 | Stop reason: SDUPTHER

## 2023-02-23 RX ORDER — ONDANSETRON 2 MG/ML
INJECTION INTRAMUSCULAR; INTRAVENOUS PRN
Status: DISCONTINUED | OUTPATIENT
Start: 2023-02-23 | End: 2023-02-23 | Stop reason: SDUPTHER

## 2023-02-23 RX ORDER — INSULIN LISPRO 100 [IU]/ML
0-8 INJECTION, SOLUTION INTRAVENOUS; SUBCUTANEOUS
Status: DISCONTINUED | OUTPATIENT
Start: 2023-02-23 | End: 2023-02-24 | Stop reason: SDUPTHER

## 2023-02-23 RX ORDER — PROCHLORPERAZINE EDISYLATE 5 MG/ML
5 INJECTION INTRAMUSCULAR; INTRAVENOUS
Status: DISCONTINUED | OUTPATIENT
Start: 2023-02-23 | End: 2023-02-23 | Stop reason: HOSPADM

## 2023-02-23 RX ORDER — LABETALOL HYDROCHLORIDE 5 MG/ML
10 INJECTION, SOLUTION INTRAVENOUS
Status: DISCONTINUED | OUTPATIENT
Start: 2023-02-23 | End: 2023-02-23 | Stop reason: HOSPADM

## 2023-02-23 RX ORDER — SODIUM CHLORIDE 9 MG/ML
INJECTION, SOLUTION INTRAVENOUS CONTINUOUS
Status: DISCONTINUED | OUTPATIENT
Start: 2023-02-23 | End: 2023-02-27 | Stop reason: HOSPADM

## 2023-02-23 RX ORDER — MEPERIDINE HYDROCHLORIDE 25 MG/ML
6.25 INJECTION INTRAMUSCULAR; INTRAVENOUS; SUBCUTANEOUS EVERY 5 MIN PRN
Status: DISCONTINUED | OUTPATIENT
Start: 2023-02-23 | End: 2023-02-23 | Stop reason: HOSPADM

## 2023-02-23 RX ORDER — INSULIN GLARGINE 100 [IU]/ML
20 INJECTION, SOLUTION SUBCUTANEOUS NIGHTLY
Status: DISCONTINUED | OUTPATIENT
Start: 2023-02-23 | End: 2023-02-23

## 2023-02-23 RX ORDER — SODIUM CHLORIDE 0.9 % (FLUSH) 0.9 %
5-40 SYRINGE (ML) INJECTION PRN
Status: DISCONTINUED | OUTPATIENT
Start: 2023-02-23 | End: 2023-02-27 | Stop reason: HOSPADM

## 2023-02-23 RX ORDER — PANTOPRAZOLE SODIUM 40 MG/1
40 TABLET, DELAYED RELEASE ORAL
Status: DISCONTINUED | OUTPATIENT
Start: 2023-02-24 | End: 2023-02-27 | Stop reason: HOSPADM

## 2023-02-23 RX ORDER — PROPOFOL 10 MG/ML
INJECTION, EMULSION INTRAVENOUS PRN
Status: DISCONTINUED | OUTPATIENT
Start: 2023-02-23 | End: 2023-02-23 | Stop reason: SDUPTHER

## 2023-02-23 RX ORDER — DIPHENHYDRAMINE HYDROCHLORIDE 50 MG/ML
12.5 INJECTION INTRAMUSCULAR; INTRAVENOUS
Status: DISCONTINUED | OUTPATIENT
Start: 2023-02-23 | End: 2023-02-23 | Stop reason: HOSPADM

## 2023-02-23 RX ORDER — IPRATROPIUM BROMIDE AND ALBUTEROL SULFATE 2.5; .5 MG/3ML; MG/3ML
1 SOLUTION RESPIRATORY (INHALATION)
Status: DISCONTINUED | OUTPATIENT
Start: 2023-02-23 | End: 2023-02-23 | Stop reason: HOSPADM

## 2023-02-23 RX ORDER — SODIUM CHLORIDE 9 MG/ML
INJECTION, SOLUTION INTRAVENOUS PRN
Status: DISCONTINUED | OUTPATIENT
Start: 2023-02-23 | End: 2023-02-27 | Stop reason: HOSPADM

## 2023-02-23 RX ORDER — ONDANSETRON 2 MG/ML
4 INJECTION INTRAMUSCULAR; INTRAVENOUS EVERY 6 HOURS PRN
Status: DISCONTINUED | OUTPATIENT
Start: 2023-02-23 | End: 2023-02-27 | Stop reason: HOSPADM

## 2023-02-23 RX ORDER — SODIUM CHLORIDE 0.9 % (FLUSH) 0.9 %
5-40 SYRINGE (ML) INJECTION EVERY 12 HOURS SCHEDULED
Status: DISCONTINUED | OUTPATIENT
Start: 2023-02-23 | End: 2023-02-27 | Stop reason: HOSPADM

## 2023-02-23 RX ORDER — ACETAMINOPHEN 650 MG/1
650 SUPPOSITORY RECTAL EVERY 6 HOURS PRN
Status: DISCONTINUED | OUTPATIENT
Start: 2023-02-23 | End: 2023-02-27 | Stop reason: HOSPADM

## 2023-02-23 RX ORDER — CARVEDILOL 6.25 MG/1
6.25 TABLET ORAL 2 TIMES DAILY WITH MEALS
Status: DISCONTINUED | OUTPATIENT
Start: 2023-02-23 | End: 2023-02-27

## 2023-02-23 RX ORDER — ATORVASTATIN CALCIUM 40 MG/1
40 TABLET, FILM COATED ORAL NIGHTLY
Status: DISCONTINUED | OUTPATIENT
Start: 2023-02-23 | End: 2023-02-27 | Stop reason: HOSPADM

## 2023-02-23 RX ORDER — FENTANYL CITRATE 50 UG/ML
50 INJECTION, SOLUTION INTRAMUSCULAR; INTRAVENOUS EVERY 5 MIN PRN
Status: DISCONTINUED | OUTPATIENT
Start: 2023-02-23 | End: 2023-02-23 | Stop reason: HOSPADM

## 2023-02-23 RX ORDER — SODIUM CHLORIDE 0.9 % (FLUSH) 0.9 %
5-40 SYRINGE (ML) INJECTION PRN
Status: DISCONTINUED | OUTPATIENT
Start: 2023-02-23 | End: 2023-02-23 | Stop reason: HOSPADM

## 2023-02-23 RX ORDER — ONDANSETRON 4 MG/1
4 TABLET, ORALLY DISINTEGRATING ORAL EVERY 8 HOURS PRN
Status: DISCONTINUED | OUTPATIENT
Start: 2023-02-23 | End: 2023-02-27 | Stop reason: HOSPADM

## 2023-02-23 RX ORDER — ASPIRIN 81 MG/1
81 TABLET ORAL DAILY
Status: DISCONTINUED | OUTPATIENT
Start: 2023-02-23 | End: 2023-02-27 | Stop reason: HOSPADM

## 2023-02-23 RX ORDER — HYDROCODONE BITARTRATE AND ACETAMINOPHEN 5; 325 MG/1; MG/1
1 TABLET ORAL EVERY 4 HOURS PRN
Status: DISCONTINUED | OUTPATIENT
Start: 2023-02-23 | End: 2023-02-27 | Stop reason: HOSPADM

## 2023-02-23 RX ORDER — SODIUM CHLORIDE 9 MG/ML
INJECTION, SOLUTION INTRAVENOUS PRN
Status: DISCONTINUED | OUTPATIENT
Start: 2023-02-23 | End: 2023-02-23 | Stop reason: HOSPADM

## 2023-02-23 RX ORDER — DEXTROSE MONOHYDRATE 100 MG/ML
INJECTION, SOLUTION INTRAVENOUS CONTINUOUS PRN
Status: DISCONTINUED | OUTPATIENT
Start: 2023-02-23 | End: 2023-02-27 | Stop reason: HOSPADM

## 2023-02-23 RX ORDER — ROCURONIUM BROMIDE 10 MG/ML
INJECTION, SOLUTION INTRAVENOUS PRN
Status: DISCONTINUED | OUTPATIENT
Start: 2023-02-23 | End: 2023-02-23 | Stop reason: SDUPTHER

## 2023-02-23 RX ORDER — INSULIN GLARGINE 100 [IU]/ML
40 INJECTION, SOLUTION SUBCUTANEOUS NIGHTLY
Status: DISCONTINUED | OUTPATIENT
Start: 2023-02-24 | End: 2023-02-24

## 2023-02-23 RX ORDER — INSULIN GLARGINE 100 [IU]/ML
20 INJECTION, SOLUTION SUBCUTANEOUS
Status: COMPLETED | OUTPATIENT
Start: 2023-02-24 | End: 2023-02-24

## 2023-02-23 RX ORDER — MORPHINE SULFATE 2 MG/ML
2 INJECTION, SOLUTION INTRAMUSCULAR; INTRAVENOUS
Status: DISCONTINUED | OUTPATIENT
Start: 2023-02-23 | End: 2023-02-26

## 2023-02-23 RX ORDER — HALOPERIDOL 5 MG/ML
1 INJECTION INTRAMUSCULAR
Status: DISCONTINUED | OUTPATIENT
Start: 2023-02-23 | End: 2023-02-23 | Stop reason: HOSPADM

## 2023-02-23 RX ORDER — POLYETHYLENE GLYCOL 3350 17 G/17G
17 POWDER, FOR SOLUTION ORAL DAILY PRN
Status: DISCONTINUED | OUTPATIENT
Start: 2023-02-23 | End: 2023-02-27 | Stop reason: HOSPADM

## 2023-02-23 RX ADMIN — FENTANYL CITRATE 50 MCG: 50 INJECTION, SOLUTION INTRAMUSCULAR; INTRAVENOUS at 07:20

## 2023-02-23 RX ADMIN — ROCURONIUM BROMIDE 50 MG: 10 INJECTION, SOLUTION INTRAVENOUS at 07:20

## 2023-02-23 RX ADMIN — INSULIN LISPRO 2 UNITS: 100 INJECTION, SOLUTION INTRAVENOUS; SUBCUTANEOUS at 16:38

## 2023-02-23 RX ADMIN — HYDROMORPHONE HYDROCHLORIDE 0.5 MG: 1 INJECTION, SOLUTION INTRAMUSCULAR; INTRAVENOUS; SUBCUTANEOUS at 07:28

## 2023-02-23 RX ADMIN — LIDOCAINE HYDROCHLORIDE 100 MG: 20 INJECTION, SOLUTION EPIDURAL; INFILTRATION; INTRACAUDAL; PERINEURAL at 07:20

## 2023-02-23 RX ADMIN — CARVEDILOL 6.25 MG: 6.25 TABLET, FILM COATED ORAL at 11:37

## 2023-02-23 RX ADMIN — ACETAMINOPHEN 650 MG: 325 TABLET ORAL at 21:28

## 2023-02-23 RX ADMIN — ASPIRIN 81 MG: 81 TABLET, COATED ORAL at 10:16

## 2023-02-23 RX ADMIN — SODIUM CHLORIDE: 9 INJECTION, SOLUTION INTRAVENOUS at 22:28

## 2023-02-23 RX ADMIN — ACETAMINOPHEN 650 MG: 325 TABLET ORAL at 15:34

## 2023-02-23 RX ADMIN — ONDANSETRON 4 MG: 2 INJECTION INTRAMUSCULAR; INTRAVENOUS at 07:20

## 2023-02-23 RX ADMIN — SODIUM CHLORIDE: 9 INJECTION, SOLUTION INTRAVENOUS at 08:21

## 2023-02-23 RX ADMIN — LISINOPRIL 20 MG: 20 TABLET ORAL at 10:16

## 2023-02-23 RX ADMIN — INSULIN HUMAN 10 UNITS: 100 INJECTION, SOLUTION PARENTERAL at 06:49

## 2023-02-23 RX ADMIN — HYDROCODONE BITARTRATE AND ACETAMINOPHEN 1 TABLET: 5; 325 TABLET ORAL at 10:17

## 2023-02-23 RX ADMIN — FENTANYL CITRATE 50 MCG: 50 INJECTION, SOLUTION INTRAMUSCULAR; INTRAVENOUS at 07:45

## 2023-02-23 RX ADMIN — INSULIN LISPRO 4 UNITS: 100 INJECTION, SOLUTION INTRAVENOUS; SUBCUTANEOUS at 11:41

## 2023-02-23 RX ADMIN — INSULIN LISPRO 10 UNITS: 100 INJECTION, SOLUTION INTRAVENOUS; SUBCUTANEOUS at 16:38

## 2023-02-23 RX ADMIN — CEFAZOLIN SODIUM 3000 MG: 10 INJECTION, POWDER, FOR SOLUTION INTRAVENOUS at 07:28

## 2023-02-23 RX ADMIN — ATORVASTATIN CALCIUM 40 MG: 40 TABLET, FILM COATED ORAL at 21:28

## 2023-02-23 RX ADMIN — INSULIN GLARGINE 20 UNITS: 100 INJECTION, SOLUTION SUBCUTANEOUS at 23:06

## 2023-02-23 RX ADMIN — SODIUM CHLORIDE: 900 INJECTION INTRAVENOUS at 07:13

## 2023-02-23 RX ADMIN — CARVEDILOL 6.25 MG: 6.25 TABLET, FILM COATED ORAL at 16:41

## 2023-02-23 RX ADMIN — PROPOFOL 200 MG: 10 INJECTION, EMULSION INTRAVENOUS at 07:20

## 2023-02-23 RX ADMIN — VANCOMYCIN HYDROCHLORIDE 1250 MG: 1.25 INJECTION, POWDER, LYOPHILIZED, FOR SOLUTION INTRAVENOUS at 11:40

## 2023-02-23 RX ADMIN — CEFEPIME HYDROCHLORIDE 2000 MG: 2 INJECTION, POWDER, FOR SOLUTION INTRAVENOUS at 23:06

## 2023-02-23 RX ADMIN — CEFEPIME HYDROCHLORIDE 2000 MG: 2 INJECTION, POWDER, FOR SOLUTION INTRAVENOUS at 10:20

## 2023-02-23 RX ADMIN — INSULIN LISPRO 10 UNITS: 100 INJECTION, SOLUTION INTRAVENOUS; SUBCUTANEOUS at 11:41

## 2023-02-23 ASSESSMENT — PAIN - FUNCTIONAL ASSESSMENT
PAIN_FUNCTIONAL_ASSESSMENT: ACTIVITIES ARE NOT PREVENTED
PAIN_FUNCTIONAL_ASSESSMENT: ACTIVITIES ARE NOT PREVENTED
PAIN_FUNCTIONAL_ASSESSMENT: PREVENTS OR INTERFERES SOME ACTIVE ACTIVITIES AND ADLS
PAIN_FUNCTIONAL_ASSESSMENT: 0-10
PAIN_FUNCTIONAL_ASSESSMENT: ACTIVITIES ARE NOT PREVENTED

## 2023-02-23 ASSESSMENT — PAIN DESCRIPTION - LOCATION
LOCATION: BREAST
LOCATION: HEAD
LOCATION: BREAST

## 2023-02-23 ASSESSMENT — PAIN DESCRIPTION - DESCRIPTORS
DESCRIPTORS: ACHING;STABBING
DESCRIPTORS: DISCOMFORT
DESCRIPTORS: ACHING;STABBING
DESCRIPTORS: ACHING;DISCOMFORT;STABBING

## 2023-02-23 ASSESSMENT — PAIN SCALES - GENERAL
PAINLEVEL_OUTOF10: 9
PAINLEVEL_OUTOF10: 9
PAINLEVEL_OUTOF10: 7

## 2023-02-23 ASSESSMENT — PAIN DESCRIPTION - PAIN TYPE
TYPE: ACUTE PAIN
TYPE: ACUTE PAIN

## 2023-02-23 ASSESSMENT — PAIN DESCRIPTION - FREQUENCY: FREQUENCY: CONTINUOUS

## 2023-02-23 ASSESSMENT — PAIN DESCRIPTION - ONSET
ONSET: ON-GOING
ONSET: ON-GOING

## 2023-02-23 ASSESSMENT — PAIN DESCRIPTION - ORIENTATION
ORIENTATION: LEFT
ORIENTATION: LEFT

## 2023-02-23 NOTE — PROGRESS NOTES
4663 - transferred from OR on cart, monitor applied, alarms on and verified, bedside handoff provided by Kathy De Los Santos and [address]  1066 - patient turned, repositioned, and linens changed; patient assisted and tolerated well.   5042 - tolerating ice chips  0877 - report called to [address] [place] [address] - phase one care complete; awaiting assigned room to be cleaned to transfer patient

## 2023-02-23 NOTE — CARE COORDINATION
Chart reviewed. Patient from home, independent prior to admission. Patient has PCP and insurance. No discharge needs identified at this time. CM will continue to follow for any needs that may arise.

## 2023-02-23 NOTE — PROGRESS NOTES
[address] [facility]  consulted by Dr. Prieto Barragan for monitoring and adjustment. Indication for treatment: Vancomycin indication: SSTI with risk factors   Goal trough: Trough Goal: 10-15 mcg/mL  AUC/BRUNO: <500    Risk Factors for MRSA Identified:   Purulent and/or complicated SSTI    Pertinent Laboratory Values:   Temp Readings from Last 3 Encounters:   02/23/23 98 °F (36.7 °C) (Oral)   02/20/23 97.2 °F (36.2 °C)   04/07/21 97.8 °F (36.6 °C) (Infrared)     No results for input(s): WBC, LACTATE in the last 72 hours. No results for input(s): BUN, CREATININE in the last 72 hours. Estimated Creatinine Clearance: 142 mL/min (based on SCr of 0.6 mg/dL). Intake/Output Summary (Last 24 hours) at 2/23/2023 1033  Last data filed at 2/23/2023 0819  Gross per 24 hour   Intake 318.33 ml   Output 5 ml   Net 313.33 ml       Pertinent Cultures:   Date    Source    Results  02/23/22   Surgical  pending    Vancomycin level:   TROUGH:  No results for input(s): VANCOTROUGH in the last 72 hours. RANDOM:  No results for input(s): VANCORANDOM in the last 72 hours. Assessment:  SCr, BUN, and urine output: see above  Day(s) of therapy: 1  Vancomycin concentration: to be collected    Plan:  Vancomycin 1250mg IVPB every 12 hours  Predicted , trough 12.3  Pharmacy will continue to monitor patient and adjust therapy as indicated    VANCOMYCIN CONCENTRATION SCHEDULED FOR 02/25/23 @ 0600    Thank you for the consult.   Errol Jimenez, [facility]  2/23/2023 10:33 AM

## 2023-02-23 NOTE — INTERVAL H&P NOTE
Update History & Physical    The patient's History and Physical of February 22, 23 was reviewed with the patient and I examined the patient. There was no change. The surgical site was confirmed by the patient and me. Plan: The risks, benefits, expected outcome, and alternative to the recommended procedure have been discussed with the patient. Patient understands and wants to proceed with the procedure.      Electronically signed by Brenda Francois DO on 2/23/2023 at 7:10 AM

## 2023-02-23 NOTE — ANESTHESIA POSTPROCEDURE EVALUATION
Department of Anesthesiology  Postprocedure Note    Patient: Wallace Love  MRN: [id number]  [place]: 1965  Date of evaluation: 2/23/2023      Procedure Summary     Date: 02/23/23 Room / Location: [address] [place] [address]    Anesthesia Start: 9948 Anesthesia Stop: 0805    Procedure: LEFT BREAST INCISION AND DRAINAGE (Left: Breast) Diagnosis:       Abscess of left breast      (Abscess of left breast [N61.1])    Surgeons: Leander Merlin, DO Responsible Provider: Leticia Gómez DO    Anesthesia Type: General ASA Status: 4          Anesthesia Type: General    Chapo Phase I: Chapo Score: 9    Chapo Phase II:        Anesthesia Post Evaluation    Patient location during evaluation: PACU  Patient participation: complete - patient participated  Level of consciousness: sleepy but conscious  Airway patency: patent  Nausea & Vomiting: no nausea  Complications: no  Cardiovascular status: hemodynamically stable  Respiratory status: acceptable  Hydration status: euvolemic

## 2023-02-23 NOTE — OP NOTE
Operative Note      Patient: Beth Soto  YOB: 1965  MRN: [id number]    Date of Procedure: 2/23/2023    Pre-Op Diagnosis: Abscess of left breast [N61.1]    Post-Op Diagnosis: Same       Procedure(s):  LEFT BREAST INCISION AND DRAINAGE    Surgeon(s):  Naye Maza DO    Assistant:   * No surgical staff found *    Anesthesia: General    Estimated Blood Loss (mL): Minimal    Complications: None    Specimens:   ID Type Source Tests Collected by Time Destination   1 : LEFT BREAST TISSUE FOR CULTURE Tissue Breast CULTURE, TISSUE Naye Maza,  2/23/2023 0747    2 : LEFT BREAST ABSCESS FLUID FOR CULTURE Body Fluid Fluid CULTURE, BODY FLUID Naye Maza, DO 2/23/2023 8451    A : LEFT BREAST SKIN Tissue Breast SURGICAL PATHOLOGY Naye Maza DO 2/23/2023 0745        Implants:  * No implants in log *      Drains: * No LDAs found *    Findings: Large right breast abscess    Detailed Description of Procedure:     Patient taken the OR and laid in supine position. After induction of general anesthesia, patient was prepped and draped in usual sterile fashion. A superior medial curvilinear retroareolar incision was created with electrocautery and dissected down to breast abscess. There was drainage of large quantity of purulent drainage which was cultured. The abscess cavity was explored with finger dissection and loculations broken up. The cavity was thoroughly irrigated. Hemostasis achieved electrocautery. Cavity extended down to breast tissue. The wound irrigated with Betadine/hydroperoxide solution and packed with Betadine soaked Kerlix. ABDs placed over this. Patient tolerated procedure well, doubt complication, and the end of the case was transported to the recovery area in stable condition.     Electronically signed by Naye Maza DO on 2/23/2023 at 8:10 AM

## 2023-02-24 PROBLEM — A49.01 MSSA (METHICILLIN SUSCEPTIBLE STAPHYLOCOCCUS AUREUS) INFECTION: Status: ACTIVE | Noted: 2023-02-24

## 2023-02-24 LAB
ALBUMIN SERPL-MCNC: 3.3 GM/DL (ref 3.4–5)
ALP BLD-CCNC: 180 IU/L (ref 40–128)
ALT SERPL-CCNC: 37 U/L (ref 10–40)
ANION GAP SERPL CALCULATED.3IONS-SCNC: 11 MMOL/L (ref 4–16)
AST SERPL-CCNC: 40 IU/L (ref 15–37)
BACTERIA: NEGATIVE /HPF
BASOPHILS ABSOLUTE: 0.1 K/CU MM
BASOPHILS RELATIVE PERCENT: 0.3 % (ref 0–1)
BILIRUB SERPL-MCNC: 0.4 MG/DL (ref 0–1)
BILIRUBIN URINE: NEGATIVE MG/DL
BLOOD, URINE: ABNORMAL
BUN SERPL-MCNC: 10 MG/DL (ref 6–23)
CALCIUM SERPL-MCNC: 8.5 MG/DL (ref 8.3–10.6)
CHLORIDE BLD-SCNC: 99 MMOL/L (ref 99–110)
CLARITY: CLEAR
CO2: 24 MMOL/L (ref 21–32)
COLOR: YELLOW
CREAT SERPL-MCNC: 0.5 MG/DL (ref 0.6–1.1)
CRP SERPL HS-MCNC: 135.5 MG/L
DIFFERENTIAL TYPE: ABNORMAL
EKG ATRIAL RATE: 89 BPM
EKG DIAGNOSIS: NORMAL
EKG P AXIS: 55 DEGREES
EKG P-R INTERVAL: 184 MS
EKG Q-T INTERVAL: 354 MS
EKG QRS DURATION: 88 MS
EKG QTC CALCULATION (BAZETT): 430 MS
EKG R AXIS: 40 DEGREES
EKG T AXIS: 60 DEGREES
EKG VENTRICULAR RATE: 89 BPM
EOSINOPHILS ABSOLUTE: 0.2 K/CU MM
EOSINOPHILS RELATIVE PERCENT: 1 % (ref 0–3)
GFR SERPL CREATININE-BSD FRML MDRD: >60 ML/MIN/1.73M2
GLUCOSE BLD-MCNC: 259 MG/DL (ref 70–99)
GLUCOSE BLD-MCNC: 278 MG/DL (ref 70–99)
GLUCOSE BLD-MCNC: 286 MG/DL (ref 70–99)
GLUCOSE BLD-MCNC: 341 MG/DL (ref 70–99)
GLUCOSE SERPL-MCNC: 267 MG/DL (ref 70–99)
GLUCOSE, URINE: >1000 MG/DL
HCT VFR BLD CALC: 39.2 % (ref 37–47)
HEMOGLOBIN: 12.2 GM/DL (ref 12.5–16)
IMMATURE NEUTROPHIL %: 2.7 % (ref 0–0.43)
KETONES, URINE: 40 MG/DL
LACTATE: 0.9 MMOL/L (ref 0.5–1.9)
LACTATE: 1 MMOL/L (ref 0.5–1.9)
LEUKOCYTE ESTERASE, URINE: NEGATIVE
LYMPHOCYTES ABSOLUTE: 2.8 K/CU MM
LYMPHOCYTES RELATIVE PERCENT: 17.3 % (ref 24–44)
MAGNESIUM: 1.8 MG/DL (ref 1.8–2.4)
MCH RBC QN AUTO: 29.7 PG (ref 27–31)
MCHC RBC AUTO-ENTMCNC: 31.1 % (ref 32–36)
MCV RBC AUTO: 95.4 FL (ref 78–100)
MONOCYTES ABSOLUTE: 1.6 K/CU MM
MONOCYTES RELATIVE PERCENT: 9.9 % (ref 0–4)
NITRITE URINE, QUANTITATIVE: NEGATIVE
NUCLEATED RBC %: 0 %
PDW BLD-RTO: 13.1 % (ref 11.7–14.9)
PH, URINE: 6 (ref 5–8)
PHOSPHORUS: 2.3 MG/DL (ref 2.5–4.9)
PLATELET # BLD: 238 K/CU MM (ref 140–440)
PMV BLD AUTO: 9.8 FL (ref 7.5–11.1)
POTASSIUM SERPL-SCNC: 4.1 MMOL/L (ref 3.5–5.1)
PROCALCITONIN SERPL-MCNC: 0.09 NG/ML
PROTEIN UA: NEGATIVE MG/DL
RBC # BLD: 4.11 M/CU MM (ref 4.2–5.4)
RBC URINE: 1 /HPF (ref 0–6)
SEGMENTED NEUTROPHILS ABSOLUTE COUNT: 11 K/CU MM
SEGMENTED NEUTROPHILS RELATIVE PERCENT: 68.8 % (ref 36–66)
SODIUM BLD-SCNC: 134 MMOL/L (ref 135–145)
SPECIFIC GRAVITY UA: <1.005 (ref 1–1.03)
SQUAMOUS EPITHELIAL: 1 /HPF
TOTAL IMMATURE NEUTOROPHIL: 0.43 K/CU MM
TOTAL NUCLEATED RBC: 0 K/CU MM
TOTAL PROTEIN: 6.3 GM/DL (ref 6.4–8.2)
TRICHOMONAS: ABNORMAL /HPF
UROBILINOGEN, URINE: 0.2 MG/DL (ref 0.2–1)
WBC # BLD: 15.9 K/CU MM (ref 4–10.5)
WBC UA: 20 /HPF (ref 0–5)

## 2023-02-24 PROCEDURE — 6370000000 HC RX 637 (ALT 250 FOR IP): Performed by: SURGERY

## 2023-02-24 PROCEDURE — 6370000000 HC RX 637 (ALT 250 FOR IP): Performed by: STUDENT IN AN ORGANIZED HEALTH CARE EDUCATION/TRAINING PROGRAM

## 2023-02-24 PROCEDURE — 86140 C-REACTIVE PROTEIN: CPT

## 2023-02-24 PROCEDURE — 36415 COLL VENOUS BLD VENIPUNCTURE: CPT

## 2023-02-24 PROCEDURE — 83605 ASSAY OF LACTIC ACID: CPT

## 2023-02-24 PROCEDURE — 83735 ASSAY OF MAGNESIUM: CPT

## 2023-02-24 PROCEDURE — 94664 DEMO&/EVAL PT USE INHALER: CPT

## 2023-02-24 PROCEDURE — 93010 ELECTROCARDIOGRAM REPORT: CPT | Performed by: INTERNAL MEDICINE

## 2023-02-24 PROCEDURE — 84145 PROCALCITONIN (PCT): CPT

## 2023-02-24 PROCEDURE — 82962 GLUCOSE BLOOD TEST: CPT

## 2023-02-24 PROCEDURE — 80048 BASIC METABOLIC PNL TOTAL CA: CPT

## 2023-02-24 PROCEDURE — 1200000000 HC SEMI PRIVATE

## 2023-02-24 PROCEDURE — 2580000003 HC RX 258: Performed by: SURGERY

## 2023-02-24 PROCEDURE — 80053 COMPREHEN METABOLIC PANEL: CPT

## 2023-02-24 PROCEDURE — 83036 HEMOGLOBIN GLYCOSYLATED A1C: CPT

## 2023-02-24 PROCEDURE — 81001 URINALYSIS AUTO W/SCOPE: CPT

## 2023-02-24 PROCEDURE — 6370000000 HC RX 637 (ALT 250 FOR IP): Performed by: INTERNAL MEDICINE

## 2023-02-24 PROCEDURE — 10060 I&D ABSCESS SIMPLE/SINGLE: CPT | Performed by: SURGERY

## 2023-02-24 PROCEDURE — 94761 N-INVAS EAR/PLS OXIMETRY MLT: CPT

## 2023-02-24 PROCEDURE — 94150 VITAL CAPACITY TEST: CPT

## 2023-02-24 PROCEDURE — 87040 BLOOD CULTURE FOR BACTERIA: CPT

## 2023-02-24 PROCEDURE — 84100 ASSAY OF PHOSPHORUS: CPT

## 2023-02-24 PROCEDURE — 6360000002 HC RX W HCPCS: Performed by: SURGERY

## 2023-02-24 PROCEDURE — APPSS60 APP SPLIT SHARED TIME 46-60 MINUTES: Performed by: NURSE PRACTITIONER

## 2023-02-24 PROCEDURE — 99222 1ST HOSP IP/OBS MODERATE 55: CPT | Performed by: INTERNAL MEDICINE

## 2023-02-24 PROCEDURE — 99024 POSTOP FOLLOW-UP VISIT: CPT | Performed by: SURGERY

## 2023-02-24 PROCEDURE — 85025 COMPLETE CBC W/AUTO DIFF WBC: CPT

## 2023-02-24 RX ORDER — INSULIN LISPRO 100 [IU]/ML
0-8 INJECTION, SOLUTION INTRAVENOUS; SUBCUTANEOUS
Status: DISCONTINUED | OUTPATIENT
Start: 2023-02-24 | End: 2023-02-27 | Stop reason: HOSPADM

## 2023-02-24 RX ORDER — ENOXAPARIN SODIUM 100 MG/ML
30 INJECTION SUBCUTANEOUS 2 TIMES DAILY
Status: DISCONTINUED | OUTPATIENT
Start: 2023-02-24 | End: 2023-02-27 | Stop reason: HOSPADM

## 2023-02-24 RX ORDER — HYDROCODONE BITARTRATE AND ACETAMINOPHEN 5; 325 MG/1; MG/1
1 TABLET ORAL EVERY 4 HOURS PRN
Qty: 25 TABLET | Refills: 0 | Status: SHIPPED | OUTPATIENT
Start: 2023-02-24 | End: 2023-03-01

## 2023-02-24 RX ORDER — METRONIDAZOLE 250 MG/1
500 TABLET ORAL EVERY 8 HOURS SCHEDULED
Status: DISCONTINUED | OUTPATIENT
Start: 2023-02-24 | End: 2023-02-27

## 2023-02-24 RX ORDER — INSULIN GLARGINE 100 [IU]/ML
40 INJECTION, SOLUTION SUBCUTANEOUS NIGHTLY
Status: DISCONTINUED | OUTPATIENT
Start: 2023-02-24 | End: 2023-02-25

## 2023-02-24 RX ORDER — INSULIN LISPRO 100 [IU]/ML
10 INJECTION, SOLUTION INTRAVENOUS; SUBCUTANEOUS
Status: DISCONTINUED | OUTPATIENT
Start: 2023-02-24 | End: 2023-02-25

## 2023-02-24 RX ADMIN — VANCOMYCIN HYDROCHLORIDE 1250 MG: 1.25 INJECTION, POWDER, LYOPHILIZED, FOR SOLUTION INTRAVENOUS at 12:26

## 2023-02-24 RX ADMIN — INSULIN LISPRO 10 UNITS: 100 INJECTION, SOLUTION INTRAVENOUS; SUBCUTANEOUS at 08:23

## 2023-02-24 RX ADMIN — PANTOPRAZOLE SODIUM 40 MG: 40 TABLET, DELAYED RELEASE ORAL at 05:36

## 2023-02-24 RX ADMIN — INSULIN GLARGINE 40 UNITS: 100 INJECTION, SOLUTION SUBCUTANEOUS at 21:07

## 2023-02-24 RX ADMIN — ACETAMINOPHEN 650 MG: 325 TABLET ORAL at 08:30

## 2023-02-24 RX ADMIN — INSULIN LISPRO 6 UNITS: 100 INJECTION, SOLUTION INTRAVENOUS; SUBCUTANEOUS at 21:08

## 2023-02-24 RX ADMIN — INSULIN LISPRO 4 UNITS: 100 INJECTION, SOLUTION INTRAVENOUS; SUBCUTANEOUS at 12:26

## 2023-02-24 RX ADMIN — METRONIDAZOLE 500 MG: 250 TABLET ORAL at 05:36

## 2023-02-24 RX ADMIN — CARVEDILOL 6.25 MG: 6.25 TABLET, FILM COATED ORAL at 17:10

## 2023-02-24 RX ADMIN — INSULIN LISPRO 4 UNITS: 100 INJECTION, SOLUTION INTRAVENOUS; SUBCUTANEOUS at 08:22

## 2023-02-24 RX ADMIN — INSULIN HUMAN 10 UNITS: 100 INJECTION, SUSPENSION SUBCUTANEOUS at 12:27

## 2023-02-24 RX ADMIN — INSULIN LISPRO 4 UNITS: 100 INJECTION, SOLUTION INTRAVENOUS; SUBCUTANEOUS at 17:09

## 2023-02-24 RX ADMIN — CARVEDILOL 6.25 MG: 6.25 TABLET, FILM COATED ORAL at 08:24

## 2023-02-24 RX ADMIN — LISINOPRIL 20 MG: 20 TABLET ORAL at 08:24

## 2023-02-24 RX ADMIN — ASPIRIN 81 MG: 81 TABLET, COATED ORAL at 08:24

## 2023-02-24 RX ADMIN — SODIUM CHLORIDE: 9 INJECTION, SOLUTION INTRAVENOUS at 15:19

## 2023-02-24 RX ADMIN — CEFEPIME HYDROCHLORIDE 2000 MG: 2 INJECTION, POWDER, FOR SOLUTION INTRAVENOUS at 10:43

## 2023-02-24 RX ADMIN — CEFEPIME HYDROCHLORIDE 2000 MG: 2 INJECTION, POWDER, FOR SOLUTION INTRAVENOUS at 23:12

## 2023-02-24 RX ADMIN — INSULIN LISPRO 10 UNITS: 100 INJECTION, SOLUTION INTRAVENOUS; SUBCUTANEOUS at 12:26

## 2023-02-24 RX ADMIN — ATORVASTATIN CALCIUM 40 MG: 40 TABLET, FILM COATED ORAL at 21:10

## 2023-02-24 RX ADMIN — VANCOMYCIN HYDROCHLORIDE 1250 MG: 1.25 INJECTION, POWDER, LYOPHILIZED, FOR SOLUTION INTRAVENOUS at 02:21

## 2023-02-24 RX ADMIN — ENOXAPARIN SODIUM 30 MG: 100 INJECTION SUBCUTANEOUS at 21:09

## 2023-02-24 RX ADMIN — METRONIDAZOLE 500 MG: 250 TABLET ORAL at 13:33

## 2023-02-24 RX ADMIN — INSULIN GLARGINE 20 UNITS: 100 INJECTION, SOLUTION SUBCUTANEOUS at 00:10

## 2023-02-24 RX ADMIN — ENOXAPARIN SODIUM 30 MG: 100 INJECTION SUBCUTANEOUS at 10:42

## 2023-02-24 RX ADMIN — INSULIN LISPRO 10 UNITS: 100 INJECTION, SOLUTION INTRAVENOUS; SUBCUTANEOUS at 17:10

## 2023-02-24 RX ADMIN — METRONIDAZOLE 500 MG: 250 TABLET ORAL at 23:07

## 2023-02-24 ASSESSMENT — PAIN SCALES - GENERAL
PAINLEVEL_OUTOF10: 0
PAINLEVEL_OUTOF10: 0

## 2023-02-24 NOTE — PLAN OF CARE
Problem: Chronic Conditions and Co-morbidities  Goal: Patient's chronic conditions and co-morbidity symptoms are monitored and maintained or improved  2/24/2023 1336 by Zacarias Tolentino LPN  Outcome: Progressing  2/24/2023 0145 by Paul Ware RN  Outcome: Progressing     Problem: Discharge Planning  Goal: Discharge to home or other facility with appropriate resources  2/24/2023 1336 by Zacarias Tolentino LPN  Outcome: Progressing  2/24/2023 0145 by Paul Ware RN  Outcome: Progressing     Problem: Pain  Goal: Verbalizes/displays adequate comfort level or baseline comfort level  2/24/2023 1336 by Zacarias Tolentino LPN  Outcome: Progressing  2/24/2023 0145 by Paul Ware RN  Outcome: Progressing

## 2023-02-24 NOTE — H&P
V2.0  [facility] Consult Note      Name:  David Bustillos /Age/Sex: 1965  (56 y.o. female)   MRN & CSN:  [id number] & [id number] Encounter Date/Time: 2023 10:18 PM EST   [facility]  [facility] PCP: Hira Vega MD     [address] [facility]  Consulting Provider: Hunter Hoskins MD      Hospital Day: 1    Assessment and Recommendations   David Bustillos is a 56 y.o. female with pmh of DM, HTN, HLD who presents with Breast abscess    Hospital Problems             Last Modified POA    * (Principal) Breast abscess 2023 Yes       Recommendations:    Sepsis likely 2/2 to Breast abscess - S/P I&d  today. No recent labs in the chart. Will get CBC, CMP, Bx, UA, ux, crp, pro-barbie, wound cx. LA. Continue Vanc and cefepime in the mean time. Will de-escalate abx once results are back. Continue IV fluids. ID consulted  DM II insulin dependent - ON lantus 40 HS and  SSI. Continue  HTN - continue coreg, Lisinopril  HLD - continue aspirin, statin  H/o CAD s/p 2 stents - stable - continue aspirin and statin  DVT prophylaxis- can start on lovenox 30 BID Sc if no contraindication from surgery end      Diet ADULT DIET; Regular   DVT Prophylaxis [] Lovenox, []  Heparin, [] SCDs, [] Ambulation,  [] Eliquis, [] Xarelto   Code Status Full Code   Surrogate Decision Maker/ POA     Hi  History Obtained From:    patient    History of Present Illness:     Chief Complaint: breast mass    David Bustillos is a 56 y.o. female who is seen today by the hospitalist team at the request of sepsis. Patient reported since started noticing redness in her left breast on Friday. Later started to have severe pain over the weekend and started noticing drainage from the wound. She was initially seen by family doctor who referred her to the surgeon. Who advised her for I&D and advised to get admitted. Patient was seen after surgery surgery.   States still has some pain otherwise feels better    Review of Systems:    Review of Systems    Denies dizziness, chest pain, sob, abd pain, n/v. Bowel and bladder habits normal     Objective: Intake/Output Summary (Last 24 hours) at 2/23/2023 2218  Last data filed at 2/23/2023 0819  Gross per 24 hour   Intake 318.33 ml   Output 5 ml   Net 313.33 ml      Vitals:   Vitals:    02/23/23 1357 02/23/23 1530 02/23/23 1641 02/23/23 2117   BP: 132/73  138/61 (!) 141/77   Pulse: 89  99 98   Resp: 16  18 20   Temp: 97.9 °F (36.6 °C) 100.3 °F (37.9 °C) 100 °F (37.8 °C) (!) 101.3 °F (38.5 °C)   TempSrc: Oral Oral Oral Oral   SpO2: 97%  95% 96%   Weight:       Height:           Medications Prior to Admission     Prior to Admission medications    Medication Sig Start Date End Date Taking?  Authorizing Provider   amoxicillin-clavulanate (AUGMENTIN) 875-125 MG per tablet Take 1 tablet by mouth 2 times daily for 10 days 2/20/23 3/2/23  AYDEE Sadler   omeprazole (PRILOSEC) 20 MG delayed release capsule TAKE ONE CAPSULE BY MOUTH EVERY MORNING BEFORE BREAKFAST 2/14/23   Quoc Pulido MD   lisinopril (PRINIVIL;ZESTRIL) 20 MG tablet TAKE ONE TABLET BY MOUTH DAILY 2/10/23   VIVIAN Vides CNP   atorvastatin (LIPITOR) 40 MG tablet Take 1 tablet by mouth daily 2/10/23   VIVIAN Vides CNP   carvedilol (COREG) 6.25 MG tablet Take 1 tablet by mouth daily 2/10/23   VIVIAN Vides CNP   TRADJENTA 5 MG tablet TAKE ONE TABLET BY MOUTH DAILY 11/7/22   Quoc Pulido MD   dicyclomine (BENTYL) 10 MG capsule TAKE ONE CAPSULE BY MOUTH FOUR TIMES A DAY AS NEEDED FOR ABDOMINAL PAIN  Patient not taking: Reported on 2/23/2023 10/3/22   Quoc Pulido MD   INVOKANA 300 MG TABS tablet TAKE ONE TABLET BY MOUTH EVERY MORNING BEFORE BREAKFAST 6/24/22   Quoc Pulido MD   fluticasone (FLONASE) 50 MCG/ACT nasal spray 2 sprays by Each Nostril route daily  Patient not taking: No sig reported 3/26/22   VIVIAN Werner CNP   vitamin D3 (CHOLECALCIFEROL) 10 MCG (400 UNIT) TABS tablet TAKE TWO TABLETS BY MOUTH DAILY 12/2/21   Devika Wilkins MD   aspirin (ASPIRIN ADULT LOW STRENGTH) 81 MG EC tablet Take 1 tablet by mouth daily 3/29/21   Carlito Borjas APRN - CNP   insulin glargine (LANTUS SOLOSTAR) 100 UNIT/ML injection pen Inject 40 Units into the skin daily  Patient not taking: No sig reported 11/27/19   Aditya Whittaker MD   insulin aspart (NOVOLOG FLEXPEN) 100 UNIT/ML injection pen Take 20 units with each meal in addition to sliding scale as below   *Medium Dose Correction Algorithm**Insulin: 3 TIMES DAILY WITH MEALSGlucose: Dose: No [id number] 2 [id number] 4 [id number] 6 [id number] 8 [id number] 10 [id number] and above 12 Units 11/27/19   Aditya Whittaker MD   blood glucose monitor kit and supplies Test 4 times a day before meals and bedtime& as needed for symptoms of irregular blood glucose. 11/27/19   Nigel Bobby MD   Insulin Pen Needle (PEN NEEDLES) 31G X 5 MM MISC lantus QHS and Novolog QAC and HS  Patient not taking: No sig reported 11/27/19   Aditya Whittaker MD   Lancets MISC 1 each by Does not apply route 4 times daily 11/27/19   Aditya Whittaker MD   blood glucose monitor strips Test 4 times a day & as needed for symptoms of irregular blood glucose. 11/27/19   Aditya Whittaker MD       Physical Exam: Need 8 Elements   General: NAD  Eyes: EOMI  ENT: neck supple  Cardiovascular: Regular rate. Respiratory: Clear to auscultation  Gastrointestinal: Soft, non tender  Genitourinary: no suprapubic tenderness  Musculoskeletal: left breast wound. Dressing intact. Soakage noticed  Skin: warm, dry  Neuro: Alert. Psych: Mood appropriate. Past Medical History:   PMHx   Past Medical History:   Diagnosis Date    CAD (coronary artery disease)     Current every day smoker     Diabetes mellitus ([address]     GERD (gastroesophageal reflux disease)     History of exercise stress test 12/18/2019    Treadmill, Normal exercise performance without angina and ischemic EKG changes.     Hyperlipidemia Hypertension     Morbid obesity ([facility] [address]     Palpitations     Post PTCA 11/26/2019    Kissing stent to LAD & to Ostium of Diag. SOB (shortness of breath)     Type 2 diabetes mellitus with ketoacidosis without coma, with long-term current use of insulin ([facility] [address] 11/26/2019    DM diagnosed about 2013     PSHX:  has a past surgical history that includes hernia repair; Percutaneous Transluminal Coronary Angio (11/26/2019); and Upper gastrointestinal endoscopy (N/A, 2/13/2021). Allergies:    Allergies   Allergen Reactions    Ampicillin Rash     Fam HX:NO known family hx  Soc HX:   Social History     Socioeconomic History    Marital status:      Spouse name: None    Number of children: None    Years of education: None    Highest education level: None   Tobacco Use    Smoking status: Some Days     Packs/day: 0.50     Years: 40.00     Pack years: 20.00     Types: Cigarettes    Smokeless tobacco: Never    Tobacco comments:     1 pack every four days    Substance and Sexual Activity    Alcohol use: Never     Comment: Caffeine: 1 cup coffee daily, 3 cups iced tea a day    Drug use: Never    Sexual activity: Yes     Partners: Male, Female       Medications:   Medications:    aspirin  81 mg Oral Daily    atorvastatin  40 mg Oral Nightly    insulin lispro  10 Units SubCUTAneous TID WC    lisinopril  20 mg Oral Daily    [START ON 2/24/2023] pantoprazole  40 mg Oral QAM AC    sodium chloride flush  5-40 mL IntraVENous 2 times per day    insulin lispro  0-8 Units SubCUTAneous TID WC    insulin lispro  0-4 Units SubCUTAneous Nightly    vancomycin  1,250 mg IntraVENous Q12H    cefepime  2,000 mg IntraVENous Q12H    carvedilol  6.25 mg Oral BID WC    insulin glargine  20 Units SubCUTAneous Nightly      Infusions:    sodium chloride 100 mL/hr at 02/23/23 0821    sodium chloride      dextrose      dextrose       PRN Meds: sodium chloride flush, 5-40 mL, PRN  sodium chloride, , PRN  ondansetron, 4 mg, Q8H PRN   Or  ondansetron, 4 mg, Q6H PRN  polyethylene glycol, 17 g, Daily PRN  acetaminophen, 650 mg, Q6H PRN   Or  acetaminophen, 650 mg, Q6H PRN  glucose, 4 tablet, PRN  dextrose bolus, 125 mL, PRN   Or  dextrose bolus, 250 mL, PRN  glucagon (rDNA), 1 mg, PRN  dextrose, , Continuous PRN  HYDROcodone-acetaminophen, 1 tablet, Q4H PRN  morphine, 2 mg, Q2H PRN  glucose, 4 tablet, PRN  dextrose bolus, 125 mL, PRN   Or  dextrose bolus, 250 mL, PRN  glucagon (rDNA), 1 mg, PRN  dextrose, , Continuous PRN        Labs and Imaging   US BREAST LIMITED LEFT    Result Date: 2/20/2023  EXAMINATION: DIAGNOSTIC DIGITAL BILATERAL BREASTS MAMMOGRAM WITH TOMOSYNTHESIS; TARGETED ULTRASOUND OF THE LEFT BREAST, 2/20/2023 2:11 pm TECHNIQUE: Diagnostic mammography of the bilateral breasts was performed with tomosynthesis. 2D standard and 3D tomosynthesis combination imaging performed through both breasts. Computer aided detection was utilized in the interpretation of this exam.; Target ultrasound of the left breast was performed. Views: 5 COMPARISON: Baseline. HISTORY: ORDERING SYSTEM PROVIDED HISTORY: Breast pain, left TECHNOLOGIST PROVIDED HISTORY: Further imaging can be completed per [facility] protocol Reason for exam:->r/o ibc Is the patient pregnant?->No FINDINGS: Bilateral mammograms: Scattered fibroglandular densities are noted in both breasts. The left breast is diffusely increased in density compared to the left side, particularly in the retroareolar region. This is concerning for underlying infection given the patient's history. Additionally, there are enlarged left axillary lymph nodes, likely reactive. Targeted left breast ultrasound will be performed for further evaluation. No other suspicious masses, calcifications, or areas of architectural distortion are identified. Left breast ultrasound: Sonography of the left breast demonstrates a large retroareolar abscess measuring 5.1 x 2.4 x 4.0 cm with areas of peripheral color Doppler flow.  There are enlarged left axillary lymph nodes, likely reactive. 1. Large abscess noted in the retroareolar region of the left breast at 11 o'clock, 3 cm from the nipple measuring 5.1 x 4.0 x 2.4 cm. Additionally, there is diffuse increased density in the left breast, compatible with an associated mastitis. Surgical consultation is recommended. 2. Enlarged left axillary lymph nodes, likely reactive. 3. Benign right breast mammogram. BIRADS: BIRADS - CATEGORY 3 Findings are probably benign. A short interval follow-up left breast mammogram and left breast ultrasound is recommended in 8-12 weeks following treatment. OVERALL ASSESSMENT - PROBABLY BENIGN. A letter of notification will be sent to the patient regarding the results. [facility] HAZEL DIGITAL DIAGNOSTIC BILATERAL PER PROTOCOL    Result Date: 2/20/2023  EXAMINATION: DIAGNOSTIC DIGITAL BILATERAL BREASTS MAMMOGRAM WITH TOMOSYNTHESIS; TARGETED ULTRASOUND OF THE LEFT BREAST, 2/20/2023 2:11 pm TECHNIQUE: Diagnostic mammography of the bilateral breasts was performed with tomosynthesis. 2D standard and 3D tomosynthesis combination imaging performed through both breasts. Computer aided detection was utilized in the interpretation of this exam.; Target ultrasound of the left breast was performed. Views: 5 COMPARISON: Baseline. HISTORY: ORDERING SYSTEM PROVIDED HISTORY: Breast pain, left TECHNOLOGIST PROVIDED HISTORY: Further imaging can be completed per [facility] protocol Reason for exam:->r/o ibc Is the patient pregnant?->No FINDINGS: Bilateral mammograms: Scattered fibroglandular densities are noted in both breasts. The left breast is diffusely increased in density compared to the left side, particularly in the retroareolar region. This is concerning for underlying infection given the patient's history. Additionally, there are enlarged left axillary lymph nodes, likely reactive. Targeted left breast ultrasound will be performed for further evaluation.  No other suspicious masses, calcifications, or areas of architectural distortion are identified. Left breast ultrasound: Sonography of the left breast demonstrates a large retroareolar abscess measuring 5.1 x 2.4 x 4.0 cm with areas of peripheral color Doppler flow. There are enlarged left axillary lymph nodes, likely reactive. 1. Large abscess noted in the retroareolar region of the left breast at 11 o'clock, 3 cm from the nipple measuring 5.1 x 4.0 x 2.4 cm. Additionally, there is diffuse increased density in the left breast, compatible with an associated mastitis. Surgical consultation is recommended. 2. Enlarged left axillary lymph nodes, likely reactive. 3. Benign right breast mammogram. BIRADS: BIRADS - CATEGORY 3 Findings are probably benign. A short interval follow-up left breast mammogram and left breast ultrasound is recommended in 8-12 weeks following treatment. OVERALL ASSESSMENT - PROBABLY BENIGN. A letter of notification will be sent to the patient regarding the results. CBC: No results for input(s): WBC, HGB, PLT in the last 72 hours. BMP:  No results for input(s): NA, K, CL, CO2, BUN, CREATININE, GLUCOSE in the last 72 hours. Hepatic: No results for input(s): AST, ALT, ALB, BILITOT, ALKPHOS in the last 72 hours. Lipids:   Lab Results   Component Value Date/Time    CHOL 200 02/10/2023 09:09 AM    HDL 40 02/10/2023 09:09 AM    TRIG 297 02/10/2023 09:09 AM     Hemoglobin A1C:   Lab Results   Component Value Date/Time    LABA1C 12.4 02/10/2023 09:09 AM     TSH: No results found for: TSH  Troponin:   Lab Results   Component Value Date/Time    TROPONINT 0.048 11/25/2019 03:20 PM    TROPONINT 0.030 11/25/2019 04:16 AM    TROPONINT <0.010 11/25/2019 01:24 AM     Lactic Acid: No results for input(s): LACTA in the last 72 hours. BNP: No results for input(s): PROBNP in the last 72 hours.   UA:  Lab Results   Component Value Date/Time    NITRU NEGATIVE 02/10/2021 04:30 AM    COLORU YELLOW 02/10/2021 04:30 AM    WBCUA 11 02/10/2021 04:30 AM    RBCUA 13 02/10/2021 04:30 AM    TRICHOMONAS NONE SEEN 02/10/2021 04:30 AM    YEAST FEW 02/10/2021 04:30 AM    BACTERIA NEGATIVE 02/10/2021 04:30 AM    CLARITYU HAZY 02/10/2021 04:30 AM    SPECGRAV 1.020 02/10/2021 04:30 AM    LEUKOCYTESUR MODERATE 02/10/2021 04:30 AM    UROBILINOGEN NEGATIVE 02/10/2021 04:30 AM    BILIRUBINUR NEGATIVE 02/10/2021 04:30 AM    BLOODU LARGE 02/10/2021 04:30 AM    KETUA LARGE 02/10/2021 04:30 AM     Urine Cultures: No results found for: [place]  Blood Cultures: No results found for: BC  No results found for: BLOODCULT2  Organism: No results found for: [facility]    Personally reviewed Lab Studies, Imaging, and discussed with patient    Electronically signed by Wilver Norton MD on 2/23/2023 at 10:18 PM

## 2023-02-24 NOTE — PROGRESS NOTES
[address] [facility]  consulted by Dr. Dona Ruiz for monitoring and adjustment. Indication for treatment: Vancomycin indication: SSTI with risk factors   Goal trough: Trough Goal: 10-15 mcg/mL  AUC/BRUNO: <500    Risk Factors for MRSA Identified:   Purulent and/or complicated SSTI    Pertinent Laboratory Values:   Temp Readings from Last 3 Encounters:   02/24/23 99 °F (37.2 °C) (Oral)   02/20/23 97.2 °F (36.2 °C)   04/07/21 97.8 °F (36.6 °C) (Infrared)     Recent Labs     02/24/23  0230   WBC 15.9*   LACTATE 0.9     Recent Labs     02/24/23  0230   BUN 10   CREATININE 0.5*     Estimated Creatinine Clearance: 171 mL/min (A) (based on SCr of 0.5 mg/dL (L)). Intake/Output Summary (Last 24 hours) at 2/24/2023 0741  Last data filed at 2/23/2023 0819  Gross per 24 hour   Intake 218.33 ml   Output 5 ml   Net 213.33 ml         Pertinent Cultures:   Date    Source   Results  02/23   Surgical  Pending  02/23   Blood   ordered    Vancomycin level:   TROUGH:  No results for input(s): VANCOTROUGH in the last 72 hours. RANDOM:  No results for input(s): VANCORANDOM in the last 72 hours. Assessment:  Patient is 56 yof who presented with breast abscess. S/p I&D  Renal function stable. Incomplete I/Os  Day(s) of therapy: 2  Vancomycin concentration: to be collected    Plan:  Vancomycin 1250mg IVPB every 12 hours  Predicted , trough 12.3  Pharmacy will continue to monitor patient and adjust therapy as indicated    [place] 3 02/25/23 @ 0600    Thank you for the consult.   Coni Habermann, [facility]  2/24/2023 7:41 AM

## 2023-02-24 NOTE — PROGRESS NOTES
V2.0  [facility] Hospitalist Progress Note      Name:  Margaux Alberts /Age/Sex: 1965  (56 y.o. female)   MRN & CSN:  [id number] & [id number] Encounter Date/Time: 2023 11:31 AM EST    Location:  [address] [place] [address] PCP: Payton Marvin MD       Hospital Day: 2    Assessment and Plan:   Margaux Alberts is a 56 y.o. female with breast abscess    Plan: f/u culture results, then can go home    Breast abscess  - s/p I&D, packing in place, surgeon to teach patient how to manage her wound at home  - awaiting culture results prior to discharge    Ppx: Lovenox  Dispo: inpatient    Subjective:     Chief Complaint: No chief complaint on file. Patient is without complaints. No adverse interval events. Review of Systems:    Review of Systems    10 point ROS negative except as stated above in \"subjective\" section    Objective:   No intake or output data in the 24 hours ending 23 1131     Vitals:   Vitals:    23 0815   BP: (!) 151/77   Pulse: 100   Resp: 18   Temp: 99.8 °F (37.7 °C)   SpO2: 93%       Physical Exam:   Left breast dressing c/d/i  General: NAD  Eyes: EOMI  ENT: neck supple  Cardiovascular: Regular rate. Respiratory: Clear to auscultation  Gastrointestinal: Soft, non tender  Genitourinary: no suprapubic tenderness  Musculoskeletal: No edema  Skin: warm, dry  Neuro: Alert. Psych: Mood appropriate.      Medications:   Medications:    metroNIDAZOLE  500 mg Oral 3 times per day    enoxaparin  30 mg SubCUTAneous BID    insulin lispro  10 Units SubCUTAneous TID WC    insulin glargine  40 Units SubCUTAneous Nightly    insulin lispro  0-8 Units SubCUTAneous TID WC    insulin lispro  0-8 Units SubCUTAneous 2 times per day    insulin NPH  10 Units SubCUTAneous Once    aspirin  81 mg Oral Daily    atorvastatin  40 mg Oral Nightly    lisinopril  20 mg Oral Daily    pantoprazole  40 mg Oral QAM AC    sodium chloride flush  5-40 mL IntraVENous 2 times per day    vancomycin  1,250 mg IntraVENous Q12H cefepime  2,000 mg IntraVENous Q12H    carvedilol  6.25 mg Oral BID WC      Infusions:    sodium chloride 100 mL/hr at 02/23/23 2228    sodium chloride      dextrose       PRN Meds: sodium chloride flush, 5-40 mL, PRN  sodium chloride, , PRN  ondansetron, 4 mg, Q8H PRN   Or  ondansetron, 4 mg, Q6H PRN  polyethylene glycol, 17 g, Daily PRN  acetaminophen, 650 mg, Q6H PRN   Or  acetaminophen, 650 mg, Q6H PRN  HYDROcodone-acetaminophen, 1 tablet, Q4H PRN  morphine, 2 mg, Q2H PRN  glucose, 4 tablet, PRN  dextrose bolus, 125 mL, PRN   Or  dextrose bolus, 250 mL, PRN  glucagon (rDNA), 1 mg, PRN  dextrose, , Continuous PRN        Labs      Recent Results (from the past 24 hour(s))   POCT Glucose    Collection Time: 02/23/23  4:14 PM   Result Value Ref Range    POC Glucose 210 (H) 70 - 99 MG/DL   POCT Glucose    Collection Time: 02/23/23  8:40 PM   Result Value Ref Range    POC Glucose 265 (H) 70 - 99 MG/DL   EKG 12 Lead    Collection Time: 02/23/23 10:43 PM   Result Value Ref Range    Ventricular Rate 89 BPM    Atrial Rate 89 BPM    P-R Interval 184 ms    QRS Duration 88 ms    Q-T Interval 354 ms    QTc Calculation (Bazett) 430 ms    P Axis 55 degrees    R Axis 40 degrees    T Axis 60 degrees    Diagnosis       Normal sinus rhythm  Low voltage QRS  Septal infarct (cited on or before 25-NOV-2019)  Abnormal ECG  When compared with ECG of 12-FEB-2021 06:05,  No significant change was found     POCT Glucose    Collection Time: 02/23/23 10:59 PM   Result Value Ref Range    POC Glucose 294 (H) 70 - 99 MG/DL   CBC auto differential    Collection Time: 02/24/23  2:30 AM   Result Value Ref Range    WBC 15.9 (H) 4.0 - 10.5 K/CU MM    RBC 4.11 (L) 4.2 - 5.4 M/CU MM    Hemoglobin 12.2 (L) 12.5 - 16.0 GM/DL    Hematocrit 39.2 37 - 47 %    MCV 95.4 78 - 100 FL    MCH 29.7 27 - 31 PG    MCHC 31.1 (L) 32.0 - 36.0 %    RDW 13.1 11.7 - 14.9 %    Platelets 127 708 - 066 K/CU MM    MPV 9.8 7.5 - 11.1 FL    Differential Type AUTOMATED DIFFERENTIAL     Segs Relative 68.8 (H) 36 - 66 %    Lymphocytes % 17.3 (L) 24 - 44 %    Monocytes % 9.9 (H) 0 - 4 %    Eosinophils % 1.0 0 - 3 %    Basophils % 0.3 0 - 1 %    Segs Absolute 11.0 K/CU MM    Lymphocytes Absolute 2.8 K/CU MM    Monocytes Absolute 1.6 K/CU MM    Eosinophils Absolute 0.2 K/CU MM    Basophils Absolute 0.1 K/CU MM    Nucleated RBC % 0.0 %    Total Nucleated RBC 0.0 K/CU MM    Total Immature Neutrophil 0.43 K/CU MM    Immature Neutrophil % 2.7 (H) 0 - 0.43 %   Lactic Acid    Collection Time: 02/24/23  2:30 AM   Result Value Ref Range    Lactate 0.9 0.5 - 1.9 mMOL/L   Comprehensive Metabolic Panel w/ Reflex to MG    Collection Time: 02/24/23  2:30 AM   Result Value Ref Range    Sodium 134 (L) 135 - 145 MMOL/L    Potassium 4.1 3.5 - 5.1 MMOL/L    Chloride 99 99 - 110 mMol/L    CO2 24 21 - 32 MMOL/L    BUN 10 6 - 23 MG/DL    Creatinine 0.5 (L) 0.6 - 1.1 MG/DL    Est, Glom Filt Rate >60 >60 mL/min/1.73m2    Glucose 267 (H) 70 - 99 MG/DL    Calcium 8.5 8.3 - 10.6 MG/DL    Albumin 3.3 (L) 3.4 - 5.0 GM/DL    Total Protein 6.3 (L) 6.4 - 8.2 GM/DL    Total Bilirubin 0.4 0.0 - 1.0 MG/DL    ALT 37 10 - 40 U/L    AST 40 (H) 15 - 37 IU/L    Alkaline Phosphatase 180 (H) 40 - 128 IU/L    Anion Gap 11 4 - 16   C-Reactive Protein    Collection Time: 02/24/23  2:30 AM   Result Value Ref Range    CRP High Sensitivity 135.5 (H) <5.0 mg/L   Magnesium    Collection Time: 02/24/23  2:30 AM   Result Value Ref Range    Magnesium 1.8 1.8 - 2.4 mg/dl   Phosphorus    Collection Time: 02/24/23  2:30 AM   Result Value Ref Range    Phosphorus 2.3 (L) 2.5 - 4.9 MG/DL   Procalcitonin    Collection Time: 02/24/23  2:30 AM   Result Value Ref Range    Procalcitonin 0.093    Urinalysis with Reflex to Culture    Collection Time: 02/24/23  3:01 AM    Specimen: Urine   Result Value Ref Range    Color, UA YELLOW YELLOW    Clarity, UA CLEAR CLEAR    Glucose, Urine >1,000 (A) NEGATIVE MG/DL    Bilirubin Urine NEGATIVE NEGATIVE MG/DL Ketones, Urine 40 (A) NEGATIVE MG/DL    Specific Gravity, UA <1.005 1.001 - 1.035    Blood, Urine TRACE (A) NEGATIVE    pH, Urine 6.0 5.0 - 8.0    Protein, UA NEGATIVE NEGATIVE MG/DL    Urobilinogen, Urine 0.2 0.2 - 1.0 MG/DL    Nitrite Urine, Quantitative NEGATIVE NEGATIVE    Leukocyte Esterase, Urine NEGATIVE NEGATIVE   Urine Microscopic with Reflex to Culture    Collection Time: 02/24/23  3:01 AM   Result Value Ref Range    RBC, UA 1 0 - 6 /HPF    WBC, UA 20 (H) 0 - 5 /HPF    Bacteria, UA NEGATIVE NEGATIVE /HPF    Squam Epithel, UA 1 /HPF    Trichomonas, UA NONE SEEN NONE SEEN /HPF   POCT Glucose    Collection Time: 02/24/23  6:37 AM   Result Value Ref Range    POC Glucose 278 (H) 70 - 99 MG/DL   POCT Glucose    Collection Time: 02/24/23 11:11 AM   Result Value Ref Range    POC Glucose 286 (H) 70 - 99 MG/DL        Imaging/Diagnostics Last 24 Hours   XR CHEST PORTABLE    Result Date: 2/24/2023  EXAMINATION: ONE XRAY VIEW OF THE CHEST 2/23/2023 10:56 pm COMPARISON: February 10, 2021 HISTORY: ORDERING SYSTEM PROVIDED HISTORY: sob TECHNOLOGIST PROVIDED HISTORY: Reason for exam:->sob Reason for Exam: SOB Additional signs and symptoms: none Relevant Medical/Surgical History: SOB, CAD, diabetes FINDINGS: No lines or tubes. Enlarged cardiomediastinal silhouette. The lungs are clear without focal consolidation or pleural effusion. No suspicious pulmonary nodules. Pulmonary edema is present. No pneumothorax. No acute osseous abnormality. Cardiomegaly with mild edema.        Electronically signed by Willa Adams MD on 2/24/2023 at 11:31 AM

## 2023-02-24 NOTE — PROGRESS NOTES
GENERAL SURGERY PROGRESS NOTE    Sadia Gil is a 56 y.o. female s/p I&D left breast abscess                Subjective:    Pain control. Denies shortness of breath, chest pain, nausea/vomiting. Objective:    Vitals: VITALS:  BP (!) 151/77   Pulse 100   Temp 99.8 °F (37.7 °C) (Oral)   Resp 18   Ht 5' 6\" (1.676 m)   Wt 273 lb (123.8 kg)   SpO2 93%   BMI 44.06 kg/m²     I/O: 02/23 0701 - 02/24 0700  In: 318.3 [P.O.:10; I.V.:208.3]  Out: 5     Labs/Imaging Results:   Lab Results   Component Value Date/Time     02/24/2023 02:30 AM    K 4.1 02/24/2023 02:30 AM    CL 99 02/24/2023 02:30 AM    CO2 24 02/24/2023 02:30 AM    BUN 10 02/24/2023 02:30 AM    CREATININE 0.5 02/24/2023 02:30 AM    GLUCOSE 267 02/24/2023 02:30 AM    CALCIUM 8.5 02/24/2023 02:30 AM      Lab Results   Component Value Date    WBC 15.9 (H) 02/24/2023    HGB 12.2 (L) 02/24/2023    HCT 39.2 02/24/2023    MCV 95.4 02/24/2023     02/24/2023          IV Fluids:   sodium chloride Last Rate: 100 mL/hr at 02/23/23 2228    sodium chloride    dextrose    Scheduled Meds:   metroNIDAZOLE, 500 mg, Oral, 3 times per day    enoxaparin, 30 mg, SubCUTAneous, BID    insulin lispro, 10 Units, SubCUTAneous, TID WC    insulin glargine, 40 Units, SubCUTAneous, Nightly    insulin lispro, 0-8 Units, SubCUTAneous, TID WC    insulin lispro, 0-8 Units, SubCUTAneous, 2 times per day    insulin NPH, 10 Units, SubCUTAneous, Once    aspirin, 81 mg, Oral, Daily    atorvastatin, 40 mg, Oral, Nightly    lisinopril, 20 mg, Oral, Daily    pantoprazole, 40 mg, Oral, QAM AC    sodium chloride flush, 5-40 mL, IntraVENous, 2 times per day    vancomycin, 1,250 mg, IntraVENous, Q12H    [COMPLETED] cefepime, 2,000 mg, IntraVENous, Once **FOLLOWED BY** cefepime, 2,000 mg, IntraVENous, Q12H    carvedilol, 6.25 mg, Oral, BID WC    Physical Exam:  General: A&O x 3, no distress. HEENT: Anicteric sclerae, MMM. Extremities: No edema bilat LE.   Abdomen: Soft, nondistended, nontender        Assessment and Plan:     Patient Active Problem List:     Acute chest pain     Unstable angina (HCC)     Morbid obesity (HCC)     Essential hypertension     Dyslipidemia     Post PTCA     SOB (shortness of breath)     CAD (coronary artery disease)     Palpitations     Hyponatremia     High anion gap metabolic acidosis     Diarrhea     Encounter for monitoring antiplatelet therapy     Sepsis ([facility]     Cellulitis     Vitamin D deficiency     WD - Cellulitis of groin     WD - Ulcer of right groin with fat layer exposed ([facility]     Diabetes mellitus type 2 in obese ([facility]     Mixed hyperlipidemia     Hypertriglyceridemia     Duodenal ulcer     Diabetic polyneuropathy associated with type 2 diabetes mellitus ([facility]     Primary insomnia     Breast abscess      Principal Problem:    Breast abscess  Plan: We will leave packing in place today  Change dressings over left breast as needed    We will remove packing tomorrow    Home antibiotics TBD pending cultures.   For now continue Vanco/cefepime/Flagyl           Mirtha Pretty, DO

## 2023-02-24 NOTE — PROGRESS NOTES
02/24/23 3780   Encounter Summary   Encounter Overview/Reason  Initial Encounter   Service Provided For: Patient   Referral/Consult From: [facility], [place]   Support System Spouse; Children   Last Encounter  02/24/23  (Delightful spiritual care visit with Ma Kawasaki. She is a woman of addi and appreciated prayer.)   Complexity of Encounter Moderate   Begin Time 0815   End Time  0846   Total Time Calculated 31 min   Encounter    Type Initial Screen/Assessment   Spiritual/Emotional needs   Type Spiritual Support   Assessment/Intervention/Outcome   Assessment Calm;Coping; Hopeful  Dulce Song with concern over breast abscess; feeling relieved.)   Intervention Active listening;Empowerment;Nurtured Hope;Prayer (assurance of)/[place];Read/Provided Scripture;Sustaining Presence/Ministry of presence  (Pt. has people praying for her and appreciated prayer for healing today.)   Outcome Comfort;Coping;Encouraged;Engaged in conversation;Expressed feelings, needs, and concerns;Expressed Gratitude  (Patient in healing process; relieved but apprehensive; thankful.)   Plan and Referrals   Plan/Referrals Continue to visit, (comment)

## 2023-02-24 NOTE — CONSULTS
Endocrinology   Consult Note  2/23/2023  5:56 AM     Primary Care provider: Caio Hoffmann MD     Referring physician:  Anna Claudio DO     Dear Doctor  Jerald Syed     Thank You for the Consult     Pt. Was Admitted for : Left breast abscess    Reason for Consult: Better control of blood glucose      History Obtained From:  Patient/ EMR       HISTORY OF PRESENT ILLNESS:                The patient is a 56 y.o. female with significant past medical history of CAD, post PTCA diabetes mellitus, GERD, pretension, hyperlipidemia, morbid obesity, seen in the office by his surgeon left breast abscess. It has already been incised and drained (I&D was done ) patient has been running higher blood glucose levels. I was  consulted for better control of blood glucose. ROS:   Pt's ROS done in detail. Abnormal ROS are noted in Medical and Surgical History Section below: Other Medical History:        Diagnosis Date    CAD (coronary artery disease)     Current every day smoker     Diabetes mellitus ([address]     GERD (gastroesophageal reflux disease)     History of exercise stress test 12/18/2019    Treadmill, Normal exercise performance without angina and ischemic EKG changes. Hyperlipidemia     Hypertension     Morbid obesity ([address]     Palpitations     Post PTCA 11/26/2019    Kissing stent to LAD & to Ostium of Diag. SOB (shortness of breath)     Type 2 diabetes mellitus with ketoacidosis without coma, with long-term current use of insulin ([address] 11/26/2019    DM diagnosed about 2013     Surgical History:        Procedure Laterality Date    BREAST SURGERY Left 2/23/2023    LEFT BREAST INCISION AND DRAINAGE performed by Anna Claudio DO at [address]      PTCA  11/26/2019    Kissing stents to LAD & Ostium of Diag. UPPER GASTROINTESTINAL ENDOSCOPY N/A 2/13/2021    EGD DIAGNOSTIC ONLY performed by Stacey Jiang MD at [address] [facility] ENDOSCOPY       Allergies:  Ampicillin    Family History:   History reviewed.  No pertinent family history. REVIEW OF SYSTEMS:  Review of System Done as noted above     PHYSICAL EXAM:      Vitals:    BP (!) 151/77   Pulse 100   Temp 99.8 °F (37.7 °C) (Oral)   Resp 18   Ht 5' 6\" (1.676 m)   Wt 273 lb (123.8 kg)   SpO2 93%   BMI 44.06 kg/m²     CONSTITUTIONAL:  awake, alert, cooperative, appears stated age   EYES:  vision intact Fundoscopic Exam not performed   ENT:Normal  NECK:  Supple, No JVD. Thyroid Exam:Normal   LUNGS:  Has Vesicular Breath Sounds,   Left breast abscess incision and drainage was done  CARDIOVASCULAR:  Normal apical impulse, regular rate and rhythm, normal S1 and S2, no S3 or S4, and has no  murmur   ABDOMEN:  No scars, normal bowel sounds, soft, non-distended, non-tender, no masses palpated, no hepatolienomegaly  Musculoskeletal: Normal  Extremities: Normal, peripheral pulses normal, , has no edema   NEUROLOGIC:  Awake, alert, oriented to name, place and time. Cranial nerves II-XII are grossly intact. Motor is  intact. Sensory is intact. ,  and gait is normal.    DATA:    CBC:   Recent Labs     02/24/23  0230   WBC 15.9*   HGB 12.2*       CMP:  Recent Labs     02/24/23  0230   *   K 4.1   CL 99   CO2 24   BUN 10   CREATININE 0.5*   CALCIUM 8.5   PROT 6.3*   LABALBU 3.3*   BILITOT 0.4   ALKPHOS 180*   AST 40*   ALT 37     Lipids:   Lab Results   Component Value Date/Time    CHOL 200 02/10/2023 09:09 AM    HDL 40 02/10/2023 09:09 AM    TRIG 297 02/10/2023 09:09 AM     Glucose:   Recent Labs     02/23/23  2040 02/23/23  2259 02/24/23  0637   POCGLU 265* 294* 278*     Hemoglobin A1C:   Lab Results   Component Value Date/Time    LABA1C 12.4 02/10/2023 09:09 AM     Free T4: No results found for: T4FREE  Free T3: No results found for: FT3  TSH High Sensitivity:   Lab Results   Component Value Date/Time    TSHHS 1.820 03/19/2021 09:00 AM       XR CHEST PORTABLE   Final Result   Cardiomegaly with mild edema.                 Scheduled Medicines   Medications: metroNIDAZOLE  500 mg Oral 3 times per day    enoxaparin  30 mg SubCUTAneous BID    insulin lispro  10 Units SubCUTAneous TID     insulin glargine  40 Units SubCUTAneous Nightly    insulin lispro  0-8 Units SubCUTAneous TID     insulin lispro  0-8 Units SubCUTAneous 2 times per day    insulin NPH  10 Units SubCUTAneous Once    aspirin  81 mg Oral Daily    atorvastatin  40 mg Oral Nightly    insulin lispro  10 Units SubCUTAneous TID     lisinopril  20 mg Oral Daily    pantoprazole  40 mg Oral QAM AC    sodium chloride flush  5-40 mL IntraVENous 2 times per day    insulin lispro  0-8 Units SubCUTAneous TID     vancomycin  1,250 mg IntraVENous Q12H    cefepime  2,000 mg IntraVENous Q12H    carvedilol  6.25 mg Oral BID       Infusions:    sodium chloride 100 mL/hr at 02/23/23 2228    sodium chloride      dextrose           IMPRESSION    Patient Active Problem List   Diagnosis    Acute chest pain    Unstable angina (HCC)    Morbid obesity ([facility]    Essential hypertension    Dyslipidemia    Post PTCA    SOB (shortness of breath)    CAD (coronary artery disease)    Palpitations    Hyponatremia    High anion gap metabolic acidosis    Diarrhea    Encounter for monitoring antiplatelet therapy    Sepsis ([facility]    Cellulitis    Vitamin D deficiency    WD - Cellulitis of groin    WD - Ulcer of right groin with fat layer exposed ([address]    Diabetes mellitus type 2 in obese ([address]    Mixed hyperlipidemia    Hypertriglyceridemia    Duodenal ulcer    Diabetic polyneuropathy associated with type 2 diabetes mellitus ([facility]    Primary insomnia    Breast abscess         RECOMMENDATIONS:      Reviewed POC blood glucose . Labs and X ray results   Reviewed Home and Current Medicines   Will Start On meal/ Correction bolus Humalog/ Lantus Insulin regime /    Monitor Blood glucose frequently   Modify  the dose of Insulin as needed        Will follow with you  Again thank you for sharing pt's care with me.      Truly yours,       Megan November Tina Francisco MD

## 2023-02-24 NOTE — PROGRESS NOTES
Outpatient Pharmacy Progress Note for Meds-to-Beds    Total number of Prescriptions Filled: 1  The following medications were dispensed to the patient during the discharge process:  Hydrocodone-APAP    Additional Documentation:  Patient picked-up the medication(s) in the OP Pharmacy      Thank you for letting us serve your patients.   [address]    [address], [address] [facility]    Phone: [phone]    Fax: [phone]

## 2023-02-24 NOTE — PROGRESS NOTES
Patient instructed and educated on [employer]. Patient able to do 1750 ml. Vital capacity. Patient's goal is 2000ml.  Electronically signed by Galina Felix RCP on 2/24/2023 at 12:39 PM

## 2023-02-24 NOTE — DISCHARGE INSTRUCTIONS
Change dressing as instructed. Pack with clean gauze in place dressings over this. Change dressing daily.  Take antibiotics as prescribed.  Please monitor BP and blood sugars and follow up with the referrals provided           DRIVING:    No driving until off narcotic pain medications walking comfortably.    RETURN TO WORK: when cleared after your follow up office visit.    WALKING:    As tolerated.     STAIRS:    As tolerated.      Take stool softeners such as milk of magnesia or MiraLAX as needed to avoid constipation.    SPECIAL INSTRUCTIONS:     If you use a CPAP at home, continue to use it as normal.    Call the office at [phone]  if you have a fever greater than or equal to 101 F or if your incision becomes red, tender, or has drainage of pus.

## 2023-02-24 NOTE — CONSULTS
Infectious Disease Consult Note  2023   Patient Name: En Davis : 1965   Impression  Left Breast Abscess with Mastitis:  Allergy to ampicillin (Rash age 17), Reports can tolerate PCN:  T-max 101.3, leukocytosis 15.9  CrCl 171  Pct 0.093, .5  -BC Pending  2023 US Breast Limited Left: Large abscess noted in the retro-areolar region of the left breast at 11 o'clock, 3 cm from the nipple measuring 5.1 x 4.0 x 2.4 cm. Additionally, there is diffuse increased density in the left breast, compatible with an associated mastitis. -S/p per Dr. Prieto Barragan, Left Breast I&D culture: Prelim: MSSA    Uncontrolled DMII: Insulin Dependent  Dr. Jacqueline Harris onboard  2/10/23-AIC 12.4  Tobacco Abuse:  HTN/ HLD:  Morbid Obesity: BMI: 44.06  Multi-morbidity: per PMHx  Plan:  DC IV vancomycin per pharmacy dosing as prelim cx grew MSSA  Continue IV cefepime 2 gm q12h  Continue po metronidazole 500 mg tid  Trend CRP and Pct, ordered  Await BC and Final surgical cultures for final recs, I DW patient she may need to be here over the weekend as an abscess could be polymicrobial    Thank you for allowing me to consult in the care of this patient.  ------------------------  REASON FOR CONSULT: Infective syndrome \"breast abscess\"  Requested by: Dr. Toribio Nails. Donato Garcia  HPI:Patient is a 56 y.o.  female with a history of IDDM, HTN, HLD, morbid obesity, tobacco abuse, allergy to ampicillin, and past groin abscess 2021 grew E.faecalis, E.coli, and MSSA who was admitted 2023 for further evaluation and management of left breast redness and tenderness which started 23. The symptoms worsened, she presented to her PCP who obtained a mammogram and US which revealed a large left breast abscess with mastitis. She was started on IV empiric vancomycin, cefepime,and po Flagyl. She underwent a left breast I&D on 2023 per Dr. Prieto Barragan, cultures are pending.  The patient reports now her left breast feels much improved, pain free. ? Infectious diseases service was consulted to evaluate the pt, and recommend further investigative and therapeutic measures. ROS: Other systems reviewed Including eyes, ENT, respiratory, cardiovascular, GI, , dermatologic, neurologic, psych, hem/lymphatic, musculoskeletal and endocrine were negative other than what is mentioned above. Patient Active Problem List    Diagnosis Date Noted    Breast abscess 02/23/2023    Diabetes mellitus type 2 in obese ([address] 03/09/2021    Mixed hyperlipidemia 03/09/2021    Hypertriglyceridemia 03/09/2021    Duodenal ulcer 03/09/2021    Diabetic polyneuropathy associated with type 2 diabetes mellitus ([address] 03/09/2021    Primary insomnia 03/09/2021    WD - Ulcer of right groin with fat layer exposed ([address] 02/25/2021    WD - Cellulitis of groin 02/24/2021    Vitamin D deficiency 02/15/2021    Encounter for monitoring antiplatelet therapy     Sepsis ([address]     Cellulitis     High anion gap metabolic acidosis     Diarrhea     Hyponatremia 02/10/2021    SOB (shortness of breath)     CAD (coronary artery disease)     Palpitations     Unstable angina ([address] 11/26/2019    Morbid obesity ([address] 11/26/2019    Essential hypertension 11/26/2019    Dyslipidemia 11/26/2019    Post PTCA 11/26/2019    Acute chest pain 11/25/2019     Past Medical History:   Diagnosis Date    CAD (coronary artery disease)     Current every day smoker     Diabetes mellitus ([address]     GERD (gastroesophageal reflux disease)     History of exercise stress test 12/18/2019    Treadmill, Normal exercise performance without angina and ischemic EKG changes. Hyperlipidemia     Hypertension     Morbid obesity ([address]     Palpitations     Post PTCA 11/26/2019    Kissing stent to LAD & to Ostium of Diag.     SOB (shortness of breath)     Type 2 diabetes mellitus with ketoacidosis without coma, with long-term current use of insulin ([address] 11/26/2019    DM diagnosed about 2013      Past Surgical History:   Procedure Laterality Date    BREAST SURGERY Left 2/23/2023    LEFT BREAST INCISION AND DRAINAGE performed by Corie Major DO at [address]      PTCA  11/26/2019    Kissing stents to LAD & Ostium of Diag. UPPER GASTROINTESTINAL ENDOSCOPY N/A 2/13/2021    EGD DIAGNOSTIC ONLY performed by Brendan Vega MD at [facility] reviewed. No pertinent family history. Infectious disease related family history - not contibutory. SOCIAL HISTORY  Social History     Tobacco Use    Smoking status: Some Days     Packs/day: 0.50     Years: 40.00     Pack years: 20.00     Types: Cigarettes    Smokeless tobacco: Never    Tobacco comments:     1 pack every four days    Substance Use Topics    Alcohol use: Never     Comment: Caffeine: 1 cup coffee daily, 3 cups iced tea a day      Born:[place], [place]  [facility] with  and son  Jabier Jay  No recent travel of significance. No recent unusual exposures. Pets: one dog, one cat   ? ALLERGIES  Allergies   Allergen Reactions    Ampicillin Rash      MEDICATIONS  Reviewed and are per the chart/EMR. ? Antibiotics:   Present:  Metronidazole 2/24-  Cefepime 2/23-  Past:  Cefazolin 2/23? Vancomycin 2/23-24  -------------------------------------------------------------------------------------------------------------------    Vital Signs:  Vitals:    02/24/23 0815   BP: (!) 151/77   Pulse: 100   Resp: 18   Temp: 99.8 °F (37.7 °C)   SpO2: 93%         Exam:    VS: noted; wt 273 lb (123.8 kg) Height 5'6\"  Gen: alert and oriented X3, no distress  Skin: no stigmata of endocarditis  Wounds: C/D/I left breast with no surrounding erythema, edema or tenderness  HEMT: AT/NC Oropharynx pink, moist, and without lesions or exudates; dentition in good state of repair  Eyes: PERRLA, EOMI, conjunctiva pink, sclera anicteric. Neck: Supple. Trachea midline. No LAD. Chest: no distress and CTA. Good air movement.  Room air.  Heart: RRR and no MRG. Abd: soft, non-distended, no tenderness, no hepatomegaly. Normoactive bowel sounds. Ext: no clubbing, cyanosis, or edema  Neuro: Mental status intact. CN 2-12 intact and no focal sensory or motor deficits    ? Diagnostic Studies: reviewed  2/20/2023 [facility] digital diagnostic [place] per Protocol    Impression   1. Large abscess noted in the retroareolar region of the left breast at 11   o'clock, 3 cm from the nipple measuring 5.1 x 4.0 x 2.4 cm. Additionally,   there is diffuse increased density in the left breast, compatible with an   associated mastitis. Surgical consultation is recommended. 2. Enlarged left axillary lymph nodes, likely reactive. 3. Benign right breast mammogram.       BIRADS:   BIRADS - CATEGORY 3       Findings are probably benign. A short interval follow-up left breast   mammogram and left breast ultrasound is recommended in 8-12 weeks    following   treatment. OVERALL ASSESSMENT - PROBABLY BENIGN. A letter of notification will be sent to the patient regarding the    results. 2/20/2023 US Breast Limited Left:  Impression   1. Large abscess noted in the retroareolar region of the left breast at 11   o'clock, 3 cm from the nipple measuring 5.1 x 4.0 x 2.4 cm. Additionally,   there is diffuse increased density in the left breast, compatible with an   associated mastitis. Surgical consultation is recommended. 2. Enlarged left axillary lymph nodes, likely reactive. 3. Benign right breast mammogram.       BIRADS:   BIRADS - CATEGORY 3       Findings are probably benign. A short interval follow-up left breast   mammogram and left breast ultrasound is recommended in 8-12 weeks    following   treatment. OVERALL ASSESSMENT - PROBABLY BENIGN. A letter of notification will be sent to the patient regarding the    results. 2/23/2023 XR Chest Portable:  Impression   Cardiomegaly with mild edema.    ??  I have examined this patient and available medical records on this date and have made the above observations, conclusions and recommendations. Electronically signed by: Electronically signed by Frida Patiño.  VIVIAN Handley CNP on 2/24/2023 at 9:36 AM

## 2023-02-25 LAB
ALBUMIN SERPL-MCNC: 3 GM/DL (ref 3.4–5)
ALP BLD-CCNC: 166 IU/L (ref 40–128)
ALT SERPL-CCNC: 31 U/L (ref 10–40)
ANION GAP SERPL CALCULATED.3IONS-SCNC: 9 MMOL/L (ref 4–16)
AST SERPL-CCNC: 24 IU/L (ref 15–37)
BANDED NEUTROPHILS ABSOLUTE COUNT: 2.87 K/CU MM
BANDED NEUTROPHILS RELATIVE PERCENT: 19 % (ref 5–11)
BILIRUB SERPL-MCNC: 0.3 MG/DL (ref 0–1)
BUN SERPL-MCNC: 9 MG/DL (ref 6–23)
CALCIUM SERPL-MCNC: 8.5 MG/DL (ref 8.3–10.6)
CHLORIDE BLD-SCNC: 101 MMOL/L (ref 99–110)
CO2: 26 MMOL/L (ref 21–32)
CREAT SERPL-MCNC: 0.6 MG/DL (ref 0.6–1.1)
CRP SERPL HS-MCNC: 132.2 MG/L
CULTURE: ABNORMAL
CULTURE: ABNORMAL
DIFFERENTIAL TYPE: ABNORMAL
GFR SERPL CREATININE-BSD FRML MDRD: >60 ML/MIN/1.73M2
GLUCOSE BLD-MCNC: 204 MG/DL (ref 70–99)
GLUCOSE BLD-MCNC: 234 MG/DL (ref 70–99)
GLUCOSE BLD-MCNC: 265 MG/DL (ref 70–99)
GLUCOSE BLD-MCNC: 296 MG/DL (ref 70–99)
GLUCOSE BLD-MCNC: 305 MG/DL (ref 70–99)
GLUCOSE SERPL-MCNC: 237 MG/DL (ref 70–99)
HCT VFR BLD CALC: 37.5 % (ref 37–47)
HEMOGLOBIN: 11.5 GM/DL (ref 12.5–16)
LYMPHOCYTES ABSOLUTE: 4.1 K/CU MM
LYMPHOCYTES RELATIVE PERCENT: 27 % (ref 24–44)
Lab: ABNORMAL
MCH RBC QN AUTO: 29.7 PG (ref 27–31)
MCHC RBC AUTO-ENTMCNC: 30.7 % (ref 32–36)
MCV RBC AUTO: 96.9 FL (ref 78–100)
METAMYELOCYTES ABSOLUTE COUNT: 0.15 K/CU MM
METAMYELOCYTES PERCENT: 1 %
MONOCYTES ABSOLUTE: 0.6 K/CU MM
MONOCYTES RELATIVE PERCENT: 4 % (ref 0–4)
PDW BLD-RTO: 13 % (ref 11.7–14.9)
PLATELET # BLD: 206 K/CU MM (ref 140–440)
PMV BLD AUTO: 10.5 FL (ref 7.5–11.1)
POTASSIUM SERPL-SCNC: 4.7 MMOL/L (ref 3.5–5.1)
PROCALCITONIN SERPL-MCNC: 0.08 NG/ML
RBC # BLD: 3.87 M/CU MM (ref 4.2–5.4)
SEGMENTED NEUTROPHILS ABSOLUTE COUNT: 7.4 K/CU MM
SEGMENTED NEUTROPHILS RELATIVE PERCENT: 49 % (ref 36–66)
SODIUM BLD-SCNC: 136 MMOL/L (ref 135–145)
SPECIMEN: ABNORMAL
TOTAL PROTEIN: 5.8 GM/DL (ref 6.4–8.2)
WBC # BLD: 15.1 K/CU MM (ref 4–10.5)

## 2023-02-25 PROCEDURE — 84145 PROCALCITONIN (PCT): CPT

## 2023-02-25 PROCEDURE — 94150 VITAL CAPACITY TEST: CPT

## 2023-02-25 PROCEDURE — 6360000002 HC RX W HCPCS: Performed by: SURGERY

## 2023-02-25 PROCEDURE — 6370000000 HC RX 637 (ALT 250 FOR IP): Performed by: SURGERY

## 2023-02-25 PROCEDURE — 1200000000 HC SEMI PRIVATE

## 2023-02-25 PROCEDURE — 85027 COMPLETE CBC AUTOMATED: CPT

## 2023-02-25 PROCEDURE — 94761 N-INVAS EAR/PLS OXIMETRY MLT: CPT

## 2023-02-25 PROCEDURE — APPNB15 APP NON BILLABLE TIME 0-15 MINS: Performed by: NURSE PRACTITIONER

## 2023-02-25 PROCEDURE — 99024 POSTOP FOLLOW-UP VISIT: CPT | Performed by: NURSE PRACTITIONER

## 2023-02-25 PROCEDURE — 82962 GLUCOSE BLOOD TEST: CPT

## 2023-02-25 PROCEDURE — 80053 COMPREHEN METABOLIC PANEL: CPT

## 2023-02-25 PROCEDURE — 2580000003 HC RX 258: Performed by: SURGERY

## 2023-02-25 PROCEDURE — 6370000000 HC RX 637 (ALT 250 FOR IP): Performed by: INTERNAL MEDICINE

## 2023-02-25 PROCEDURE — 85007 BL SMEAR W/DIFF WBC COUNT: CPT

## 2023-02-25 PROCEDURE — 86140 C-REACTIVE PROTEIN: CPT

## 2023-02-25 PROCEDURE — 36415 COLL VENOUS BLD VENIPUNCTURE: CPT

## 2023-02-25 RX ORDER — INSULIN GLARGINE 100 [IU]/ML
50 INJECTION, SOLUTION SUBCUTANEOUS NIGHTLY
Status: DISCONTINUED | OUTPATIENT
Start: 2023-02-25 | End: 2023-02-27 | Stop reason: HOSPADM

## 2023-02-25 RX ORDER — ALOGLIPTIN 12.5 MG/1
25 TABLET, FILM COATED ORAL DAILY
Status: DISCONTINUED | OUTPATIENT
Start: 2023-02-25 | End: 2023-02-27 | Stop reason: HOSPADM

## 2023-02-25 RX ORDER — INSULIN LISPRO 100 [IU]/ML
15 INJECTION, SOLUTION INTRAVENOUS; SUBCUTANEOUS
Status: DISCONTINUED | OUTPATIENT
Start: 2023-02-25 | End: 2023-02-26

## 2023-02-25 RX ADMIN — INSULIN LISPRO 15 UNITS: 100 INJECTION, SOLUTION INTRAVENOUS; SUBCUTANEOUS at 12:10

## 2023-02-25 RX ADMIN — ENOXAPARIN SODIUM 30 MG: 100 INJECTION SUBCUTANEOUS at 21:25

## 2023-02-25 RX ADMIN — PANTOPRAZOLE SODIUM 40 MG: 40 TABLET, DELAYED RELEASE ORAL at 05:45

## 2023-02-25 RX ADMIN — CEFEPIME HYDROCHLORIDE 2000 MG: 2 INJECTION, POWDER, FOR SOLUTION INTRAVENOUS at 11:50

## 2023-02-25 RX ADMIN — HYDROCODONE BITARTRATE AND ACETAMINOPHEN 1 TABLET: 5; 325 TABLET ORAL at 14:04

## 2023-02-25 RX ADMIN — SODIUM CHLORIDE, PRESERVATIVE FREE 10 ML: 5 INJECTION INTRAVENOUS at 10:09

## 2023-02-25 RX ADMIN — INSULIN LISPRO 4 UNITS: 100 INJECTION, SOLUTION INTRAVENOUS; SUBCUTANEOUS at 02:24

## 2023-02-25 RX ADMIN — ASPIRIN 81 MG: 81 TABLET, COATED ORAL at 10:02

## 2023-02-25 RX ADMIN — INSULIN GLARGINE 50 UNITS: 100 INJECTION, SOLUTION SUBCUTANEOUS at 21:24

## 2023-02-25 RX ADMIN — LISINOPRIL 20 MG: 20 TABLET ORAL at 10:03

## 2023-02-25 RX ADMIN — SODIUM CHLORIDE: 9 INJECTION, SOLUTION INTRAVENOUS at 11:48

## 2023-02-25 RX ADMIN — METRONIDAZOLE 500 MG: 250 TABLET ORAL at 14:04

## 2023-02-25 RX ADMIN — CARVEDILOL 6.25 MG: 6.25 TABLET, FILM COATED ORAL at 10:03

## 2023-02-25 RX ADMIN — MORPHINE SULFATE 2 MG: 2 INJECTION, SOLUTION INTRAMUSCULAR; INTRAVENOUS at 10:15

## 2023-02-25 RX ADMIN — SODIUM CHLORIDE: 9 INJECTION, SOLUTION INTRAVENOUS at 02:21

## 2023-02-25 RX ADMIN — INSULIN LISPRO 15 UNITS: 100 INJECTION, SOLUTION INTRAVENOUS; SUBCUTANEOUS at 17:39

## 2023-02-25 RX ADMIN — CARVEDILOL 6.25 MG: 6.25 TABLET, FILM COATED ORAL at 17:31

## 2023-02-25 RX ADMIN — ATORVASTATIN CALCIUM 40 MG: 40 TABLET, FILM COATED ORAL at 21:26

## 2023-02-25 RX ADMIN — ENOXAPARIN SODIUM 30 MG: 100 INJECTION SUBCUTANEOUS at 10:03

## 2023-02-25 RX ADMIN — INSULIN LISPRO 2 UNITS: 100 INJECTION, SOLUTION INTRAVENOUS; SUBCUTANEOUS at 21:23

## 2023-02-25 RX ADMIN — ALOGLIPTIN 25 MG: 12.5 TABLET, FILM COATED ORAL at 10:02

## 2023-02-25 RX ADMIN — METRONIDAZOLE 500 MG: 250 TABLET ORAL at 05:45

## 2023-02-25 RX ADMIN — METRONIDAZOLE 500 MG: 250 TABLET ORAL at 21:26

## 2023-02-25 RX ADMIN — INSULIN LISPRO 6 UNITS: 100 INJECTION, SOLUTION INTRAVENOUS; SUBCUTANEOUS at 12:11

## 2023-02-25 ASSESSMENT — PAIN DESCRIPTION - DESCRIPTORS: DESCRIPTORS: STABBING;SORE

## 2023-02-25 ASSESSMENT — ENCOUNTER SYMPTOMS
GASTROINTESTINAL NEGATIVE: 1
EYES NEGATIVE: 1

## 2023-02-25 ASSESSMENT — PAIN DESCRIPTION - ORIENTATION: ORIENTATION: LEFT

## 2023-02-25 ASSESSMENT — PAIN DESCRIPTION - LOCATION: LOCATION: BREAST

## 2023-02-25 ASSESSMENT — PAIN - FUNCTIONAL ASSESSMENT: PAIN_FUNCTIONAL_ASSESSMENT: ACTIVITIES ARE NOT PREVENTED

## 2023-02-25 ASSESSMENT — PAIN SCALES - GENERAL: PAINLEVEL_OUTOF10: 0

## 2023-02-25 NOTE — PROGRESS NOTES
GENERAL SURGERY PROGRESS NOTE    Jyay Santana is a 56 y.o. female s/p I&D left breast abscess                Subjective:    Pain control. Denies shortness of breath, chest pain, nausea/vomiting. Objective:    Vitals: VITALS:  BP (!) 152/76   Pulse 91   Temp 98.4 °F (36.9 °C) (Oral)   Resp 17   Ht 5' 6\" (1.676 m)   Wt 273 lb (123.8 kg)   SpO2 93%   BMI 44.06 kg/m²     I/O: 02/24 0701 - 02/25 0700  In: 240 [P.O.:240]  Out: -     Labs/Imaging Results:   Lab Results   Component Value Date/Time     02/25/2023 12:52 AM    K 4.7 02/25/2023 12:52 AM     02/25/2023 12:52 AM    CO2 26 02/25/2023 12:52 AM    BUN 9 02/25/2023 12:52 AM    CREATININE 0.6 02/25/2023 12:52 AM    GLUCOSE 237 02/25/2023 12:52 AM    CALCIUM 8.5 02/25/2023 12:52 AM      Lab Results   Component Value Date    WBC 15.1 (H) 02/25/2023    HGB 11.5 (L) 02/25/2023    HCT 37.5 02/25/2023    MCV 96.9 02/25/2023     02/25/2023          IV Fluids:   sodium chloride Last Rate: 100 mL/hr at 02/25/23 0221    sodium chloride    dextrose    Scheduled Meds:   insulin glargine, 50 Units, SubCUTAneous, Nightly    alogliptin, 25 mg, Oral, Daily    insulin lispro, 15 Units, SubCUTAneous, TID WC    metroNIDAZOLE, 500 mg, Oral, 3 times per day    enoxaparin, 30 mg, SubCUTAneous, BID    insulin lispro, 0-8 Units, SubCUTAneous, TID WC    insulin lispro, 0-8 Units, SubCUTAneous, 2 times per day    aspirin, 81 mg, Oral, Daily    atorvastatin, 40 mg, Oral, Nightly    lisinopril, 20 mg, Oral, Daily    pantoprazole, 40 mg, Oral, QAM AC    sodium chloride flush, 5-40 mL, IntraVENous, 2 times per day    [COMPLETED] cefepime, 2,000 mg, IntraVENous, Once **FOLLOWED BY** cefepime, 2,000 mg, IntraVENous, Q12H    carvedilol, 6.25 mg, Oral, BID WC    Physical Exam:  General: A&O x 3, no distress. HEENT: Anicteric sclerae, MMM. Extremities: No edema bilat LE.   Abdomen: Soft, nondistended, nontender        Assessment and Plan:     Patient Active Problem List:     Acute chest pain     Unstable angina (HCC)     Morbid obesity ([facility]     Essential hypertension     Dyslipidemia     Post PTCA     SOB (shortness of breath)     CAD (coronary artery disease)     Palpitations     Hyponatremia     High anion gap metabolic acidosis     Diarrhea     Encounter for monitoring antiplatelet therapy     Sepsis ([facility]     Cellulitis     Vitamin D deficiency     WD - Cellulitis of groin     WD - Ulcer of right groin with fat layer exposed ([facility]     Diabetes mellitus type 2 in obese ([facility]     Mixed hyperlipidemia     Hypertriglyceridemia     Duodenal ulcer     Diabetic polyneuropathy associated with type 2 diabetes mellitus ([facility]     Primary insomnia     Breast abscess      Principal Problem:    Breast abscess  Plan:     Packing removed and replaced  Change dressings over left breast as needed    Home antibiotics TBD pending cultures.   For now continue Vanco/cefepime/Flagyl           VIVIAN Pacheco - CNP

## 2023-02-25 NOTE — PROGRESS NOTES
Progress Note( Dr. Lorena Treadwell)  2/25/2023  Subjective:   Admit Date: 2/23/2023  PCP: Devika Wilkins MD    Admitted For :   Left breast abscess incision drainage and    Consulted For: Better control of blood glucose    Interval History: Feels lot better still has some pain in the operative site    Denies any chest pains,   Denies SOB . Denies nausea or vomiting. No new bowel or bladder symptoms. Intake/Output Summary (Last 24 hours) at 2/25/2023 1142  Last data filed at 2/24/2023 1230  Gross per 24 hour   Intake 240 ml   Output --   Net 240 ml       DATA    CBC:   Recent Labs     02/24/23  0230 02/25/23  0052   WBC 15.9* 15.1*   HGB 12.2* 11.5*    206    CMP:  Recent Labs     02/24/23  0230 02/25/23  0052   * 136   K 4.1 4.7   CL 99 101   CO2 24 26   BUN 10 9   CREATININE 0.5* 0.6   CALCIUM 8.5 8.5   PROT 6.3* 5.8*   LABALBU 3.3* 3.0*   BILITOT 0.4 0.3   ALKPHOS 180* 166*   AST 40* 24   ALT 37 31     Lipids:   Lab Results   Component Value Date/Time    CHOL 200 02/10/2023 09:09 AM    HDL 40 02/10/2023 09:09 AM    TRIG 297 02/10/2023 09:09 AM     Glucose:  Recent Labs     02/25/23  0208 02/25/23  0651 02/25/23  1059   POCGLU 296* 204* 305*     [id number]:  Lab Results   Component Value Date/Time    LABA1C 12.4 02/10/2023 09:09 AM     High Sensitivity TSH:   Lab Results   Component Value Date/Time    TSHHS 1.820 03/19/2021 09:00 AM     Free T3: No results found for: FT3  Free T4:No results found for: T4FREE    XR CHEST PORTABLE   Final Result   Cardiomegaly with mild edema.               Scheduled Medicines   Medications:    insulin glargine  50 Units SubCUTAneous Nightly    alogliptin  25 mg Oral Daily    insulin lispro  15 Units SubCUTAneous TID WC    metroNIDAZOLE  500 mg Oral 3 times per day    enoxaparin  30 mg SubCUTAneous BID    insulin lispro  0-8 Units SubCUTAneous TID WC    insulin lispro  0-8 Units SubCUTAneous 2 times per day    aspirin  81 mg Oral Daily    atorvastatin  40 mg Oral Nightly    lisinopril  20 mg Oral Daily    pantoprazole  40 mg Oral QAM AC    sodium chloride flush  5-40 mL IntraVENous 2 times per day    cefepime  2,000 mg IntraVENous Q12H    carvedilol  6.25 mg Oral BID WC      Infusions:    sodium chloride 100 mL/hr at 02/25/23 0221    sodium chloride      dextrose           Objective:   Vitals: BP (!) 158/88   Pulse 91   Temp 98.4 °F (36.9 °C)   Resp 16   Ht 5' 6\" (1.676 m)   Wt 273 lb (123.8 kg)   SpO2 93%   BMI 44.06 kg/m²   General appearance: alert and cooperative with exam  Neck: no JVD or bruit  Thyroid : Normal lobes   Lungs: Has Vesicular Breath sounds   Left breast incision and drainage done for the abscess  Heart:  regular rate and rhythm  Abdomen: soft, non-tender; bowel sounds normal; no masses,  no organomegaly  Musculoskeletal: Normal  Extremities: extremities normal, , no edema  Neurologic:  Awake, alert, oriented to name, place and time. Cranial nerves II-XII are grossly intact. Motor is  intact. Sensory neuropathy possible and gait is normal.    Assessment:     Patient Active Problem List:     Acute chest pain     Unstable angina (HCC)     Morbid obesity (HCC)     Essential hypertension     Dyslipidemia     Post PTCA     SOB (shortness of breath)     CAD (coronary artery disease)     Palpitations     Hyponatremia     High anion gap metabolic acidosis     Diarrhea     Encounter for monitoring antiplatelet therapy     Sepsis ([address]     Cellulitis     Vitamin D deficiency     WD - Cellulitis of groin     WD - Ulcer of right groin with fat layer exposed ([address]     Diabetes mellitus type 2 in obese ([address]     Mixed hyperlipidemia     Hypertriglyceridemia     Duodenal ulcer     Diabetic polyneuropathy associated with type 2 diabetes mellitus ([address]     Primary insomnia     Breast abscess     MSSA (methicillin susceptible Staphylococcus aureus) infection      Plan:     Reviewed POC blood glucose .  Labs and X ray results   Reviewed Current Medicines   On meal/ Correction bolus Humalog/ Basal Lantus Insulin regime / and Oral Hypoglycemic drugs   Monitor Blood glucose frequently   Modified  the dose of Insulin/ other medicines as needed   Will follow     .      Jules Brock MD, MD

## 2023-02-25 NOTE — PROGRESS NOTES
V2.0  [facility] Hospitalist Progress Note      Name:  Hattie Armstrong /Age/Sex: 1965  (56 y.o. female)   MRN & CSN:  [id number] & [id number] Encounter Date/Time: 2023 7:30 AM EST    Location:  [address] [place] [address] PCP: Harry Nevarez MD       Hospital Day: 3    Assessment and Plan:   Hattie Armstrong is a 56 y.o. female with pmh of T2DM, HTN, HLD, and CAD s/p stents who presents with Breast abscess      Plan:  #Sepsis 2/2 to Breast abscess   --Patient is s/p I&D with packing in place  --Tissue and fluid cultures grew methicillin sensitive Staph aureus, Vanco discontinued and patient started on cefepime and Flagyl per ID.  --Cultures finalized, plan to transition to p.o. antibiotics    #Hyperglycemia 2/2 DM II insulin dependent   -- Uncontrolled in the 300s, Will Require Stricter Glycemic Control for Improved Wound Healing  --Endocrinology on board, Lantus increased from 40 to 50 units, covered with a bolus of 15 units 3 times daily and sliding scale insulin  --Continue Accu-Cheks and aim for blood glucose less than 180    #HTN   -- Pressure elevated, continue regimen of lisinopril and Coreg  --Monitor trends and adjust therapy as appropriate    #HLD   -- Continue statin    #H/O CAD s/p 2 stents  -- Stable at this time, continue aspirin and statin      Diet ADULT DIET; Regular   DVT Prophylaxis [x] Lovenox, []  Heparin, [] SCDs, [] Ambulation,  [] Eliquis, [] Xarelto  [] Coumadin   Code Status Full Code   Disposition From: Home  Expected Disposition: Home  Estimated Date of Discharge: 1-2 days   Patient requires continued admission due to Left breast abscess   Surrogate Decision Maker/ POA      Subjective:     Chief Complaint: No chief complaint on file. Seen and examined at bedside. She endorses that she is beginning to feel some pain at the site of the drainage of the abscess and that it is a little more tender this morning.   She is however still looking forward to discharge as soon as possible      Review of Systems:    Review of Systems   Constitutional: Negative. HENT: Negative. Eyes: Negative. Cardiovascular: Negative. Gastrointestinal: Negative. Genitourinary: Negative. Musculoskeletal: Negative. Skin:  Positive for wound. Pain at site of abscess drainage of left breast   Neurological: Negative. Psychiatric/Behavioral: Negative. Objective: Intake/Output Summary (Last 24 hours) at 2/25/2023 0730  Last data filed at 2/24/2023 1230  Gross per 24 hour   Intake 240 ml   Output --   Net 240 ml        Vitals:   Vitals:    02/25/23 0212   BP: (!) 152/76   Pulse: 91   Resp: 17   Temp: 98.4 °F (36.9 °C)   SpO2: 93%       Physical Exam:   General: NAD, sitting on edge of bed  Eyes: EOMI  ENT: Mist mucous membrane  Cardiovascular: Regular rate. Respiratory: Clear to auscultation  Gastrointestinal: Soft, non tender  Genitourinary: no suprapubic tenderness  Musculoskeletal: No edema  Skin: Dressing over left breast is intact but with a good amount of drainage. Some tenderness to palpation  Neuro: Alert. Psych: Mood appropriate.      Medications:   Medications:    metroNIDAZOLE  500 mg Oral 3 times per day    enoxaparin  30 mg SubCUTAneous BID    insulin lispro  10 Units SubCUTAneous TID WC    insulin glargine  40 Units SubCUTAneous Nightly    insulin lispro  0-8 Units SubCUTAneous TID WC    insulin lispro  0-8 Units SubCUTAneous 2 times per day    aspirin  81 mg Oral Daily    atorvastatin  40 mg Oral Nightly    lisinopril  20 mg Oral Daily    pantoprazole  40 mg Oral QAM AC    sodium chloride flush  5-40 mL IntraVENous 2 times per day    cefepime  2,000 mg IntraVENous Q12H    carvedilol  6.25 mg Oral BID WC      Infusions:    sodium chloride 100 mL/hr at 02/25/23 0221    sodium chloride      dextrose       PRN Meds: sodium chloride flush, 5-40 mL, PRN  sodium chloride, , PRN  ondansetron, 4 mg, Q8H PRN   Or  ondansetron, 4 mg, Q6H PRN  polyethylene glycol, 17 g, Daily PRN  acetaminophen, 650 mg, Q6H PRN   Or  acetaminophen, 650 mg, Q6H PRN  HYDROcodone-acetaminophen, 1 tablet, Q4H PRN  morphine, 2 mg, Q2H PRN  glucose, 4 tablet, PRN  dextrose bolus, 125 mL, PRN   Or  dextrose bolus, 250 mL, PRN  glucagon (rDNA), 1 mg, PRN  dextrose, , Continuous PRN        Labs      Recent Results (from the past 24 hour(s))   POCT Glucose    Collection Time: 02/24/23 11:11 AM   Result Value Ref Range    POC Glucose 286 (H) 70 - 99 MG/DL   POCT Glucose    Collection Time: 02/24/23  4:24 PM   Result Value Ref Range    POC Glucose 259 (H) 70 - 99 MG/DL   Lactic Acid    Collection Time: 02/24/23  4:32 PM   Result Value Ref Range    Lactate 1.0 0.5 - 1.9 mMOL/L   POCT Glucose    Collection Time: 02/24/23  8:23 PM   Result Value Ref Range    POC Glucose 341 (H) 70 - 99 MG/DL   CBC with Auto Differential    Collection Time: 02/25/23 12:52 AM   Result Value Ref Range    WBC 15.1 (H) 4.0 - 10.5 K/CU MM    RBC 3.87 (L) 4.2 - 5.4 M/CU MM    Hemoglobin 11.5 (L) 12.5 - 16.0 GM/DL    Hematocrit 37.5 37 - 47 %    MCV 96.9 78 - 100 FL    MCH 29.7 27 - 31 PG    MCHC 30.7 (L) 32.0 - 36.0 %    RDW 13.0 11.7 - 14.9 %    Platelets 126 832 - 551 K/CU MM    MPV 10.5 7.5 - 11.1 FL    Metamyelocytes Relative 1 (H) 0.0 %    Bands Relative 19 (H) 5 - 11 %    Segs Relative 49.0 36 - 66 %    Lymphocytes % 27.0 24 - 44 %    Monocytes % 4.0 0 - 4 %    Metamyelocytes Absolute 0.15 K/CU MM    Bands Absolute 2.87 K/CU MM    Segs Absolute 7.4 K/CU MM    Lymphocytes Absolute 4.1 K/CU MM    Monocytes Absolute 0.6 K/CU MM    Differential Type MANUAL DIFFERENTIAL    Comprehensive Metabolic Panel w/ Reflex to MG    Collection Time: 02/25/23 12:52 AM   Result Value Ref Range    Sodium 136 135 - 145 MMOL/L    Potassium 4.7 3.5 - 5.1 MMOL/L    Chloride 101 99 - 110 mMol/L    CO2 26 21 - 32 MMOL/L    BUN 9 6 - 23 MG/DL    Creatinine 0.6 0.6 - 1.1 MG/DL    Est, Glom Filt Rate >60 >60 mL/min/1.73m2    Glucose 237 (H) 70 - 99 MG/DL    Calcium 8.5 8.3 - 10.6 MG/DL    Albumin 3.0 (L) 3.4 - 5.0 GM/DL    Total Protein 5.8 (L) 6.4 - 8.2 GM/DL    Total Bilirubin 0.3 0.0 - 1.0 MG/DL    ALT 31 10 - 40 U/L    AST 24 15 - 37 IU/L    Alkaline Phosphatase 166 (H) 40 - 128 IU/L    Anion Gap 9 4 - 16   C-Reactive Protein    Collection Time: 02/25/23 12:52 AM   Result Value Ref Range    CRP High Sensitivity 132.2 (H) <5.0 mg/L   Procalcitonin    Collection Time: 02/25/23 12:52 AM   Result Value Ref Range    Procalcitonin 0.078    POCT Glucose    Collection Time: 02/25/23  2:08 AM   Result Value Ref Range    POC Glucose 296 (H) 70 - 99 MG/DL   POCT Glucose    Collection Time: 02/25/23  6:51 AM   Result Value Ref Range    POC Glucose 204 (H) 70 - 99 MG/DL        Imaging/Diagnostics Last 24 Hours   XR CHEST PORTABLE    Result Date: 2/24/2023  EXAMINATION: ONE XRAY VIEW OF THE CHEST 2/23/2023 10:56 pm COMPARISON: February 10, 2021 HISTORY: ORDERING SYSTEM PROVIDED HISTORY: sob TECHNOLOGIST PROVIDED HISTORY: Reason for exam:->sob Reason for Exam: SOB Additional signs and symptoms: none Relevant Medical/Surgical History: SOB, CAD, diabetes FINDINGS: No lines or tubes. Enlarged cardiomediastinal silhouette.  The lungs are clear without focal consolidation or pleural effusion.  No suspicious pulmonary nodules.  Pulmonary edema is present.  No pneumothorax. No acute osseous abnormality.     Cardiomegaly with mild edema.       Electronically signed by Stu Brewster MD on 2/25/2023 at 7:30 AM

## 2023-02-26 LAB
ALBUMIN SERPL-MCNC: 3.2 GM/DL (ref 3.4–5)
ALP BLD-CCNC: 169 IU/L (ref 40–128)
ALT SERPL-CCNC: 30 U/L (ref 10–40)
ANION GAP SERPL CALCULATED.3IONS-SCNC: 9 MMOL/L (ref 4–16)
AST SERPL-CCNC: 30 IU/L (ref 15–37)
BANDED NEUTROPHILS ABSOLUTE COUNT: 0.49 K/CU MM
BANDED NEUTROPHILS RELATIVE PERCENT: 4 % (ref 5–11)
BASOPHILS ABSOLUTE: 0.1 K/CU MM
BASOPHILS RELATIVE PERCENT: 1 % (ref 0–1)
BILIRUB SERPL-MCNC: 0.2 MG/DL (ref 0–1)
BUN SERPL-MCNC: 7 MG/DL (ref 6–23)
CALCIUM SERPL-MCNC: 8.7 MG/DL (ref 8.3–10.6)
CHLORIDE BLD-SCNC: 100 MMOL/L (ref 99–110)
CO2: 26 MMOL/L (ref 21–32)
CREAT SERPL-MCNC: 0.6 MG/DL (ref 0.6–1.1)
CRP SERPL HS-MCNC: 64.5 MG/L
DIFFERENTIAL TYPE: ABNORMAL
EOSINOPHILS ABSOLUTE: 0.2 K/CU MM
EOSINOPHILS RELATIVE PERCENT: 2 % (ref 0–3)
GFR SERPL CREATININE-BSD FRML MDRD: >60 ML/MIN/1.73M2
GLUCOSE BLD-MCNC: 115 MG/DL (ref 70–99)
GLUCOSE BLD-MCNC: 199 MG/DL (ref 70–99)
GLUCOSE BLD-MCNC: 213 MG/DL (ref 70–99)
GLUCOSE BLD-MCNC: 221 MG/DL (ref 70–99)
GLUCOSE BLD-MCNC: 225 MG/DL (ref 70–99)
GLUCOSE SERPL-MCNC: 256 MG/DL (ref 70–99)
HCT VFR BLD CALC: 39.2 % (ref 37–47)
HEMOGLOBIN: 12.3 GM/DL (ref 12.5–16)
LYMPHOCYTES ABSOLUTE: 3.1 K/CU MM
LYMPHOCYTES RELATIVE PERCENT: 25 % (ref 24–44)
MAGNESIUM: 1.6 MG/DL (ref 1.8–2.4)
MCH RBC QN AUTO: 29.9 PG (ref 27–31)
MCHC RBC AUTO-ENTMCNC: 31.4 % (ref 32–36)
MCV RBC AUTO: 95.1 FL (ref 78–100)
MONOCYTES ABSOLUTE: 0.4 K/CU MM
MONOCYTES RELATIVE PERCENT: 3 % (ref 0–4)
PDW BLD-RTO: 13 % (ref 11.7–14.9)
PHOSPHORUS: 2.3 MG/DL (ref 2.5–4.9)
PLATELET # BLD: 268 K/CU MM (ref 140–440)
PMV BLD AUTO: 10 FL (ref 7.5–11.1)
POTASSIUM SERPL-SCNC: 4 MMOL/L (ref 3.5–5.1)
RBC # BLD: 4.12 M/CU MM (ref 4.2–5.4)
SEGMENTED NEUTROPHILS ABSOLUTE COUNT: 7.9 K/CU MM
SEGMENTED NEUTROPHILS RELATIVE PERCENT: 65 % (ref 36–66)
SODIUM BLD-SCNC: 135 MMOL/L (ref 135–145)
TOTAL PROTEIN: 6.8 GM/DL (ref 6.4–8.2)
WBC # BLD: 12.2 K/CU MM (ref 4–10.5)

## 2023-02-26 PROCEDURE — 36415 COLL VENOUS BLD VENIPUNCTURE: CPT

## 2023-02-26 PROCEDURE — 6370000000 HC RX 637 (ALT 250 FOR IP): Performed by: SURGERY

## 2023-02-26 PROCEDURE — APPNB15 APP NON BILLABLE TIME 0-15 MINS: Performed by: NURSE PRACTITIONER

## 2023-02-26 PROCEDURE — 82962 GLUCOSE BLOOD TEST: CPT

## 2023-02-26 PROCEDURE — 94150 VITAL CAPACITY TEST: CPT

## 2023-02-26 PROCEDURE — 94761 N-INVAS EAR/PLS OXIMETRY MLT: CPT

## 2023-02-26 PROCEDURE — 80053 COMPREHEN METABOLIC PANEL: CPT

## 2023-02-26 PROCEDURE — 85007 BL SMEAR W/DIFF WBC COUNT: CPT

## 2023-02-26 PROCEDURE — 1200000000 HC SEMI PRIVATE

## 2023-02-26 PROCEDURE — 84100 ASSAY OF PHOSPHORUS: CPT

## 2023-02-26 PROCEDURE — 99024 POSTOP FOLLOW-UP VISIT: CPT | Performed by: NURSE PRACTITIONER

## 2023-02-26 PROCEDURE — 83735 ASSAY OF MAGNESIUM: CPT

## 2023-02-26 PROCEDURE — 2580000003 HC RX 258: Performed by: SURGERY

## 2023-02-26 PROCEDURE — 85027 COMPLETE CBC AUTOMATED: CPT

## 2023-02-26 PROCEDURE — 6360000002 HC RX W HCPCS: Performed by: SURGERY

## 2023-02-26 PROCEDURE — 2500000003 HC RX 250 WO HCPCS: Performed by: FAMILY MEDICINE

## 2023-02-26 PROCEDURE — 6370000000 HC RX 637 (ALT 250 FOR IP): Performed by: INTERNAL MEDICINE

## 2023-02-26 PROCEDURE — 86140 C-REACTIVE PROTEIN: CPT

## 2023-02-26 RX ORDER — LABETALOL HYDROCHLORIDE 5 MG/ML
10 INJECTION, SOLUTION INTRAVENOUS ONCE
Status: COMPLETED | OUTPATIENT
Start: 2023-02-26 | End: 2023-02-26

## 2023-02-26 RX ORDER — INSULIN LISPRO 100 [IU]/ML
20 INJECTION, SOLUTION INTRAVENOUS; SUBCUTANEOUS
Status: DISCONTINUED | OUTPATIENT
Start: 2023-02-26 | End: 2023-02-27 | Stop reason: HOSPADM

## 2023-02-26 RX ADMIN — ATORVASTATIN CALCIUM 40 MG: 40 TABLET, FILM COATED ORAL at 20:52

## 2023-02-26 RX ADMIN — CEFEPIME HYDROCHLORIDE 2000 MG: 2 INJECTION, POWDER, FOR SOLUTION INTRAVENOUS at 22:37

## 2023-02-26 RX ADMIN — INSULIN GLARGINE 50 UNITS: 100 INJECTION, SOLUTION SUBCUTANEOUS at 20:53

## 2023-02-26 RX ADMIN — SODIUM CHLORIDE: 9 INJECTION, SOLUTION INTRAVENOUS at 00:19

## 2023-02-26 RX ADMIN — METRONIDAZOLE 500 MG: 250 TABLET ORAL at 13:30

## 2023-02-26 RX ADMIN — CEFEPIME HYDROCHLORIDE 2000 MG: 2 INJECTION, POWDER, FOR SOLUTION INTRAVENOUS at 10:55

## 2023-02-26 RX ADMIN — CARVEDILOL 6.25 MG: 6.25 TABLET, FILM COATED ORAL at 09:19

## 2023-02-26 RX ADMIN — SODIUM CHLORIDE, PRESERVATIVE FREE 10 ML: 5 INJECTION INTRAVENOUS at 20:51

## 2023-02-26 RX ADMIN — INSULIN LISPRO 20 UNITS: 100 INJECTION, SOLUTION INTRAVENOUS; SUBCUTANEOUS at 09:30

## 2023-02-26 RX ADMIN — ALOGLIPTIN 25 MG: 12.5 TABLET, FILM COATED ORAL at 09:19

## 2023-02-26 RX ADMIN — CEFEPIME HYDROCHLORIDE 2000 MG: 2 INJECTION, POWDER, FOR SOLUTION INTRAVENOUS at 00:21

## 2023-02-26 RX ADMIN — CARVEDILOL 6.25 MG: 6.25 TABLET, FILM COATED ORAL at 16:38

## 2023-02-26 RX ADMIN — PANTOPRAZOLE SODIUM 40 MG: 40 TABLET, DELAYED RELEASE ORAL at 05:52

## 2023-02-26 RX ADMIN — LABETALOL HYDROCHLORIDE 10 MG: 5 INJECTION, SOLUTION INTRAVENOUS at 06:37

## 2023-02-26 RX ADMIN — LABETALOL HYDROCHLORIDE 10 MG: 5 INJECTION, SOLUTION INTRAVENOUS at 23:36

## 2023-02-26 RX ADMIN — ASPIRIN 81 MG: 81 TABLET, COATED ORAL at 09:20

## 2023-02-26 RX ADMIN — INSULIN LISPRO 2 UNITS: 100 INJECTION, SOLUTION INTRAVENOUS; SUBCUTANEOUS at 02:04

## 2023-02-26 RX ADMIN — INSULIN LISPRO 2 UNITS: 100 INJECTION, SOLUTION INTRAVENOUS; SUBCUTANEOUS at 20:53

## 2023-02-26 RX ADMIN — METRONIDAZOLE 500 MG: 250 TABLET ORAL at 22:34

## 2023-02-26 RX ADMIN — LISINOPRIL 20 MG: 20 TABLET ORAL at 09:19

## 2023-02-26 RX ADMIN — ENOXAPARIN SODIUM 30 MG: 100 INJECTION SUBCUTANEOUS at 20:52

## 2023-02-26 RX ADMIN — ENOXAPARIN SODIUM 30 MG: 100 INJECTION SUBCUTANEOUS at 09:19

## 2023-02-26 RX ADMIN — EMPAGLIFLOZIN 10 MG: 10 TABLET, FILM COATED ORAL at 09:19

## 2023-02-26 RX ADMIN — INSULIN LISPRO 2 UNITS: 100 INJECTION, SOLUTION INTRAVENOUS; SUBCUTANEOUS at 11:37

## 2023-02-26 RX ADMIN — METRONIDAZOLE 500 MG: 250 TABLET ORAL at 05:52

## 2023-02-26 RX ADMIN — SODIUM CHLORIDE 990 ML: 9 INJECTION, SOLUTION INTRAVENOUS at 20:50

## 2023-02-26 RX ADMIN — HYDROCODONE BITARTRATE AND ACETAMINOPHEN 1 TABLET: 5; 325 TABLET ORAL at 09:30

## 2023-02-26 RX ADMIN — INSULIN LISPRO 20 UNITS: 100 INJECTION, SOLUTION INTRAVENOUS; SUBCUTANEOUS at 11:37

## 2023-02-26 ASSESSMENT — ENCOUNTER SYMPTOMS
EYES NEGATIVE: 1
GASTROINTESTINAL NEGATIVE: 1

## 2023-02-26 ASSESSMENT — PAIN SCALES - GENERAL
PAINLEVEL_OUTOF10: 3
PAINLEVEL_OUTOF10: 0

## 2023-02-26 NOTE — PROGRESS NOTES
Progress Note( Dr. Yancy Ge)  2/26/2023  Subjective:   Admit Date: 2/23/2023  PCP: Evan Tamayo MD    Admitted For :   Left breast abscess incision drainage and    Consulted For: Better control of blood glucose    Interval History: Feels lot better still has some pain in the operative site    Denies any chest pains,   Denies SOB . Denies nausea or vomiting. No new bowel or bladder symptoms. No intake or output data in the 24 hours ending 02/26/23 0934      DATA    CBC:   Recent Labs     02/24/23  0230 02/25/23  0052 02/26/23  0810   WBC 15.9* 15.1* 12.2*   HGB 12.2* 11.5* 12.3*    206 268      CMP:  Recent Labs     02/24/23  0230 02/25/23  0052   * 136   K 4.1 4.7   CL 99 101   CO2 24 26   BUN 10 9   CREATININE 0.5* 0.6   CALCIUM 8.5 8.5   PROT 6.3* 5.8*   LABALBU 3.3* 3.0*   BILITOT 0.4 0.3   ALKPHOS 180* 166*   AST 40* 24   ALT 37 31       Lipids:   Lab Results   Component Value Date/Time    CHOL 200 02/10/2023 09:09 AM    HDL 40 02/10/2023 09:09 AM    TRIG 297 02/10/2023 09:09 AM     Glucose:  Recent Labs     02/25/23  2115 02/26/23  0200 02/26/23  0654   POCGLU 234* 221* 199*       [id number]:  Lab Results   Component Value Date/Time    LABA1C 12.4 02/10/2023 09:09 AM     High Sensitivity TSH:   Lab Results   Component Value Date/Time    TSHHS 1.820 03/19/2021 09:00 AM     Free T3: No results found for: FT3  Free T4:No results found for: T4FREE    XR CHEST PORTABLE   Final Result   Cardiomegaly with mild edema.               Scheduled Medicines   Medications:    empagliflozin  10 mg Oral Daily    insulin lispro  20 Units SubCUTAneous TID WC    insulin glargine  50 Units SubCUTAneous Nightly    alogliptin  25 mg Oral Daily    metroNIDAZOLE  500 mg Oral 3 times per day    enoxaparin  30 mg SubCUTAneous BID    insulin lispro  0-8 Units SubCUTAneous TID WC    insulin lispro  0-8 Units SubCUTAneous 2 times per day    aspirin  81 mg Oral Daily    atorvastatin  40 mg Oral Nightly lisinopril  20 mg Oral Daily    pantoprazole  40 mg Oral QAM AC    sodium chloride flush  5-40 mL IntraVENous 2 times per day    cefepime  2,000 mg IntraVENous Q12H    carvedilol  6.25 mg Oral BID WC      Infusions:    sodium chloride 100 mL/hr at 02/26/23 0019    sodium chloride      dextrose           Objective:   Vitals: BP (!) 168/87   Pulse 83   Temp 98.4 °F (36.9 °C) (Oral)   Resp 18   Ht 5' 6\" (1.676 m)   Wt 273 lb (123.8 kg)   SpO2 96%   BMI 44.06 kg/m²   General appearance: alert and cooperative with exam  Neck: no JVD or bruit  Thyroid : Normal lobes   Lungs: Has Vesicular Breath sounds   Left breast incision and drainage done for the abscess  Heart:  regular rate and rhythm  Abdomen: soft, non-tender; bowel sounds normal; no masses,  no organomegaly  Musculoskeletal: Normal  Extremities: extremities normal, , no edema  Neurologic:  Awake, alert, oriented to name, place and time. Cranial nerves II-XII are grossly intact. Motor is  intact. Sensory neuropathy possible and gait is normal.    Assessment:     Patient Active Problem List:     Acute chest pain     Unstable angina (HCC)     Morbid obesity (HCC)     Essential hypertension     Dyslipidemia     Post PTCA     SOB (shortness of breath)     CAD (coronary artery disease)     Palpitations     Hyponatremia     High anion gap metabolic acidosis     Diarrhea     Encounter for monitoring antiplatelet therapy     Sepsis ([address]     Cellulitis     Vitamin D deficiency     WD - Cellulitis of groin     WD - Ulcer of right groin with fat layer exposed ([address]     Diabetes mellitus type 2 in obese ([address]     Mixed hyperlipidemia     Hypertriglyceridemia     Duodenal ulcer     Diabetic polyneuropathy associated with type 2 diabetes mellitus ([address]     Primary insomnia     Breast abscess     MSSA (methicillin susceptible Staphylococcus aureus) infection      Plan:     Reviewed POC blood glucose .  Labs and X ray results   Reviewed Current Medicines   On meal/ Correction bolus Humalog/ Basal Lantus Insulin regime / and Oral Hypoglycemic drugs   Monitor Blood glucose frequently   Modified  the dose of Insulin/ other medicines as needed   Will follow     .      Jose Grossman MD, MD

## 2023-02-26 NOTE — PROGRESS NOTES
GENERAL SURGERY PROGRESS NOTE    Marni Goodell is a 56 y.o. female s/p I&D left breast abscess                Subjective:    Pain control. Waiting on cultures   Denies shortness of breath, chest pain, nausea/vomiting. Objective:    Vitals: VITALS:  BP (!) 197/89   Pulse 80   Temp 98.4 °F (36.9 °C) (Oral)   Resp 17   Ht 5' 6\" (1.676 m)   Wt 273 lb (123.8 kg)   SpO2 95%   BMI 44.06 kg/m²     I/O: No intake/output data recorded. Labs/Imaging Results:   Lab Results   Component Value Date/Time     02/25/2023 12:52 AM    K 4.7 02/25/2023 12:52 AM     02/25/2023 12:52 AM    CO2 26 02/25/2023 12:52 AM    BUN 9 02/25/2023 12:52 AM    CREATININE 0.6 02/25/2023 12:52 AM    GLUCOSE 237 02/25/2023 12:52 AM    CALCIUM 8.5 02/25/2023 12:52 AM      Lab Results   Component Value Date    WBC 15.1 (H) 02/25/2023    HGB 11.5 (L) 02/25/2023    HCT 37.5 02/25/2023    MCV 96.9 02/25/2023     02/25/2023          IV Fluids:   sodium chloride Last Rate: 100 mL/hr at 02/26/23 0019    sodium chloride    dextrose    Scheduled Meds:   empagliflozin, 10 mg, Oral, Daily    insulin lispro, 20 Units, SubCUTAneous, TID WC    insulin glargine, 50 Units, SubCUTAneous, Nightly    alogliptin, 25 mg, Oral, Daily    metroNIDAZOLE, 500 mg, Oral, 3 times per day    enoxaparin, 30 mg, SubCUTAneous, BID    insulin lispro, 0-8 Units, SubCUTAneous, TID WC    insulin lispro, 0-8 Units, SubCUTAneous, 2 times per day    aspirin, 81 mg, Oral, Daily    atorvastatin, 40 mg, Oral, Nightly    lisinopril, 20 mg, Oral, Daily    pantoprazole, 40 mg, Oral, QAM AC    sodium chloride flush, 5-40 mL, IntraVENous, 2 times per day    [COMPLETED] cefepime, 2,000 mg, IntraVENous, Once **FOLLOWED BY** cefepime, 2,000 mg, IntraVENous, Q12H    carvedilol, 6.25 mg, Oral, BID WC    Physical Exam:  General: A&O x 3, no distress. HEENT: Anicteric sclerae, MMM. Extremities: No edema bilat LE.   Abdomen: Soft, nondistended, nontender        Assessment and Plan:     Patient Active Problem List:     Acute chest pain     Unstable angina (HCC)     Morbid obesity (HCC)     Essential hypertension     Dyslipidemia     Post PTCA     SOB (shortness of breath)     CAD (coronary artery disease)     Palpitations     Hyponatremia     High anion gap metabolic acidosis     Diarrhea     Encounter for monitoring antiplatelet therapy     Sepsis ([address]     Cellulitis     Vitamin D deficiency     WD - Cellulitis of groin     WD - Ulcer of right groin with fat layer exposed ([facility]     Diabetes mellitus type 2 in obese ([facility]     Mixed hyperlipidemia     Hypertriglyceridemia     Duodenal ulcer     Diabetic polyneuropathy associated with type 2 diabetes mellitus ([facility]     Primary insomnia     Breast abscess      Principal Problem:    Breast abscess  Plan: Will have nurse teach spouse how to change dressing. Will have  her follow up in the wound center with Dr. Zac Wilson once discharged. Home antibiotics TBD pending cultures.   For now continue Vanco/cefepime/Flagyl    OK for discharge once cultures back    Benedicto Pak APRN - CNP

## 2023-02-26 NOTE — PROGRESS NOTES
V2.0  [facility] Hospitalist Progress Note      Name:  Krista Ellison /Age/Sex: 1965  (56 y.o. female)   MRN & CSN:  [id number] & [id number] Encounter Date/Time: 2023 7:30 AM EST    Location:  [address] [place] [address] PCP: Wesley Gomez MD       Hospital Day: 4    Assessment and Plan:   Krista Ellison is a 56 y.o. female with pmh of T2DM, HTN, HLD, and CAD s/p stents who presents with Breast abscess      Plan:  #Sepsis 2/2 to Breast abscess   --Patient is s/p I&D with packing in place, plan to change packing and teach patient and  how to do it at home this AM  --Tissue and fluid cultures grew methicillin sensitive Staph aureus, Vanco discontinued and patient started on cefepime and Flagyl per ID notes, await final recs. --Cultures awaiting finalization, plan to transition to p.o. antibiotics once finalized  --Pain management as needed    #Hyperglycemia 2/2 DM II insulin dependent   --Slight improvement in the sugars to 250 and below  --Endocrinology on board, Lantus increased from 40 to 50 units, covered with a bolus of 15 units 3 times daily and sliding scale insulin  --Continue Accu-Cheks and aim for blood glucose less than 180  --Stricter glycemic control to aid in wound healing    #HTN   --Blood pressure elevated to 156R systolic overnight into this AM, per patient she did not sleep all night, she is s/p a dose of IV labetalol. BP improved this am to 096S systolic, continue regimen of lisinopril and Coreg  --Monitor trends and adjust therapy as appropriate    #HLD   --Continue statin    #H/O CAD s/p 2 stents  --Stable at this time, continue aspirin and statin      Diet ADULT DIET;  Regular   DVT Prophylaxis [x] Lovenox, []  Heparin, [] SCDs, [] Ambulation,  [] Eliquis, [] Xarelto  [] Coumadin   Code Status Full Code   Disposition From: Home  Expected Disposition: Home  Estimated Date of Discharge: 1-2 days   Patient requires continued admission due to Left breast abscess   Surrogate Decision Maker/ POA Subjective:     Chief Complaint: No chief complaint on file. Seen and examined at bedside. She endorses that she is anxious to go home, reports that she barely slept all night. Plan of care, including need for better glycemic control, blood pressure control and final ID recs. She endorses understanding and is amenable to plan. Review of Systems:    Review of Systems   Constitutional: Negative. HENT: Negative. Eyes: Negative. Cardiovascular: Negative. Gastrointestinal: Negative. Genitourinary: Negative. Musculoskeletal: Negative. Skin:  Positive for wound. Pain at site of abscess drainage of left breast   Neurological: Negative. Psychiatric/Behavioral: Negative. Objective:   No intake or output data in the 24 hours ending 02/26/23 0715       Vitals:   Vitals:    02/26/23 0601   BP: (!) 197/89   Pulse: 80   Resp: 17   Temp: 98.4 °F (36.9 °C)   SpO2: 95%     Vitals:    02/26/23 0922   BP: (!) 168/87   Pulse: 83   Resp: 18   Temp: 98.4 °F (36.9 °C)   SpO2: 96%         Physical Exam:   General: NAD, lying in bed  Eyes: EOMI  ENT: Moist mucous membrane  Cardiovascular: Regular rate. Respiratory: Clear to auscultation  Gastrointestinal: Soft, non tender  Genitourinary: no suprapubic tenderness  Musculoskeletal: No edema  Skin: Dressing over left breast is intact but with a good amount of drainage in the lower half. Some tenderness to palpation  Neuro: Alert. Psych: Mood appropriate.      Medications:   Medications:    empagliflozin  10 mg Oral Daily    insulin lispro  20 Units SubCUTAneous TID WC    insulin glargine  50 Units SubCUTAneous Nightly    alogliptin  25 mg Oral Daily    metroNIDAZOLE  500 mg Oral 3 times per day    enoxaparin  30 mg SubCUTAneous BID    insulin lispro  0-8 Units SubCUTAneous TID WC    insulin lispro  0-8 Units SubCUTAneous 2 times per day    aspirin  81 mg Oral Daily    atorvastatin  40 mg Oral Nightly    lisinopril  20 mg Oral Daily pantoprazole  40 mg Oral QAM AC    sodium chloride flush  5-40 mL IntraVENous 2 times per day    cefepime  2,000 mg IntraVENous Q12H    carvedilol  6.25 mg Oral BID WC      Infusions:    sodium chloride 100 mL/hr at 02/26/23 0019    sodium chloride      dextrose       PRN Meds: sodium chloride flush, 5-40 mL, PRN  sodium chloride, , PRN  ondansetron, 4 mg, Q8H PRN   Or  ondansetron, 4 mg, Q6H PRN  polyethylene glycol, 17 g, Daily PRN  acetaminophen, 650 mg, Q6H PRN   Or  acetaminophen, 650 mg, Q6H PRN  HYDROcodone-acetaminophen, 1 tablet, Q4H PRN  glucose, 4 tablet, PRN  dextrose bolus, 125 mL, PRN   Or  dextrose bolus, 250 mL, PRN  glucagon (rDNA), 1 mg, PRN  dextrose, , Continuous PRN      Labs      Recent Results (from the past 24 hour(s))   POCT Glucose    Collection Time: 02/25/23 10:59 AM   Result Value Ref Range    POC Glucose 305 (H) 70 - 99 MG/DL   POCT Glucose    Collection Time: 02/25/23  4:06 PM   Result Value Ref Range    POC Glucose 265 (H) 70 - 99 MG/DL   POCT Glucose    Collection Time: 02/25/23  9:15 PM   Result Value Ref Range    POC Glucose 234 (H) 70 - 99 MG/DL   POCT Glucose    Collection Time: 02/26/23  2:00 AM   Result Value Ref Range    POC Glucose 221 (H) 70 - 99 MG/DL   POCT Glucose    Collection Time: 02/26/23  6:54 AM   Result Value Ref Range    POC Glucose 199 (H) 70 - 99 MG/DL        Imaging/Diagnostics Last 24 Hours   XR CHEST PORTABLE    Result Date: 2/24/2023  EXAMINATION: ONE XRAY VIEW OF THE CHEST 2/23/2023 10:56 pm COMPARISON: February 10, 2021 HISTORY: ORDERING SYSTEM PROVIDED HISTORY: sob TECHNOLOGIST PROVIDED HISTORY: Reason for exam:->sob Reason for Exam: SOB Additional signs and symptoms: none Relevant Medical/Surgical History: SOB, CAD, diabetes FINDINGS: No lines or tubes. Enlarged cardiomediastinal silhouette. The lungs are clear without focal consolidation or pleural effusion. No suspicious pulmonary nodules. Pulmonary edema is present. No pneumothorax.  No acute osseous abnormality. Cardiomegaly with mild edema.        Electronically signed by Ester English MD on 2/26/2023 at 7:15 AM

## 2023-02-27 VITALS
BODY MASS INDEX: 43.87 KG/M2 | DIASTOLIC BLOOD PRESSURE: 85 MMHG | WEIGHT: 273 LBS | HEART RATE: 77 BPM | RESPIRATION RATE: 16 BRPM | TEMPERATURE: 98.2 F | OXYGEN SATURATION: 91 % | HEIGHT: 66 IN | SYSTOLIC BLOOD PRESSURE: 139 MMHG

## 2023-02-27 DIAGNOSIS — E66.9 DIABETES MELLITUS TYPE 2 IN OBESE (HCC): ICD-10-CM

## 2023-02-27 DIAGNOSIS — E11.69 DIABETES MELLITUS TYPE 2 IN OBESE (HCC): ICD-10-CM

## 2023-02-27 LAB
ALBUMIN SERPL-MCNC: 3.2 GM/DL (ref 3.4–5)
ALP BLD-CCNC: 170 IU/L (ref 40–128)
ALT SERPL-CCNC: 29 U/L (ref 10–40)
ANION GAP SERPL CALCULATED.3IONS-SCNC: 11 MMOL/L (ref 4–16)
AST SERPL-CCNC: 31 IU/L (ref 15–37)
BILIRUB SERPL-MCNC: 0.2 MG/DL (ref 0–1)
BUN SERPL-MCNC: 7 MG/DL (ref 6–23)
CALCIUM SERPL-MCNC: 8.8 MG/DL (ref 8.3–10.6)
CHLORIDE BLD-SCNC: 100 MMOL/L (ref 99–110)
CO2: 29 MMOL/L (ref 21–32)
CREAT SERPL-MCNC: 0.5 MG/DL (ref 0.6–1.1)
CRP SERPL HS-MCNC: 40.9 MG/L
GFR SERPL CREATININE-BSD FRML MDRD: >60 ML/MIN/1.73M2
GLUCOSE BLD-MCNC: 145 MG/DL (ref 70–99)
GLUCOSE BLD-MCNC: 163 MG/DL (ref 70–99)
GLUCOSE BLD-MCNC: 174 MG/DL (ref 70–99)
GLUCOSE SERPL-MCNC: 143 MG/DL (ref 70–99)
HCT VFR BLD CALC: 38.6 % (ref 37–47)
HEMOGLOBIN: 12 GM/DL (ref 12.5–16)
MCH RBC QN AUTO: 29.6 PG (ref 27–31)
MCHC RBC AUTO-ENTMCNC: 31.1 % (ref 32–36)
MCV RBC AUTO: 95.1 FL (ref 78–100)
PDW BLD-RTO: 13.2 % (ref 11.7–14.9)
PLATELET # BLD: 283 K/CU MM (ref 140–440)
PMV BLD AUTO: 9.5 FL (ref 7.5–11.1)
POTASSIUM SERPL-SCNC: 4.3 MMOL/L (ref 3.5–5.1)
RBC # BLD: 4.06 M/CU MM (ref 4.2–5.4)
SODIUM BLD-SCNC: 140 MMOL/L (ref 135–145)
TOTAL PROTEIN: 6.3 GM/DL (ref 6.4–8.2)
WBC # BLD: 12.4 K/CU MM (ref 4–10.5)

## 2023-02-27 PROCEDURE — 86140 C-REACTIVE PROTEIN: CPT

## 2023-02-27 PROCEDURE — 6360000002 HC RX W HCPCS: Performed by: SURGERY

## 2023-02-27 PROCEDURE — 6360000002 HC RX W HCPCS: Performed by: STUDENT IN AN ORGANIZED HEALTH CARE EDUCATION/TRAINING PROGRAM

## 2023-02-27 PROCEDURE — 85027 COMPLETE CBC AUTOMATED: CPT

## 2023-02-27 PROCEDURE — 80053 COMPREHEN METABOLIC PANEL: CPT

## 2023-02-27 PROCEDURE — 6370000000 HC RX 637 (ALT 250 FOR IP): Performed by: NURSE PRACTITIONER

## 2023-02-27 PROCEDURE — 6370000000 HC RX 637 (ALT 250 FOR IP): Performed by: SURGERY

## 2023-02-27 PROCEDURE — 6370000000 HC RX 637 (ALT 250 FOR IP): Performed by: STUDENT IN AN ORGANIZED HEALTH CARE EDUCATION/TRAINING PROGRAM

## 2023-02-27 PROCEDURE — 82962 GLUCOSE BLOOD TEST: CPT

## 2023-02-27 PROCEDURE — 2500000003 HC RX 250 WO HCPCS: Performed by: STUDENT IN AN ORGANIZED HEALTH CARE EDUCATION/TRAINING PROGRAM

## 2023-02-27 PROCEDURE — 2580000003 HC RX 258: Performed by: STUDENT IN AN ORGANIZED HEALTH CARE EDUCATION/TRAINING PROGRAM

## 2023-02-27 PROCEDURE — 99232 SBSQ HOSP IP/OBS MODERATE 35: CPT | Performed by: NURSE PRACTITIONER

## 2023-02-27 PROCEDURE — 2580000003 HC RX 258: Performed by: SURGERY

## 2023-02-27 PROCEDURE — 36415 COLL VENOUS BLD VENIPUNCTURE: CPT

## 2023-02-27 PROCEDURE — 6370000000 HC RX 637 (ALT 250 FOR IP): Performed by: INTERNAL MEDICINE

## 2023-02-27 RX ORDER — SULFAMETHOXAZOLE AND TRIMETHOPRIM 800; 160 MG/1; MG/1
1 TABLET ORAL EVERY 12 HOURS SCHEDULED
Status: DISCONTINUED | OUTPATIENT
Start: 2023-02-27 | End: 2023-02-27 | Stop reason: HOSPADM

## 2023-02-27 RX ORDER — CARVEDILOL 6.25 MG/1
12.5 TABLET ORAL 2 TIMES DAILY
Qty: 90 TABLET | Refills: 3 | Status: SHIPPED | OUTPATIENT
Start: 2023-02-27

## 2023-02-27 RX ORDER — SULFAMETHOXAZOLE AND TRIMETHOPRIM 800; 160 MG/1; MG/1
1 TABLET ORAL EVERY 12 HOURS SCHEDULED
Qty: 27 TABLET | Refills: 0 | Status: SHIPPED | OUTPATIENT
Start: 2023-02-27 | End: 2023-03-13

## 2023-02-27 RX ORDER — MAGNESIUM SULFATE IN WATER 40 MG/ML
2000 INJECTION, SOLUTION INTRAVENOUS ONCE
Status: COMPLETED | OUTPATIENT
Start: 2023-02-27 | End: 2023-02-27

## 2023-02-27 RX ORDER — AMLODIPINE BESYLATE 10 MG/1
10 TABLET ORAL DAILY
Qty: 30 TABLET | Refills: 3 | Status: SHIPPED | OUTPATIENT
Start: 2023-02-28

## 2023-02-27 RX ORDER — INSULIN GLARGINE 100 [IU]/ML
50 INJECTION, SOLUTION SUBCUTANEOUS NIGHTLY
Qty: 3 ADJUSTABLE DOSE PRE-FILLED PEN SYRINGE | Refills: 2 | Status: SHIPPED | OUTPATIENT
Start: 2023-02-27

## 2023-02-27 RX ORDER — CARVEDILOL 6.25 MG/1
12.5 TABLET ORAL 2 TIMES DAILY WITH MEALS
Status: DISCONTINUED | OUTPATIENT
Start: 2023-02-27 | End: 2023-02-27 | Stop reason: HOSPADM

## 2023-02-27 RX ORDER — AMLODIPINE BESYLATE 10 MG/1
10 TABLET ORAL DAILY
Status: DISCONTINUED | OUTPATIENT
Start: 2023-02-27 | End: 2023-02-27 | Stop reason: HOSPADM

## 2023-02-27 RX ADMIN — ASPIRIN 81 MG: 81 TABLET, COATED ORAL at 08:26

## 2023-02-27 RX ADMIN — SODIUM PHOSPHATE, MONOBASIC, MONOHYDRATE AND SODIUM PHOSPHATE, DIBASIC, ANHYDROUS 20 MMOL: 276; 142 INJECTION, SOLUTION INTRAVENOUS at 11:26

## 2023-02-27 RX ADMIN — AMLODIPINE BESYLATE 10 MG: 10 TABLET ORAL at 08:26

## 2023-02-27 RX ADMIN — SULFAMETHOXAZOLE AND TRIMETHOPRIM 1 TABLET: 800; 160 TABLET ORAL at 11:14

## 2023-02-27 RX ADMIN — METRONIDAZOLE 500 MG: 250 TABLET ORAL at 05:38

## 2023-02-27 RX ADMIN — EMPAGLIFLOZIN 10 MG: 10 TABLET, FILM COATED ORAL at 08:26

## 2023-02-27 RX ADMIN — INSULIN LISPRO 20 UNITS: 100 INJECTION, SOLUTION INTRAVENOUS; SUBCUTANEOUS at 12:40

## 2023-02-27 RX ADMIN — SODIUM CHLORIDE: 9 INJECTION, SOLUTION INTRAVENOUS at 11:21

## 2023-02-27 RX ADMIN — MAGNESIUM SULFATE HEPTAHYDRATE 2000 MG: 2 INJECTION, SOLUTION INTRAVENOUS at 11:22

## 2023-02-27 RX ADMIN — INSULIN LISPRO 20 UNITS: 100 INJECTION, SOLUTION INTRAVENOUS; SUBCUTANEOUS at 08:29

## 2023-02-27 RX ADMIN — LISINOPRIL 20 MG: 20 TABLET ORAL at 08:25

## 2023-02-27 RX ADMIN — ALOGLIPTIN 25 MG: 12.5 TABLET, FILM COATED ORAL at 08:23

## 2023-02-27 RX ADMIN — CARVEDILOL 12.5 MG: 6.25 TABLET, FILM COATED ORAL at 08:24

## 2023-02-27 RX ADMIN — PANTOPRAZOLE SODIUM 40 MG: 40 TABLET, DELAYED RELEASE ORAL at 05:38

## 2023-02-27 RX ADMIN — ENOXAPARIN SODIUM 30 MG: 100 INJECTION SUBCUTANEOUS at 08:27

## 2023-02-27 NOTE — PROGRESS NOTES
Infectious Disease Progress Note  2023   Patient Name: Israel Pinto : 1965   Impression  Left Breast Abscess with Mastitis:  Allergy to ampicillin (Rash age 17), Reports can tolerate PCN:  Afebrile, leukocytosis on DWT  CrCl 171  -BC 0/2-NGTD  2023 US Breast Limited Left: Large abscess noted in the retro-areolar region of the left breast at 11 o'clock, 3 cm from the nipple measuring 5.1 x 4.0 x 2.4 cm. Additionally, there is diffuse increased density in the left breast, compatible with an associated mastitis. -S/p per Dr. Erica Ho, Left Breast I&D culture: MSSA     Uncontrolled DMII: Insulin Dependent  Dr. Reji Travis onboard  2/10/23-AIC 12.4  Tobacco Abuse:  HTN/ HLD:  Morbid Obesity: BMI: 44.06  Multi-morbidity: per PMHx  Plan:  DC IV cefepime and Flagyl  Start po Bactrim 1 DS q12h x 14 days (end date 3/13/2023)  Follow up with general surgery after DC  OK from ID standpoint to DC when ready    Ongoing Antimicrobial Therapy  Bactrim -  Completed Antimicrobial Therapy  Cefazolin ? Vancomycin -  Metronidazole -  Cefepime -? History:? Interval history noted. Chief complaint: left breast mastitis and abscess  Denies n/v/d/f or untoward effects of antibiotics  Physical Exam:  Vital Signs: BP (!) 161/94   Pulse 92   Temp 97.5 °F (36.4 °C) (Oral)   Resp 18   Ht 5' 6\" (1.676 m)   Wt 273 lb (123.8 kg)   SpO2 93%   BMI 44.06 kg/m²     Gen: remains A&O x 4  Wounds: C/D/I left breast with no surrounding erythema, edema or tenderness  Chest: no distress and CTA. Good air movement. Room air. Heart: RRR and no MRG. Abd: soft, non-distended, no tenderness, no hepatomegaly. Normoactive bowel sounds. Ext: no clubbing, cyanosis, or edema  Neuro: Mental status intact. CN 2-12 intact and no focal sensory or motor deficits     Radiologic / Imaging / TESTING  2023 [facility] digital diagnostic [place] per Protocol     Impression   1.  Large abscess noted in the retroareolar region of the left breast at 11   o'clock, 3 cm from the nipple measuring 5.1 x 4.0 x 2.4 cm. Additionally,   there is diffuse increased density in the left breast, compatible with an   associated mastitis. Surgical consultation is recommended. 2. Enlarged left axillary lymph nodes, likely reactive. 3. Benign right breast mammogram.       BIRADS:   BIRADS - CATEGORY 3       Findings are probably benign. A short interval follow-up left breast   mammogram and left breast ultrasound is recommended in 8-12 weeks    following   treatment. OVERALL ASSESSMENT - PROBABLY BENIGN. A letter of notification will be sent to the patient regarding the    results. 2/20/2023 US Breast Limited Left:  Impression   1. Large abscess noted in the retroareolar region of the left breast at 11   o'clock, 3 cm from the nipple measuring 5.1 x 4.0 x 2.4 cm. Additionally,   there is diffuse increased density in the left breast, compatible with an   associated mastitis. Surgical consultation is recommended. 2. Enlarged left axillary lymph nodes, likely reactive. 3. Benign right breast mammogram.       BIRADS:   BIRADS - CATEGORY 3       Findings are probably benign. A short interval follow-up left breast   mammogram and left breast ultrasound is recommended in 8-12 weeks    following   treatment. OVERALL ASSESSMENT - PROBABLY BENIGN. A letter of notification will be sent to the patient regarding the    results. 2/23/2023 XR Chest Portable:  Impression   Cardiomegaly with mild edema.         Labs:    Recent Results (from the past 24 hour(s))   POCT Glucose    Collection Time: 02/26/23 11:14 AM   Result Value Ref Range    POC Glucose 213 (H) 70 - 99 MG/DL   Magnesium    Collection Time: 02/26/23 12:07 PM   Result Value Ref Range    Magnesium 1.6 (L) 1.8 - 2.4 mg/dl   Phosphorus    Collection Time: 02/26/23 12:07 PM   Result Value Ref Range    Phosphorus 2.3 (L) 2.5 - 4.9 MG/DL   POCT Glucose    Collection Time: 02/26/23  4:05 PM   Result Value Ref Range    POC Glucose 115 (H) 70 - 99 MG/DL   POCT Glucose    Collection Time: 02/26/23  8:26 PM   Result Value Ref Range    POC Glucose 225 (H) 70 - 99 MG/DL   POCT Glucose    Collection Time: 02/27/23  2:59 AM   Result Value Ref Range    POC Glucose 174 (H) 70 - 99 MG/DL   C-Reactive Protein    Collection Time: 02/27/23  6:23 AM   Result Value Ref Range    CRP High Sensitivity 40.9 (H) <5.0 mg/L   CBC    Collection Time: 02/27/23  6:23 AM   Result Value Ref Range    WBC 12.4 (H) 4.0 - 10.5 K/CU MM    RBC 4.06 (L) 4.2 - 5.4 M/CU MM    Hemoglobin 12.0 (L) 12.5 - 16.0 GM/DL    Hematocrit 38.6 37 - 47 %    MCV 95.1 78 - 100 FL    MCH 29.6 27 - 31 PG    MCHC 31.1 (L) 32.0 - 36.0 %    RDW 13.2 11.7 - 14.9 %    Platelets 743 012 - 899 K/CU MM    MPV 9.5 7.5 - 11.1 FL   Comprehensive Metabolic Panel w/ Reflex to MG    Collection Time: 02/27/23  6:23 AM   Result Value Ref Range    Sodium 140 135 - 145 MMOL/L    Potassium 4.3 3.5 - 5.1 MMOL/L    Chloride 100 99 - 110 mMol/L    CO2 29 21 - 32 MMOL/L    BUN 7 6 - 23 MG/DL    Creatinine 0.5 (L) 0.6 - 1.1 MG/DL    Est, Glom Filt Rate >60 >60 mL/min/1.73m2    Glucose 143 (H) 70 - 99 MG/DL    Calcium 8.8 8.3 - 10.6 MG/DL    Albumin 3.2 (L) 3.4 - 5.0 GM/DL    Total Protein 6.3 (L) 6.4 - 8.2 GM/DL    Total Bilirubin 0.2 0.0 - 1.0 MG/DL    ALT 29 10 - 40 U/L    AST 31 15 - 37 IU/L    Alkaline Phosphatase 170 (H) 40 - 128 IU/L    Anion Gap 11 4 - 16   POCT Glucose    Collection Time: 02/27/23  6:45 AM   Result Value Ref Range    POC Glucose 163 (H) 70 - 99 MG/DL     CULTURE results: Invalid input(s): BLOOD CULTURE,  URINE CULTURE, SURGICAL CULTURE    Diagnosis:  Patient Active Problem List   Diagnosis    Acute chest pain    Unstable angina (HCC)    Morbid obesity (HCC)    Essential hypertension    Dyslipidemia    Post PTCA    SOB (shortness of breath)    CAD (coronary artery disease)    Palpitations    Hyponatremia High anion gap metabolic acidosis    Diarrhea    Encounter for monitoring antiplatelet therapy    Sepsis ([facility] [address]    Cellulitis    Vitamin D deficiency    WD - Cellulitis of groin    WD - Ulcer of right groin with fat layer exposed ([facility] [address]    Diabetes mellitus type 2 in obese ([facility] [address]    Mixed hyperlipidemia    Hypertriglyceridemia    Duodenal ulcer    Diabetic polyneuropathy associated with type 2 diabetes mellitus (HCC)    Primary insomnia    Breast abscess    MSSA (methicillin susceptible Staphylococcus aureus) infection       Active Problems  Principal Problem:    Breast abscess  Active Problems:    MSSA (methicillin susceptible Staphylococcus aureus) infection  Resolved Problems:    * No resolved hospital problems. *    Electronically signed by: Electronically signed by Jethro Cobian.  VIVIAN Handley CNP on 2/27/2023 at 9:09 AM

## 2023-02-27 NOTE — PROGRESS NOTES
[id number] Sirisha Mckenzie for discharge today from surgery standpoint. ABX per ID/IM  Has pain meds. Follow up in 1 week in wound care clinic.

## 2023-02-27 NOTE — DISCHARGE SUMMARY
Discharge Summary    Name:  John Parish /Age/Sex: 1965  (56 y.o. female)   MRN & CSN:  [id number] & [id number] Admission Date/Time: 2023  5:56 AM   Attending:  Ashley Wynne Physician: Mine Aguillon MD     Hospital Course:   John Parish is a 56 y.o. female with pmh of T2DM, HTN, HLD, and CAD s/p stents who presents with breast abscess. She is s/p I&D, cultures are finalized and recommendations for PO abx given by ID. She is cleared for DC from a surgical and ID standpoint. Please see brief POC from her hospital course below:      Plan:  #Sepsis 2/2 to breast abscess - Resolving  --Patient is s/p I&D with packing in place, packing was changed,  patient and  were taught how to do it at home this AM  --Tissue and fluid cultures grew MSSA, ID recommends discontinuing cefepime and flagyl. Started bactrim  --Inflammatory markers are downtrending, Bcx no growth  --Pain management as needed  --Outpatient follow up with surgery     #Hyperglycemia 2/2 DM II insulin dependent  - Resolving  --Sugars improved this AM to 140-160s  --Continue accucheks, continue basal-bolus, 50 units-20 units respectively, cover with SSI  --Endocrinology on board, restarted oral hypoglycemics  --Stricter glycemic control to aid in wound healing     #HTN   --Blood pressure elevated to 616M systolic overnight into this AM, per patient she is not sleeping well and remains anxious, she is s/p a dose of IV labetalol. BP improved this am to 214C systolic. --Added on amlodipine 10, coreg increased to 12.5 from 6.25 BID and   continue lisinopril 20  --Monitor trends and adjust therapy as appropriate     #HLD   --Continue statin     #H/O CAD s/p 2 stents  --Stable at this time, continue aspirin and statin    Medically cleared for DC    The patient expressed appropriate understanding of and agreement with the discharge recommendations, medications, and plan.      Consults this admission:  PHARMACY TO DOSE MEDICATION  IP CONSULT TO PHARMACY  IP CONSULT TO HOSPITALIST  IP CONSULT TO INFECTIOUS DISEASES  IP CONSULT TO ENDOCRINOLOGY  IP CONSULT TO IV TEAM      Discharge Instruction:   Handoff to PCP:     Follow up appointments:   Gen Surgery  Infectious Diseases  Endocrinology    Primary care physician: Zachariah Palmer    Diet:  regular diet   Activity: activity as tolerated  Disposition: Discharged to:   [x]Home, [][facility], []SNF, []Acute Rehab, []Hospice     Condition on discharge: Stable    Discharge Medications:        Medication List        START taking these medications      amLODIPine 10 MG tablet  Commonly known as: NORVASC  Take 1 tablet by mouth daily  Start taking on: February 28, 2023     HYDROcodone-acetaminophen 5-325 MG per tablet  Commonly known as: Norco  Take 1 tablet by mouth every 4 hours as needed for Pain for up to 5 days. Intended supply: 5 days. Take lowest dose possible to manage pain Max Daily Amount: 6 tablets     insulin glargine 100 UNIT/ML injection vial  Commonly known as: LANTUS  Inject 50 Units into the skin nightly  Replaces: insulin glargine 100 UNIT/ML injection pen     sulfamethoxazole-trimethoprim 800-160 MG per tablet  Commonly known as: BACTRIM DS;SEPTRA DS  Take 1 tablet by mouth every 12 hours for 27 doses            CHANGE how you take these medications      carvedilol 6.25 MG tablet  Commonly known as: COREG  Take 2 tablets by mouth 2 times daily  What changed:   how much to take  when to take this            CONTINUE taking these medications      aspirin 81 MG EC tablet  Commonly known as: Aspirin Adult Low Strength  Take 1 tablet by mouth daily     atorvastatin 40 MG tablet  Commonly known as: LIPITOR  Take 1 tablet by mouth daily     blood glucose monitor kit and supplies  Test 4 times a day before meals and bedtime& as needed for symptoms of irregular blood glucose.      blood glucose test strips  Test 4 times a day & as needed for symptoms of irregular blood glucose.      insulin aspart 100 UNIT/ML injection pen  Commonly known as: NovoLOG FlexPen  Take 20 units with each meal in addition to sliding scale as below   *Medium Dose Correction Algorithm**Insulin: 3 TIMES DAILY WITH MEALSGlucose: Dose: No [id number] 2 [id number] 4 [id number] 6 [id number] 8 Iaiaf310-112 10 Oqhgh653 and above 12 Units     Invokana 300 MG Tabs tablet  Generic drug: canagliflozin  TAKE ONE TABLET BY MOUTH EVERY MORNING BEFORE BREAKFAST     Lancets Misc  1 each by Does not apply route 4 times daily     lisinopril 20 MG tablet  Commonly known as: PRINIVIL;ZESTRIL  TAKE ONE TABLET BY MOUTH DAILY     omeprazole 20 MG delayed release capsule  Commonly known as: PRILOSEC  TAKE ONE CAPSULE BY MOUTH EVERY MORNING BEFORE BREAKFAST     Pen Needles 31G X 5 MM Misc  lantus QHS and Novolog QAC and HS     Tradjenta 5 MG tablet  Generic drug: linagliptin  TAKE ONE TABLET BY MOUTH DAILY     vitamin D3 10 MCG (400 UNIT) Tabs tablet  Commonly known as: CHOLECALCIFEROL  TAKE TWO TABLETS BY MOUTH DAILY            STOP taking these medications      amoxicillin-clavulanate 875-125 MG per tablet  Commonly known as: AUGMENTIN     dicyclomine 10 MG capsule  Commonly known as: BENTYL     fluticasone 50 MCG/ACT nasal spray  Commonly known as: FLONASE     insulin glargine 100 UNIT/ML injection pen  Commonly known as: Lantus SoloStar  Replaced by: insulin glargine 100 UNIT/ML injection vial     naproxen 500 MG tablet  Commonly known as: NAPROSYN               Where to Get Your Medications        These medications were sent to [facility] [id number] [address] [facility] - P [phone] F [phone] [address], [facility] [address]      Phone: [phone]   amLODIPine 10 MG tablet  carvedilol 6.25 MG tablet  insulin glargine 100 UNIT/ML injection vial  sulfamethoxazole-trimethoprim 800-160 MG per tablet       These medications were sent to [facility] [address] [facility], [address] [place] [address] 25, [address]      Phone: [phone]   HYDROcodone-acetaminophen 5-325 MG per tablet         Objective Findings at Discharge:     Vitals:    23 0815   BP: (!) 161/94   Pulse: 92   Resp: 18   Temp: 97.5 °F (36.4 °C)   SpO2: 93%     Vitals:    23 1439   BP: 139/85   Pulse: 77   Resp: 16   Temp: 98.2 °F (36.8 °C)   SpO2: 91%       General: NAD, lying in bed  Eyes: EOMI  ENT: Moist mucous membrane  Cardiovascular: Regular rate, normal S1/S2. Respiratory: Clear to auscultation  Gastrointestinal: Soft, non tender  Genitourinary: No suprapubic tenderness  Musculoskeletal: No edema  Skin: Dressing over left breast is intact but with a good amount of drainage in the lower half. Some tenderness to palpation  Neuro: Alert. Psych: Mood appropriate. BMP/CBC  Recent Labs     23  0052 23  0810 23  0623    135 140   K 4.7 4.0 4.3    100 100   CO2 26 26 29   BUN 9 7 7   CREATININE 0.6 0.6 0.5*   WBC 15.1* 12.2* 12.4*   HCT 37.5 39.2 38.6    268 283       IMAGIN. Large abscess noted in the retroareolar region of the left breast at 11   o'clock, 3 cm from the nipple measuring 5.1 x 4.0 x 2.4 cm. Additionally,   there is diffuse increased density in the left breast, compatible with an   associated mastitis. Surgical consultation is recommended. 2. Enlarged left axillary lymph nodes, likely reactive. 3. Benign right breast mammogram.       BIRADS:   BIRADS - CATEGORY 3       Findings are probably benign. A short interval follow-up left breast   mammogram and left breast ultrasound is recommended in 8-12 weeks    following   treatment. OVERALL ASSESSMENT - PROBABLY BENIGN. A letter of notification will be sent to the patient regarding the    results.      EXAMINATION:   ONE XRAY VIEW OF THE CHEST       2023 10:56 pm COMPARISON:   February 10, 2021       HISTORY:   ORDERING SYSTEM PROVIDED HISTORY: sob   TECHNOLOGIST PROVIDED HISTORY:   Reason for exam:->sob   Reason for Exam: SOB   Additional signs and symptoms: none   Relevant Medical/Surgical History: SOB, CAD, diabetes       FINDINGS:   No lines or tubes. Enlarged cardiomediastinal silhouette. The lungs are   clear without focal consolidation or pleural effusion. No suspicious   pulmonary nodules. Pulmonary edema is present. No pneumothorax. No acute osseous abnormality. Additional Information: Patient seen and examined day of discharge.  For more information regarding patient's care please contact [facility] records [phone]    Discharge Time of 35 minutes    Electronically signed by Rod Hewitt MD on 2/27/2023 at 1:59 PM

## 2023-02-27 NOTE — PROGRESS NOTES
Physician Progress Note      PATIENT:               Sandra Basurto  CSN #:                  [id number]  :                       1965  ADMIT DATE:       2023 5:56 AM  DISCH DATE:  Pita Ghotra  PROVIDER #:        Kmimy Isaacs DO          QUERY TEXT:    Surgical Team,    Patient admitted with breast abscess and is s/p I&D. To accurately reflect the   procedure performed please further specify the depth of tissue incised and   drained: The medical record reflects the following:  Risk Factors: abscess  Clinical Indicators: documentation of I&D of left breast without depth of I&D   documented  Treatment: labs, imaging, atb, I&D    Thank you,  Pavel Reis RN CDS  [phone]  Options provided:  -- Skin only  -- Subcutaneous tissue  -- Fascia  -- Muscle  -- Joint  -- Bone  -- Other - I will add my own diagnosis  -- Disagree - Not applicable / Not valid  -- Disagree - Clinically unable to determine / Unknown  -- Refer to Clinical Documentation Reviewer    PROVIDER RESPONSE TEXT:    Provider disagreed with this query.   per note[de-identified] cavity extended down to breast tissue\"    Query created by: Pablo Kingsley on 2023 5:01 PM      Electronically signed by:  Kimmy Isaacs DO 2023 10:33 AM

## 2023-02-28 LAB
CULTURE: ABNORMAL
CULTURE: ABNORMAL
GRAM SMEAR: ABNORMAL
Lab: ABNORMAL
SPECIMEN: ABNORMAL

## 2023-02-28 RX ORDER — LINAGLIPTIN 5 MG/1
TABLET, FILM COATED ORAL
Qty: 90 TABLET | Refills: 0 | OUTPATIENT
Start: 2023-02-28

## 2023-02-28 RX ORDER — CANAGLIFLOZIN 300 MG/1
TABLET, FILM COATED ORAL
Qty: 90 TABLET | Refills: 1 | OUTPATIENT
Start: 2023-02-28

## 2023-02-28 NOTE — PROGRESS NOTES
Progress Note( Dr. Leeanna Gonzalez)  2/27/2023  Subjective:   Admit Date: 2/23/2023  PCP: Janie Leyden, MD    Admitted For :   Left breast abscess incision drainage and    Consulted For: Better control of blood glucose    Interval History: Feels lot better still has some pain in the operative site    Denies any chest pains,   Denies SOB . Denies nausea or vomiting. No new bowel or bladder symptoms. Intake/Output Summary (Last 24 hours) at 2/27/2023 2244  Last data filed at 2/27/2023 2657  Gross per 24 hour   Intake 240 ml   Output --   Net 240 ml         DATA    CBC:   Recent Labs     02/25/23  0052 02/26/23  0810 02/27/23  0623   WBC 15.1* 12.2* 12.4*   HGB 11.5* 12.3* 12.0*    268 283      CMP:  Recent Labs     02/25/23  0052 02/26/23  0810 02/27/23  0623    135 140   K 4.7 4.0 4.3    100 100   CO2 26 26 29   BUN 9 7 7   CREATININE 0.6 0.6 0.5*   CALCIUM 8.5 8.7 8.8   PROT 5.8* 6.8 6.3*   LABALBU 3.0* 3.2* 3.2*   BILITOT 0.3 0.2 0.2   ALKPHOS 166* 169* 170*   AST 24 30 31   ALT 31 30 29       Lipids:   Lab Results   Component Value Date/Time    CHOL 200 02/10/2023 09:09 AM    HDL 40 02/10/2023 09:09 AM    TRIG 297 02/10/2023 09:09 AM     Glucose:  Recent Labs     02/27/23  0259 02/27/23  0645 02/27/23  1107   POCGLU 174* 163* 145*       [id number]:  Lab Results   Component Value Date/Time    LABA1C 12.4 02/10/2023 09:09 AM     High Sensitivity TSH:   Lab Results   Component Value Date/Time    TSHHS 1.820 03/19/2021 09:00 AM     Free T3: No results found for: FT3  Free T4:No results found for: T4FREE    XR CHEST PORTABLE   Final Result   Cardiomegaly with mild edema.               Scheduled Medicines   Medications:   REM     Infusions:   REM        Objective:   Vitals: /85   Pulse 77   Temp 98.2 °F (36.8 °C) (Oral)   Resp 16   Ht 5' 6\" (1.676 m)   Wt 273 lb (123.8 kg)   SpO2 91%   BMI 44.06 kg/m²   General appearance: alert and cooperative with exam  Neck: no JVD or bruit  Thyroid : Normal lobes   Lungs: Has Vesicular Breath sounds   Left breast incision and drainage done for the abscess  Heart:  regular rate and rhythm  Abdomen: soft, non-tender; bowel sounds normal; no masses,  no organomegaly  Musculoskeletal: Normal  Extremities: extremities normal, , no edema  Neurologic:  Awake, alert, oriented to name, place and time. Cranial nerves II-XII are grossly intact. Motor is  intact. Sensory neuropathy possible and gait is normal.    Assessment:     Patient Active Problem List:     Acute chest pain     Unstable angina (HCC)     Morbid obesity (HCC)     Essential hypertension     Dyslipidemia     Post PTCA     SOB (shortness of breath)     CAD (coronary artery disease)     Palpitations     Hyponatremia     High anion gap metabolic acidosis     Diarrhea     Encounter for monitoring antiplatelet therapy     Sepsis ([address]     Cellulitis     Vitamin D deficiency     WD - Cellulitis of groin     WD - Ulcer of right groin with fat layer exposed ([address]     Diabetes mellitus type 2 in obese ([address]     Mixed hyperlipidemia     Hypertriglyceridemia     Duodenal ulcer     Diabetic polyneuropathy associated with type 2 diabetes mellitus ([address]     Primary insomnia     Breast abscess     MSSA (methicillin susceptible Staphylococcus aureus) infection      Plan:     Reviewed POC blood glucose . Labs and X ray results   Reviewed Current Medicines   On meal/ Correction bolus Humalog/ Basal Lantus Insulin regime / and Oral Hypoglycemic drugs   Monitor Blood glucose frequently   Modified  the dose of Insulin/ other medicines as needed   Will follow     .      Carol Vazquez MD, MD

## 2023-03-01 LAB
CULTURE: NORMAL
CULTURE: NORMAL
Lab: NORMAL
Lab: NORMAL
SPECIMEN: NORMAL
SPECIMEN: NORMAL

## 2023-03-07 ENCOUNTER — HOSPITAL ENCOUNTER (OUTPATIENT)
Dept: WOUND CARE | Age: 58
Discharge: HOME | End: 2023-03-07
Payer: COMMERCIAL

## 2023-03-07 VITALS
TEMPERATURE: 98.4 F | RESPIRATION RATE: 20 BRPM | SYSTOLIC BLOOD PRESSURE: 119 MMHG | HEART RATE: 84 BPM | DIASTOLIC BLOOD PRESSURE: 82 MMHG

## 2023-03-07 DIAGNOSIS — N61.1 BREAST ABSCESS: Primary | ICD-10-CM

## 2023-03-07 PROCEDURE — 11042 DBRDMT SUBQ TIS 1ST 20SQCM/<: CPT

## 2023-03-07 PROCEDURE — 11042 DBRDMT SUBQ TIS 1ST 20SQCM/<: CPT | Performed by: SURGERY

## 2023-03-07 PROCEDURE — 99213 OFFICE O/P EST LOW 20 MIN: CPT

## 2023-03-07 NOTE — PROGRESS NOTES
Wound Care Center Progress Note With Procedure    nAgelo Laws  AGE: 56 y.o. GENDER: female  : 1965  EPISODE DATE:  3/7/2023     Subjective:     Chief Complaint   Patient presents with    Wound Check     LEFT BREAST         HISTORY of PRESENT ILLNESS      Angelo Laws is a 56 y.o. female who presents today for wound evaluation of Acute non-healing surgical wound s/p R breast abscess I&D. The wound is of moderate severity. The underlying cause of the wound is abscess R breast s/p I&D. Wound Pain Timing/Severity: constant  Quality of pain: sharp  Severity of pain:  5 / 10   Modifying Factors: diabetes, poor glucose control, obesity, and smoking  Associated Signs/Symptoms: erythema, drainage, and pain        PAST MEDICAL HISTORY        Diagnosis Date    CAD (coronary artery disease)     Current every day smoker     Diabetes mellitus (HCC)     GERD (gastroesophageal reflux disease)     History of exercise stress test 2019    Treadmill, Normal exercise performance without angina and ischemic EKG changes. Hyperlipidemia     Hypertension     Morbid obesity ([address]     Palpitations     Post PTCA 2019    Kissing stent to LAD & to Ostium of Diag. SOB (shortness of breath)     Type 2 diabetes mellitus with ketoacidosis without coma, with long-term current use of insulin ([address] 2019    DM diagnosed about        PAST SURGICAL HISTORY    Past Surgical History:   Procedure Laterality Date    BREAST SURGERY Left 2023    LEFT BREAST INCISION AND DRAINAGE performed by Fransisco Jang DO at [address] [place] [address]      PTCA  2019    Kissing stents to LAD & Ostium of Diag. UPPER GASTROINTESTINAL ENDOSCOPY N/A 2021    EGD DIAGNOSTIC ONLY performed by Jay Cook MD at [facility]    History reviewed. No pertinent family history.     SOCIAL HISTORY    Social History     Tobacco Use    Smoking status: Some Days     Packs/day: 0.50     Years: 40.00 Pack years: 20.00     Types: Cigarettes    Smokeless tobacco: Never    Tobacco comments:     1 pack every four days    Vaping Use    Vaping Use: Never used   Substance Use Topics    Alcohol use: Never     Comment: Caffeine: 1 cup coffee daily, 3 cups iced tea a day    Drug use: Never       ALLERGIES    Allergies   Allergen Reactions    Morphine Other (See Comments)    Ampicillin Rash       MEDICATIONS    Current Outpatient Medications on File Prior to Encounter   Medication Sig Dispense Refill    carvedilol (COREG) 6.25 MG tablet Take 2 tablets by mouth 2 times daily 90 tablet 3    amLODIPine (NORVASC) 10 MG tablet Take 1 tablet by mouth daily 30 tablet 3    sulfamethoxazole-trimethoprim (BACTRIM DS;SEPTRA DS) 800-160 MG per tablet Take 1 tablet by mouth every 12 hours for 27 doses 27 tablet 0    insulin glargine (LANTUS) 100 UNIT/ML injection vial Inject 50 Units into the skin nightly 3 Adjustable Dose Pre-filled Pen Syringe 2    omeprazole (PRILOSEC) 20 MG delayed release capsule TAKE ONE CAPSULE BY MOUTH EVERY MORNING BEFORE BREAKFAST 30 capsule 0    lisinopril (PRINIVIL;ZESTRIL) 20 MG tablet TAKE ONE TABLET BY MOUTH DAILY 90 tablet 3    atorvastatin (LIPITOR) 40 MG tablet Take 1 tablet by mouth daily 90 tablet 3    TRADJENTA 5 MG tablet TAKE ONE TABLET BY MOUTH DAILY 90 tablet 0    INVOKANA 300 MG TABS tablet TAKE ONE TABLET BY MOUTH EVERY MORNING BEFORE BREAKFAST 90 tablet 1    vitamin D3 (CHOLECALCIFEROL) 10 MCG (400 UNIT) TABS tablet TAKE TWO TABLETS BY MOUTH DAILY 180 tablet 1    aspirin (ASPIRIN ADULT LOW STRENGTH) 81 MG EC tablet Take 1 tablet by mouth daily 30 tablet 4    insulin aspart (NOVOLOG FLEXPEN) 100 UNIT/ML injection pen Take 20 units with each meal in addition to sliding scale as below   *Medium Dose Correction Algorithm**Insulin: 3 TIMES DAILY WITH MEALSGlucose: Dose: No [id number] 2 [id number] 4 Ehjkq191-457 6 [id number] 8 [id number] 10 Gpwze207 and above 12 Units 5 pen 3 blood glucose monitor kit and supplies Test 4 times a day before meals and bedtime& as needed for symptoms of irregular blood glucose. 1 kit 0    Insulin Pen Needle (PEN NEEDLES) 31G X 5 MM MISC lantus QHS and Novolog QAC and HS (Patient not taking: No sig reported) 150 each 2    Lancets MISC 1 each by Does not apply route 4 times daily 200 each 2    blood glucose monitor strips Test 4 times a day & as needed for symptoms of irregular blood glucose. 200 strip 2     No current facility-administered medications on file prior to encounter. REVIEW OF SYSTEMS    Pertinent items are noted in HPI. Constitutional: Negative for systemic symptoms including fever, chills and malaise. Objective:      /82   Pulse 84   Temp 98.4 °F (36.9 °C) (Temporal)   Resp 20     PHYSICAL EXAM  General: No acute distress  Respiratory: Chest rise equal bilaterally  CV: Appears well perfused   Abdomen: Soft, nontender, nondistended, no rebound or guarding      General: The patient is in no acute distress. Mental status:  Patient is appropriate, is  oriented to place and plan of care. Dermatologic exam: Visual inspection of the periwound reveals the skin to be normal in turgor and texture  Wound exam: see wound description below in procedure note      Assessment:      Problem List Items Addressed This Visit          Other    Breast abscess - Primary     Procedure Note    Indications:  Based on my examination of this patient's wound(s) today, sharp excision into necrotic subcutaneous tissue is required to promote healing and evaluate the extent of previous healing. Performed by: Rachael Bird DO    Consent obtained: Yes    Time out taken:  Yes    Pain Control: topical lido      Debridement:Excisional Debridement    Using curette and scissors the wound(s) was/were sharply debrided down through and including the removal of subcutaneous tissue.         Devitalized Tissue Debrided:  biofilm and slough    Pre Debridement Measurements:  Are located in the Wound Documentation Flow Sheet    All active wounds listed below with today's date are evaluated  Wound(s)    debrided this date include # : 1     Post  Debridement Measurements:  Wound 03/07/23 Breast Left; Lower #1 (Active)   Wound Image   03/07/23 0953   Wound Etiology Non-Healing Surgical 03/07/23 0953   Wound Cleansed Wound cleanser 03/07/23 0953   Wound Length (cm) 1.7 cm 03/07/23 0953   Wound Width (cm) 2.2 cm 03/07/23 0953   Wound Depth (cm) 2 cm 03/07/23 0953   Wound Surface Area (cm^2) 3.74 cm^2 03/07/23 0953   Wound Volume (cm^3) 7.48 cm^3 03/07/23 0953   Post-Procedure Length (cm) 1.7 cm 03/07/23 1009   Post-Procedure Width (cm) 2.2 cm 03/07/23 1009   Post-Procedure Depth (cm) 2 cm 03/07/23 1009   Post-Procedure Surface Area (cm^2) 3.74 cm^2 03/07/23 1009   Post-Procedure Volume (cm^3) 7.48 cm^3 03/07/23 1009   Distance Tunneling (cm) 0 cm 03/07/23 0953   Tunneling Position ___ O'Clock 0 03/07/23 0953   Undermining Starts ___ O'Clock 0 03/07/23 0953   Undermining Ends___ O'Clock 0 03/07/23 0953   Undermining Maxium Distance (cm) 0 03/07/23 0953   Wound Assessment Pink/red;Slough 03/07/23 0953   Drainage Amount Moderate 03/07/23 0953   Drainage Description Serosanguinous 03/07/23 0953   Odor None 03/07/23 0953   Dolores-wound Assessment Intact 03/07/23 0953   Margins Defined edges 03/07/23 0953   Wound Thickness Description not for Pressure Injury Full thickness 03/07/23 0953   Number of days: 0       Percent of Wound(s) Debrided: approximately 100%    Total  Area  Debrided:  3.7 sq cm     Bleeding:  Minimal    Hemostasis Achieved:  by pressure    Procedural Pain:  4  / 10     Post Procedural Pain:  0 / 10     Response to treatment:  Well tolerated by patient. Plan:       Discharge Instructions         PHYSICIAN ORDERS AND DISCHARGE INSTRUCTIONS    NOTE: Upon discharge from the [address], you will receive a patient experience survey.  We would be grateful if you would take the time to fill this survey out. Wound care order history:     GALO's   Right       Left    Date:   Cultures:     Grafts:     Antibiotics:        Continuing wound care orders and information:                Residence:                Continue home health care with:    Your wound-care supplies will be provided by: Wound cleansing:     Do not scrub or use excessive force. Wash hands with soap and water before and after dressing changes. Prior to applying a clean dressing, cleanse wound with normal saline, wound cleanser, or mild soap and water. Ask the physician or nurse before getting the wound(s) wet in a shower. General Wound management:   Keep weight off wounds and reposition every 2 hours. Avoid standing for long periods of time. Apply wraps/stockings in AM and remove at bedtime. If swelling is present, elevate legs to the level of the heart or above for 30 minutes 4-5 times a day and/or when sitting. When taking antibiotics take entire prescription as ordered by physician do not stop taking until medicine is all gone. Orders for this week: 03/07/2023    Rx:    Left Breast Wound - Wash with soap and water, pat dry. Pack with Mesalt to depth (do not overpack - make sure to leave a tail for removal). Cover with ca alginate and ABD secured with tape (may switch to silicone border as drainage subsides). Change daily. Nurse visit on Thursday to check progress. Follow up with Dr. Ronak Warner in 1 week(s) in the wound care center. Call [phone] for any questions or concerns.   Date__________   Time____________       Treatment Note      Written Patient Dismissal Instructions Given            Electronically signed by Jessica Liang DO on 3/7/2023 at 10:21 AM

## 2023-03-07 NOTE — LETTER
March 7, 2023       Go  YOB: 1965   [address] Date of Visit:  3/7/2023       To Whom It May Concern: It is my medical opinion that Go  should remain out of work until 3/8/23. If you have any questions or concerns, please don't hesitate to call.     Sincerely,        Ambika Cohen, DO

## 2023-03-07 NOTE — DISCHARGE INSTRUCTIONS
PHYSICIAN ORDERS AND DISCHARGE INSTRUCTIONS    NOTE: Upon discharge from the [address], you will receive a patient experience survey. We would be grateful if you would take the time to fill this survey out. Wound care order history:     GALO's   Right       Left    Date:   Cultures:     Grafts:     Antibiotics:        Continuing wound care orders and information:                Residence:                Continue home health care with:    Your wound-care supplies will be provided by: Wound cleansing:     Do not scrub or use excessive force. Wash hands with soap and water before and after dressing changes. Prior to applying a clean dressing, cleanse wound with normal saline, wound cleanser, or mild soap and water. Ask the physician or nurse before getting the wound(s) wet in a shower. General Wound management:   Keep weight off wounds and reposition every 2 hours. Avoid standing for long periods of time. Apply wraps/stockings in AM and remove at bedtime. If swelling is present, elevate legs to the level of the heart or above for 30 minutes 4-5 times a day and/or when sitting. When taking antibiotics take entire prescription as ordered by physician do not stop taking until medicine is all gone. Orders for this week: 03/07/2023    Rx:    Left Breast Wound - Wash with soap and water, pat dry. Pack with Mesalt to depth (do not overpack - make sure to leave a tail for removal). Cover with ca alginate and ABD secured with silicone tape (may switch to silicone border as drainage subsides). Change daily. Nurse visit on Thursday to check progress. Follow up with Dr. Khai Dong in 1 week(s) in the wound care center. Call [phone] for any questions or concerns.   Date__________   Time____________

## 2023-03-08 LAB
ESTIMATED AVERAGE GLUCOSE: 326 MG/DL
HBA1C MFR BLD: 13 % (ref 4.2–6.3)

## 2023-03-08 RX ORDER — LIDOCAINE HYDROCHLORIDE 40 MG/ML
SOLUTION TOPICAL ONCE
OUTPATIENT
Start: 2023-03-08 | End: 2023-03-08

## 2023-03-08 RX ORDER — LIDOCAINE 40 MG/G
CREAM TOPICAL ONCE
OUTPATIENT
Start: 2023-03-08 | End: 2023-03-08

## 2023-03-08 RX ORDER — BACITRACIN ZINC AND POLYMYXIN B SULFATE 500; 1000 [USP'U]/G; [USP'U]/G
OINTMENT TOPICAL ONCE
OUTPATIENT
Start: 2023-03-08 | End: 2023-03-08

## 2023-03-08 RX ORDER — GENTAMICIN SULFATE 1 MG/G
OINTMENT TOPICAL ONCE
OUTPATIENT
Start: 2023-03-08 | End: 2023-03-08

## 2023-03-08 RX ORDER — GINSENG 100 MG
CAPSULE ORAL ONCE
OUTPATIENT
Start: 2023-03-08 | End: 2023-03-08

## 2023-03-08 RX ORDER — BETAMETHASONE DIPROPIONATE 0.05 %
OINTMENT (GRAM) TOPICAL ONCE
OUTPATIENT
Start: 2023-03-08 | End: 2023-03-08

## 2023-03-08 RX ORDER — LIDOCAINE 50 MG/G
OINTMENT TOPICAL ONCE
OUTPATIENT
Start: 2023-03-08 | End: 2023-03-08

## 2023-03-08 RX ORDER — CLOBETASOL PROPIONATE 0.5 MG/G
OINTMENT TOPICAL ONCE
OUTPATIENT
Start: 2023-03-08 | End: 2023-03-08

## 2023-03-08 RX ORDER — BACITRACIN, NEOMYCIN, POLYMYXIN B 400; 3.5; 5 [USP'U]/G; MG/G; [USP'U]/G
OINTMENT TOPICAL ONCE
OUTPATIENT
Start: 2023-03-08 | End: 2023-03-08

## 2023-03-09 ENCOUNTER — HOSPITAL ENCOUNTER (OUTPATIENT)
Dept: WOUND CARE | Age: 58
Discharge: HOME | End: 2023-03-09
Payer: COMMERCIAL

## 2023-03-09 VITALS
HEART RATE: 89 BPM | SYSTOLIC BLOOD PRESSURE: 145 MMHG | TEMPERATURE: 98.7 F | DIASTOLIC BLOOD PRESSURE: 70 MMHG | RESPIRATION RATE: 18 BRPM

## 2023-03-09 PROCEDURE — 99213 OFFICE O/P EST LOW 20 MIN: CPT

## 2023-03-09 ASSESSMENT — PAIN SCALES - GENERAL: PAINLEVEL_OUTOF10: 0

## 2023-03-21 ENCOUNTER — HOSPITAL ENCOUNTER (OUTPATIENT)
Dept: WOUND CARE | Age: 58
Discharge: HOME | End: 2023-03-21
Payer: COMMERCIAL

## 2023-03-21 VITALS
DIASTOLIC BLOOD PRESSURE: 79 MMHG | HEART RATE: 82 BPM | TEMPERATURE: 98.4 F | SYSTOLIC BLOOD PRESSURE: 134 MMHG | RESPIRATION RATE: 18 BRPM

## 2023-03-21 DIAGNOSIS — K26.9 DUODENAL ULCER: ICD-10-CM

## 2023-03-21 DIAGNOSIS — N61.1 BREAST ABSCESS: Primary | ICD-10-CM

## 2023-03-21 DIAGNOSIS — E66.9 DIABETES MELLITUS TYPE 2 IN OBESE (HCC): ICD-10-CM

## 2023-03-21 DIAGNOSIS — E11.69 DIABETES MELLITUS TYPE 2 IN OBESE (HCC): ICD-10-CM

## 2023-03-21 PROCEDURE — 11042 DBRDMT SUBQ TIS 1ST 20SQCM/<: CPT | Performed by: SURGERY

## 2023-03-21 PROCEDURE — 11042 DBRDMT SUBQ TIS 1ST 20SQCM/<: CPT

## 2023-03-21 RX ORDER — OMEPRAZOLE 20 MG/1
CAPSULE, DELAYED RELEASE ORAL
Qty: 30 CAPSULE | Refills: 0 | OUTPATIENT
Start: 2023-03-21

## 2023-03-21 RX ORDER — LIDOCAINE HYDROCHLORIDE 40 MG/ML
SOLUTION TOPICAL ONCE
OUTPATIENT
Start: 2023-03-21 | End: 2023-03-21

## 2023-03-21 RX ORDER — BACITRACIN, NEOMYCIN, POLYMYXIN B 400; 3.5; 5 [USP'U]/G; MG/G; [USP'U]/G
OINTMENT TOPICAL ONCE
OUTPATIENT
Start: 2023-03-21 | End: 2023-03-21

## 2023-03-21 RX ORDER — LIDOCAINE 50 MG/G
OINTMENT TOPICAL ONCE
OUTPATIENT
Start: 2023-03-21 | End: 2023-03-21

## 2023-03-21 RX ORDER — GINSENG 100 MG
CAPSULE ORAL ONCE
OUTPATIENT
Start: 2023-03-21 | End: 2023-03-21

## 2023-03-21 RX ORDER — GENTAMICIN SULFATE 1 MG/G
OINTMENT TOPICAL ONCE
OUTPATIENT
Start: 2023-03-21 | End: 2023-03-21

## 2023-03-21 RX ORDER — BACITRACIN ZINC AND POLYMYXIN B SULFATE 500; 1000 [USP'U]/G; [USP'U]/G
OINTMENT TOPICAL ONCE
OUTPATIENT
Start: 2023-03-21 | End: 2023-03-21

## 2023-03-21 RX ORDER — BETAMETHASONE DIPROPIONATE 0.05 %
OINTMENT (GRAM) TOPICAL ONCE
OUTPATIENT
Start: 2023-03-21 | End: 2023-03-21

## 2023-03-21 RX ORDER — LIDOCAINE 40 MG/G
CREAM TOPICAL ONCE
OUTPATIENT
Start: 2023-03-21 | End: 2023-03-21

## 2023-03-21 RX ORDER — CLOBETASOL PROPIONATE 0.5 MG/G
OINTMENT TOPICAL ONCE
OUTPATIENT
Start: 2023-03-21 | End: 2023-03-21

## 2023-03-21 ASSESSMENT — PAIN SCALES - GENERAL: PAINLEVEL_OUTOF10: 0

## 2023-03-21 NOTE — PROGRESS NOTES
Insulin Pen Needle (PEN NEEDLES) 31G X 5 MM MISC lantus QHS and Novolog QAC and HS (Patient not taking: No sig reported) 150 each 2    Lancets MISC 1 each by Does not apply route 4 times daily 200 each 2    blood glucose monitor strips Test 4 times a day & as needed for symptoms of irregular blood glucose. 200 strip 2     No current facility-administered medications on file prior to encounter. REVIEW OF SYSTEMS    Pertinent items are noted in HPI. Constitutional: Negative for systemic symptoms including fever, chills and malaise. Objective:      /79   Pulse 82   Temp 98.4 °F (36.9 °C) (Temporal)   Resp 18     PHYSICAL EXAM  General: No acute distress  Respiratory: Chest rise equal bilaterally  CV: Appears well perfused   Abdomen: Soft, nontender, nondistended, no rebound or guarding      General: The patient is in no acute distress. Mental status:  Patient is appropriate, is  oriented to place and plan of care. Dermatologic exam: Visual inspection of the periwound reveals the skin to be normal in turgor and texture  Wound exam: see wound description below in procedure note      Assessment:      Problem List Items Addressed This Visit          Endocrine    Diabetes mellitus type 2 in obese [facility])    Relevant Orders    Initiate Outpatient Wound Care Protocol       Other    Breast abscess - Primary    Relevant Orders    Initiate Outpatient Wound Care Protocol     Procedure Note    Indications:  Based on my examination of this patient's wound(s) today, sharp excision into necrotic subcutaneous tissue is required to promote healing and evaluate the extent of previous healing. Performed by: Keily Barrois DO    Consent obtained: Yes    Time out taken:  Yes    Pain Control: topical lido      Debridement:Excisional Debridement    Using curette and scissors the wound(s) was/were sharply debrided down through and including the removal of subcutaneous tissue.         Devitalized Tissue

## 2023-03-21 NOTE — LETTER
March 21, 2023       Beth Soto YOB: 1965   [address] Date of Visit:  3/21/2023       To Whom It May Concern: It is my medical opinion that Beth Soto may return to work on [place] 3/26/23. If you have any questions or concerns, please don't hesitate to call.     Sincerely,        Naye Maza, DO

## 2023-03-21 NOTE — LETTER
March 14, 2023       Aruna Rojas YOB: 1965   [address] Date of Visit:  3/21/2023       To Whom It May Concern: It is my medical opinion that Aruna Rojas should remain out of work until 3/22/23. .    If you have any questions or concerns, please don't hesitate to call.     Sincerely,        Zoe Dow, DO

## 2023-03-21 NOTE — PLAN OF CARE
Problem: Chronic Conditions and Co-morbidities  Goal: Patient's chronic conditions and co-morbidity symptoms are monitored and maintained or improved  3/21/2023 0950 by Shawnee Garcia LPN  Outcome: Progressing  3/21/2023 0950 by Shawnee Garcia LPN  Outcome: Progressing

## 2023-03-22 DIAGNOSIS — K26.9 DUODENAL ULCER: ICD-10-CM

## 2023-03-23 DIAGNOSIS — K26.9 DUODENAL ULCER: ICD-10-CM

## 2023-03-23 RX ORDER — OMEPRAZOLE 20 MG/1
CAPSULE, DELAYED RELEASE ORAL
Qty: 30 CAPSULE | Refills: 0 | OUTPATIENT
Start: 2023-03-23

## 2023-03-24 DIAGNOSIS — E66.9 DIABETES MELLITUS TYPE 2 IN OBESE (HCC): ICD-10-CM

## 2023-03-24 DIAGNOSIS — E11.69 DIABETES MELLITUS TYPE 2 IN OBESE (HCC): ICD-10-CM

## 2023-03-28 ENCOUNTER — HOSPITAL ENCOUNTER (OUTPATIENT)
Dept: WOUND CARE | Age: 58
Discharge: HOME | End: 2023-03-28
Payer: COMMERCIAL

## 2023-03-28 VITALS — TEMPERATURE: 98.3 F | HEART RATE: 91 BPM | SYSTOLIC BLOOD PRESSURE: 137 MMHG | DIASTOLIC BLOOD PRESSURE: 74 MMHG

## 2023-03-28 DIAGNOSIS — K26.9 DUODENAL ULCER: ICD-10-CM

## 2023-03-28 DIAGNOSIS — E11.69 DIABETES MELLITUS TYPE 2 IN OBESE (HCC): ICD-10-CM

## 2023-03-28 DIAGNOSIS — E66.9 DIABETES MELLITUS TYPE 2 IN OBESE (HCC): ICD-10-CM

## 2023-03-28 DIAGNOSIS — N61.1 BREAST ABSCESS: Primary | ICD-10-CM

## 2023-03-28 PROCEDURE — 99213 OFFICE O/P EST LOW 20 MIN: CPT

## 2023-03-28 PROCEDURE — 99203 OFFICE O/P NEW LOW 30 MIN: CPT | Performed by: SURGERY

## 2023-03-28 RX ORDER — OMEPRAZOLE 20 MG/1
CAPSULE, DELAYED RELEASE ORAL
Qty: 30 CAPSULE | Refills: 0 | Status: SHIPPED | OUTPATIENT
Start: 2023-03-28

## 2023-03-28 RX ORDER — BACITRACIN, NEOMYCIN, POLYMYXIN B 400; 3.5; 5 [USP'U]/G; MG/G; [USP'U]/G
OINTMENT TOPICAL ONCE
OUTPATIENT
Start: 2023-03-28 | End: 2023-03-28

## 2023-03-28 RX ORDER — BACITRACIN ZINC AND POLYMYXIN B SULFATE 500; 1000 [USP'U]/G; [USP'U]/G
OINTMENT TOPICAL ONCE
OUTPATIENT
Start: 2023-03-28 | End: 2023-03-28

## 2023-03-28 RX ORDER — LIDOCAINE HYDROCHLORIDE 40 MG/ML
SOLUTION TOPICAL ONCE
OUTPATIENT
Start: 2023-03-28 | End: 2023-03-28

## 2023-03-28 RX ORDER — GENTAMICIN SULFATE 1 MG/G
OINTMENT TOPICAL ONCE
OUTPATIENT
Start: 2023-03-28 | End: 2023-03-28

## 2023-03-28 RX ORDER — CLOBETASOL PROPIONATE 0.5 MG/G
OINTMENT TOPICAL ONCE
OUTPATIENT
Start: 2023-03-28 | End: 2023-03-28

## 2023-03-28 RX ORDER — GINSENG 100 MG
CAPSULE ORAL ONCE
OUTPATIENT
Start: 2023-03-28 | End: 2023-03-28

## 2023-03-28 RX ORDER — BETAMETHASONE DIPROPIONATE 0.05 %
OINTMENT (GRAM) TOPICAL ONCE
OUTPATIENT
Start: 2023-03-28 | End: 2023-03-28

## 2023-03-28 RX ORDER — LIDOCAINE 40 MG/G
CREAM TOPICAL ONCE
OUTPATIENT
Start: 2023-03-28 | End: 2023-03-28

## 2023-03-28 RX ORDER — LIDOCAINE 50 MG/G
OINTMENT TOPICAL ONCE
OUTPATIENT
Start: 2023-03-28 | End: 2023-03-28

## 2023-03-28 ASSESSMENT — PAIN SCALES - GENERAL
PAINLEVEL_OUTOF10: 0
PAINLEVEL_OUTOF10: 0

## 2023-03-28 ASSESSMENT — PAIN - FUNCTIONAL ASSESSMENT: PAIN_FUNCTIONAL_ASSESSMENT: ACTIVITIES ARE NOT PREVENTED

## 2023-03-28 NOTE — PROGRESS NOTES
hands with soap and water before and after dressing changes. Prior to applying a clean dressing, cleanse wound with normal saline, wound cleanser, or mild soap and water. Ask the physician or nurse before getting the wound(s) wet in a shower. General Wound management:              Keep weight off wounds and reposition every 2 hours. Avoid standing for long periods of time. Apply wraps/stockings in AM and remove at bedtime. If swelling is present, elevate legs to the level of the heart or above for 30 minutes 4-5 times a day and/or when sitting. When taking antibiotics take entire prescription as ordered by physician do not stop taking until medicine is all gone. Orders for this week: 03/23/2023     Rx:     Left Breast Wound - Wash with soap and water, pat dry. Pack with Mesalt to depth (do not overpack - make sure to leave a tail for removal) (Dr Sydni Barnard applied today in clinic 3/28/23). Cover with ca alginate and ABD secured with silicone tape (may switch to silicone border as drainage subsides). Change daily. Follow up with Dr. Sydni Barnard in 1 week(s) in the wound care center. Call [phone] for any questions or concerns.   Date__________   Time____________        Treatment Note      Written Patient Dismissal Instructions Given            Electronically signed by Ry Bedoya DO on 3/28/2023 at 9:48 AM

## 2023-03-28 NOTE — DISCHARGE INSTRUCTIONS
PHYSICIAN ORDERS AND DISCHARGE INSTRUCTIONS     NOTE: Upon discharge from the [address], you will receive a patient experience survey. We would be grateful if you would take the time to fill this survey out. Wound care order history:                 GALO's   Right       Left               Date:              Cultures:                Grafts:                Antibiotics:           Continuing wound care orders and information:                 Residence:                Continue home health care with:               Your wound-care supplies will be provided by: Wound cleansing:               Do not scrub or use excessive force. Wash hands with soap and water before and after dressing changes. Prior to applying a clean dressing, cleanse wound with normal saline, wound cleanser, or mild soap and water. Ask the physician or nurse before getting the wound(s) wet in a shower. General Wound management:              Keep weight off wounds and reposition every 2 hours. Avoid standing for long periods of time. Apply wraps/stockings in AM and remove at bedtime. If swelling is present, elevate legs to the level of the heart or above for 30 minutes 4-5 times a day and/or when sitting. When taking antibiotics take entire prescription as ordered by physician do not stop taking until medicine is all gone. Orders for this week: 03/23/2023     Rx:     Left Breast Wound - Wash with soap and water, pat dry. Pack with Mesalt to depth (do not overpack - make sure to leave a tail for removal) (Dr Antoine Bates applied today in clinic 3/28/23). Cover with ca alginate and ABD secured with silicone tape (may switch to silicone border as drainage subsides). Change daily. Follow up with Dr. Antoine Bates in 1 week(s) in the wound care center.   Call [phone] for any questions or

## 2023-04-04 ENCOUNTER — HOSPITAL ENCOUNTER (OUTPATIENT)
Dept: WOUND CARE | Age: 58
Discharge: HOME | End: 2023-04-04
Payer: COMMERCIAL

## 2023-04-04 VITALS
HEART RATE: 97 BPM | DIASTOLIC BLOOD PRESSURE: 75 MMHG | RESPIRATION RATE: 18 BRPM | TEMPERATURE: 97.7 F | SYSTOLIC BLOOD PRESSURE: 165 MMHG

## 2023-04-04 DIAGNOSIS — N61.1 BREAST ABSCESS: Primary | ICD-10-CM

## 2023-04-04 DIAGNOSIS — E11.69 DIABETES MELLITUS TYPE 2 IN OBESE (HCC): ICD-10-CM

## 2023-04-04 DIAGNOSIS — E66.9 DIABETES MELLITUS TYPE 2 IN OBESE (HCC): ICD-10-CM

## 2023-04-04 PROCEDURE — 99213 OFFICE O/P EST LOW 20 MIN: CPT

## 2023-04-04 PROCEDURE — 99213 OFFICE O/P EST LOW 20 MIN: CPT | Performed by: SURGERY

## 2023-04-04 RX ORDER — LIDOCAINE 50 MG/G
OINTMENT TOPICAL ONCE
OUTPATIENT
Start: 2023-04-04 | End: 2023-04-04

## 2023-04-04 RX ORDER — GENTAMICIN SULFATE 1 MG/G
OINTMENT TOPICAL ONCE
OUTPATIENT
Start: 2023-04-04 | End: 2023-04-04

## 2023-04-04 RX ORDER — BETAMETHASONE DIPROPIONATE 0.05 %
OINTMENT (GRAM) TOPICAL ONCE
OUTPATIENT
Start: 2023-04-04 | End: 2023-04-04

## 2023-04-04 RX ORDER — BACITRACIN, NEOMYCIN, POLYMYXIN B 400; 3.5; 5 [USP'U]/G; MG/G; [USP'U]/G
OINTMENT TOPICAL ONCE
OUTPATIENT
Start: 2023-04-04 | End: 2023-04-04

## 2023-04-04 RX ORDER — LIDOCAINE 40 MG/G
CREAM TOPICAL ONCE
OUTPATIENT
Start: 2023-04-04 | End: 2023-04-04

## 2023-04-04 RX ORDER — CLOBETASOL PROPIONATE 0.5 MG/G
OINTMENT TOPICAL ONCE
OUTPATIENT
Start: 2023-04-04 | End: 2023-04-04

## 2023-04-04 RX ORDER — GINSENG 100 MG
CAPSULE ORAL ONCE
OUTPATIENT
Start: 2023-04-04 | End: 2023-04-04

## 2023-04-04 RX ORDER — BACITRACIN ZINC AND POLYMYXIN B SULFATE 500; 1000 [USP'U]/G; [USP'U]/G
OINTMENT TOPICAL ONCE
OUTPATIENT
Start: 2023-04-04 | End: 2023-04-04

## 2023-04-04 RX ORDER — LIDOCAINE HYDROCHLORIDE 40 MG/ML
SOLUTION TOPICAL ONCE
OUTPATIENT
Start: 2023-04-04 | End: 2023-04-04

## 2023-04-04 ASSESSMENT — PAIN SCALES - GENERAL: PAINLEVEL_OUTOF10: 0

## 2023-04-04 NOTE — PROGRESS NOTES
of irregular blood glucose. 1 kit 0    Insulin Pen Needle (PEN NEEDLES) 31G X 5 MM MISC lantus QHS and Novolog QAC and HS (Patient not taking: No sig reported) 150 each 2    Lancets MISC 1 each by Does not apply route 4 times daily 200 each 2    blood glucose monitor strips Test 4 times a day & as needed for symptoms of irregular blood glucose. 200 strip 2     No current facility-administered medications on file prior to encounter. REVIEW OF SYSTEMS    Pertinent items are noted in HPI. Constitutional: Negative for systemic symptoms including fever, chills and malaise. Objective:      BP (!) 165/75   Pulse 97   Temp 97.7 °F (36.5 °C) (Temporal)   Resp 18     PHYSICAL EXAM     General: No acute distress  Respiratory: Chest rise equal bilaterally  CV: Appears well perfused   Abdomen: Soft, nontender, nondistended, no rebound or guarding  Left breast wound with minimal drainage. Clean wound base, no erythema. No purulence. Assessment:   Left breast abscess s/p I&D. Problem List Items Addressed This Visit          Endocrine    Diabetes mellitus type 2 in obese [facility])    Relevant Orders    Initiate Outpatient Wound Care Protocol       Other    Breast abscess - Primary    Relevant Orders    Initiate Outpatient Wound Care Protocol              Plan:   Wound repacked. Discharge Instructions         PHYSICIAN ORDERS AND DISCHARGE INSTRUCTIONS     NOTE: Upon discharge from the [address], you will receive a patient experience survey. We would be grateful if you would take the time to fill this survey out. Wound care order history:                 GALO's   Right       Left               Date:              Cultures:                Grafts:                Antibiotics:           Continuing wound care orders and information:                 Residence:                Continue home health care with:               Your wound-care supplies will be provided by:       Wound cleansing:               Do not

## 2023-04-18 ENCOUNTER — HOSPITAL ENCOUNTER (OUTPATIENT)
Dept: WOUND CARE | Age: 58
Discharge: HOME | End: 2023-04-18
Payer: COMMERCIAL

## 2023-04-18 VITALS
HEART RATE: 97 BPM | RESPIRATION RATE: 18 BRPM | TEMPERATURE: 97.5 F | SYSTOLIC BLOOD PRESSURE: 172 MMHG | DIASTOLIC BLOOD PRESSURE: 77 MMHG

## 2023-04-18 DIAGNOSIS — E11.69 DIABETES MELLITUS TYPE 2 IN OBESE (HCC): ICD-10-CM

## 2023-04-18 DIAGNOSIS — E66.9 DIABETES MELLITUS TYPE 2 IN OBESE (HCC): ICD-10-CM

## 2023-04-18 DIAGNOSIS — N61.1 BREAST ABSCESS: Primary | ICD-10-CM

## 2023-04-18 PROCEDURE — 99213 OFFICE O/P EST LOW 20 MIN: CPT | Performed by: SURGERY

## 2023-04-18 PROCEDURE — 99213 OFFICE O/P EST LOW 20 MIN: CPT

## 2023-04-18 RX ORDER — GINSENG 100 MG
CAPSULE ORAL ONCE
OUTPATIENT
Start: 2023-04-18 | End: 2023-04-18

## 2023-04-18 RX ORDER — LIDOCAINE 40 MG/G
CREAM TOPICAL ONCE
OUTPATIENT
Start: 2023-04-18 | End: 2023-04-18

## 2023-04-18 RX ORDER — BACITRACIN ZINC AND POLYMYXIN B SULFATE 500; 1000 [USP'U]/G; [USP'U]/G
OINTMENT TOPICAL ONCE
OUTPATIENT
Start: 2023-04-18 | End: 2023-04-18

## 2023-04-18 RX ORDER — BACITRACIN, NEOMYCIN, POLYMYXIN B 400; 3.5; 5 [USP'U]/G; MG/G; [USP'U]/G
OINTMENT TOPICAL ONCE
OUTPATIENT
Start: 2023-04-18 | End: 2023-04-18

## 2023-04-18 RX ORDER — LIDOCAINE HYDROCHLORIDE 40 MG/ML
SOLUTION TOPICAL ONCE
OUTPATIENT
Start: 2023-04-18 | End: 2023-04-18

## 2023-04-18 RX ORDER — GENTAMICIN SULFATE 1 MG/G
OINTMENT TOPICAL ONCE
OUTPATIENT
Start: 2023-04-18 | End: 2023-04-18

## 2023-04-18 RX ORDER — LIDOCAINE 50 MG/G
OINTMENT TOPICAL ONCE
OUTPATIENT
Start: 2023-04-18 | End: 2023-04-18

## 2023-04-18 RX ORDER — CLOBETASOL PROPIONATE 0.5 MG/G
OINTMENT TOPICAL ONCE
OUTPATIENT
Start: 2023-04-18 | End: 2023-04-18

## 2023-04-18 RX ORDER — BETAMETHASONE DIPROPIONATE 0.05 %
OINTMENT (GRAM) TOPICAL ONCE
OUTPATIENT
Start: 2023-04-18 | End: 2023-04-18

## 2023-04-18 ASSESSMENT — PAIN SCALES - GENERAL: PAINLEVEL_OUTOF10: 0

## 2023-04-18 ASSESSMENT — PAIN - FUNCTIONAL ASSESSMENT: PAIN_FUNCTIONAL_ASSESSMENT: PREVENTS OR INTERFERES SOME ACTIVE ACTIVITIES AND ADLS

## 2023-04-18 NOTE — DISCHARGE INSTRUCTIONS
PHYSICIAN ORDERS AND DISCHARGE INSTRUCTIONS     NOTE: Upon discharge from the [address], you will receive a patient experience survey. We would be grateful if you would take the time to fill this survey out. Wound care order history:                 GALO's   Right       Left               Date:              Cultures:                Grafts:                Antibiotics:           Continuing wound care orders and information:                 Residence:                Continue home health care with:               Your wound-care supplies will be provided by: Wound cleansing:               Do not scrub or use excessive force. Wash hands with soap and water before and after dressing changes. Prior to applying a clean dressing, cleanse wound with normal saline, wound cleanser, or mild soap and water. Ask the physician or nurse before getting the wound(s) wet in a shower. General Wound management:              Keep weight off wounds and reposition every 2 hours. Avoid standing for long periods of time. Apply wraps/stockings in AM and remove at bedtime. If swelling is present, elevate legs to the level of the heart or above for 30 minutes 4-5 times a day and/or when sitting. When taking antibiotics take entire prescription as ordered by physician do not stop taking until medicine is all gone. Orders for this week: 04/18/2023     Rx:     Left Breast Wound - Wash with soap and water, pat dry. (Anasept and Stimulen to wound bed at Home), pack Mesalt to depth (do not overpack - make sure to leave a tail for removal). Cover with ca alginate and tegaderm. Change daily. Follow up with Dr. Gris Goodrich in 1 week(s) in the wound care center. Call [phone] for any questions or concerns.   Date__________   Time___________

## 2023-04-18 NOTE — PROGRESS NOTES
Pack years: 20.00     Types: Cigarettes     Quit date: 3/14/2023     Years since quittin.0    Smokeless tobacco: Never    Tobacco comments:     1 pack every four days    Vaping Use    Vaping Use: Never used   Substance Use Topics    Alcohol use: Never     Comment: Caffeine: 1 cup coffee daily, 3 cups iced tea a day    Drug use: Never       ALLERGIES    Allergies   Allergen Reactions    Morphine Other (See Comments)    Ampicillin Rash       MEDICATIONS    Current Outpatient Medications on File Prior to Encounter   Medication Sig Dispense Refill    omeprazole (PRILOSEC) 20 MG delayed release capsule TAKE ONE CAPSULE BY MOUTH EVERY MORNING BEFORE BREAKFAST 30 capsule 0    carvedilol (COREG) 6.25 MG tablet Take 2 tablets by mouth 2 times daily 90 tablet 3    amLODIPine (NORVASC) 10 MG tablet Take 1 tablet by mouth daily 30 tablet 3    insulin glargine (LANTUS) 100 UNIT/ML injection vial Inject 50 Units into the skin nightly 3 Adjustable Dose Pre-filled Pen Syringe 2    lisinopril (PRINIVIL;ZESTRIL) 20 MG tablet TAKE ONE TABLET BY MOUTH DAILY 90 tablet 3    atorvastatin (LIPITOR) 40 MG tablet Take 1 tablet by mouth daily 90 tablet 3    TRADJENTA 5 MG tablet TAKE ONE TABLET BY MOUTH DAILY 90 tablet 0    INVOKANA 300 MG TABS tablet TAKE ONE TABLET BY MOUTH EVERY MORNING BEFORE BREAKFAST 90 tablet 1    vitamin D3 (CHOLECALCIFEROL) 10 MCG (400 UNIT) TABS tablet TAKE TWO TABLETS BY MOUTH DAILY 180 tablet 1    aspirin (ASPIRIN ADULT LOW STRENGTH) 81 MG EC tablet Take 1 tablet by mouth daily 30 tablet 4    insulin aspart (NOVOLOG FLEXPEN) 100 UNIT/ML injection pen Take 20 units with each meal in addition to sliding scale as below   *Medium Dose Correction Algorithm**Insulin: 3 TIMES DAILY WITH MEALSGlucose: Dose: No [id number] 2 [id number] 4 [id number] 6 [id number] 8 [id number] 10 [id number] and above 12 Units 5 pen 3    blood glucose monitor kit and supplies Test 4 times a day before meals and bedtime& as detailed exam

## 2023-04-25 ENCOUNTER — HOSPITAL ENCOUNTER (OUTPATIENT)
Dept: WOUND CARE | Age: 58
Discharge: HOME | End: 2023-04-25
Payer: COMMERCIAL

## 2023-04-25 VITALS
SYSTOLIC BLOOD PRESSURE: 156 MMHG | TEMPERATURE: 98.6 F | DIASTOLIC BLOOD PRESSURE: 75 MMHG | RESPIRATION RATE: 18 BRPM | HEART RATE: 91 BPM

## 2023-04-25 DIAGNOSIS — N61.1 BREAST ABSCESS: Primary | ICD-10-CM

## 2023-04-25 DIAGNOSIS — E11.69 DIABETES MELLITUS TYPE 2 IN OBESE (HCC): ICD-10-CM

## 2023-04-25 DIAGNOSIS — E66.9 DIABETES MELLITUS TYPE 2 IN OBESE (HCC): ICD-10-CM

## 2023-04-25 DIAGNOSIS — K26.9 DUODENAL ULCER: ICD-10-CM

## 2023-04-25 PROCEDURE — 99213 OFFICE O/P EST LOW 20 MIN: CPT

## 2023-04-25 PROCEDURE — 99213 OFFICE O/P EST LOW 20 MIN: CPT | Performed by: SURGERY

## 2023-04-25 RX ORDER — GENTAMICIN SULFATE 1 MG/G
OINTMENT TOPICAL ONCE
OUTPATIENT
Start: 2023-04-25 | End: 2023-04-25

## 2023-04-25 RX ORDER — LIDOCAINE 40 MG/G
CREAM TOPICAL ONCE
OUTPATIENT
Start: 2023-04-25 | End: 2023-04-25

## 2023-04-25 RX ORDER — CLOBETASOL PROPIONATE 0.5 MG/G
OINTMENT TOPICAL ONCE
OUTPATIENT
Start: 2023-04-25 | End: 2023-04-25

## 2023-04-25 RX ORDER — BETAMETHASONE DIPROPIONATE 0.05 %
OINTMENT (GRAM) TOPICAL ONCE
OUTPATIENT
Start: 2023-04-25 | End: 2023-04-25

## 2023-04-25 RX ORDER — BACITRACIN, NEOMYCIN, POLYMYXIN B 400; 3.5; 5 [USP'U]/G; MG/G; [USP'U]/G
OINTMENT TOPICAL ONCE
OUTPATIENT
Start: 2023-04-25 | End: 2023-04-25

## 2023-04-25 RX ORDER — LIDOCAINE HYDROCHLORIDE 40 MG/ML
SOLUTION TOPICAL ONCE
OUTPATIENT
Start: 2023-04-25 | End: 2023-04-25

## 2023-04-25 RX ORDER — OMEPRAZOLE 20 MG/1
CAPSULE, DELAYED RELEASE ORAL
Qty: 30 CAPSULE | Refills: 1 | Status: SHIPPED | OUTPATIENT
Start: 2023-04-25

## 2023-04-25 RX ORDER — BACITRACIN ZINC AND POLYMYXIN B SULFATE 500; 1000 [USP'U]/G; [USP'U]/G
OINTMENT TOPICAL ONCE
OUTPATIENT
Start: 2023-04-25 | End: 2023-04-25

## 2023-04-25 RX ORDER — GINSENG 100 MG
CAPSULE ORAL ONCE
OUTPATIENT
Start: 2023-04-25 | End: 2023-04-25

## 2023-04-25 RX ORDER — LIDOCAINE 50 MG/G
OINTMENT TOPICAL ONCE
OUTPATIENT
Start: 2023-04-25 | End: 2023-04-25

## 2023-04-25 ASSESSMENT — PAIN SCALES - GENERAL: PAINLEVEL_OUTOF10: 0

## 2023-04-25 NOTE — PROGRESS NOTES
Wound Care Center Progress Note     Barb Block  AGE: 56 y.o. GENDER: female  : 1965  EPISODE DATE:  2023     Subjective:     Chief Complaint   Patient presents with    Wound Check     Left breast          HISTORY of PRESENT ILLNESS      Barb Block is a 56 y.o. female who presents today for wound evaluation of Acute non-healing surgical ulcer(s) of the left breast s/p I&D. The ulcer is of moderate severity. The underlying cause of the wound is abscess s/p I&D. Wound Pain Timing/Severity: intermittent  Quality of pain: dull  Severity of pain:  0/ 10   Modifying Factors: obesity and smoking (now quit smoking)  Associated Signs/Symptoms: drainage and pain          No issues with packing wound this week. PAST MEDICAL HISTORY        Diagnosis Date    CAD (coronary artery disease)     Current every day smoker     Diabetes mellitus ([address]     GERD (gastroesophageal reflux disease)     History of exercise stress test 2019    Treadmill, Normal exercise performance without angina and ischemic EKG changes. Hyperlipidemia     Hypertension     Morbid obesity ([address]     Palpitations     Post PTCA 2019    Kissing stent to LAD & to Ostium of Diag. SOB (shortness of breath)     Type 2 diabetes mellitus with ketoacidosis without coma, with long-term current use of insulin ([address] 2019    DM diagnosed about        PAST SURGICAL HISTORY    Past Surgical History:   Procedure Laterality Date    BREAST SURGERY Left 2023    LEFT BREAST INCISION AND DRAINAGE performed by Sarah Strickland DO at [address] [place] [address]      PTCA  2019    Kissing stents to LAD & Ostium of Diag. UPPER GASTROINTESTINAL ENDOSCOPY N/A 2021    EGD DIAGNOSTIC ONLY performed by Braden Pickett MD at [facility]    History reviewed. No pertinent family history.     SOCIAL HISTORY    Social History     Tobacco Use    Smoking status: Former     Packs/day: 0.50     Years:

## 2023-04-25 NOTE — DISCHARGE INSTRUCTIONS
PHYSICIAN ORDERS AND DISCHARGE INSTRUCTIONS     NOTE: Upon discharge from the [address], you will receive a patient experience survey. We would be grateful if you would take the time to fill this survey out. Wound care order history:                 GALO's   Right       Left               Date:              Cultures:                Grafts:                Antibiotics:           Continuing wound care orders and information:                 Residence:                Continue home health care with:               Your wound-care supplies will be provided by: Wound cleansing:               Do not scrub or use excessive force. Wash hands with soap and water before and after dressing changes. Prior to applying a clean dressing, cleanse wound with normal saline, wound cleanser, or mild soap and water. Ask the physician or nurse before getting the wound(s) wet in a shower. General Wound management:              Keep weight off wounds and reposition every 2 hours. Avoid standing for long periods of time. Apply wraps/stockings in AM and remove at bedtime. If swelling is present, elevate legs to the level of the heart or above for 30 minutes 4-5 times a day and/or when sitting. When taking antibiotics take entire prescription as ordered by physician do not stop taking until medicine is all gone. Orders for this week: 04/25/2023     Rx:     Left Breast Wound - Wash with soap and water, pat dry. (Anasept and Stimulen to wound bed at Home), pack Mesalt to depth (do not overpack - make sure to leave a tail for removal). Cover with ca alginate and tegaderm. Change daily. Follow up with Dr. Bridgette Henson in 1 week(s) in the wound care center. Call [phone] for any questions or concerns.   Date__________   Time___________

## 2023-05-02 ENCOUNTER — HOSPITAL ENCOUNTER (OUTPATIENT)
Dept: WOUND CARE | Age: 58
Discharge: HOME | End: 2023-05-02
Payer: COMMERCIAL

## 2023-05-02 VITALS
HEART RATE: 94 BPM | TEMPERATURE: 97.8 F | SYSTOLIC BLOOD PRESSURE: 150 MMHG | DIASTOLIC BLOOD PRESSURE: 87 MMHG | RESPIRATION RATE: 20 BRPM

## 2023-05-02 DIAGNOSIS — T14.8XXA NON-HEALING NON-SURGICAL WOUND: ICD-10-CM

## 2023-05-02 DIAGNOSIS — E11.69 DIABETES MELLITUS TYPE 2 IN OBESE (HCC): ICD-10-CM

## 2023-05-02 DIAGNOSIS — E66.9 DIABETES MELLITUS TYPE 2 IN OBESE (HCC): ICD-10-CM

## 2023-05-02 DIAGNOSIS — N61.1 BREAST ABSCESS: Primary | ICD-10-CM

## 2023-05-02 PROCEDURE — 99213 OFFICE O/P EST LOW 20 MIN: CPT

## 2023-05-02 PROCEDURE — 99213 OFFICE O/P EST LOW 20 MIN: CPT | Performed by: SURGERY

## 2023-05-02 RX ORDER — GINSENG 100 MG
CAPSULE ORAL ONCE
OUTPATIENT
Start: 2023-05-02 | End: 2023-05-02

## 2023-05-02 RX ORDER — BACITRACIN, NEOMYCIN, POLYMYXIN B 400; 3.5; 5 [USP'U]/G; MG/G; [USP'U]/G
OINTMENT TOPICAL ONCE
OUTPATIENT
Start: 2023-05-02 | End: 2023-05-02

## 2023-05-02 RX ORDER — LIDOCAINE HYDROCHLORIDE 40 MG/ML
SOLUTION TOPICAL ONCE
OUTPATIENT
Start: 2023-05-02 | End: 2023-05-02

## 2023-05-02 RX ORDER — BETAMETHASONE DIPROPIONATE 0.05 %
OINTMENT (GRAM) TOPICAL ONCE
OUTPATIENT
Start: 2023-05-02 | End: 2023-05-02

## 2023-05-02 RX ORDER — LIDOCAINE 40 MG/G
CREAM TOPICAL ONCE
OUTPATIENT
Start: 2023-05-02 | End: 2023-05-02

## 2023-05-02 RX ORDER — LIDOCAINE 50 MG/G
OINTMENT TOPICAL ONCE
OUTPATIENT
Start: 2023-05-02 | End: 2023-05-02

## 2023-05-02 RX ORDER — CLOBETASOL PROPIONATE 0.5 MG/G
OINTMENT TOPICAL ONCE
OUTPATIENT
Start: 2023-05-02 | End: 2023-05-02

## 2023-05-02 RX ORDER — BACITRACIN ZINC AND POLYMYXIN B SULFATE 500; 1000 [USP'U]/G; [USP'U]/G
OINTMENT TOPICAL ONCE
OUTPATIENT
Start: 2023-05-02 | End: 2023-05-02

## 2023-05-02 RX ORDER — GENTAMICIN SULFATE 1 MG/G
OINTMENT TOPICAL ONCE
OUTPATIENT
Start: 2023-05-02 | End: 2023-05-02

## 2023-05-02 NOTE — PLAN OF CARE
Problem: Wound:  Goal: Will show signs of wound healing; wound closure and no evidence of infection  Description: Will show signs of wound healing; wound closure and no evidence of infection  Outcome: Progressing     Problem: Pain  Goal: Verbalizes/displays adequate comfort level or baseline comfort level  Outcome: Progressing none

## 2023-05-02 NOTE — DISCHARGE INSTRUCTIONS
PHYSICIAN ORDERS AND DISCHARGE INSTRUCTIONS     NOTE: Upon discharge from the [address], you will receive a patient experience survey. We would be grateful if you would take the time to fill this survey out. Wound care order history:                 GALO's   Right       Left               Date:              Cultures:                Grafts:                Antibiotics:           Continuing wound care orders and information:                 Residence:                Continue home health care with:               Your wound-care supplies will be provided by: Wound cleansing:               Do not scrub or use excessive force. Wash hands with soap and water before and after dressing changes. Prior to applying a clean dressing, cleanse wound with normal saline, wound cleanser, or mild soap and water. Ask the physician or nurse before getting the wound(s) wet in a shower. General Wound management:              Keep weight off wounds and reposition every 2 hours. Avoid standing for long periods of time. Apply wraps/stockings in AM and remove at bedtime. If swelling is present, elevate legs to the level of the heart or above for 30 minutes 4-5 times a day and/or when sitting. When taking antibiotics take entire prescription as ordered by physician do not stop taking until medicine is all gone. Orders for this week: 5/2/2023     Rx:     Left Breast Wound - Wash with soap and water, pat dry. (Anasept and Stimulen to wound bed at Home), pack Mesalt to depth (do not overpack - make sure to leave a tail for removal). Cover with ca alginate and tegaderm. Change daily. Follow up with Dr. Rupesh Chester in 1 week(s) in the wound care center. Call [phone] for any questions or concerns.   Date__________   Time___________

## 2023-05-02 NOTE — PROGRESS NOTES
Wound Care Center Progress Note. Margaux Alberts  AGE: 56 y.o. GENDER: female  : 1965  EPISODE DATE:  2023     Subjective:     Chief Complaint   Patient presents with    Wound Check         HISTORY of PRESENT ILLNESS      Margaux Alberts is a 56 y.o. female who presents today for wound evaluation of Acute non-healing surgical ulcer(s) of the left breast.  The ulcer is of mild severity. The underlying cause of the wound is abscess s/p I&D. Wound Pain Timing/Severity: intermittent  Quality of pain: dull  Severity of pain:  1 / 10   Modifying Factors: obesity and smoking (has now quit)  Associated Signs/Symptoms: edema and drainage        PAST MEDICAL HISTORY        Diagnosis Date    CAD (coronary artery disease)     Current every day smoker     Diabetes mellitus ([address]     GERD (gastroesophageal reflux disease)     History of exercise stress test 2019    Treadmill, Normal exercise performance without angina and ischemic EKG changes. Hyperlipidemia     Hypertension     Morbid obesity ([address]     Palpitations     Post PTCA 2019    Kissing stent to LAD & to Ostium of Diag. SOB (shortness of breath)     Type 2 diabetes mellitus with ketoacidosis without coma, with long-term current use of insulin ([address] 2019    DM diagnosed about        PAST SURGICAL HISTORY    Past Surgical History:   Procedure Laterality Date    BREAST SURGERY Left 2023    LEFT BREAST INCISION AND DRAINAGE performed by Trent Motley DO at [address] [place] [address]      PTCA  2019    Kissing stents to LAD & Ostium of Diag. UPPER GASTROINTESTINAL ENDOSCOPY N/A 2021    EGD DIAGNOSTIC ONLY performed by Adebayo Reyes MD at [facility]    History reviewed. No pertinent family history.     SOCIAL HISTORY    Social History     Tobacco Use    Smoking status: Former     Packs/day: 0.50     Years: 40.00     Pack years: 20.00     Types: Cigarettes     Quit date: 3/14/2023

## 2023-05-09 ENCOUNTER — HOSPITAL ENCOUNTER (OUTPATIENT)
Dept: WOUND CARE | Age: 58
Discharge: HOME | End: 2023-05-09
Payer: COMMERCIAL

## 2023-05-09 VITALS
SYSTOLIC BLOOD PRESSURE: 137 MMHG | TEMPERATURE: 97 F | RESPIRATION RATE: 20 BRPM | HEART RATE: 87 BPM | DIASTOLIC BLOOD PRESSURE: 82 MMHG

## 2023-05-09 DIAGNOSIS — N61.1 BREAST ABSCESS: Primary | ICD-10-CM

## 2023-05-09 DIAGNOSIS — E66.9 DIABETES MELLITUS TYPE 2 IN OBESE (HCC): ICD-10-CM

## 2023-05-09 DIAGNOSIS — E11.69 DIABETES MELLITUS TYPE 2 IN OBESE (HCC): ICD-10-CM

## 2023-05-09 PROCEDURE — 99213 OFFICE O/P EST LOW 20 MIN: CPT | Performed by: SURGERY

## 2023-05-09 PROCEDURE — 99213 OFFICE O/P EST LOW 20 MIN: CPT

## 2023-05-09 RX ORDER — LIDOCAINE 50 MG/G
OINTMENT TOPICAL ONCE
OUTPATIENT
Start: 2023-05-09 | End: 2023-05-09

## 2023-05-09 RX ORDER — BETAMETHASONE DIPROPIONATE 0.05 %
OINTMENT (GRAM) TOPICAL ONCE
OUTPATIENT
Start: 2023-05-09 | End: 2023-05-09

## 2023-05-09 RX ORDER — GINSENG 100 MG
CAPSULE ORAL ONCE
OUTPATIENT
Start: 2023-05-09 | End: 2023-05-09

## 2023-05-09 RX ORDER — BACITRACIN ZINC AND POLYMYXIN B SULFATE 500; 1000 [USP'U]/G; [USP'U]/G
OINTMENT TOPICAL ONCE
OUTPATIENT
Start: 2023-05-09 | End: 2023-05-09

## 2023-05-09 RX ORDER — LIDOCAINE HYDROCHLORIDE 40 MG/ML
SOLUTION TOPICAL ONCE
OUTPATIENT
Start: 2023-05-09 | End: 2023-05-09

## 2023-05-09 RX ORDER — CLOBETASOL PROPIONATE 0.5 MG/G
OINTMENT TOPICAL ONCE
OUTPATIENT
Start: 2023-05-09 | End: 2023-05-09

## 2023-05-09 RX ORDER — BACITRACIN, NEOMYCIN, POLYMYXIN B 400; 3.5; 5 [USP'U]/G; MG/G; [USP'U]/G
OINTMENT TOPICAL ONCE
OUTPATIENT
Start: 2023-05-09 | End: 2023-05-09

## 2023-05-09 RX ORDER — LIDOCAINE 40 MG/G
CREAM TOPICAL ONCE
OUTPATIENT
Start: 2023-05-09 | End: 2023-05-09

## 2023-05-09 RX ORDER — GENTAMICIN SULFATE 1 MG/G
OINTMENT TOPICAL ONCE
OUTPATIENT
Start: 2023-05-09 | End: 2023-05-09

## 2023-05-09 NOTE — PROGRESS NOTES
Wound Care Center Progress Note. Mehdi Tineo  AGE: 56 y.o. GENDER: female  : 1965  EPISODE DATE:  2023     Subjective:     Chief Complaint   Patient presents with    Wound Check     BREAST         HISTORY of PRESENT ILLNESS      Mehdi Tineo is a 56 y.o. female who presents today for wound evaluation of Acute non-healing surgical ulcer(s) of the left breast.  The ulcer is of mild severity. The underlying cause of the wound is abscess s/p I&D. Wound Pain Timing/Severity: intermittent  Quality of pain: dull  Severity of pain:  1 / 10   Modifying Factors: obesity and smoking (has now quit)  Associated Signs/Symptoms: edema and drainage       23 - wound remains unchanged. Will try packing with jude this week. PAST MEDICAL HISTORY        Diagnosis Date    CAD (coronary artery disease)     Current every day smoker     Diabetes mellitus ([address]     GERD (gastroesophageal reflux disease)     History of exercise stress test 2019    Treadmill, Normal exercise performance without angina and ischemic EKG changes. Hyperlipidemia     Hypertension     Morbid obesity ([address]     Palpitations     Post PTCA 2019    Kissing stent to LAD & to Ostium of Diag. SOB (shortness of breath)     Type 2 diabetes mellitus with ketoacidosis without coma, with long-term current use of insulin ([address] 2019    DM diagnosed about        PAST SURGICAL HISTORY    Past Surgical History:   Procedure Laterality Date    BREAST SURGERY Left 2023    LEFT BREAST INCISION AND DRAINAGE performed by Ioana Harrell DO at [address] [place] [address] Road      PTCA  2019    Kissing stents to LAD & Ostium of Diag. UPPER GASTROINTESTINAL ENDOSCOPY N/A 2021    EGD DIAGNOSTIC ONLY performed by Mario Lux MD at [facility]    History reviewed. No pertinent family history.     SOCIAL HISTORY    Social History     Tobacco Use    Smoking status: Former     Packs/day: 0.50

## 2023-05-09 NOTE — DISCHARGE INSTRUCTIONS
PHYSICIAN ORDERS AND DISCHARGE INSTRUCTIONS     NOTE: Upon discharge from the [address], you will receive a patient experience survey. We would be grateful if you would take the time to fill this survey out. Wound care order history:                 GALO's   Right       Left               Date:              Cultures:                Grafts:                Antibiotics:           Continuing wound care orders and information:                 Residence:                Continue home health care with:               Your wound-care supplies will be provided by: Wound cleansing:               Do not scrub or use excessive force. Wash hands with soap and water before and after dressing changes. Prior to applying a clean dressing, cleanse wound with normal saline, wound cleanser, or mild soap and water. Ask the physician or nurse before getting the wound(s) wet in a shower. General Wound management:              Keep weight off wounds and reposition every 2 hours. Avoid standing for long periods of time. Apply wraps/stockings in AM and remove at bedtime. If swelling is present, elevate legs to the level of the heart or above for 30 minutes 4-5 times a day and/or when sitting. When taking antibiotics take entire prescription as ordered by physician do not stop taking until medicine is all gone. Orders for this week: 5/2/2023     Rx:     Left Breast Wound - Wash with soap and water, pat dry. Tuck strips of Luisa into wound to depth (do not overpack). Cover with ca alginate and tegaderm. Change daily. Follow up with Dr. Chris Rader in 1 week(s) in the wound care center. Call [phone] for any questions or concerns.   Date__________   Time___________

## 2023-05-16 ENCOUNTER — HOSPITAL ENCOUNTER (OUTPATIENT)
Dept: WOUND CARE | Age: 58
Discharge: HOME | End: 2023-05-16
Payer: COMMERCIAL

## 2023-05-16 VITALS
DIASTOLIC BLOOD PRESSURE: 84 MMHG | RESPIRATION RATE: 18 BRPM | SYSTOLIC BLOOD PRESSURE: 157 MMHG | TEMPERATURE: 97.5 F | HEART RATE: 90 BPM

## 2023-05-16 DIAGNOSIS — E66.9 DIABETES MELLITUS TYPE 2 IN OBESE (HCC): ICD-10-CM

## 2023-05-16 DIAGNOSIS — E11.69 DIABETES MELLITUS TYPE 2 IN OBESE (HCC): ICD-10-CM

## 2023-05-16 DIAGNOSIS — N61.1 BREAST ABSCESS: Primary | ICD-10-CM

## 2023-05-16 PROCEDURE — 11042 DBRDMT SUBQ TIS 1ST 20SQCM/<: CPT

## 2023-05-16 PROCEDURE — 11042 DBRDMT SUBQ TIS 1ST 20SQCM/<: CPT | Performed by: SURGERY

## 2023-05-16 RX ORDER — GENTAMICIN SULFATE 1 MG/G
OINTMENT TOPICAL ONCE
OUTPATIENT
Start: 2023-05-16 | End: 2023-05-16

## 2023-05-16 RX ORDER — CLOBETASOL PROPIONATE 0.5 MG/G
OINTMENT TOPICAL ONCE
OUTPATIENT
Start: 2023-05-16 | End: 2023-05-16

## 2023-05-16 RX ORDER — GINSENG 100 MG
CAPSULE ORAL ONCE
OUTPATIENT
Start: 2023-05-16 | End: 2023-05-16

## 2023-05-16 RX ORDER — BACITRACIN ZINC AND POLYMYXIN B SULFATE 500; 1000 [USP'U]/G; [USP'U]/G
OINTMENT TOPICAL ONCE
OUTPATIENT
Start: 2023-05-16 | End: 2023-05-16

## 2023-05-16 RX ORDER — LIDOCAINE HYDROCHLORIDE 40 MG/ML
SOLUTION TOPICAL ONCE
OUTPATIENT
Start: 2023-05-16 | End: 2023-05-16

## 2023-05-16 RX ORDER — LIDOCAINE 40 MG/G
CREAM TOPICAL ONCE
OUTPATIENT
Start: 2023-05-16 | End: 2023-05-16

## 2023-05-16 RX ORDER — BETAMETHASONE DIPROPIONATE 0.05 %
OINTMENT (GRAM) TOPICAL ONCE
OUTPATIENT
Start: 2023-05-16 | End: 2023-05-16

## 2023-05-16 RX ORDER — LIDOCAINE 50 MG/G
OINTMENT TOPICAL ONCE
OUTPATIENT
Start: 2023-05-16 | End: 2023-05-16

## 2023-05-16 RX ORDER — BACITRACIN, NEOMYCIN, POLYMYXIN B 400; 3.5; 5 [USP'U]/G; MG/G; [USP'U]/G
OINTMENT TOPICAL ONCE
OUTPATIENT
Start: 2023-05-16 | End: 2023-05-16

## 2023-05-16 ASSESSMENT — PAIN - FUNCTIONAL ASSESSMENT: PAIN_FUNCTIONAL_ASSESSMENT: ACTIVITIES ARE NOT PREVENTED

## 2023-05-16 ASSESSMENT — PAIN SCALES - GENERAL: PAINLEVEL_OUTOF10: 0

## 2023-05-16 NOTE — DISCHARGE INSTRUCTIONS
PHYSICIAN ORDERS AND DISCHARGE INSTRUCTIONS     NOTE: Upon discharge from the [address], you will receive a patient experience survey. We would be grateful if you would take the time to fill this survey out. Wound care order history:                 GALO's   Right       Left               Date:              Cultures:                Grafts:                Antibiotics:           Continuing wound care orders and information:                 Residence:                Continue home health care with:               Your wound-care supplies will be provided by: Wound cleansing:               Do not scrub or use excessive force. Wash hands with soap and water before and after dressing changes. Prior to applying a clean dressing, cleanse wound with normal saline, wound cleanser, or mild soap and water. Ask the physician or nurse before getting the wound(s) wet in a shower. General Wound management:              Keep weight off wounds and reposition every 2 hours. Avoid standing for long periods of time. Apply wraps/stockings in AM and remove at bedtime. If swelling is present, elevate legs to the level of the heart or above for 30 minutes 4-5 times a day and/or when sitting. When taking antibiotics take entire prescription as ordered by physician do not stop taking until medicine is all gone. Orders for this week: 5/16/2023     Rx:     Left Breast Wound - Wash with soap and water, pat dry. Tuck strips of Luisa into wound to depth (do not overpack). Cover with ca alginate, ABD, and tegaderm. Change daily. Follow up with Dr. Clarence Freed in 1 week(s) in the wound care center. Call [phone] for any questions or concerns.   Date__________   Time___________

## 2023-05-23 ENCOUNTER — HOSPITAL ENCOUNTER (OUTPATIENT)
Dept: WOUND CARE | Age: 58
Discharge: HOME | End: 2023-05-23
Payer: COMMERCIAL

## 2023-05-23 VITALS
DIASTOLIC BLOOD PRESSURE: 64 MMHG | TEMPERATURE: 97.8 F | SYSTOLIC BLOOD PRESSURE: 144 MMHG | HEART RATE: 95 BPM | RESPIRATION RATE: 20 BRPM

## 2023-05-23 DIAGNOSIS — N61.1 BREAST ABSCESS: Primary | ICD-10-CM

## 2023-05-23 DIAGNOSIS — E66.9 DIABETES MELLITUS TYPE 2 IN OBESE (HCC): ICD-10-CM

## 2023-05-23 DIAGNOSIS — E11.69 DIABETES MELLITUS TYPE 2 IN OBESE (HCC): ICD-10-CM

## 2023-05-23 PROCEDURE — 11042 DBRDMT SUBQ TIS 1ST 20SQCM/<: CPT | Performed by: SURGERY

## 2023-05-23 PROCEDURE — 11042 DBRDMT SUBQ TIS 1ST 20SQCM/<: CPT

## 2023-05-23 RX ORDER — BACITRACIN ZINC AND POLYMYXIN B SULFATE 500; 1000 [USP'U]/G; [USP'U]/G
OINTMENT TOPICAL ONCE
OUTPATIENT
Start: 2023-05-23 | End: 2023-05-23

## 2023-05-23 RX ORDER — BETAMETHASONE DIPROPIONATE 0.05 %
OINTMENT (GRAM) TOPICAL ONCE
OUTPATIENT
Start: 2023-05-23 | End: 2023-05-23

## 2023-05-23 RX ORDER — BACITRACIN, NEOMYCIN, POLYMYXIN B 400; 3.5; 5 [USP'U]/G; MG/G; [USP'U]/G
OINTMENT TOPICAL ONCE
OUTPATIENT
Start: 2023-05-23 | End: 2023-05-23

## 2023-05-23 RX ORDER — CLOBETASOL PROPIONATE 0.5 MG/G
OINTMENT TOPICAL ONCE
OUTPATIENT
Start: 2023-05-23 | End: 2023-05-23

## 2023-05-23 RX ORDER — LIDOCAINE HYDROCHLORIDE 40 MG/ML
SOLUTION TOPICAL ONCE
OUTPATIENT
Start: 2023-05-23 | End: 2023-05-23

## 2023-05-23 RX ORDER — LIDOCAINE 50 MG/G
OINTMENT TOPICAL ONCE
OUTPATIENT
Start: 2023-05-23 | End: 2023-05-23

## 2023-05-23 RX ORDER — GINSENG 100 MG
CAPSULE ORAL ONCE
OUTPATIENT
Start: 2023-05-23 | End: 2023-05-23

## 2023-05-23 RX ORDER — GENTAMICIN SULFATE 1 MG/G
OINTMENT TOPICAL ONCE
OUTPATIENT
Start: 2023-05-23 | End: 2023-05-23

## 2023-05-23 RX ORDER — LIDOCAINE 40 MG/G
CREAM TOPICAL ONCE
OUTPATIENT
Start: 2023-05-23 | End: 2023-05-23

## 2023-05-23 NOTE — DISCHARGE INSTRUCTIONS
PHYSICIAN ORDERS AND DISCHARGE INSTRUCTIONS     NOTE: Upon discharge from the [address], you will receive a patient experience survey. We would be grateful if you would take the time to fill this survey out. Wound care order history:                 GALO's   Right       Left               Date:              Cultures:                Grafts:                Antibiotics:           Continuing wound care orders and information:                 Residence:                Continue home health care with:               Your wound-care supplies will be provided by: Wound cleansing:               Do not scrub or use excessive force. Wash hands with soap and water before and after dressing changes. Prior to applying a clean dressing, cleanse wound with normal saline, wound cleanser, or mild soap and water. Ask the physician or nurse before getting the wound(s) wet in a shower. General Wound management:              Keep weight off wounds and reposition every 2 hours. Avoid standing for long periods of time. Apply wraps/stockings in AM and remove at bedtime. If swelling is present, elevate legs to the level of the heart or above for 30 minutes 4-5 times a day and/or when sitting. When taking antibiotics take entire prescription as ordered by physician do not stop taking until medicine is all gone. Orders for this week: 5/23/2023     Rx:     Left Breast Wound - Wash with soap and water, pat dry. Today in clinic, apply liquid silver nitrate damp hydrofera blue and tuck into wound bed. Cover with ca alginate, ABD, and tegaderm. Leave in place for at least 3 days. After 3 days return to previous dressing: Tuck strips of Luisa into wound to depth (do not overpack). Cover with ca alginate, ABD, and tegaderm. Change daily.         Follow up with Dr. Tio Lindsay in 2 week(s) in the wound

## 2023-05-23 NOTE — PROGRESS NOTES
Wound Care Center Progress Note With Procedure    Juan Santiago  AGE: 56 y.o. GENDER: female  : 1965  EPISODE DATE:  2023     Subjective:     Chief Complaint   Patient presents with    Wound Check     breast         HISTORY of PRESENT ILLNESS      Juan Santiago is a 56 y.o. female who presents today for wound evaluation of Acute non-healing surgical ulcer(s) of the left breast.  The ulcer is of mild severity. The underlying cause of the wound is abscess s/p I&D. Wound Pain Timing/Severity: intermittent  Quality of pain: dull  Severity of pain:  1 / 10   Modifying Factors: obesity and smoking (has now quit)  Associated Signs/Symptoms: edema and drainage                            23 - wound remains unchanged. Will try packing with jude this week. 23   - wound improving with jude. 23   - no new complaints. Depth unchanged. Will switch to dressing with silver nitrate for 1 week before going back to jude next week. PAST MEDICAL HISTORY        Diagnosis Date    CAD (coronary artery disease)     Current every day smoker     Diabetes mellitus ([address]     GERD (gastroesophageal reflux disease)     History of exercise stress test 2019    Treadmill, Normal exercise performance without angina and ischemic EKG changes. Hyperlipidemia     Hypertension     Morbid obesity ([address]     Palpitations     Post PTCA 2019    Kissing stent to LAD & to Ostium of Diag. SOB (shortness of breath)     Type 2 diabetes mellitus with ketoacidosis without coma, with long-term current use of insulin ([address] 2019    DM diagnosed about        PAST SURGICAL HISTORY    Past Surgical History:   Procedure Laterality Date    BREAST SURGERY Left 2023    LEFT BREAST INCISION AND DRAINAGE performed by Taya Durbin DO at [address] [place] [address] Road      PTCA  2019    Kissing stents to LAD & Ostium of Diag.     UPPER GASTROINTESTINAL ENDOSCOPY N/A 2021

## 2023-06-06 ENCOUNTER — HOSPITAL ENCOUNTER (OUTPATIENT)
Dept: WOUND CARE | Age: 58
Discharge: HOME | End: 2023-06-06
Payer: COMMERCIAL

## 2023-06-06 VITALS
RESPIRATION RATE: 18 BRPM | DIASTOLIC BLOOD PRESSURE: 75 MMHG | TEMPERATURE: 98.2 F | SYSTOLIC BLOOD PRESSURE: 152 MMHG | HEART RATE: 90 BPM

## 2023-06-06 DIAGNOSIS — E66.9 DIABETES MELLITUS TYPE 2 IN OBESE (HCC): ICD-10-CM

## 2023-06-06 DIAGNOSIS — E11.69 DIABETES MELLITUS TYPE 2 IN OBESE (HCC): ICD-10-CM

## 2023-06-06 DIAGNOSIS — N61.1 BREAST ABSCESS: Primary | ICD-10-CM

## 2023-06-06 PROCEDURE — 11042 DBRDMT SUBQ TIS 1ST 20SQCM/<: CPT

## 2023-06-06 PROCEDURE — 11042 DBRDMT SUBQ TIS 1ST 20SQCM/<: CPT | Performed by: SURGERY

## 2023-06-06 RX ORDER — LIDOCAINE 40 MG/G
CREAM TOPICAL ONCE
OUTPATIENT
Start: 2023-06-06 | End: 2023-06-06

## 2023-06-06 RX ORDER — GINSENG 100 MG
CAPSULE ORAL ONCE
OUTPATIENT
Start: 2023-06-06 | End: 2023-06-06

## 2023-06-06 RX ORDER — GENTAMICIN SULFATE 1 MG/G
OINTMENT TOPICAL ONCE
OUTPATIENT
Start: 2023-06-06 | End: 2023-06-06

## 2023-06-06 RX ORDER — BETAMETHASONE DIPROPIONATE 0.05 %
OINTMENT (GRAM) TOPICAL ONCE
OUTPATIENT
Start: 2023-06-06 | End: 2023-06-06

## 2023-06-06 RX ORDER — CLOBETASOL PROPIONATE 0.5 MG/G
OINTMENT TOPICAL ONCE
OUTPATIENT
Start: 2023-06-06 | End: 2023-06-06

## 2023-06-06 RX ORDER — LIDOCAINE HYDROCHLORIDE 40 MG/ML
SOLUTION TOPICAL ONCE
OUTPATIENT
Start: 2023-06-06 | End: 2023-06-06

## 2023-06-06 RX ORDER — LIDOCAINE 50 MG/G
OINTMENT TOPICAL ONCE
OUTPATIENT
Start: 2023-06-06 | End: 2023-06-06

## 2023-06-06 RX ORDER — BACITRACIN ZINC AND POLYMYXIN B SULFATE 500; 1000 [USP'U]/G; [USP'U]/G
OINTMENT TOPICAL ONCE
OUTPATIENT
Start: 2023-06-06 | End: 2023-06-06

## 2023-06-06 RX ORDER — IBUPROFEN 200 MG
TABLET ORAL ONCE
OUTPATIENT
Start: 2023-06-06 | End: 2023-06-06

## 2023-06-06 ASSESSMENT — PAIN SCALES - GENERAL: PAINLEVEL_OUTOF10: 0

## 2023-06-06 NOTE — PROGRESS NOTES
subcutaneous tissue is required to promote healing and evaluate the extent of previous healing. Performed by: Leander Merlin, DO    Consent obtained: Yes    Time out taken:  Yes    Pain Control:       Debridement:Excisional Debridement    Using curette the wound(s) was/were sharply debrided down through and including the removal of subcutaneous tissue. Devitalized Tissue Debrided:  biofilm and slough    Pre Debridement Measurements:  Are located in the Wound Documentation Flow Sheet    All active wounds listed below with today's date are evaluated  Wound(s)    debrided this date include # : 1     Post  Debridement Measurements:  Wound 03/07/23 Breast Left; Lower #1 (Active)   Wound Image   05/23/23 0855   Wound Etiology Non-Healing Surgical 06/06/23 0913   Dressing Status New dressing applied 05/23/23 0926   Wound Cleansed Wound cleanser 06/06/23 0913   Offloading for Diabetic Foot Ulcers Offloading not ordered 06/06/23 0913   Wound Length (cm) 0.2 cm 06/06/23 0913   Wound Width (cm) 0.2 cm 06/06/23 0913   Wound Depth (cm) 0.3 cm 06/06/23 0913   Wound Surface Area (cm^2) 0.04 cm^2 06/06/23 0913   Change in Wound Size % (l*w) 98.93 06/06/23 0913   Wound Volume (cm^3) 0.012 cm^3 06/06/23 0913   Wound Healing % 100 06/06/23 0913   Post-Procedure Length (cm) 0.2 cm 06/06/23 0933   Post-Procedure Width (cm) 0.2 cm 06/06/23 0933   Post-Procedure Depth (cm) 0.3 cm 06/06/23 0933   Post-Procedure Surface Area (cm^2) 0.04 cm^2 06/06/23 0933   Post-Procedure Volume (cm^3) 0.012 cm^3 06/06/23 0933   Distance Tunneling (cm) 0 cm 06/06/23 0913   Tunneling Position ___ O'Clock 0 06/06/23 0913   Undermining Starts ___ O'Clock 0 06/06/23 0913   Undermining Ends___ O'Clock 0 06/06/23 0913   Undermining Maxium Distance (cm) 0 06/06/23 0913   Wound Assessment Pale granulation tissue 06/06/23 0913   Drainage Amount Moderate 06/06/23 0913   Drainage Description Serosanguinous 06/06/23 0913   Odor None 06/06/23 0913   Dolores-wound

## 2023-06-06 NOTE — DISCHARGE INSTRUCTIONS
PHYSICIAN ORDERS AND DISCHARGE INSTRUCTIONS     NOTE: Upon discharge from the [address], you will receive a patient experience survey. We would be grateful if you would take the time to fill this survey out. Wound care order history:                 GALO's   Right       Left               Date:              Cultures:                Grafts:                Antibiotics:           Continuing wound care orders and information:                 Residence:                Continue home health care with:               Your wound-care supplies will be provided by: Wound cleansing:               Do not scrub or use excessive force. Wash hands with soap and water before and after dressing changes. Prior to applying a clean dressing, cleanse wound with normal saline, wound cleanser, or mild soap and water. Ask the physician or nurse before getting the wound(s) wet in a shower. General Wound management:              Keep weight off wounds and reposition every 2 hours. Avoid standing for long periods of time. Apply wraps/stockings in AM and remove at bedtime. If swelling is present, elevate legs to the level of the heart or above for 30 minutes 4-5 times a day and/or when sitting. When taking antibiotics take entire prescription as ordered by physician do not stop taking until medicine is all gone. Orders for this week: 6/6/23     Rx:     Left Breast Wound - Wash with soap and water, pat dry. Tuck small amount of Luisa into wound (do not overpack). Cover with ca alginate, ABD (or bandaid/silicone border) and tegaderm. Change daily. Follow up with Dr. Marquez Alvarez in 2 week(s) in the wound care center. Call [phone] for any questions or concerns.   Date__________   Time___________

## 2023-06-20 ENCOUNTER — HOSPITAL ENCOUNTER (OUTPATIENT)
Dept: WOUND CARE | Age: 58
Discharge: HOME | End: 2023-06-20
Payer: COMMERCIAL

## 2023-06-20 VITALS
RESPIRATION RATE: 18 BRPM | HEART RATE: 96 BPM | SYSTOLIC BLOOD PRESSURE: 157 MMHG | DIASTOLIC BLOOD PRESSURE: 94 MMHG | TEMPERATURE: 97.7 F

## 2023-06-20 DIAGNOSIS — E11.69 DIABETES MELLITUS TYPE 2 IN OBESE (HCC): ICD-10-CM

## 2023-06-20 DIAGNOSIS — E66.9 DIABETES MELLITUS TYPE 2 IN OBESE (HCC): ICD-10-CM

## 2023-06-20 DIAGNOSIS — N61.1 BREAST ABSCESS: Primary | ICD-10-CM

## 2023-06-20 PROCEDURE — 99212 OFFICE O/P EST SF 10 MIN: CPT

## 2023-06-20 PROCEDURE — 99213 OFFICE O/P EST LOW 20 MIN: CPT | Performed by: SURGERY

## 2023-06-20 RX ORDER — GENTAMICIN SULFATE 1 MG/G
OINTMENT TOPICAL ONCE
OUTPATIENT
Start: 2023-06-20 | End: 2023-06-20

## 2023-06-20 RX ORDER — LIDOCAINE 50 MG/G
OINTMENT TOPICAL ONCE
OUTPATIENT
Start: 2023-06-20 | End: 2023-06-20

## 2023-06-20 RX ORDER — IBUPROFEN 200 MG
TABLET ORAL ONCE
OUTPATIENT
Start: 2023-06-20 | End: 2023-06-20

## 2023-06-20 RX ORDER — LIDOCAINE HYDROCHLORIDE 40 MG/ML
SOLUTION TOPICAL ONCE
OUTPATIENT
Start: 2023-06-20 | End: 2023-06-20

## 2023-06-20 RX ORDER — LIDOCAINE 40 MG/G
CREAM TOPICAL ONCE
OUTPATIENT
Start: 2023-06-20 | End: 2023-06-20

## 2023-06-20 RX ORDER — GINSENG 100 MG
CAPSULE ORAL ONCE
OUTPATIENT
Start: 2023-06-20 | End: 2023-06-20

## 2023-06-20 RX ORDER — BACITRACIN ZINC AND POLYMYXIN B SULFATE 500; 1000 [USP'U]/G; [USP'U]/G
OINTMENT TOPICAL ONCE
OUTPATIENT
Start: 2023-06-20 | End: 2023-06-20

## 2023-06-20 RX ORDER — BETAMETHASONE DIPROPIONATE 0.05 %
OINTMENT (GRAM) TOPICAL ONCE
OUTPATIENT
Start: 2023-06-20 | End: 2023-06-20

## 2023-06-20 RX ORDER — CLOBETASOL PROPIONATE 0.5 MG/G
OINTMENT TOPICAL ONCE
OUTPATIENT
Start: 2023-06-20 | End: 2023-06-20

## 2023-06-20 ASSESSMENT — PAIN - FUNCTIONAL ASSESSMENT: PAIN_FUNCTIONAL_ASSESSMENT: ACTIVITIES ARE NOT PREVENTED

## 2023-06-20 ASSESSMENT — PAIN SCALES - GENERAL: PAINLEVEL_OUTOF10: 0

## 2023-06-20 NOTE — PROGRESS NOTES
Wound Care Center Progress Note      Dong Tracy  AGE: 56 y.o. GENDER: female  : 1965  EPISODE DATE:  2023     Subjective:     Chief Complaint   Patient presents with    Wound Check     Left Breast         HISTORY of PRESENT ILLNESS      Dong Tracy is a 56 y.o. female who presents today for wound evaluation of Acute non-healing surgical ulcer(s) of the left breast.  The ulcer is of mild severity. The underlying cause of the wound is abscess s/p I&D. Wound Pain Timing/Severity: intermittent  Quality of pain: dull  Severity of pain:  1 / 10   Modifying Factors: obesity and smoking (has now quit)  Associated Signs/Symptoms: edema and drainage                            23 - wound remains unchanged. Will try packing with jude this week. 23   - wound improving with jude. 23   - no new complaints. Depth unchanged. Will switch to dressing with silver nitrate for 1 week before going back to jude next week. 23  - improved. Less drainage. 23   Healed        PAST MEDICAL HISTORY        Diagnosis Date    CAD (coronary artery disease)     Current every day smoker     Diabetes mellitus ([address]     GERD (gastroesophageal reflux disease)     History of exercise stress test 2019    Treadmill, Normal exercise performance without angina and ischemic EKG changes. Hyperlipidemia     Hypertension     Morbid obesity ([address]     Palpitations     Post PTCA 2019    Kissing stent to LAD & to Ostium of Diag. SOB (shortness of breath)     Type 2 diabetes mellitus with ketoacidosis without coma, with long-term current use of insulin ([address] 2019    DM diagnosed about        PAST SURGICAL HISTORY    Past Surgical History:   Procedure Laterality Date    BREAST SURGERY Left 2023    LEFT BREAST INCISION AND DRAINAGE performed by Anupama Mann DO at [address] [place] [address] Road      PTCA  2019    Kissing stents to LAD & Ostium of Diag. na

## 2023-06-20 NOTE — DISCHARGE INSTRUCTIONS
PHYSICIAN ORDERS AND DISCHARGE INSTRUCTIONS     NOTE: Upon discharge from the [address], you will receive a patient experience survey. We would be grateful if you would take the time to fill this survey out. Wound care order history:                 GALO's   Right       Left               Date:              Cultures:                Grafts:                Antibiotics:           Continuing wound care orders and information:                 Residence:                Continue home health care with:               Your wound-care supplies will be provided by: Wound cleansing:               Do not scrub or use excessive force. Wash hands with soap and water before and after dressing changes. Prior to applying a clean dressing, cleanse wound with normal saline, wound cleanser, or mild soap and water. Ask the physician or nurse before getting the wound(s) wet in a shower. General Wound management:              Keep weight off wounds and reposition every 2 hours. Avoid standing for long periods of time. Apply wraps/stockings in AM and remove at bedtime. If swelling is present, elevate legs to the level of the heart or above for 30 minutes 4-5 times a day and/or when sitting. When taking antibiotics take entire prescription as ordered by physician do not stop taking until medicine is all gone. Orders for this week: 6/20/23     Rx:     Left Breast Wound - Healed 6/20/23. Tuck small amount of Luisa over wound. Cover with ABD (or bandaid/silicone border) and tegaderm. Change daily for 1 more week then may leave open to air. Discharged from the wound care center 6/20/23. Call [phone] for any questions or concerns.   Date__________   Time___________

## 2023-06-20 NOTE — PROGRESS NOTES
Luisa applied to wound, covered with silicone border. Pt declined Tegaderm. Went over discharge instructions with Gibson Marie. She verbalized understanding.

## 2023-06-24 DIAGNOSIS — K26.9 DUODENAL ULCER: ICD-10-CM

## 2023-06-26 RX ORDER — AMLODIPINE BESYLATE 10 MG/1
10 TABLET ORAL DAILY
Qty: 30 TABLET | Refills: 3 | OUTPATIENT
Start: 2023-06-26

## 2023-06-26 RX ORDER — OMEPRAZOLE 20 MG/1
CAPSULE, DELAYED RELEASE ORAL
Qty: 30 CAPSULE | Refills: 1 | Status: SHIPPED | OUTPATIENT
Start: 2023-06-26

## 2023-08-15 ENCOUNTER — TELEPHONE (OUTPATIENT)
Dept: INTERNAL MEDICINE CLINIC | Age: 58
End: 2023-08-15

## 2023-08-15 NOTE — TELEPHONE ENCOUNTER
----- Message from [address] [place] [address] sent at 8/14/2023  8:48 AM EDT -----  Subject: Appointment Request    Reason for Call: Heriberto Gant Patient/New to Provider Appointment needed: New   Patient Request Appointment    QUESTIONS    Reason for appointment request? Available appointments did not meet   patient need     Additional Information for Provider? Pt will be out of her meds in a   month. Can she be seen before then or will provider refill until she can   get in.  Please call her with appt for her and her   ---------------------------------------------------------------------------  --------------  [address]  [phone]; OK to leave message on voicemail  ---------------------------------------------------------------------------  --------------  SCRIPT ANSWERS

## 2023-10-16 ENCOUNTER — OFFICE VISIT (OUTPATIENT)
Dept: INTERNAL MEDICINE CLINIC | Age: 58
End: 2023-10-16
Payer: COMMERCIAL

## 2023-10-16 VITALS — TEMPERATURE: 97.9 F | HEART RATE: 97 BPM | OXYGEN SATURATION: 95 % | BODY MASS INDEX: 47.78 KG/M2 | WEIGHT: 293 LBS

## 2023-10-16 DIAGNOSIS — R09.89 SYMPTOMS OF UPPER RESPIRATORY INFECTION (URI): ICD-10-CM

## 2023-10-16 DIAGNOSIS — J04.0 LARYNGITIS: Primary | ICD-10-CM

## 2023-10-16 DIAGNOSIS — R05.1 ACUTE COUGH: ICD-10-CM

## 2023-10-16 PROCEDURE — 99213 OFFICE O/P EST LOW 20 MIN: CPT | Performed by: PHYSICIAN ASSISTANT

## 2023-10-16 RX ORDER — FLUTICASONE PROPIONATE 50 MCG
2 SPRAY, SUSPENSION (ML) NASAL DAILY
Qty: 16 G | Refills: 0 | Status: SHIPPED | OUTPATIENT
Start: 2023-10-16

## 2023-10-16 RX ORDER — PREDNISONE 20 MG/1
20 TABLET ORAL DAILY
Qty: 5 TABLET | Refills: 0 | Status: SHIPPED | OUTPATIENT
Start: 2023-10-16 | End: 2023-10-21

## 2023-10-16 RX ORDER — CETIRIZINE HYDROCHLORIDE 10 MG/1
10 TABLET ORAL DAILY
Qty: 30 TABLET | Refills: 0 | Status: SHIPPED | OUTPATIENT
Start: 2023-10-16 | End: 2023-11-15

## 2023-10-16 RX ORDER — ALBUTEROL SULFATE 90 UG/1
2 AEROSOL, METERED RESPIRATORY (INHALATION) 4 TIMES DAILY PRN
Qty: 54 G | Refills: 1 | Status: SHIPPED | OUTPATIENT
Start: 2023-10-16

## 2023-10-16 ASSESSMENT — ENCOUNTER SYMPTOMS
VOMITING: 0
BACK PAIN: 0
EYE REDNESS: 0
PHOTOPHOBIA: 0
SORE THROAT: 1
DIARRHEA: 0
VOICE CHANGE: 1
EYE DISCHARGE: 0
COLOR CHANGE: 0
NAUSEA: 0
WHEEZING: 0
COUGH: 1
RHINORRHEA: 0
EYE PAIN: 0
CHEST TIGHTNESS: 0
CONSTIPATION: 0
SHORTNESS OF BREATH: 0
ABDOMINAL PAIN: 0
BLOOD IN STOOL: 0

## 2023-10-16 NOTE — PROGRESS NOTES
Juan Carlos Sierra (:  1965) is a 66 y.o. female,Established patient, here for evaluation of the following chief complaint(s):    Cough (For 2 weeks. Losing voice)      SUBJECTIVE/OBJECTIVE:  HPI  Juan Carlos Sierra is a pleasant 66 y.o. female presenting to clinic today for cough/hoarseness/URI. Patient reports ongoing symptoms for the past 2 weeks; reports initial slight sore throat which has improved; reports periodic coughing, denies coughing fits, shortness of breath, dyspnea on exertion; reports cough is usually dry; reports significant hoarseness and loss of voice; states her symptoms are not improving overall; reports taking ibuprofen occasionally which does not help. Has not taken anything else for symptoms. Reports she has had similar episodes previously which have improved with steroid inhaler. Reports she has also been having congestion and drainage for the past several weeks which she presumes are allergy related. Denies fevers, chest pain etc.  Follows up with endocrinology next month; reports blood sugars have been 1 10-1 20 recently, is on nightly long-acting insulin and sliding scale coverage. Patient is a long-term smoker, currently smoking about half pack per day, not interested in quitting at this time.         Allergies   Allergen Reactions    Morphine Other (See Comments)    Ampicillin Rash       Current Outpatient Medications   Medication Sig Dispense Refill    predniSONE (DELTASONE) 20 MG tablet Take 1 tablet by mouth daily for 5 days 5 tablet 0    albuterol sulfate HFA (VENTOLIN HFA) 108 (90 Base) MCG/ACT inhaler Inhale 2 puffs into the lungs 4 times daily as needed for Wheezing or Shortness of Breath (cough) 54 g 1    cetirizine (ZYRTEC) 10 MG tablet Take 1 tablet by mouth daily 30 tablet 0    fluticasone (FLONASE) 50 MCG/ACT nasal spray 2 sprays by Each Nostril route daily 16 g 0    omeprazole (PRILOSEC) 20 MG delayed release capsule TAKE ONE CAPSULE BY MOUTH EVERY MORNING BEFORE

## 2023-10-26 ASSESSMENT — ENCOUNTER SYMPTOMS: ORTHOPNEA: 0

## 2023-10-27 ENCOUNTER — OFFICE VISIT (OUTPATIENT)
Dept: CARDIOLOGY CLINIC | Age: 58
End: 2023-10-27
Payer: COMMERCIAL

## 2023-10-27 VITALS
WEIGHT: 293 LBS | OXYGEN SATURATION: 95 % | HEART RATE: 102 BPM | BODY MASS INDEX: 47.09 KG/M2 | SYSTOLIC BLOOD PRESSURE: 136 MMHG | DIASTOLIC BLOOD PRESSURE: 88 MMHG | HEIGHT: 66 IN

## 2023-10-27 DIAGNOSIS — I25.10 CORONARY ARTERY DISEASE INVOLVING NATIVE CORONARY ARTERY OF NATIVE HEART WITHOUT ANGINA PECTORIS: Primary | ICD-10-CM

## 2023-10-27 DIAGNOSIS — E66.01 CLASS 3 SEVERE OBESITY DUE TO EXCESS CALORIES WITH SERIOUS COMORBIDITY AND BODY MASS INDEX (BMI) OF 45.0 TO 49.9 IN ADULT (HCC): ICD-10-CM

## 2023-10-27 DIAGNOSIS — I10 ESSENTIAL HYPERTENSION: ICD-10-CM

## 2023-10-27 DIAGNOSIS — E78.2 MIXED HYPERLIPIDEMIA: ICD-10-CM

## 2023-10-27 PROCEDURE — 99214 OFFICE O/P EST MOD 30 MIN: CPT | Performed by: NURSE PRACTITIONER

## 2023-10-27 PROCEDURE — 3075F SYST BP GE 130 - 139MM HG: CPT | Performed by: NURSE PRACTITIONER

## 2023-10-27 PROCEDURE — 3079F DIAST BP 80-89 MM HG: CPT | Performed by: NURSE PRACTITIONER

## 2023-10-27 PROCEDURE — 93000 ELECTROCARDIOGRAM COMPLETE: CPT | Performed by: NURSE PRACTITIONER

## 2023-10-27 RX ORDER — CARVEDILOL 12.5 MG/1
12.5 TABLET ORAL 2 TIMES DAILY
Qty: 180 TABLET | Refills: 3 | Status: SHIPPED | OUTPATIENT
Start: 2023-10-27

## 2023-10-27 ASSESSMENT — ENCOUNTER SYMPTOMS: SHORTNESS OF BREATH: 0

## 2023-10-27 NOTE — PATIENT INSTRUCTIONS
**It is YOUR responsibilty to bring medication bottles and/or updated medication list to [address] [place] [address]. This will allow us to better serve you and all your healthcare needs**   [facility] Locations - No appointment necessary. Sites open Monday to Friday. Call your preferred location for test preparation, business   hours and other information you need. [employer] accepts [employer]. [address] [place] [address]. [address] [facility], [address] [facility]  Phone: [phone]    Thank you for allowing us to care for you today! We want to ensure we can follow your treatment plan and we strive to give you the best outcomes and experience possible. If you ever have a life threatening emergency and call 911 - for an ambulance (EMS)   Our providers can only care for you at:   [facility] or [facility]. Even if you have someone take you or you drive yourself we can only care for you in a [facility]. Our providers are not setup at the other healthcare locations! Please be informed that if you contact our office outside of normal business hours the physician on call cannot help with any scheduling or rescheduling issues, procedure instruction questions or any type of medication issue. We advise you for any urgent/emergency that you go to the nearest emergency room! PLEASE CALL OUR OFFICE DURING NORMAL BUSINESS HOURS    Monday - Friday   8 am to 5 pm    [place]: [address] [place]: [phone]    [place]:  [phone]  We are committed to providing you the best care possible. If you receive a survey after visiting one of our offices, please take time to share your experience concerning your physician office visit. These surveys are confidential and no health information about you is shared. We are eager to improve for you and we are counting on your feedback to help make that happen.

## 2023-10-30 ENCOUNTER — TELEPHONE (OUTPATIENT)
Dept: BARIATRICS/WEIGHT MGMT | Age: 58
End: 2023-10-30

## 2023-10-30 NOTE — TELEPHONE ENCOUNTER
Called for wm referral has bar cov but must have surgery at a [facility] facility which we are not.  Left message

## 2023-11-07 ENCOUNTER — TELEPHONE (OUTPATIENT)
Dept: INTERNAL MEDICINE CLINIC | Age: 58
End: 2023-11-07

## 2023-11-07 NOTE — TELEPHONE ENCOUNTER
Attempted to return patients call to set up a new to provider appointment. Message left for patient to contact office to assist with getting her scheduled with one of other providers in the office.

## 2023-11-07 NOTE — TELEPHONE ENCOUNTER
----- Message from Anika Huynh sent at 11/3/2023 10:39 AM EDT -----  Subject: Appointment Request    Reason for Call: Established Patient Appointment needed: New Patient   Request Appointment    QUESTIONS    Reason for appointment request? No appointments available during search     Additional Information for Provider? Patient called and stated she needs   to schedule a appointment with anyone in the office. Patient stated they   were patients of Deana Reyes who has left the office. No appointments   showing.  Please call  ---------------------------------------------------------------------------  --------------  Fredy Standing INFO  [phone]; OK to leave message on voicemail,OK to respond with electronic   message via [employer] portal (only for patients who have registered [employer]   account)  ---------------------------------------------------------------------------  --------------  SCRIPT ANSWERS

## 2023-11-21 ENCOUNTER — PATIENT MESSAGE (OUTPATIENT)
Dept: INTERNAL MEDICINE CLINIC | Age: 58
End: 2023-11-21

## 2023-12-06 ENCOUNTER — OFFICE VISIT (OUTPATIENT)
Dept: INTERNAL MEDICINE CLINIC | Age: 58
End: 2023-12-06
Payer: COMMERCIAL

## 2023-12-06 VITALS
WEIGHT: 293 LBS | OXYGEN SATURATION: 98 % | SYSTOLIC BLOOD PRESSURE: 120 MMHG | DIASTOLIC BLOOD PRESSURE: 70 MMHG | HEIGHT: 66 IN | BODY MASS INDEX: 47.09 KG/M2 | TEMPERATURE: 86 F

## 2023-12-06 DIAGNOSIS — I25.10 CORONARY ARTERY DISEASE INVOLVING NATIVE CORONARY ARTERY OF NATIVE HEART WITHOUT ANGINA PECTORIS: ICD-10-CM

## 2023-12-06 DIAGNOSIS — E66.9 DIABETES MELLITUS TYPE 2 IN OBESE (HCC): Primary | ICD-10-CM

## 2023-12-06 DIAGNOSIS — E78.5 DYSLIPIDEMIA: ICD-10-CM

## 2023-12-06 DIAGNOSIS — I10 ESSENTIAL HYPERTENSION: ICD-10-CM

## 2023-12-06 DIAGNOSIS — F51.01 PRIMARY INSOMNIA: ICD-10-CM

## 2023-12-06 DIAGNOSIS — E11.69 DIABETES MELLITUS TYPE 2 IN OBESE (HCC): Primary | ICD-10-CM

## 2023-12-06 DIAGNOSIS — K26.9 DUODENAL ULCER: ICD-10-CM

## 2023-12-06 DIAGNOSIS — E66.01 MORBID OBESITY (HCC): ICD-10-CM

## 2023-12-06 PROBLEM — I20.0 UNSTABLE ANGINA (HCC): Status: RESOLVED | Noted: 2019-11-26 | Resolved: 2023-12-06

## 2023-12-06 PROBLEM — R07.9 ACUTE CHEST PAIN: Status: RESOLVED | Noted: 2019-11-25 | Resolved: 2023-12-06

## 2023-12-06 PROCEDURE — 3074F SYST BP LT 130 MM HG: CPT | Performed by: INTERNAL MEDICINE

## 2023-12-06 PROCEDURE — 99214 OFFICE O/P EST MOD 30 MIN: CPT | Performed by: INTERNAL MEDICINE

## 2023-12-06 PROCEDURE — 3046F HEMOGLOBIN A1C LEVEL >9.0%: CPT | Performed by: INTERNAL MEDICINE

## 2023-12-06 PROCEDURE — 3078F DIAST BP <80 MM HG: CPT | Performed by: INTERNAL MEDICINE

## 2023-12-06 RX ORDER — INSULIN GLARGINE 100 [IU]/ML
36 INJECTION, SOLUTION SUBCUTANEOUS NIGHTLY
Qty: 3 ADJUSTABLE DOSE PRE-FILLED PEN SYRINGE | Refills: 2 | Status: SHIPPED
Start: 2023-12-06 | End: 2023-12-06

## 2023-12-06 RX ORDER — DULAGLUTIDE 3 MG/.5ML
3 INJECTION, SOLUTION SUBCUTANEOUS WEEKLY
COMMUNITY

## 2023-12-06 RX ORDER — CANAGLIFLOZIN 300 MG/1
TABLET, FILM COATED ORAL
Qty: 30 TABLET | Refills: 0 | Status: SHIPPED | OUTPATIENT
Start: 2023-12-06

## 2023-12-06 RX ORDER — INSULIN GLARGINE 100 [IU]/ML
36 INJECTION, SOLUTION SUBCUTANEOUS NIGHTLY
Qty: 5 ADJUSTABLE DOSE PRE-FILLED PEN SYRINGE | Refills: 3
Start: 2023-12-06

## 2024-01-02 RX ORDER — LISINOPRIL 20 MG/1
TABLET ORAL
Qty: 90 TABLET | Refills: 3 | OUTPATIENT
Start: 2024-01-02

## 2024-01-02 RX ORDER — LISINOPRIL 20 MG/1
TABLET ORAL
Qty: 90 TABLET | Refills: 3 | Status: SHIPPED | OUTPATIENT
Start: 2024-01-02

## 2024-01-31 ENCOUNTER — HOSPITAL ENCOUNTER (EMERGENCY)
Age: 59
Discharge: HOME | End: 2024-01-31
Attending: EMERGENCY MEDICINE
Payer: COMMERCIAL

## 2024-01-31 ENCOUNTER — APPOINTMENT (OUTPATIENT)
Dept: GENERAL RADIOLOGY | Age: 59
End: 2024-01-31
Attending: EMERGENCY MEDICINE
Payer: COMMERCIAL

## 2024-01-31 VITALS
WEIGHT: 278 LBS | SYSTOLIC BLOOD PRESSURE: 187 MMHG | HEIGHT: 66 IN | DIASTOLIC BLOOD PRESSURE: 106 MMHG | RESPIRATION RATE: 16 BRPM | OXYGEN SATURATION: 91 % | BODY MASS INDEX: 44.68 KG/M2 | HEART RATE: 77 BPM | TEMPERATURE: 97.7 F

## 2024-01-31 DIAGNOSIS — I10 ESSENTIAL HYPERTENSION: ICD-10-CM

## 2024-01-31 DIAGNOSIS — R06.02 SHORTNESS OF BREATH: Primary | ICD-10-CM

## 2024-01-31 DIAGNOSIS — U07.1 COVID-19 VIRUS INFECTION: ICD-10-CM

## 2024-01-31 LAB
ALBUMIN SERPL-MCNC: 4 GM/DL (ref 3.4–5)
ALP BLD-CCNC: 135 IU/L (ref 40–129)
ALT SERPL-CCNC: 26 U/L (ref 10–40)
ANION GAP SERPL CALCULATED.3IONS-SCNC: 13 MMOL/L (ref 7–16)
AST SERPL-CCNC: 26 IU/L (ref 15–37)
BASOPHILS ABSOLUTE: 0 K/CU MM
BASOPHILS RELATIVE PERCENT: 0.4 % (ref 0–1)
BILIRUB SERPL-MCNC: 0.4 MG/DL (ref 0–1)
BUN SERPL-MCNC: 7 MG/DL (ref 6–23)
CALCIUM SERPL-MCNC: 8.9 MG/DL (ref 8.3–10.6)
CHLORIDE BLD-SCNC: 99 MMOL/L (ref 99–110)
CO2: 27 MMOL/L (ref 21–32)
CREAT SERPL-MCNC: 0.6 MG/DL (ref 0.6–1.1)
DIFFERENTIAL TYPE: ABNORMAL
EKG ATRIAL RATE: 72 BPM
EKG DIAGNOSIS: NORMAL
EKG P AXIS: 44 DEGREES
EKG P-R INTERVAL: 204 MS
EKG Q-T INTERVAL: 392 MS
EKG QRS DURATION: 78 MS
EKG QTC CALCULATION (BAZETT): 429 MS
EKG R AXIS: -9 DEGREES
EKG T AXIS: 76 DEGREES
EKG VENTRICULAR RATE: 72 BPM
EOSINOPHILS ABSOLUTE: 0.2 K/CU MM
EOSINOPHILS RELATIVE PERCENT: 2.4 % (ref 0–3)
GFR SERPL CREATININE-BSD FRML MDRD: >60 ML/MIN/1.73M2
GLUCOSE SERPL-MCNC: 201 MG/DL (ref 70–99)
HCT VFR BLD CALC: 44.1 % (ref 37–47)
HEMOGLOBIN: 13.7 GM/DL (ref 12.5–16)
IMMATURE NEUTROPHIL %: 0.4 % (ref 0–0.43)
LIPASE: 81 IU/L (ref 13–60)
LYMPHOCYTES ABSOLUTE: 2.6 K/CU MM
LYMPHOCYTES RELATIVE PERCENT: 31.3 % (ref 24–44)
MAGNESIUM: 1.7 MG/DL (ref 1.8–2.4)
MCH RBC QN AUTO: 29.8 PG (ref 27–31)
MCHC RBC AUTO-ENTMCNC: 31.1 % (ref 32–36)
MCV RBC AUTO: 95.9 FL (ref 78–100)
MONOCYTES ABSOLUTE: 0.5 K/CU MM
MONOCYTES RELATIVE PERCENT: 6.4 % (ref 0–4)
PDW BLD-RTO: 14.8 % (ref 11.7–14.9)
PLATELET # BLD: 158 K/CU MM (ref 140–440)
PMV BLD AUTO: 9.7 FL (ref 7.5–11.1)
POTASSIUM SERPL-SCNC: 4 MMOL/L (ref 3.5–5.1)
PRO-BNP: 114.8 PG/ML
RBC # BLD: 4.6 M/CU MM (ref 4.2–5.4)
SEGMENTED NEUTROPHILS ABSOLUTE COUNT: 5 K/CU MM
SEGMENTED NEUTROPHILS RELATIVE PERCENT: 59.1 % (ref 36–66)
SODIUM BLD-SCNC: 139 MMOL/L (ref 135–145)
TOTAL IMMATURE NEUTOROPHIL: 0.03 K/CU MM
TOTAL PROTEIN: 7.3 GM/DL (ref 6.4–8.2)
TROPONIN, HIGH SENSITIVITY: 12 NG/L (ref 0–14)
TROPONIN, HIGH SENSITIVITY: 15 NG/L (ref 0–14)
WBC # BLD: 8.4 K/CU MM (ref 4–10.5)

## 2024-01-31 PROCEDURE — 96374 THER/PROPH/DIAG INJ IV PUSH: CPT

## 2024-01-31 PROCEDURE — 93005 ELECTROCARDIOGRAM TRACING: CPT | Performed by: EMERGENCY MEDICINE

## 2024-01-31 PROCEDURE — 83880 ASSAY OF NATRIURETIC PEPTIDE: CPT

## 2024-01-31 PROCEDURE — 84484 ASSAY OF TROPONIN QUANT: CPT

## 2024-01-31 PROCEDURE — 94640 AIRWAY INHALATION TREATMENT: CPT

## 2024-01-31 PROCEDURE — 80053 COMPREHEN METABOLIC PANEL: CPT

## 2024-01-31 PROCEDURE — 6370000000 HC RX 637 (ALT 250 FOR IP): Performed by: EMERGENCY MEDICINE

## 2024-01-31 PROCEDURE — 93010 ELECTROCARDIOGRAM REPORT: CPT | Performed by: INTERNAL MEDICINE

## 2024-01-31 PROCEDURE — 99285 EMERGENCY DEPT VISIT HI MDM: CPT

## 2024-01-31 PROCEDURE — 71045 X-RAY EXAM CHEST 1 VIEW: CPT

## 2024-01-31 PROCEDURE — 83735 ASSAY OF MAGNESIUM: CPT

## 2024-01-31 PROCEDURE — 94664 DEMO&/EVAL PT USE INHALER: CPT

## 2024-01-31 PROCEDURE — 6360000002 HC RX W HCPCS: Performed by: EMERGENCY MEDICINE

## 2024-01-31 PROCEDURE — 85025 COMPLETE CBC W/AUTO DIFF WBC: CPT

## 2024-01-31 PROCEDURE — 83690 ASSAY OF LIPASE: CPT

## 2024-01-31 RX ORDER — ALBUTEROL SULFATE 90 UG/1
2 AEROSOL, METERED RESPIRATORY (INHALATION) ONCE
Status: COMPLETED | OUTPATIENT
Start: 2024-01-31 | End: 2024-01-31

## 2024-01-31 RX ORDER — HYDROXYZINE PAMOATE 25 MG/1
25 CAPSULE ORAL ONCE
Status: COMPLETED | OUTPATIENT
Start: 2024-01-31 | End: 2024-01-31

## 2024-01-31 RX ORDER — LISINOPRIL 20 MG/1
20 TABLET ORAL ONCE
Status: COMPLETED | OUTPATIENT
Start: 2024-01-31 | End: 2024-01-31

## 2024-01-31 RX ORDER — DEXAMETHASONE SODIUM PHOSPHATE 10 MG/ML
6 INJECTION, SOLUTION INTRAMUSCULAR; INTRAVENOUS EVERY 6 HOURS
Status: DISCONTINUED | OUTPATIENT
Start: 2024-01-31 | End: 2024-01-31 | Stop reason: HOSPADM

## 2024-01-31 RX ORDER — HYDROXYZINE HYDROCHLORIDE 25 MG/1
25 TABLET, FILM COATED ORAL EVERY 8 HOURS PRN
Qty: 15 TABLET | Refills: 0 | Status: SHIPPED | OUTPATIENT
Start: 2024-01-31 | End: 2024-02-05

## 2024-01-31 RX ORDER — IPRATROPIUM BROMIDE AND ALBUTEROL SULFATE 2.5; .5 MG/3ML; MG/3ML
1 SOLUTION RESPIRATORY (INHALATION) ONCE
Status: DISCONTINUED | OUTPATIENT
Start: 2024-01-31 | End: 2024-01-31

## 2024-01-31 RX ADMIN — LISINOPRIL 20 MG: 20 TABLET ORAL at 08:02

## 2024-01-31 RX ADMIN — ALBUTEROL SULFATE 2 PUFF: 108 AEROSOL, METERED RESPIRATORY (INHALATION) at 07:15

## 2024-01-31 RX ADMIN — ALBUTEROL SULFATE 2 PUFF: 108 INHALANT RESPIRATORY (INHALATION) at 07:15

## 2024-01-31 RX ADMIN — HYDROXYZINE PAMOATE 25 MG: 25 CAPSULE ORAL at 08:02

## 2024-01-31 RX ADMIN — DEXAMETHASONE SODIUM PHOSPHATE 6 MG: 10 INJECTION, SOLUTION INTRAMUSCULAR; INTRAVENOUS at 06:54

## 2024-01-31 ASSESSMENT — PAIN - FUNCTIONAL ASSESSMENT: PAIN_FUNCTIONAL_ASSESSMENT: NONE - DENIES PAIN

## 2024-01-31 NOTE — ED PROVIDER NOTES
Patient signed out to me by Dr. Barragan,    58yof with complaint of shortness of breath, lightheaded, anxious. Having cough and congestion. Started having symptoms on Saturday, tested positive for covid on Sunday.  Was started on paxlovid.     Pending labs and CXR     Latisha Garrido MD  01/31/24 0766        Patient doing well on reassessment. She reports the anxiety is primarily what she came in for, she feels flushed and anxious, can't sleep. Started after staring the paxlovid. Agreeable to a dose of hydroxyzine. We will also give her home lisinopril as she has not had her morning meds and is hypertensive. No PNA on CXR. Trop 15, pending repeat high send trop. She has no significant leukocytosis, no sputum production, no fever here, no tachycardia. I am not concerned for bacterial co-infection. Pending repeat trop for disposition.     Latisha Garrido MD  01/31/24 8576      EKG interpreted by me  Normal sinus rhythm with rate of 72 bpm, normal intervals.  No ST elevation.  There is there is some wandering baseline.  No previous to compare.    Repeat troponin went from 15-12.  She is not having any chest pain currently.  Blood pressure is improving slightly with her home lisinopril.  She is feeling much better on reassessment, states she feels entirely better, her breathing is fine, she is no longer anxious.  We did discuss potentially stopping the Paxlovid as she does feel that this is what is causing her anxiety and she is going to stop that at home and I will give her hydroxyzine for use if needed.  Plan will be for discharge at this time.     Latisha Garrido MD  01/31/24 3916

## 2024-02-05 RX ORDER — ATORVASTATIN CALCIUM 40 MG/1
40 TABLET, FILM COATED ORAL DAILY
Qty: 30 TABLET | Refills: 5 | Status: SHIPPED | OUTPATIENT
Start: 2024-02-05

## 2024-03-01 ENCOUNTER — HOSPITAL ENCOUNTER (OUTPATIENT)
Age: 59
Discharge: HOME | End: 2024-03-01
Payer: COMMERCIAL

## 2024-03-01 ENCOUNTER — OFFICE VISIT (OUTPATIENT)
Age: 59
End: 2024-03-01
Payer: COMMERCIAL

## 2024-03-01 VITALS
TEMPERATURE: 99.3 F | SYSTOLIC BLOOD PRESSURE: 166 MMHG | HEART RATE: 99 BPM | DIASTOLIC BLOOD PRESSURE: 86 MMHG | OXYGEN SATURATION: 94 %

## 2024-03-01 DIAGNOSIS — E66.9 DIABETES MELLITUS TYPE 2 IN OBESE (HCC): ICD-10-CM

## 2024-03-01 DIAGNOSIS — R21 RASH: ICD-10-CM

## 2024-03-01 DIAGNOSIS — E11.69 DIABETES MELLITUS TYPE 2 IN OBESE (HCC): ICD-10-CM

## 2024-03-01 DIAGNOSIS — J10.1 INFLUENZA A: Primary | ICD-10-CM

## 2024-03-01 LAB
ALBUMIN SERPL-MCNC: 3.6 GM/DL (ref 3.4–5)
ALP BLD-CCNC: 159 IU/L (ref 40–129)
ALT SERPL-CCNC: 29 U/L (ref 10–40)
ANION GAP SERPL CALCULATED.3IONS-SCNC: 11 MMOL/L (ref 7–16)
AST SERPL-CCNC: 39 IU/L (ref 15–37)
BASOPHILS ABSOLUTE: 0 K/CU MM
BASOPHILS RELATIVE PERCENT: 0.5 % (ref 0–1)
BILIRUB SERPL-MCNC: 0.3 MG/DL (ref 0–1)
BILIRUBIN URINE: NEGATIVE MG/DL
BILIRUBIN, POC: ABNORMAL
BLOOD URINE, POC: ABNORMAL
BLOOD, URINE: NEGATIVE
BUN SERPL-MCNC: 9 MG/DL (ref 6–23)
CALCIUM SERPL-MCNC: 8.8 MG/DL (ref 8.3–10.6)
CHLORIDE BLD-SCNC: 97 MMOL/L (ref 99–110)
CLARITY, POC: ABNORMAL
CLARITY: CLEAR
CO2: 28 MMOL/L (ref 21–32)
COLOR, POC: YELLOW
COLOR: YELLOW
COMMENT UA: NORMAL
CREAT SERPL-MCNC: 0.8 MG/DL (ref 0.6–1.1)
DIFFERENTIAL TYPE: ABNORMAL
EOSINOPHILS RELATIVE PERCENT: 4.4 % (ref 0–3)
ESTIMATED AVERAGE GLUCOSE: 174 MG/DL
GFR SERPL CREATININE-BSD FRML MDRD: >60 ML/MIN/1.73M2
GLUCOSE SERPL-MCNC: 107 MG/DL (ref 70–99)
GLUCOSE URINE, POC: NEGATIVE
GLUCOSE, URINE: NEGATIVE MG/DL
HBA1C MFR BLD: 7.7 % (ref 4.2–6.3)
HCT VFR BLD CALC: 44.7 % (ref 37–47)
HEMOGLOBIN: 13.8 GM/DL (ref 12.5–16)
IGA: 493 MG/DL (ref 69–382)
IMMATURE NEUTROPHIL %: 0.3 % (ref 0–0.43)
KETONES, POC: ABNORMAL
KETONES, URINE: NEGATIVE MG/DL
LEUKOCYTE EST, POC: ABNORMAL
LEUKOCYTE ESTERASE, URINE: NEGATIVE
LYMPHOCYTES ABSOLUTE: 1.7 K/CU MM
LYMPHOCYTES RELATIVE PERCENT: 22.2 % (ref 24–44)
MCH RBC QN AUTO: 30.1 PG (ref 27–31)
MCHC RBC AUTO-ENTMCNC: 30.9 % (ref 32–36)
MCV RBC AUTO: 97.6 FL (ref 78–100)
MONOCYTES ABSOLUTE: 1.2 K/CU MM
MONOCYTES RELATIVE PERCENT: 15.6 % (ref 0–4)
NITRITE URINE, QUANTITATIVE: NEGATIVE
NITRITE, POC: ABNORMAL
PDW BLD-RTO: 14.5 % (ref 11.7–14.9)
PH, POC: 6
PH, URINE: 7 (ref 5–8)
PLATELET # BLD: 161 K/CU MM (ref 140–440)
PMV BLD AUTO: 9.3 FL (ref 7.5–11.1)
POTASSIUM SERPL-SCNC: 4.9 MMOL/L (ref 3.5–5.1)
PROTEIN UA: NEGATIVE MG/DL
PROTEIN, POC: 300
RBC # BLD: 4.58 M/CU MM (ref 4.2–5.4)
SEGMENTED NEUTROPHILS ABSOLUTE COUNT: 4.3 K/CU MM
SEGMENTED NEUTROPHILS RELATIVE PERCENT: 57 % (ref 36–66)
SODIUM BLD-SCNC: 136 MMOL/L (ref 135–145)
SPECIFIC GRAVITY UA: <1.005 (ref 1–1.03)
SPECIFIC GRAVITY, POC: 1.03
TOTAL IMMATURE NEUTOROPHIL: 0.02 K/CU MM
TOTAL PROTEIN: 7.7 GM/DL (ref 6.4–8.2)
UROBILINOGEN, POC: 0.2
UROBILINOGEN, URINE: 0.2 MG/DL (ref 0.2–1)
WBC # BLD: 7.5 K/CU MM (ref 4–10.5)

## 2024-03-01 PROCEDURE — 80053 COMPREHEN METABOLIC PANEL: CPT

## 2024-03-01 PROCEDURE — 82784 ASSAY IGA/IGD/IGG/IGM EACH: CPT

## 2024-03-01 PROCEDURE — 85025 COMPLETE CBC W/AUTO DIFF WBC: CPT

## 2024-03-01 PROCEDURE — 81002 URINALYSIS NONAUTO W/O SCOPE: CPT | Performed by: PHYSICIAN ASSISTANT

## 2024-03-01 PROCEDURE — 99214 OFFICE O/P EST MOD 30 MIN: CPT | Performed by: PHYSICIAN ASSISTANT

## 2024-03-01 PROCEDURE — 81003 URINALYSIS AUTO W/O SCOPE: CPT

## 2024-03-01 PROCEDURE — 81001 URINALYSIS AUTO W/SCOPE: CPT

## 2024-03-01 PROCEDURE — 36415 COLL VENOUS BLD VENIPUNCTURE: CPT

## 2024-03-01 PROCEDURE — 83036 HEMOGLOBIN GLYCOSYLATED A1C: CPT

## 2024-03-01 PROCEDURE — 3079F DIAST BP 80-89 MM HG: CPT | Performed by: PHYSICIAN ASSISTANT

## 2024-03-01 PROCEDURE — 3077F SYST BP >= 140 MM HG: CPT | Performed by: PHYSICIAN ASSISTANT

## 2024-03-01 RX ORDER — ONDANSETRON 4 MG/1
4 TABLET, ORALLY DISINTEGRATING ORAL 3 TIMES DAILY PRN
Qty: 21 TABLET | Refills: 0 | Status: SHIPPED | OUTPATIENT
Start: 2024-03-01

## 2024-03-01 RX ORDER — OSELTAMIVIR PHOSPHATE 75 MG/1
75 CAPSULE ORAL 2 TIMES DAILY
Qty: 10 CAPSULE | Refills: 0 | Status: SHIPPED | OUTPATIENT
Start: 2024-03-01 | End: 2024-03-06

## 2024-03-01 RX ORDER — BENZONATATE 200 MG/1
200 CAPSULE ORAL 3 TIMES DAILY PRN
Qty: 30 CAPSULE | Refills: 0 | Status: SHIPPED | OUTPATIENT
Start: 2024-03-01 | End: 2024-03-08

## 2024-03-01 RX ORDER — FLUTICASONE PROPIONATE 50 MCG
2 SPRAY, SUSPENSION (ML) NASAL DAILY
Qty: 16 G | Refills: 0 | Status: SHIPPED | OUTPATIENT
Start: 2024-03-01

## 2024-03-01 RX ORDER — DEXTROMETHORPHAN HYDROBROMIDE AND PROMETHAZINE HYDROCHLORIDE 15; 6.25 MG/5ML; MG/5ML
5 SYRUP ORAL NIGHTLY PRN
Qty: 118 ML | Refills: 0 | Status: SHIPPED | OUTPATIENT
Start: 2024-03-01 | End: 2024-03-25

## 2024-03-01 ASSESSMENT — ENCOUNTER SYMPTOMS
EYE DISCHARGE: 0
BACK PAIN: 0
NAUSEA: 0
DIARRHEA: 0
CONSTIPATION: 0
SHORTNESS OF BREATH: 0
PHOTOPHOBIA: 0
CHEST TIGHTNESS: 0
WHEEZING: 0
COLOR CHANGE: 0
COUGH: 1
VOMITING: 0
SORE THROAT: 0
EYE REDNESS: 0
RHINORRHEA: 0
ABDOMINAL PAIN: 0
BLOOD IN STOOL: 0
EYE PAIN: 0

## 2024-03-01 NOTE — RESULT ENCOUNTER NOTE
Vianey:    Vazquez Ms. Soto,    I left you a voicemail message, just wanted to let you know the remainder of your labs are reassuring, your kidney function and urine testing is normal, be sure to closely monitor for any new or worsening symptoms and please return to clinic or report to ED if the symptoms develop.  Your A1c is improved at 7.7%, patient to follow-up with your endocrinologist PCP.    Please let me know if you have any other questions or concerns.  Thanks,  Jose

## 2024-03-01 NOTE — PROGRESS NOTES
effort is normal. No respiratory distress.      Breath sounds: Normal breath sounds. No stridor. No wheezing, rhonchi or rales.   Chest:      Chest wall: No tenderness.   Musculoskeletal:         General: Normal range of motion.      Cervical back: Normal range of motion. No rigidity.   Lymphadenopathy:      Cervical: No cervical adenopathy.   Skin:     General: Skin is warm and dry.      Findings: Rash present. Rash is purpuric.      Comments: See media; purpuric/petechial rash to bilateral lower extremities, less so to bilateral upper extremities, no rash to torso or head; rash is nonblanchable, no discharge, nontender   Neurological:      General: No focal deficit present.      Mental Status: She is alert and oriented to person, place, and time. Mental status is at baseline.   Psychiatric:         Behavior: Behavior normal.          Media Information      Document Information    Rash      03/01/2024 09:11   Attached To:   Office Visit on 3/1/24 with Jose Medina PA   Source Information    Jose Medina PA  [facility]     ASSESSMENT/PLAN:  1. Influenza A   -Sick symptoms consistent with viral illness, point-of-care testing positive for influenza A; symptoms began 36 hours ago, can initiate Tamiflu, recommend sinus irrigation, Flonase, cough suppressants as needed, Zofran as needed. Return to clinic or report to emergency department if symptoms worsen, change, persist.    -     benzonatate (TESSALON) 200 MG capsule; Take 1 capsule by mouth 3 times daily as needed for Cough, Disp-30 capsule, R-0Normal  -     promethazine-dextromethorphan (PROMETHAZINE-DM) 6.25-15 MG/5ML syrup; Take 5 mLs by mouth nightly as needed for Cough, Disp-118 mL, R-0Normal  -     oseltamivir (TAMIFLU) 75 MG capsule; Take 1 capsule by mouth 2 times daily for 5 days, Disp-10 capsule, R-0Normal  -     fluticasone (FLONASE) 50 MCG/ACT nasal spray; 2 sprays by Each Nostril route daily, Disp-16 g, R-0Normal  -

## 2024-03-08 ENCOUNTER — OFFICE VISIT (OUTPATIENT)
Age: 59
End: 2024-03-08
Payer: COMMERCIAL

## 2024-03-08 VITALS
BODY MASS INDEX: 48.49 KG/M2 | DIASTOLIC BLOOD PRESSURE: 100 MMHG | RESPIRATION RATE: 16 BRPM | SYSTOLIC BLOOD PRESSURE: 170 MMHG | WEIGHT: 293 LBS | OXYGEN SATURATION: 95 % | HEART RATE: 76 BPM | TEMPERATURE: 97.4 F

## 2024-03-08 DIAGNOSIS — E66.01 MORBID OBESITY (HCC): ICD-10-CM

## 2024-03-08 DIAGNOSIS — J10.1 INFLUENZA A: ICD-10-CM

## 2024-03-08 DIAGNOSIS — I10 ESSENTIAL HYPERTENSION: Primary | ICD-10-CM

## 2024-03-08 DIAGNOSIS — R21 RASH AND NONSPECIFIC SKIN ERUPTION: ICD-10-CM

## 2024-03-08 DIAGNOSIS — I25.10 CORONARY ARTERY DISEASE INVOLVING NATIVE CORONARY ARTERY OF NATIVE HEART WITHOUT ANGINA PECTORIS: ICD-10-CM

## 2024-03-08 DIAGNOSIS — E55.9 VITAMIN D DEFICIENCY: ICD-10-CM

## 2024-03-08 DIAGNOSIS — E11.69 DIABETES MELLITUS TYPE 2 IN OBESE (HCC): ICD-10-CM

## 2024-03-08 DIAGNOSIS — E66.9 DIABETES MELLITUS TYPE 2 IN OBESE (HCC): ICD-10-CM

## 2024-03-08 DIAGNOSIS — E78.5 DYSLIPIDEMIA: ICD-10-CM

## 2024-03-08 DIAGNOSIS — K26.9 DUODENAL ULCER: ICD-10-CM

## 2024-03-08 PROBLEM — T14.8XXA NON-HEALING NON-SURGICAL WOUND: Status: RESOLVED | Noted: 2023-05-02 | Resolved: 2024-03-08

## 2024-03-08 PROBLEM — A49.01 MSSA (METHICILLIN SUSCEPTIBLE STAPHYLOCOCCUS AUREUS) INFECTION: Status: RESOLVED | Noted: 2023-02-24 | Resolved: 2024-03-08

## 2024-03-08 PROCEDURE — 3080F DIAST BP >= 90 MM HG: CPT | Performed by: INTERNAL MEDICINE

## 2024-03-08 PROCEDURE — 3077F SYST BP >= 140 MM HG: CPT | Performed by: INTERNAL MEDICINE

## 2024-03-08 PROCEDURE — 99214 OFFICE O/P EST MOD 30 MIN: CPT | Performed by: INTERNAL MEDICINE

## 2024-03-08 PROCEDURE — 3051F HG A1C>EQUAL 7.0%<8.0%: CPT | Performed by: INTERNAL MEDICINE

## 2024-03-08 RX ORDER — ASPIRIN 81 MG/1
81 TABLET ORAL DAILY
Qty: 90 TABLET | Refills: 1 | Status: CANCELLED | OUTPATIENT
Start: 2024-03-08

## 2024-03-08 RX ORDER — CANAGLIFLOZIN 300 MG/1
TABLET, FILM COATED ORAL
Qty: 90 TABLET | Refills: 1 | Status: SHIPPED | OUTPATIENT
Start: 2024-03-08

## 2024-03-08 RX ORDER — DULAGLUTIDE 3 MG/.5ML
3 INJECTION, SOLUTION SUBCUTANEOUS WEEKLY
Qty: 12 ADJUSTABLE DOSE PRE-FILLED PEN SYRINGE | Refills: 1 | Status: SHIPPED | OUTPATIENT
Start: 2024-03-08

## 2024-03-08 RX ORDER — OMEPRAZOLE 20 MG/1
20 CAPSULE, DELAYED RELEASE ORAL
Qty: 90 CAPSULE | Refills: 1 | Status: CANCELLED | OUTPATIENT
Start: 2024-03-08

## 2024-03-08 RX ORDER — INSULIN GLARGINE 100 [IU]/ML
36 INJECTION, SOLUTION SUBCUTANEOUS NIGHTLY
Qty: 15 ADJUSTABLE DOSE PRE-FILLED PEN SYRINGE | Refills: 1 | Status: SHIPPED | OUTPATIENT
Start: 2024-03-08

## 2024-03-08 RX ORDER — AMLODIPINE BESYLATE 10 MG/1
10 TABLET ORAL DAILY
Qty: 90 TABLET | Refills: 1 | Status: SHIPPED | OUTPATIENT
Start: 2024-03-08

## 2024-03-08 RX ORDER — ATORVASTATIN CALCIUM 40 MG/1
40 TABLET, FILM COATED ORAL DAILY
Qty: 90 TABLET | Refills: 1 | Status: SHIPPED | OUTPATIENT
Start: 2024-03-08

## 2024-03-08 RX ORDER — CARVEDILOL 12.5 MG/1
12.5 TABLET ORAL 2 TIMES DAILY
Qty: 180 TABLET | Refills: 1 | Status: SHIPPED | OUTPATIENT
Start: 2024-03-08

## 2024-03-08 RX ORDER — OMEGA-3S/DHA/EPA/FISH OIL/D3 300MG-1000
800 CAPSULE ORAL DAILY
Qty: 180 TABLET | Refills: 1 | Status: SHIPPED | OUTPATIENT
Start: 2024-03-08

## 2024-03-08 RX ORDER — LISINOPRIL 20 MG/1
TABLET ORAL
Qty: 90 TABLET | Refills: 1 | Status: SHIPPED | OUTPATIENT
Start: 2024-03-08

## 2024-03-08 RX ORDER — INSULIN ASPART 100 [IU]/ML
INJECTION, SOLUTION INTRAVENOUS; SUBCUTANEOUS
Qty: 15 ADJUSTABLE DOSE PRE-FILLED PEN SYRINGE | Refills: 1 | Status: SHIPPED | OUTPATIENT
Start: 2024-03-08

## 2024-03-08 NOTE — PROGRESS NOTES
Cindy Soto  Patient's  is 1965  Seen in office on 3/8/2024      SUBJECTIVE:  Cindy stoddard 58 y.o.year old female presents today   Chief Complaint   Patient presents with    Follow-up     Saw Clinic last week, flu (Jose)    Results     Lab review    Medication Refill    Hypertension    Diabetes       Pt states she had influenza A last week and had rash on the body /papules . Healing . Rash does not itch.  Rash is normal lower legs and on the upper extremities and few on the stomach.  Finished Tamiflu yesterday. No chest pain. No SOB. No HA.  Lab results reviewed and discussed    Patient has DM. No hypoglycemia. No numbness or weakness. No dizziness. Blood sugars are good at home. Hga1c has decreased from 13.0 to 7.7.  Patient sees endocrinology Dr. Luna    Patient has hypertension.  BP is elevated today.  Not taking the medications.  Patient is on amlodipine carvedilol and lisinopril    She has hyperlipidemia and needs on atorvastatin 40 mg daily..  Last lipid profile in 2023 Dr. Luna was acceptable.    Patient states she is feeling better.  No chest pain.  No shortness of breath no cough or sputum production.  No abdominal pain.  No nausea vomiting or diarrhea      Taking medications regularly. No side effects noted.    Review of Systems    OBJECTIVE: BP (!) 170/100 (Site: Right Upper Arm, Position: Sitting, Cuff Size: Large Adult)   Pulse 76   Temp 97.4 °F (36.3 °C) (Skin)   Resp 16   Wt (!) 136.3 kg (300 lb 6.4 oz)   SpO2 95%   BMI 48.49 kg/m²     Wt Readings from Last 3 Encounters:   24 (!) 136.3 kg (300 lb 6.4 oz)   24 126.1 kg (278 lb)   23 135.6 kg (299 lb)      GENERAL: - Alert, oriented, pleasant, in no apparent distress.    HEENT: - Conjunctiva pink, no scleral icterus. ENT clear.  NECK: -Supple.  No jugular venous distention noted. No masses felt,  CARDIOVASCULAR: - Normal S1 and S2    PULMONARY: - No respiratory distress.  No wheezes or rales.    ABDOMEN: -

## 2024-03-17 PROBLEM — F51.01 PRIMARY INSOMNIA: Status: RESOLVED | Noted: 2021-03-09 | Resolved: 2024-03-17

## 2024-03-17 PROBLEM — R21 RASH AND NONSPECIFIC SKIN ERUPTION: Status: ACTIVE | Noted: 2024-03-17

## 2024-03-19 ENCOUNTER — TELEPHONE (OUTPATIENT)
Age: 59
End: 2024-03-19

## 2024-03-19 NOTE — TELEPHONE ENCOUNTER
Pa approval denied for invokana- patient must have tried Farxiga, Jardiance and or metformin - patient has had to try 2 of these medications and failed therapy - denial scanned in media

## 2024-03-29 ENCOUNTER — OFFICE VISIT (OUTPATIENT)
Age: 59
End: 2024-03-29
Payer: COMMERCIAL

## 2024-03-29 VITALS
DIASTOLIC BLOOD PRESSURE: 90 MMHG | BODY MASS INDEX: 48.58 KG/M2 | HEART RATE: 92 BPM | SYSTOLIC BLOOD PRESSURE: 158 MMHG | WEIGHT: 293 LBS | OXYGEN SATURATION: 98 % | TEMPERATURE: 97.1 F

## 2024-03-29 DIAGNOSIS — R21 RASH AND NONSPECIFIC SKIN ERUPTION: Primary | ICD-10-CM

## 2024-03-29 PROCEDURE — 3077F SYST BP >= 140 MM HG: CPT | Performed by: INTERNAL MEDICINE

## 2024-03-29 PROCEDURE — 3080F DIAST BP >= 90 MM HG: CPT | Performed by: INTERNAL MEDICINE

## 2024-03-29 PROCEDURE — 99213 OFFICE O/P EST LOW 20 MIN: CPT | Performed by: INTERNAL MEDICINE

## 2024-03-29 NOTE — PROGRESS NOTES
Cindy Soto  Patient's  is 1965  Seen in office on 3/29/2024      SUBJECTIVE:  Cindy stoddard 58 y.o.year old female presents today   Chief Complaint   Patient presents with    Follow-up     \"Vasculitis on extremities\" \" side effect from paxlovid\".     Pt still has rash on the legs and arms   Does not itch.  No pain.  Patient thinks that the side effect of the Paxlovid  Rash has become little darker but has not increased  Taking medications regularly. No side effects noted.    Review of Systems    OBJECTIVE: BP (!) 158/90   Pulse 92   Temp 97.1 °F (36.2 °C) (Temporal)   Wt (!) 136.5 kg (301 lb)   SpO2 98%   BMI 48.58 kg/m²     Wt Readings from Last 3 Encounters:   24 (!) 136.5 kg (301 lb)   24 (!) 136.3 kg (300 lb 6.4 oz)   24 126.1 kg (278 lb)      GENERAL: - Alert, oriented, pleasant, in no apparent distress.    HEENT: - Conjunctiva pink, no scleral icterus. ENT clear.  NECK: -Supple.  No jugular venous distention noted. No masses felt,  CARDIOVASCULAR: - Normal S1 and S2    PULMONARY: - No respiratory distress.  No wheezes or rales.    ABDOMEN: - Soft and non-tender,no masses  ororganomegaly.  EXTREMITIES: - No cyanosis, clubbing, or significant edema.  SKIN: Skin is warm and dry.  Patient has erythematous rash with psoriatic plaques  NEUROLOGICAL: - Cranial nerves II through XII are grossly intact.       Media Information    Document Information    Wound Care Image: Wound      2024 08:35   Attached To:   Office Visit on 3/29/24 with Alayna Puckett MD   Source Information    Alayna Puckett MD  [facility]     IMPRESSION:    Encounter Diagnoses   Name Primary?    Rash and nonspecific skin eruption Yes       ASSESSMENT/PLAN:  Discussed with patient in detail  Etiology of the rash is not clear  Refer patient to dermatologist  Payton Alcantar MA she called [place] [facility] and got an appointment for the patient for next week  Return to office at the end of the month for routine

## 2024-03-29 NOTE — PROGRESS NOTES
Appt made with [facility] with Dr Simin Rooney, Tuesday April 2, 2024 at 0840 hours. LM of same on patient's voicemail.

## 2024-04-30 ENCOUNTER — TELEPHONE (OUTPATIENT)
Dept: CARDIOLOGY CLINIC | Age: 59
End: 2024-04-30

## 2024-04-30 NOTE — TELEPHONE ENCOUNTER
Message was left times 3 to call office to r/s 5/3  Patient advised appt will be cancelled advised to call     To r/s

## 2024-08-19 ENCOUNTER — TELEPHONE (OUTPATIENT)
Age: 59
End: 2024-08-19

## 2024-08-19 RX ORDER — AMLODIPINE BESYLATE 10 MG/1
10 TABLET ORAL DAILY
Qty: 30 TABLET | Refills: 0 | Status: SHIPPED | OUTPATIENT
Start: 2024-08-19

## 2024-09-06 RX ORDER — DULAGLUTIDE 3 MG/.5ML
INJECTION, SOLUTION SUBCUTANEOUS
Qty: 6 ML | Refills: 0 | Status: SHIPPED | OUTPATIENT
Start: 2024-09-06

## 2024-09-09 RX ORDER — AMLODIPINE BESYLATE 10 MG/1
10 TABLET ORAL DAILY
Qty: 30 TABLET | Refills: 0 | Status: SHIPPED | OUTPATIENT
Start: 2024-09-09

## 2024-10-11 RX ORDER — CARVEDILOL 12.5 MG/1
12.5 TABLET ORAL 2 TIMES DAILY
Qty: 180 TABLET | Refills: 0 | OUTPATIENT
Start: 2024-10-11

## 2024-10-11 RX ORDER — LISINOPRIL 20 MG/1
TABLET ORAL
Qty: 90 TABLET | Refills: 0 | OUTPATIENT
Start: 2024-10-11

## 2024-11-19 ENCOUNTER — OFFICE VISIT (OUTPATIENT)
Dept: CARDIOLOGY CLINIC | Age: 59
End: 2024-11-19
Payer: COMMERCIAL

## 2024-11-19 VITALS
BODY MASS INDEX: 47.09 KG/M2 | HEIGHT: 66 IN | SYSTOLIC BLOOD PRESSURE: 144 MMHG | DIASTOLIC BLOOD PRESSURE: 92 MMHG | HEART RATE: 81 BPM | WEIGHT: 293 LBS

## 2024-11-19 DIAGNOSIS — E78.5 DYSLIPIDEMIA: ICD-10-CM

## 2024-11-19 DIAGNOSIS — I10 ESSENTIAL HYPERTENSION: ICD-10-CM

## 2024-11-19 DIAGNOSIS — F17.200 TOBACCO USE DISORDER: ICD-10-CM

## 2024-11-19 DIAGNOSIS — I25.10 CORONARY ARTERY DISEASE INVOLVING NATIVE CORONARY ARTERY OF NATIVE HEART WITHOUT ANGINA PECTORIS: Primary | ICD-10-CM

## 2024-11-19 PROCEDURE — 3077F SYST BP >= 140 MM HG: CPT | Performed by: NURSE PRACTITIONER

## 2024-11-19 PROCEDURE — 3080F DIAST BP >= 90 MM HG: CPT | Performed by: NURSE PRACTITIONER

## 2024-11-19 PROCEDURE — 99214 OFFICE O/P EST MOD 30 MIN: CPT | Performed by: NURSE PRACTITIONER

## 2024-11-19 RX ORDER — LISINOPRIL 40 MG/1
40 TABLET ORAL DAILY
Qty: 90 TABLET | Refills: 3 | Status: SHIPPED | OUTPATIENT
Start: 2024-11-19

## 2024-11-19 ASSESSMENT — ENCOUNTER SYMPTOMS
ORTHOPNEA: 0
SHORTNESS OF BREATH: 1

## 2024-11-19 NOTE — PROGRESS NOTES
11/19/2024  Primary cardiologist: Dr. Ca    CC:   Cindy  is an established 59 y.o.  female here for a follow up on coronary artery disease      SUBJECTIVE/OBJECTIVE:  Cindy is a 59 y.o. female with a history of coronary artery disease, PCI, hypertension, hyperlipidemia, diabetes mellitus, GERD, GIB and obesity  In November 2019 Cindy underwent kissing stent to the mid LAD and ostium of the diagonal.     HPI:  Cindy reports she has been feeling well.  No chest pain or shortness of breath.  Trying to keep her self active.  Now walking on a treadmill for about 10 minutes a day.  She has lost 6 pounds since her last visit.    Review of Systems   Constitutional: Negative for diaphoresis and malaise/fatigue.   Cardiovascular:  Negative for chest pain, claudication, dyspnea on exertion, irregular heartbeat, leg swelling, near-syncope, orthopnea, palpitations and paroxysmal nocturnal dyspnea.   Respiratory:  Positive for shortness of breath.    Neurological:  Negative for dizziness and light-headedness.       Vitals:    11/19/24 1305 11/19/24 1310   BP: (!) 142/98 (!) 144/92   Site: Left Upper Arm Left Upper Arm   Position: Sitting Sitting   Cuff Size: Large Adult Large Adult   Pulse: 81    Weight: 134.2 kg (295 lb 12.8 oz)    Height: 1.676 m (5' 6\")      Wt Readings from Last 3 Encounters:   11/19/24 134.2 kg (295 lb 12.8 oz)   03/29/24 (!) 136.5 kg (301 lb)   03/08/24 (!) 136.3 kg (300 lb 6.4 oz)      Body mass index is 47.74 kg/m².     Physical Exam  Vitals reviewed.   Constitutional:       Appearance: She is obese.   Eyes:      Pupils: Pupils are equal, round, and reactive to light.   Neck:      Vascular: No carotid bruit.   Cardiovascular:      Rate and Rhythm: Normal rate and regular rhythm.      Pulses: Normal pulses.   Pulmonary:      Effort: Pulmonary effort is normal.      Breath sounds: Normal breath sounds. No rales.   Chest:      Chest wall: No tenderness.   Musculoskeletal:      Right lower leg: No edema.

## 2024-11-26 RX ORDER — AMLODIPINE BESYLATE 10 MG/1
10 TABLET ORAL DAILY
Qty: 30 TABLET | Refills: 0 | Status: SHIPPED | OUTPATIENT
Start: 2024-11-26

## 2024-11-27 RX ORDER — ATORVASTATIN CALCIUM 40 MG/1
40 TABLET, FILM COATED ORAL DAILY
Qty: 60 TABLET | Refills: 0 | Status: SHIPPED | OUTPATIENT
Start: 2024-11-27

## 2024-11-27 RX ORDER — CARVEDILOL 12.5 MG/1
12.5 TABLET ORAL 2 TIMES DAILY
Qty: 60 TABLET | Refills: 0 | Status: SHIPPED | OUTPATIENT
Start: 2024-11-27

## 2024-11-27 RX ORDER — OMEGA-3S/DHA/EPA/FISH OIL/D3 300MG-1000
800 CAPSULE ORAL DAILY
Qty: 60 TABLET | Refills: 0 | Status: SHIPPED | OUTPATIENT
Start: 2024-11-27

## 2024-12-15 SDOH — ECONOMIC STABILITY: FOOD INSECURITY: WITHIN THE PAST 12 MONTHS, YOU WORRIED THAT YOUR FOOD WOULD RUN OUT BEFORE YOU GOT MONEY TO BUY MORE.: NEVER TRUE

## 2024-12-15 SDOH — ECONOMIC STABILITY: INCOME INSECURITY: HOW HARD IS IT FOR YOU TO PAY FOR THE VERY BASICS LIKE FOOD, HOUSING, MEDICAL CARE, AND HEATING?: NOT HARD AT ALL

## 2024-12-15 SDOH — ECONOMIC STABILITY: TRANSPORTATION INSECURITY
IN THE PAST 12 MONTHS, HAS LACK OF TRANSPORTATION KEPT YOU FROM MEETINGS, WORK, OR FROM GETTING THINGS NEEDED FOR DAILY LIVING?: NO

## 2024-12-15 SDOH — ECONOMIC STABILITY: FOOD INSECURITY: WITHIN THE PAST 12 MONTHS, THE FOOD YOU BOUGHT JUST DIDN'T LAST AND YOU DIDN'T HAVE MONEY TO GET MORE.: NEVER TRUE

## 2024-12-23 RX ORDER — AMLODIPINE BESYLATE 10 MG/1
10 TABLET ORAL DAILY
Qty: 30 TABLET | Refills: 1 | Status: SHIPPED | OUTPATIENT
Start: 2024-12-23

## 2025-01-13 ENCOUNTER — OFFICE VISIT (OUTPATIENT)
Age: 60
End: 2025-01-13
Payer: COMMERCIAL

## 2025-01-13 VITALS
HEART RATE: 96 BPM | SYSTOLIC BLOOD PRESSURE: 140 MMHG | DIASTOLIC BLOOD PRESSURE: 88 MMHG | OXYGEN SATURATION: 92 % | BODY MASS INDEX: 47.87 KG/M2 | WEIGHT: 293 LBS | TEMPERATURE: 97.7 F | RESPIRATION RATE: 16 BRPM

## 2025-01-13 DIAGNOSIS — E11.69 TYPE 2 DIABETES MELLITUS WITH OBESITY (HCC): ICD-10-CM

## 2025-01-13 DIAGNOSIS — I10 ESSENTIAL HYPERTENSION: ICD-10-CM

## 2025-01-13 DIAGNOSIS — I25.10 CORONARY ARTERY DISEASE INVOLVING NATIVE CORONARY ARTERY OF NATIVE HEART WITHOUT ANGINA PECTORIS: ICD-10-CM

## 2025-01-13 DIAGNOSIS — Z01.419 WELL FEMALE EXAM WITH ROUTINE GYNECOLOGICAL EXAM: ICD-10-CM

## 2025-01-13 DIAGNOSIS — E78.5 DYSLIPIDEMIA: Primary | ICD-10-CM

## 2025-01-13 DIAGNOSIS — E66.9 TYPE 2 DIABETES MELLITUS WITH OBESITY (HCC): ICD-10-CM

## 2025-01-13 DIAGNOSIS — E66.01 MORBID OBESITY: ICD-10-CM

## 2025-01-13 DIAGNOSIS — K26.9 DUODENAL ULCER: ICD-10-CM

## 2025-01-13 DIAGNOSIS — E55.9 VITAMIN D DEFICIENCY: ICD-10-CM

## 2025-01-13 LAB — HBA1C MFR BLD: 9 %

## 2025-01-13 PROCEDURE — 3079F DIAST BP 80-89 MM HG: CPT | Performed by: INTERNAL MEDICINE

## 2025-01-13 PROCEDURE — 3052F HG A1C>EQUAL 8.0%<EQUAL 9.0%: CPT | Performed by: INTERNAL MEDICINE

## 2025-01-13 PROCEDURE — 83036 HEMOGLOBIN GLYCOSYLATED A1C: CPT | Performed by: INTERNAL MEDICINE

## 2025-01-13 PROCEDURE — 99214 OFFICE O/P EST MOD 30 MIN: CPT | Performed by: INTERNAL MEDICINE

## 2025-01-13 PROCEDURE — 3077F SYST BP >= 140 MM HG: CPT | Performed by: INTERNAL MEDICINE

## 2025-01-13 RX ORDER — ATORVASTATIN CALCIUM 40 MG/1
40 TABLET, FILM COATED ORAL DAILY
Qty: 30 TABLET | Refills: 5 | Status: SHIPPED | OUTPATIENT
Start: 2025-01-13

## 2025-01-13 RX ORDER — DAPAGLIFLOZIN 10 MG/1
1 TABLET, FILM COATED ORAL
COMMUNITY
Start: 2024-11-20 | End: 2025-01-13 | Stop reason: SDUPTHER

## 2025-01-13 RX ORDER — CARVEDILOL 12.5 MG/1
12.5 TABLET ORAL 2 TIMES DAILY
Qty: 60 TABLET | Refills: 5 | Status: SHIPPED | OUTPATIENT
Start: 2025-01-13

## 2025-01-13 RX ORDER — DAPAGLIFLOZIN 10 MG/1
10 TABLET, FILM COATED ORAL EVERY MORNING
Qty: 30 TABLET | Refills: 5 | Status: SHIPPED | OUTPATIENT
Start: 2025-01-13

## 2025-01-13 RX ORDER — AMLODIPINE BESYLATE 10 MG/1
10 TABLET ORAL DAILY
Qty: 30 TABLET | Refills: 5 | Status: SHIPPED | OUTPATIENT
Start: 2025-01-13

## 2025-01-13 SDOH — ECONOMIC STABILITY: FOOD INSECURITY: WITHIN THE PAST 12 MONTHS, YOU WORRIED THAT YOUR FOOD WOULD RUN OUT BEFORE YOU GOT MONEY TO BUY MORE.: NEVER TRUE

## 2025-01-13 SDOH — ECONOMIC STABILITY: FOOD INSECURITY: WITHIN THE PAST 12 MONTHS, THE FOOD YOU BOUGHT JUST DIDN'T LAST AND YOU DIDN'T HAVE MONEY TO GET MORE.: NEVER TRUE

## 2025-01-13 ASSESSMENT — PATIENT HEALTH QUESTIONNAIRE - PHQ9
1. LITTLE INTEREST OR PLEASURE IN DOING THINGS: NOT AT ALL
SUM OF ALL RESPONSES TO PHQ QUESTIONS 1-9: 0
SUM OF ALL RESPONSES TO PHQ9 QUESTIONS 1 & 2: 0
SUM OF ALL RESPONSES TO PHQ QUESTIONS 1-9: 0
2. FEELING DOWN, DEPRESSED OR HOPELESS: NOT AT ALL

## 2025-01-13 NOTE — PROGRESS NOTES
Current packs/day: 0.50     Average packs/day: 0.5 packs/day for 40.0 years (20.0 ttl pk-yrs)     Types: Cigarettes    Smokeless tobacco: Never    Tobacco comments:     Pt states a pack every two days    Substance Use Topics    Alcohol use: Never     Comment: Caffeine: 1-2 cup coffee daily       LAB REVIEW:  CBC:   Lab Results   Component Value Date/Time    WBC 7.5 03/01/2024 10:20 AM    HGB 13.8 03/01/2024 10:20 AM    HCT 44.7 03/01/2024 10:20 AM     03/01/2024 10:20 AM     Lipids:   Lab Results   Component Value Date    HDL 40 02/10/2023    LDLDIRECT 86 03/19/2021    TRIGLYCFAST 190 (H) 03/19/2021    CHOLFAST 144 03/19/2021     Renal:   Lab Results   Component Value Date/Time    BUN 9 03/01/2024 10:20 AM    CREATININE 0.8 03/01/2024 10:20 AM     03/01/2024 10:20 AM    K 4.9 03/01/2024 10:20 AM    ALKPHOS 159 03/01/2024 10:20 AM    ALT 29 03/01/2024 10:20 AM    AST 39 03/01/2024 10:20 AM    GLUCOSE 107 03/01/2024 10:20 AM    GLUF 180 03/19/2021 09:00 AM     PT/INR:   Lab Results   Component Value Date/Time    INR 1.04 02/13/2021 05:30 AM     A1C:   Lab Results   Component Value Date    LABA1C 7.7 (H) 03/01/2024           Alayna Puckett MD, 1/13/2025 , 3:54 PM

## 2025-01-14 LAB
CREAT UR-MCNC: 50.7 MG/DL (ref 28–259)
MICROALBUMIN UR DL<=1MG/L-MCNC: 5.54 MG/DL
MICROALBUMIN/CREAT UR: 109.3 MG/G (ref 0–30)

## 2025-02-24 NOTE — PROGRESS NOTES
Debridement:Excisional Debridement    Using curette the wound(s) was/were sharply debrided down through and including the removal of subcutaneous tissue. Devitalized Tissue Debrided:  biofilm and slough    Pre Debridement Measurements:  Are located in the Wound Documentation Flow Sheet    All active wounds listed below with today's date are evaluated  Wound(s)    debrided this date include # : 1     Post  Debridement Measurements:  Wound 03/07/23 Breast Left; Lower #1 (Active)   Wound Image   04/11/23 0849   Wound Etiology Non-Healing Surgical 05/16/23 0907   Dressing Status New dressing applied 05/02/23 0931   Wound Cleansed Wound cleanser 05/16/23 0907   Offloading for Diabetic Foot Ulcers Offloading not ordered 05/16/23 0907   Wound Length (cm) 0.3 cm 05/16/23 0907   Wound Width (cm) 0.5 cm 05/16/23 0907   Wound Depth (cm) 1 cm 05/16/23 0907   Wound Surface Area (cm^2) 0.15 cm^2 05/16/23 0907   Change in Wound Size % (l*w) 95.99 05/16/23 0907   Wound Volume (cm^3) 0.15 cm^3 05/16/23 0907   Wound Healing % 98 05/16/23 0907   Post-Procedure Length (cm) 0.3 cm 05/16/23 0924   Post-Procedure Width (cm) 0.5 cm 05/16/23 0924   Post-Procedure Depth (cm) 1 cm 05/16/23 0924   Post-Procedure Surface Area (cm^2) 0.15 cm^2 05/16/23 0924   Post-Procedure Volume (cm^3) 0.15 cm^3 05/16/23 0924   Distance Tunneling (cm) 0 cm 05/16/23 0907   Tunneling Position ___ O'Clock 0 05/16/23 0907   Undermining Starts ___ O'Clock 0 05/16/23 0907   Undermining Ends___ O'Clock 0 05/16/23 0907   Undermining Maxium Distance (cm) 0 05/16/23 0907   Wound Assessment Pink/red 05/16/23 0907   Drainage Amount Moderate 05/16/23 0907   Drainage Description Yellow 05/16/23 0907   Odor None 05/16/23 0907   Dolores-wound Assessment Intact 05/16/23 0907   Margins Defined edges 05/16/23 0907   Wound Thickness Description not for Pressure Injury Full thickness 05/16/23 0907   Number of days: 69       Percent of Wound(s) Debrided: approximately none

## 2025-02-25 RX ORDER — LISINOPRIL 20 MG/1
TABLET ORAL
Qty: 90 TABLET | Refills: 3 | OUTPATIENT
Start: 2025-02-25

## 2025-05-08 DIAGNOSIS — E55.9 VITAMIN D DEFICIENCY: Primary | ICD-10-CM

## 2025-05-08 RX ORDER — OMEGA-3S/DHA/EPA/FISH OIL/D3 300MG-1000
800 CAPSULE ORAL DAILY
Qty: 60 TABLET | Refills: 0 | OUTPATIENT
Start: 2025-05-08

## 2025-05-08 NOTE — TELEPHONE ENCOUNTER
Vitamin D Protocol Syxlmg8805/08/2025 07:56 AM   Protocol Details Last Vitamin D level normal, within the past 12 months   Medication:   Requested Prescriptions     Pending Prescriptions Disp Refills    VITAMIN D3 10 MCG (400 UNIT) TABS tablet [Pharmacy Med Name: VITAMIN D3 (CHOLECAL) 400 UNIT TAB] 60 tablet 0     Sig: TAKE 2 TABLETS BY MOUTH DAILY        Last Filled:  11/27/2024 with no refills for 30 days ( no record of Vitamin D level being checked)    Patient Phone Number: [phone] (home) [phone] (work)    Last appt: 1/13/2025   Next appt: no return appt was to schedule for 3 mfu    Last OARRS:        No data to display              My chart message sent to patient to have lab drawn and schedule fu appt

## 2025-06-04 ENCOUNTER — OFFICE VISIT (OUTPATIENT)
Dept: CARDIOLOGY CLINIC | Age: 60
End: 2025-06-04
Payer: COMMERCIAL

## 2025-06-04 VITALS
WEIGHT: 279.2 LBS | BODY MASS INDEX: 44.87 KG/M2 | HEART RATE: 90 BPM | SYSTOLIC BLOOD PRESSURE: 124 MMHG | DIASTOLIC BLOOD PRESSURE: 70 MMHG | HEIGHT: 66 IN

## 2025-06-04 DIAGNOSIS — E78.5 DYSLIPIDEMIA: ICD-10-CM

## 2025-06-04 DIAGNOSIS — E66.9 TYPE 2 DIABETES MELLITUS WITH OBESITY (HCC): ICD-10-CM

## 2025-06-04 DIAGNOSIS — I10 ESSENTIAL HYPERTENSION: ICD-10-CM

## 2025-06-04 DIAGNOSIS — E66.01 MORBID OBESITY (HCC): ICD-10-CM

## 2025-06-04 DIAGNOSIS — E11.69 TYPE 2 DIABETES MELLITUS WITH OBESITY (HCC): ICD-10-CM

## 2025-06-04 DIAGNOSIS — I25.10 CORONARY ARTERY DISEASE INVOLVING NATIVE CORONARY ARTERY OF NATIVE HEART WITHOUT ANGINA PECTORIS: Primary | ICD-10-CM

## 2025-06-04 PROCEDURE — 99214 OFFICE O/P EST MOD 30 MIN: CPT | Performed by: NURSE PRACTITIONER

## 2025-06-04 PROCEDURE — 3074F SYST BP LT 130 MM HG: CPT | Performed by: NURSE PRACTITIONER

## 2025-06-04 PROCEDURE — 3078F DIAST BP <80 MM HG: CPT | Performed by: NURSE PRACTITIONER

## 2025-06-04 PROCEDURE — 3052F HG A1C>EQUAL 8.0%<EQUAL 9.0%: CPT | Performed by: NURSE PRACTITIONER

## 2025-06-04 PROCEDURE — 93000 ELECTROCARDIOGRAM COMPLETE: CPT | Performed by: NURSE PRACTITIONER

## 2025-06-04 RX ORDER — LISINOPRIL 40 MG/1
40 TABLET ORAL DAILY
Qty: 90 TABLET | Refills: 3 | Status: SHIPPED | OUTPATIENT
Start: 2025-06-04

## 2025-06-04 RX ORDER — CARVEDILOL 12.5 MG/1
12.5 TABLET ORAL 2 TIMES DAILY
Qty: 180 TABLET | Refills: 3 | Status: SHIPPED | OUTPATIENT
Start: 2025-06-04

## 2025-06-04 RX ORDER — AMLODIPINE BESYLATE 10 MG/1
10 TABLET ORAL DAILY
Qty: 90 TABLET | Refills: 3 | Status: SHIPPED | OUTPATIENT
Start: 2025-06-04

## 2025-06-04 RX ORDER — ATORVASTATIN CALCIUM 40 MG/1
40 TABLET, FILM COATED ORAL DAILY
Qty: 90 TABLET | Refills: 3 | Status: SHIPPED | OUTPATIENT
Start: 2025-06-04

## 2025-06-04 ASSESSMENT — ENCOUNTER SYMPTOMS
SHORTNESS OF BREATH: 0
ORTHOPNEA: 0

## 2025-06-04 NOTE — PATIENT INSTRUCTIONS
Thank you for allowing us to care for you today!   We want to ensure we can follow your treatment plan and we strive to give you the best outcomes and experience possible.   If you ever have a life threatening emergency and call 911 - for an ambulance (EMS)  REMEMBER  Our providers can only care for you at:   [facility] or [facility]   Even if you have someone take you or you drive yourself we can only care for you in a [facility] facility. Our providers are not setup at the other healthcare locations!    PLEASE CALL OUR OFFICE DURING NORMAL BUSINESS HOURS  Monday through Friday 8 am to 5 pm  AFTER HOURS the physician on-call cannot help with scheduling, rescheduling, procedure instruction questions or any type of medication need or issue.  [facility] P:[phone] - [facility] P:[phone] - [facility] P:[phone]      If you receive a survey:  We would appreciate you taking the time to share your experience concerning your provider visit in the office.    These surveys are confidential!  We are eager to improve and are counting on you to share your feedback so we can ensure you get the best care possible.

## 2025-06-04 NOTE — PROGRESS NOTES
CLINICAL STAFF DOCUMENTATION    Willa Harvey, ALDO     Cindy Soto  1965  [id number]    Have you had any Chest Pain recently? - No  Have you had any Shortness of Breath - No  Have you had any dizziness - No  Have you had any palpitations recently? - No  Do you have any edema - swelling in No      When did you have your last labs drawn 3/24  What doctor ordered [place]  Do we have the labs in their chart Yes  If we do not have the labs, ask where they were drawn     Do you need any prescriptions refilled? - Yes    Do you have a surgery or procedure scheduled in the near future - No    Do use tobacco products? - Yes - 1/2 pack daily  Do you drink alcohol? - No  Do you use any illicit drugs? - No  Caffeine? - Yes  How much caffeine? 2 cups of coffee daily     Check medication list thoroughly!!! AND RECONCILE OUTSIDE MEDICATIONS  If dose has changed change the entire order not just the MG  BE SURE TO ASK PATIENT IF THEY NEED MEDICATION REFILLS  Verify Pharmacy and update if incorrect  
place, and time.                Current Outpatient Medications   Medication Sig Dispense Refill    amLODIPine (NORVASC) 10 MG tablet Take 1 tablet by mouth daily 30 tablet 5    atorvastatin (LIPITOR) 40 MG tablet Take 1 tablet by mouth daily 30 tablet 5    carvedilol (COREG) 12.5 MG tablet Take 1 tablet by mouth 2 times daily 60 tablet 5    dapagliflozin (FARXIGA) 10 MG tablet Take 1 tablet by mouth every morning 30 tablet 5    cholecalciferol (VITAMIN D3) 400 UNIT TABS tablet Take 2 tablets by mouth daily 60 tablet 0    OMEPRAZOLE PO Take by mouth      lisinopril (PRINIVIL;ZESTRIL) 40 MG tablet Take 1 tablet by mouth daily 90 tablet 3    Dulaglutide (TRULICITY) 3 MG/0.5ML SOPN Inject 3 mg under the skin once weekly. 6 mL 0    insulin aspart (NOVOLOG FLEXPEN) 100 UNIT/ML injection pen Take 20 units with each meal in addition to sliding scale as below   *Medium Dose Correction Algorithm**Insulin: 3 TIMES DAILY WITH MEALSGlucose: Dose: No [id number] 2 [id number] 4 [id number] 6 [id number] 8 Odtge455-017 10 Hizsw553 and above 12 Units 15 Adjustable Dose Pre-filled Pen Syringe 1    insulin glargine (LANTUS SOLOSTAR) 100 UNIT/ML injection pen Inject 36 Units into the skin nightly 15 Adjustable Dose Pre-filled Pen Syringe 1    aspirin (ASPIRIN ADULT LOW STRENGTH) 81 MG EC tablet Take 1 tablet by mouth daily 30 tablet 4    blood glucose monitor kit and supplies Test 4 times a day before meals and bedtime& as needed for symptoms of irregular blood glucose. 1 kit 0    Insulin Pen Needle (PEN NEEDLES) 31G X 5 MM MISC lantus QHS and Novolog QAC and  each 2    Lancets MISC 1 each by Does not apply route 4 times daily 200 each 2    blood glucose monitor strips Test 4 times a day & as needed for symptoms of irregular blood glucose. 200 strip 2     No current facility-administered medications for this visit.          All pertinent data reviewed and discussed with patient       ASSESSMENT/PLAN:    Coronary

## (undated) DEVICE — TUBE CULTURE LF UNIFORM BOTTOM STER

## (undated) DEVICE — CULTURETTE SPEC COLL AND TRNSPRT HNDL L5.25IN TAMP EVIDENT

## (undated) DEVICE — SHEET PT TRANSFER 80X40 IN LAT AIR NYL GRY COMFORT GLIDE

## (undated) DEVICE — BANDAGE,GAUZE,BULKEE II,4.5"X4.1YD,STRL: Brand: MEDLINE

## (undated) DEVICE — SUTURE PROL SZ 4-0 L18IN NONABSORBABLE BLU L13MM P-3 3/8 8699G

## (undated) DEVICE — APPLICATOR MEDICATED 26 CC SOLUTION HI LT ORNG CHLORAPREP

## (undated) DEVICE — Z DISCONTINUED (USE MFG CAT MVABO)  TUBING GAS SAMPLING STD 6.5 FT FEMALE CONN SMRT CAPNOLINE

## (undated) DEVICE — TRAY PREP DRY W/ PREM GLV 2 APPL 6 SPNG 2 UNDPD 1 OVERWRAP

## (undated) DEVICE — GLOVE SURG SZ 65 THK91MIL LTX FREE SYN POLYISOPRENE

## (undated) DEVICE — GLOVE ORANGE PI 8   MSG9080

## (undated) DEVICE — GLOVE SURG SZ 6 THK91MIL LTX FREE SYN POLYISOPRENE ANTI

## (undated) DEVICE — SYRINGE IRRIG 60ML SFT PLIABLE BLB EZ TO GRP 1 HND USE W/

## (undated) DEVICE — PAD,ABDOMINAL,5"X9",ST,LF,25/BX: Brand: MEDLINE INDUSTRIES, INC.